# Patient Record
Sex: FEMALE | Race: BLACK OR AFRICAN AMERICAN | NOT HISPANIC OR LATINO | URBAN - METROPOLITAN AREA
[De-identification: names, ages, dates, MRNs, and addresses within clinical notes are randomized per-mention and may not be internally consistent; named-entity substitution may affect disease eponyms.]

---

## 2024-10-21 ENCOUNTER — OFFICE VISIT (OUTPATIENT)
Dept: URGENT CARE | Age: 39
End: 2024-10-21
Payer: COMMERCIAL

## 2024-10-21 ENCOUNTER — HOSPITAL ENCOUNTER (EMERGENCY)
Facility: HOSPITAL | Age: 39
Discharge: OTHER INSTITUTION | End: 2024-10-21
Payer: COMMERCIAL

## 2024-10-21 VITALS
OXYGEN SATURATION: 90 % | BODY MASS INDEX: 25.25 KG/M2 | TEMPERATURE: 98.2 F | WEIGHT: 147 LBS | RESPIRATION RATE: 16 BRPM | HEART RATE: 65 BPM | DIASTOLIC BLOOD PRESSURE: 76 MMHG | SYSTOLIC BLOOD PRESSURE: 123 MMHG

## 2024-10-21 VITALS
DIASTOLIC BLOOD PRESSURE: 85 MMHG | BODY MASS INDEX: 25.25 KG/M2 | TEMPERATURE: 97.5 F | OXYGEN SATURATION: 94 % | RESPIRATION RATE: 18 BRPM | WEIGHT: 147 LBS | SYSTOLIC BLOOD PRESSURE: 112 MMHG | HEART RATE: 78 BPM

## 2024-10-21 DIAGNOSIS — R10.11 RIGHT UPPER QUADRANT PAIN: Primary | ICD-10-CM

## 2024-10-21 LAB
POC APPEARANCE, URINE: CLEAR
POC BILIRUBIN, URINE: NEGATIVE
POC BLOOD, URINE: NEGATIVE
POC COLOR, URINE: ABNORMAL
POC GLUCOSE, URINE: NEGATIVE MG/DL
POC KETONES, URINE: NEGATIVE MG/DL
POC LEUKOCYTES, URINE: ABNORMAL
POC NITRITE,URINE: NEGATIVE
POC PH, URINE: 5.5 PH
POC PROTEIN, URINE: NEGATIVE MG/DL
POC SPECIFIC GRAVITY, URINE: 1.02
POC UROBILINOGEN, URINE: 1 EU/DL

## 2024-10-21 PROCEDURE — 99203 OFFICE O/P NEW LOW 30 MIN: CPT | Performed by: FAMILY MEDICINE

## 2024-10-21 PROCEDURE — 81003 URINALYSIS AUTO W/O SCOPE: CPT | Performed by: FAMILY MEDICINE

## 2024-10-21 PROCEDURE — 99281 EMR DPT VST MAYX REQ PHY/QHP: CPT

## 2024-10-21 RX ORDER — FONDAPARINUX SODIUM 7.5 MG/.6ML
7.5 INJECTION SUBCUTANEOUS DAILY
COMMUNITY

## 2024-10-21 ASSESSMENT — ENCOUNTER SYMPTOMS
BACK PAIN: 0
DIZZINESS: 0
BLOOD IN STOOL: 0
FEVER: 0
HEADACHES: 0
CONSTIPATION: 0
ARTHRALGIAS: 0
COUGH: 0
FLANK PAIN: 0
WHEEZING: 0
DYSURIA: 0
VOMITING: 0
VOICE CHANGE: 0
APNEA: 0
ABDOMINAL PAIN: 1
BRUISES/BLEEDS EASILY: 1
SINUS PRESSURE: 0
CHILLS: 0
NAUSEA: 0
DIARRHEA: 0
MYALGIAS: 0

## 2024-10-21 ASSESSMENT — PAIN DESCRIPTION - DESCRIPTORS: DESCRIPTORS: ACHING

## 2024-10-21 ASSESSMENT — PAIN - FUNCTIONAL ASSESSMENT: PAIN_FUNCTIONAL_ASSESSMENT: 0-10

## 2024-10-21 ASSESSMENT — PAIN DESCRIPTION - LOCATION: LOCATION: OTHER (COMMENT)

## 2024-10-21 ASSESSMENT — COLUMBIA-SUICIDE SEVERITY RATING SCALE - C-SSRS
2. HAVE YOU ACTUALLY HAD ANY THOUGHTS OF KILLING YOURSELF?: NO
1. IN THE PAST MONTH, HAVE YOU WISHED YOU WERE DEAD OR WISHED YOU COULD GO TO SLEEP AND NOT WAKE UP?: NO
6. HAVE YOU EVER DONE ANYTHING, STARTED TO DO ANYTHING, OR PREPARED TO DO ANYTHING TO END YOUR LIFE?: NO

## 2024-10-21 ASSESSMENT — PAIN DESCRIPTION - PAIN TYPE: TYPE: ACUTE PAIN

## 2024-10-21 ASSESSMENT — PAIN SCALES - GENERAL
PAINLEVEL_OUTOF10: 8
PAINLEVEL_OUTOF10: 7

## 2024-10-21 ASSESSMENT — PAIN DESCRIPTION - ORIENTATION: ORIENTATION: RIGHT

## 2024-10-21 NOTE — PATIENT INSTRUCTIONS
Based on clinical exam, patient's history, 39-year-old with complex chronic liver failure  medical condition including antiphospholipid syndrome on fondaparinux, with right upper quadrant pain needs evaluation in the emergency room with additional test and imaging studies.  I discussed with her follow-up with her hematologist, Dr. Motley of left a voicemail.  She can call the office at [phone] to schedule follow-up with Dr. Motley.

## 2024-10-21 NOTE — PROGRESS NOTES
Subjective   Patient ID: Gissel Harvey is a 39 y.o. female. They present today with a chief complaint of Illness (Rt side upper abd pain 1 week hx lupus) and Abdominal Pain.    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 Not seen by hepatology in past 3 years.       Illness  Associated symptoms: abdominal pain    Associated symptoms: no chest pain, no cough, no diarrhea, no fever, no headaches, no myalgias, no nausea, no vomiting and no wheezing    Abdominal Pain  Pertinent negatives include no arthralgias, constipation, diarrhea, dysuria, fever, headaches, myalgias, nausea or vomiting.       Past Medical History  Allergies as of 10/21/2024 - Reviewed 10/21/2024   Allergen Reaction Noted    Heparin Hives and Unknown 02/03/2008       (Not in a hospital admission)       No past medical history on file.    No past surgical history on file.     reports that she has been smoking cigarettes. She has never used smokeless tobacco.    Review of Systems  Review of Systems   Constitutional:  Negative for chills and fever.   HENT:  Negative for sinus pressure and voice change.    Respiratory:  Negative for apnea, cough and wheezing.    Cardiovascular:  Negative for chest pain.   Gastrointestinal:  Positive for abdominal pain. Negative for blood in stool, constipation, diarrhea, nausea and vomiting.   Genitourinary:  Negative for dysuria, flank pain and urgency.   Musculoskeletal:  Negative for arthralgias, back pain and myalgias.   Allergic/Immunologic: Positive for immunocompromised state.   Neurological:  Negative for dizziness and headaches.   Hematological:  Bruises/bleeds easily.                                   Objective    Vitals:    10/21/24 1325   BP: 123/76   Pulse: 65   Resp: 16   Temp: 36.8 °C (98.2 °F)   SpO2: 90%   Weight: 66.7 kg (147 lb)     No LMP recorded.    Physical Exam  Vitals and nursing note reviewed.   Constitutional:       General: She is not in acute distress.     Appearance: She is ill-appearing. She is not toxic-appearing or diaphoretic.   HENT:      Mouth/Throat:      Mouth: Mucous membranes are dry.   Eyes:      General: Scleral icterus present.   Cardiovascular:      Rate and Rhythm: Normal rate and regular rhythm.      Pulses: Normal pulses.      Heart sounds: Normal heart sounds.   Pulmonary:      Effort: Pulmonary effort is normal.      Breath sounds: Normal breath sounds.   Chest:      Chest wall: Tenderness (right lower costochondral junction in the anterior axilklary 10-12th rib) present.   Abdominal:      General: There is distension.      Tenderness: There is abdominal tenderness (engorged superficial veins abdomen, tender RUQ, no rigidity). There is no right CVA tenderness, guarding or rebound.   Musculoskeletal:         General: Normal range of motion.      Cervical back: No rigidity.   Lymphadenopathy:      Cervical: No cervical adenopathy.   Skin:     General: Skin is warm.      Findings: No lesion or rash.   Neurological:      Mental Status: She is alert and oriented to person, place, and time.   Psychiatric:         Mood and Affect: Mood normal.         Procedures    Point of Care Test & Imaging Results from this visit  Results for orders placed or performed in visit on 10/21/24   POCT UA Automated manually resulted   Result Value Ref Range    POC Color, Urine Orange (A) Straw, Yellow, Light-Yellow    POC Appearance, Urine Clear Clear    POC Glucose, Urine NEGATIVE NEGATIVE mg/dl    POC Bilirubin, Urine NEGATIVE NEGATIVE    POC Ketones, Urine NEGATIVE NEGATIVE mg/dl    POC Specific Gravity, Urine 1.025 1.005 - 1.035    POC Blood, Urine NEGATIVE NEGATIVE    POC PH, Urine 5.5 No  Reference Range Established PH    POC Protein, Urine NEGATIVE NEGATIVE, 30 (1+) mg/dl    POC Urobilinogen, Urine 1.0 0.2, 1.0 EU/DL    Poc Nitrite, Urine NEGATIVE NEGATIVE    POC Leukocytes, Urine TRACE (A) NEGATIVE      No results found.    Diagnostic study results (if any) were reviewed by Carlie Anaya.    Assessment/Plan   Allergies, medications, history, and pertinent labs/EKGs/Imaging reviewed by Carlie Anaya.     Medical Decision Making  Based on clinical exam, patient's history, 39-year-old with complex chronic liver failure  medical condition including antiphospholipid syndrome on fondaparinux, with right upper quadrant pain needs evaluation in the emergency room with additional test and imaging studies.  Her urinalysis without blood, trace selena. She's asymptomatic. I discussed with her follow-up with her hematologist, Dr. Motley of left a voicemail.  She can call the office at [phone] to schedule follow-up with Dr. Motley.  Report called to the ER.  Orders and Diagnoses  Diagnoses and all orders for this visit:  Right upper quadrant pain  -     POCT UA Automated manually resulted      Medical Admin Record      Patient disposition: ED    Electronically signed by Carlie Anaya  2:29 PM

## 2024-10-22 NOTE — ED TRIAGE NOTES
Pt here with c/o r flank pain was seen at  pt is in liver failure but this isnt like pt's normal pain.uc sent her here

## 2024-10-22 NOTE — ED TRIAGE NOTES
TRIAGE NOTE   I saw the patient as the Clinician in Triage and performed a brief history and physical exam, established acuity, and ordered appropriate tests to develop basic plan of care. Patient will be seen by an ANAOTLIY, resident and/or physician who will independently evaluate the patient. Please see subsequent provider notes for further details and disposition.     Brief HPI: In brief, Gissel Harvey is a 39 y.o. female that presents for right upper quadrant pain and ascites.  States that she has a history of liver failure and has had worsening pain over the last week and a half.  She has not been seeing anybody for her illness.    Focused Physical exam:   Regular rate and rhythm  Alert and oriented  Right upper quadrant tenderness on palpation with ascites    Plan/MDM:   After discussing plan of care with the patient and ordering lab work and IV patient states that she wants to leave at this time.  States that whenever she comes into the emergency department she gets admitted and at this time is not ready to be admitted.  States that she needs to go home tonight discourse and things away and will come back tomorrow for a workup.  Patient alert and oriented and able to make her own medical decisions.    Please see subsequent provider note for further details and disposition

## 2024-11-02 ENCOUNTER — HOSPITAL ENCOUNTER (EMERGENCY)
Facility: HOSPITAL | Age: 39
Discharge: HOME | End: 2024-11-02
Attending: EMERGENCY MEDICINE
Payer: COMMERCIAL

## 2024-11-02 VITALS
OXYGEN SATURATION: 94 % | HEIGHT: 64 IN | WEIGHT: 147 LBS | HEART RATE: 79 BPM | RESPIRATION RATE: 20 BRPM | BODY MASS INDEX: 25.1 KG/M2 | TEMPERATURE: 97.8 F | DIASTOLIC BLOOD PRESSURE: 86 MMHG | SYSTOLIC BLOOD PRESSURE: 124 MMHG

## 2024-11-02 PROCEDURE — 4500999001 HC ED NO CHARGE

## 2024-11-02 ASSESSMENT — PAIN - FUNCTIONAL ASSESSMENT: PAIN_FUNCTIONAL_ASSESSMENT: 0-10

## 2024-11-02 ASSESSMENT — PAIN SCALES - GENERAL: PAINLEVEL_OUTOF10: 0 - NO PAIN

## 2025-01-01 ENCOUNTER — APPOINTMENT (OUTPATIENT)
Dept: RADIOLOGY | Facility: HOSPITAL | Age: 40
End: 2025-01-01
Payer: COMMERCIAL

## 2025-01-01 ENCOUNTER — APPOINTMENT (OUTPATIENT)
Dept: CARDIOLOGY | Facility: HOSPITAL | Age: 40
End: 2025-01-01
Payer: COMMERCIAL

## 2025-01-01 ENCOUNTER — APPOINTMENT (OUTPATIENT)
Dept: NEUROLOGY | Facility: HOSPITAL | Age: 40
End: 2025-01-01
Payer: COMMERCIAL

## 2025-01-01 ENCOUNTER — APPOINTMENT (OUTPATIENT)
Dept: VASCULAR MEDICINE | Facility: HOSPITAL | Age: 40
End: 2025-01-01
Payer: COMMERCIAL

## 2025-01-01 PROCEDURE — 71045 X-RAY EXAM CHEST 1 VIEW: CPT

## 2025-01-01 PROCEDURE — 93325 DOPPLER ECHO COLOR FLOW MAPG: CPT | Performed by: INTERNAL MEDICINE

## 2025-01-01 PROCEDURE — 76705 ECHO EXAM OF ABDOMEN: CPT

## 2025-01-01 PROCEDURE — 93005 ELECTROCARDIOGRAM TRACING: CPT

## 2025-01-01 PROCEDURE — 93321 DOPPLER ECHO F-UP/LMTD STD: CPT | Performed by: INTERNAL MEDICINE

## 2025-01-01 PROCEDURE — 70553 MRI BRAIN STEM W/O & W/DYE: CPT

## 2025-01-01 PROCEDURE — 74177 CT ABD & PELVIS W/CONTRAST: CPT

## 2025-01-01 PROCEDURE — 71260 CT THORAX DX C+: CPT

## 2025-01-01 PROCEDURE — 70450 CT HEAD/BRAIN W/O DYE: CPT

## 2025-01-01 PROCEDURE — 71275 CT ANGIOGRAPHY CHEST: CPT

## 2025-01-01 PROCEDURE — C8924 2D TTE W OR W/O FOL W/CON,FU: HCPCS

## 2025-01-01 PROCEDURE — 93308 TTE F-UP OR LMTD: CPT | Performed by: INTERNAL MEDICINE

## 2025-03-03 ENCOUNTER — HOSPITAL ENCOUNTER (INPATIENT)
Facility: HOSPITAL | Age: 40
End: 2025-03-03
Attending: EMERGENCY MEDICINE | Admitting: INTERNAL MEDICINE
Payer: COMMERCIAL

## 2025-03-03 DIAGNOSIS — K72.90 LIVER FAILURE WITHOUT HEPATIC COMA, UNSPECIFIED CHRONICITY (MULTI): ICD-10-CM

## 2025-03-03 DIAGNOSIS — R09.02 HYPOXEMIA: Primary | ICD-10-CM

## 2025-03-03 DIAGNOSIS — Z51.5 HOSPICE CARE PATIENT: ICD-10-CM

## 2025-03-03 DIAGNOSIS — R06.03 ACUTE RESPIRATORY DISTRESS: ICD-10-CM

## 2025-03-03 DIAGNOSIS — E16.2 HYPOGLYCEMIA: ICD-10-CM

## 2025-03-03 DIAGNOSIS — J96.01 ACUTE RESPIRATORY FAILURE WITH HYPOXIA (MULTI): ICD-10-CM

## 2025-03-03 DIAGNOSIS — M79.89 LEFT ARM SWELLING: ICD-10-CM

## 2025-03-03 DIAGNOSIS — J18.9 MULTIFOCAL PNEUMONIA: ICD-10-CM

## 2025-03-03 DIAGNOSIS — N17.9 AKI (ACUTE KIDNEY INJURY) (CMS-HCC): ICD-10-CM

## 2025-03-03 DIAGNOSIS — E87.20 LACTIC ACIDOSIS: ICD-10-CM

## 2025-03-03 DIAGNOSIS — R57.9 SHOCK (MULTI): ICD-10-CM

## 2025-03-03 DIAGNOSIS — I95.9 HYPOTENSION, UNSPECIFIED HYPOTENSION TYPE: ICD-10-CM

## 2025-03-03 LAB
ALBUMIN SERPL BCP-MCNC: 3 G/DL (ref 3.4–5)
ALBUMIN SERPL BCP-MCNC: 3.2 G/DL (ref 3.4–5)
ALP SERPL-CCNC: 121 U/L (ref 33–110)
ALP SERPL-CCNC: 153 U/L (ref 33–110)
ALT SERPL W P-5'-P-CCNC: 23 U/L (ref 7–45)
ALT SERPL W P-5'-P-CCNC: 24 U/L (ref 7–45)
AMMONIA PLAS-SCNC: 130 UMOL/L (ref 16–53)
AMPHETAMINES UR QL SCN: ABNORMAL
ANION GAP BLDA CALCULATED.4IONS-SCNC: 13 MMO/L (ref 10–25)
ANION GAP BLDA CALCULATED.4IONS-SCNC: 13 MMO/L (ref 10–25)
ANION GAP BLDA CALCULATED.4IONS-SCNC: 15 MMO/L (ref 10–25)
ANION GAP BLDMV CALCULATED.4IONS-SCNC: 18 MMO/L (ref 10–25)
ANION GAP BLDV CALCULATED.4IONS-SCNC: 12 MMOL/L (ref 10–25)
ANION GAP BLDV CALCULATED.4IONS-SCNC: 14 MMOL/L (ref 10–25)
ANION GAP BLDV CALCULATED.4IONS-SCNC: 17 MMOL/L (ref 10–25)
ANION GAP BLDV CALCULATED.4IONS-SCNC: 20 MMOL/L (ref 10–25)
ANION GAP BLDV CALCULATED.4IONS-SCNC: 21 MMOL/L (ref 10–25)
ANION GAP SERPL CALC-SCNC: 21 MMOL/L (ref 10–20)
ANION GAP SERPL CALC-SCNC: 28 MMOL/L (ref 10–20)
APTT PPP: 35 SECONDS (ref 26–36)
APTT PPP: 45 SECONDS (ref 26–36)
AST SERPL W P-5'-P-CCNC: 52 U/L (ref 9–39)
AST SERPL W P-5'-P-CCNC: 56 U/L (ref 9–39)
B-OH-BUTYR SERPL-SCNC: 0.32 MMOL/L (ref 0.02–0.27)
BARBITURATES UR QL SCN: ABNORMAL
BASE EXCESS BLDA CALC-SCNC: -1.5 MMOL/L (ref -2–3)
BASE EXCESS BLDA CALC-SCNC: -2.6 MMOL/L (ref -2–3)
BASE EXCESS BLDA CALC-SCNC: -6.7 MMOL/L (ref -2–3)
BASE EXCESS BLDMV CALC-SCNC: -9.5 MMOL/L (ref -2–3)
BASE EXCESS BLDV CALC-SCNC: -2.7 MMOL/L (ref -2–3)
BASE EXCESS BLDV CALC-SCNC: -5 MMOL/L (ref -2–3)
BASE EXCESS BLDV CALC-SCNC: -7.3 MMOL/L (ref -2–3)
BASE EXCESS BLDV CALC-SCNC: -9.4 MMOL/L (ref -2–3)
BASE EXCESS BLDV CALC-SCNC: -9.6 MMOL/L (ref -2–3)
BASOPHILS # BLD MANUAL: 0.04 X10*3/UL (ref 0–0.1)
BASOPHILS NFR BLD MANUAL: 0.8 %
BENZODIAZ UR QL SCN: ABNORMAL
BILIRUB SERPL-MCNC: 6.3 MG/DL (ref 0–1.2)
BILIRUB SERPL-MCNC: 6.5 MG/DL (ref 0–1.2)
BNP SERPL-MCNC: 822 PG/ML (ref 0–99)
BODY TEMPERATURE: 37 DEGREES CELSIUS
BUN SERPL-MCNC: 35 MG/DL (ref 6–23)
BUN SERPL-MCNC: 39 MG/DL (ref 6–23)
BURR CELLS BLD QL SMEAR: ABNORMAL
BZE UR QL SCN: ABNORMAL
CA-I BLDA-SCNC: 0.99 MMOL/L (ref 1.1–1.33)
CA-I BLDA-SCNC: 1.03 MMOL/L (ref 1.1–1.33)
CA-I BLDA-SCNC: 1.15 MMOL/L (ref 1.1–1.33)
CA-I BLDMV-SCNC: 1.18 MMOL/L (ref 1.1–1.33)
CA-I BLDV-SCNC: 1.06 MMOL/L (ref 1.1–1.33)
CA-I BLDV-SCNC: 1.13 MMOL/L (ref 1.1–1.33)
CA-I BLDV-SCNC: 1.17 MMOL/L (ref 1.1–1.33)
CALCIUM SERPL-MCNC: 8 MG/DL (ref 8.6–10.6)
CALCIUM SERPL-MCNC: 8.7 MG/DL (ref 8.6–10.6)
CANNABINOIDS UR QL SCN: ABNORMAL
CARDIAC TROPONIN I PNL SERPL HS: 16 NG/L (ref 0–34)
CARDIAC TROPONIN I PNL SERPL HS: 40 NG/L (ref 0–34)
CHLORIDE BLD-SCNC: 106 MMOL/L (ref 98–107)
CHLORIDE BLDA-SCNC: 104 MMOL/L (ref 98–107)
CHLORIDE BLDA-SCNC: 104 MMOL/L (ref 98–107)
CHLORIDE BLDA-SCNC: 105 MMOL/L (ref 98–107)
CHLORIDE BLDV-SCNC: 104 MMOL/L (ref 98–107)
CHLORIDE BLDV-SCNC: 105 MMOL/L (ref 98–107)
CHLORIDE SERPL-SCNC: 101 MMOL/L (ref 98–107)
CHLORIDE SERPL-SCNC: 105 MMOL/L (ref 98–107)
CO2 SERPL-SCNC: 15 MMOL/L (ref 21–32)
CO2 SERPL-SCNC: 26 MMOL/L (ref 21–32)
CREAT SERPL-MCNC: 1.91 MG/DL (ref 0.5–1.05)
CREAT SERPL-MCNC: 1.96 MG/DL (ref 0.5–1.05)
EGFRCR SERPLBLD CKD-EPI 2021: 33 ML/MIN/1.73M*2
EGFRCR SERPLBLD CKD-EPI 2021: 34 ML/MIN/1.73M*2
EOSINOPHIL # BLD MANUAL: 0 X10*3/UL (ref 0–0.7)
EOSINOPHIL NFR BLD MANUAL: 0 %
ERYTHROCYTE [DISTWIDTH] IN BLOOD BY AUTOMATED COUNT: 15.3 % (ref 11.5–14.5)
ETHANOL SERPL-MCNC: <10 MG/DL
FENTANYL+NORFENTANYL UR QL SCN: ABNORMAL
FLUAV RNA RESP QL NAA+PROBE: NOT DETECTED
FLUBV RNA RESP QL NAA+PROBE: NOT DETECTED
GLUCOSE BLD MANUAL STRIP-MCNC: 102 MG/DL (ref 74–99)
GLUCOSE BLD-MCNC: 64 MG/DL (ref 74–99)
GLUCOSE BLDA-MCNC: 121 MG/DL (ref 74–99)
GLUCOSE BLDA-MCNC: 92 MG/DL (ref 74–99)
GLUCOSE BLDA-MCNC: 94 MG/DL (ref 74–99)
GLUCOSE BLDV-MCNC: 104 MG/DL (ref 74–99)
GLUCOSE BLDV-MCNC: 153 MG/DL (ref 74–99)
GLUCOSE BLDV-MCNC: 155 MG/DL (ref 74–99)
GLUCOSE BLDV-MCNC: 43 MG/DL (ref 74–99)
GLUCOSE BLDV-MCNC: 95 MG/DL (ref 74–99)
GLUCOSE SERPL-MCNC: 38 MG/DL (ref 74–99)
GLUCOSE SERPL-MCNC: 90 MG/DL (ref 74–99)
HCG UR QL IA.RAPID: NEGATIVE
HCO3 BLDA-SCNC: 21.9 MMOL/L (ref 22–26)
HCO3 BLDA-SCNC: 24.9 MMOL/L (ref 22–26)
HCO3 BLDA-SCNC: 25.2 MMOL/L (ref 22–26)
HCO3 BLDMV-SCNC: 18.4 MMOL/L (ref 22–26)
HCO3 BLDV-SCNC: 17.6 MMOL/L (ref 22–26)
HCO3 BLDV-SCNC: 18 MMOL/L (ref 22–26)
HCO3 BLDV-SCNC: 22.6 MMOL/L (ref 22–26)
HCO3 BLDV-SCNC: 23 MMOL/L (ref 22–26)
HCO3 BLDV-SCNC: 27 MMOL/L (ref 22–26)
HCT VFR BLD AUTO: 42.4 % (ref 36–46)
HCT VFR BLD EST: 37 % (ref 36–46)
HCT VFR BLD EST: 37 % (ref 36–46)
HCT VFR BLD EST: 39 % (ref 36–46)
HCT VFR BLD EST: 40 % (ref 36–46)
HCT VFR BLD EST: 41 % (ref 36–46)
HCT VFR BLD EST: 42 % (ref 36–46)
HCT VFR BLD EST: 42 % (ref 36–46)
HCT VFR BLD EST: 43 % (ref 36–46)
HCT VFR BLD EST: 44 % (ref 36–46)
HGB BLD-MCNC: 14.4 G/DL (ref 12–16)
HGB BLDA-MCNC: 13.3 G/DL (ref 12–16)
HGB BLDA-MCNC: 14.1 G/DL (ref 12–16)
HGB BLDA-MCNC: 14.5 G/DL (ref 12–16)
HGB BLDMV-MCNC: 12.4 G/DL (ref 12–16)
HGB BLDV-MCNC: 12.2 G/DL (ref 12–16)
HGB BLDV-MCNC: 13.1 G/DL (ref 12–16)
HGB BLDV-MCNC: 13.8 G/DL (ref 12–16)
HGB BLDV-MCNC: 14 G/DL (ref 12–16)
HGB BLDV-MCNC: 14.4 G/DL (ref 12–16)
HIV 1+2 AB+HIV1 P24 AG SERPL QL IA: NONREACTIVE
IMM GRANULOCYTES # BLD AUTO: 0.07 X10*3/UL (ref 0–0.7)
IMM GRANULOCYTES NFR BLD AUTO: 1.5 % (ref 0–0.9)
INHALED O2 CONCENTRATION: 100 %
INR PPP: 2.4 (ref 0.9–1.1)
INR PPP: 2.5 (ref 0.9–1.1)
LACTATE BLDA-SCNC: 5 MMOL/L (ref 0.4–2)
LACTATE BLDA-SCNC: 5.6 MMOL/L (ref 0.4–2)
LACTATE BLDA-SCNC: 6 MMOL/L (ref 0.4–2)
LACTATE BLDMV-SCNC: 7.8 MMOL/L (ref 0.4–2)
LACTATE BLDV-SCNC: 5 MMOL/L (ref 0.4–2)
LACTATE BLDV-SCNC: 6.6 MMOL/L (ref 0.4–2)
LACTATE BLDV-SCNC: 6.9 MMOL/L (ref 0.4–2)
LACTATE BLDV-SCNC: 8.6 MMOL/L (ref 0.4–2)
LACTATE BLDV-SCNC: 8.9 MMOL/L (ref 0.4–2)
LACTATE SERPL-SCNC: 5.7 MMOL/L (ref 0.4–2)
LACTATE SERPL-SCNC: 8.7 MMOL/L (ref 0.4–2)
LYMPHOCYTES # BLD MANUAL: 0.26 X10*3/UL (ref 1.2–4.8)
LYMPHOCYTES NFR BLD MANUAL: 5.4 %
MAGNESIUM SERPL-MCNC: 2.06 MG/DL (ref 1.6–2.4)
MCH RBC QN AUTO: 32.4 PG (ref 26–34)
MCHC RBC AUTO-ENTMCNC: 34 G/DL (ref 32–36)
MCV RBC AUTO: 96 FL (ref 80–100)
METAMYELOCYTES # BLD MANUAL: 0.15 X10*3/UL
METAMYELOCYTES NFR BLD MANUAL: 3.1 %
METHADONE UR QL SCN: ABNORMAL
MONOCYTES # BLD MANUAL: 0.26 X10*3/UL (ref 0.1–1)
MONOCYTES NFR BLD MANUAL: 5.4 %
MYELOCYTES # BLD MANUAL: 0.67 X10*3/UL
MYELOCYTES NFR BLD MANUAL: 14 %
NEUTROPHILS # BLD MANUAL: 3.42 X10*3/UL (ref 1.2–7.7)
NEUTS BAND # BLD MANUAL: 0.04 X10*3/UL (ref 0–0.7)
NEUTS BAND NFR BLD MANUAL: 0.8 %
NEUTS SEG # BLD MANUAL: 3.38 X10*3/UL (ref 1.2–7)
NEUTS SEG NFR BLD MANUAL: 70.5 %
NRBC BLD-RTO: 0 /100 WBCS (ref 0–0)
OPIATES UR QL SCN: ABNORMAL
OXYCODONE+OXYMORPHONE UR QL SCN: ABNORMAL
OXYHGB MFR BLDA: 84.7 % (ref 94–98)
OXYHGB MFR BLDA: 89.8 % (ref 94–98)
OXYHGB MFR BLDA: 93 % (ref 94–98)
OXYHGB MFR BLDMV: 89.2 % (ref 45–75)
OXYHGB MFR BLDV: 53.6 % (ref 45–75)
OXYHGB MFR BLDV: 55.7 % (ref 45–75)
OXYHGB MFR BLDV: 60.8 % (ref 45–75)
OXYHGB MFR BLDV: 72.4 % (ref 45–75)
OXYHGB MFR BLDV: 76.8 % (ref 45–75)
PCO2 BLDA: 49 MM HG (ref 38–42)
PCO2 BLDA: 53 MM HG (ref 38–42)
PCO2 BLDA: 56 MM HG (ref 38–42)
PCO2 BLDMV: 47 MM HG (ref 41–51)
PCO2 BLDV: 42 MM HG (ref 41–51)
PCO2 BLDV: 44 MM HG (ref 41–51)
PCO2 BLDV: 55 MM HG (ref 41–51)
PCO2 BLDV: 65 MM HG (ref 41–51)
PCO2 BLDV: 69 MM HG (ref 41–51)
PCP UR QL SCN: ABNORMAL
PH BLDA: 7.2 PH (ref 7.38–7.42)
PH BLDA: 7.28 PH (ref 7.38–7.42)
PH BLDA: 7.32 PH (ref 7.38–7.42)
PH BLDMV: 7.2 PH (ref 7.33–7.43)
PH BLDV: 7.15 PH (ref 7.33–7.43)
PH BLDV: 7.2 PH (ref 7.33–7.43)
PH BLDV: 7.22 PH (ref 7.33–7.43)
PH BLDV: 7.23 PH (ref 7.33–7.43)
PH BLDV: 7.23 PH (ref 7.33–7.43)
PHOSPHATE SERPL-MCNC: 5.7 MG/DL (ref 2.5–4.9)
PLATELET # BLD AUTO: 106 X10*3/UL (ref 150–450)
PO2 BLDA: 65 MM HG (ref 85–95)
PO2 BLDA: 67 MM HG (ref 85–95)
PO2 BLDA: 73 MM HG (ref 85–95)
PO2 BLDMV: 71 MM HG (ref 35–45)
PO2 BLDV: 39 MM HG (ref 35–45)
PO2 BLDV: 42 MM HG (ref 35–45)
PO2 BLDV: 46 MM HG (ref 35–45)
PO2 BLDV: 50 MM HG (ref 35–45)
PO2 BLDV: 54 MM HG (ref 35–45)
POLYCHROMASIA BLD QL SMEAR: ABNORMAL
POTASSIUM BLDA-SCNC: 3.3 MMOL/L (ref 3.5–5.3)
POTASSIUM BLDMV-SCNC: 3.3 MMOL/L (ref 3.5–5.3)
POTASSIUM BLDV-SCNC: 3 MMOL/L (ref 3.5–5.3)
POTASSIUM BLDV-SCNC: 3.2 MMOL/L (ref 3.5–5.3)
POTASSIUM BLDV-SCNC: 3.3 MMOL/L (ref 3.5–5.3)
POTASSIUM BLDV-SCNC: 3.4 MMOL/L (ref 3.5–5.3)
POTASSIUM BLDV-SCNC: 3.7 MMOL/L (ref 3.5–5.3)
POTASSIUM SERPL-SCNC: 3.5 MMOL/L (ref 3.5–5.3)
POTASSIUM SERPL-SCNC: 3.6 MMOL/L (ref 3.5–5.3)
PROT SERPL-MCNC: 6.6 G/DL (ref 6.4–8.2)
PROT SERPL-MCNC: 6.8 G/DL (ref 6.4–8.2)
PROTHROMBIN TIME: 26.5 SECONDS (ref 9.8–12.4)
PROTHROMBIN TIME: 27.9 SECONDS (ref 9.8–12.4)
RBC # BLD AUTO: 4.44 X10*6/UL (ref 4–5.2)
RBC MORPH BLD: ABNORMAL
RSV RNA RESP QL NAA+PROBE: NOT DETECTED
SAO2 % BLDA: 88 % (ref 94–100)
SAO2 % BLDA: 93 % (ref 94–100)
SAO2 % BLDA: 96 % (ref 94–100)
SAO2 % BLDMV: 92 % (ref 45–75)
SAO2 % BLDV: 56 % (ref 45–75)
SAO2 % BLDV: 57 % (ref 45–75)
SAO2 % BLDV: 63 % (ref 45–75)
SAO2 % BLDV: 75 % (ref 45–75)
SAO2 % BLDV: 79 % (ref 45–75)
SARS-COV-2 RNA RESP QL NAA+PROBE: NOT DETECTED
SODIUM BLDA-SCNC: 139 MMOL/L (ref 136–145)
SODIUM BLDMV-SCNC: 139 MMOL/L (ref 136–145)
SODIUM BLDV-SCNC: 139 MMOL/L (ref 136–145)
SODIUM BLDV-SCNC: 140 MMOL/L (ref 136–145)
SODIUM BLDV-SCNC: 141 MMOL/L (ref 136–145)
SODIUM SERPL-SCNC: 144 MMOL/L (ref 136–145)
SODIUM SERPL-SCNC: 144 MMOL/L (ref 136–145)
TOTAL CELLS COUNTED BLD: 129
TRIGL SERPL-MCNC: 84 MG/DL (ref 0–149)
TSH SERPL-ACNC: 1.55 MIU/L (ref 0.44–3.98)
WBC # BLD AUTO: 4.8 X10*3/UL (ref 4.4–11.3)

## 2025-03-03 PROCEDURE — 99285 EMERGENCY DEPT VISIT HI MDM: CPT | Mod: 25 | Performed by: EMERGENCY MEDICINE

## 2025-03-03 PROCEDURE — 2500000005 HC RX 250 GENERAL PHARMACY W/O HCPCS: Mod: SE

## 2025-03-03 PROCEDURE — 94002 VENT MGMT INPAT INIT DAY: CPT | Mod: CCI

## 2025-03-03 PROCEDURE — 84132 ASSAY OF SERUM POTASSIUM: CPT

## 2025-03-03 PROCEDURE — 85027 COMPLETE CBC AUTOMATED: CPT | Performed by: EMERGENCY MEDICINE

## 2025-03-03 PROCEDURE — 84484 ASSAY OF TROPONIN QUANT: CPT | Performed by: EMERGENCY MEDICINE

## 2025-03-03 PROCEDURE — 82435 ASSAY OF BLOOD CHLORIDE: CPT

## 2025-03-03 PROCEDURE — 87631 RESP VIRUS 3-5 TARGETS: CPT

## 2025-03-03 PROCEDURE — 74177 CT ABD & PELVIS W/CONTRAST: CPT | Performed by: RADIOLOGY

## 2025-03-03 PROCEDURE — 84484 ASSAY OF TROPONIN QUANT: CPT

## 2025-03-03 PROCEDURE — 85007 BL SMEAR W/DIFF WBC COUNT: CPT | Performed by: EMERGENCY MEDICINE

## 2025-03-03 PROCEDURE — 83880 ASSAY OF NATRIURETIC PEPTIDE: CPT

## 2025-03-03 PROCEDURE — 2500000001 HC RX 250 WO HCPCS SELF ADMINISTERED DRUGS (ALT 637 FOR MEDICARE OP): Mod: SE

## 2025-03-03 PROCEDURE — 85576 BLOOD PLATELET AGGREGATION: CPT

## 2025-03-03 PROCEDURE — 87798 DETECT AGENT NOS DNA AMP: CPT

## 2025-03-03 PROCEDURE — 31500 INSERT EMERGENCY AIRWAY: CPT | Mod: GC

## 2025-03-03 PROCEDURE — 76937 US GUIDE VASCULAR ACCESS: CPT | Performed by: EMERGENCY MEDICINE

## 2025-03-03 PROCEDURE — 82140 ASSAY OF AMMONIA: CPT

## 2025-03-03 PROCEDURE — 2500000004 HC RX 250 GENERAL PHARMACY W/ HCPCS (ALT 636 FOR OP/ED): Mod: SE | Performed by: EMERGENCY MEDICINE

## 2025-03-03 PROCEDURE — 36620 INSERTION CATHETER ARTERY: CPT

## 2025-03-03 PROCEDURE — 96361 HYDRATE IV INFUSION ADD-ON: CPT | Mod: 59

## 2025-03-03 PROCEDURE — 80307 DRUG TEST PRSMV CHEM ANLYZR: CPT | Performed by: EMERGENCY MEDICINE

## 2025-03-03 PROCEDURE — 2020000001 HC ICU ROOM DAILY

## 2025-03-03 PROCEDURE — 87632 RESP VIRUS 6-11 TARGETS: CPT

## 2025-03-03 PROCEDURE — 99291 CRITICAL CARE FIRST HOUR: CPT

## 2025-03-03 PROCEDURE — 2500000005 HC RX 250 GENERAL PHARMACY W/O HCPCS: Mod: SE | Performed by: EMERGENCY MEDICINE

## 2025-03-03 PROCEDURE — 36620 INSERTION CATHETER ARTERY: CPT | Mod: GC

## 2025-03-03 PROCEDURE — 80053 COMPREHEN METABOLIC PANEL: CPT

## 2025-03-03 PROCEDURE — 5A09357 ASSISTANCE WITH RESPIRATORY VENTILATION, LESS THAN 24 CONSECUTIVE HOURS, CONTINUOUS POSITIVE AIRWAY PRESSURE: ICD-10-PCS | Performed by: EMERGENCY MEDICINE

## 2025-03-03 PROCEDURE — 37799 UNLISTED PX VASCULAR SURGERY: CPT

## 2025-03-03 PROCEDURE — 85730 THROMBOPLASTIN TIME PARTIAL: CPT

## 2025-03-03 PROCEDURE — 2500000004 HC RX 250 GENERAL PHARMACY W/ HCPCS (ALT 636 FOR OP/ED): Mod: SE

## 2025-03-03 PROCEDURE — 31500 INSERT EMERGENCY AIRWAY: CPT | Performed by: EMERGENCY MEDICINE

## 2025-03-03 PROCEDURE — 84145 PROCALCITONIN (PCT): CPT

## 2025-03-03 PROCEDURE — 82077 ASSAY SPEC XCP UR&BREATH IA: CPT | Performed by: EMERGENCY MEDICINE

## 2025-03-03 PROCEDURE — 87389 HIV-1 AG W/HIV-1&-2 AB AG IA: CPT

## 2025-03-03 PROCEDURE — 85610 PROTHROMBIN TIME: CPT | Performed by: EMERGENCY MEDICINE

## 2025-03-03 PROCEDURE — 87186 SC STD MICRODIL/AGAR DIL: CPT

## 2025-03-03 PROCEDURE — 31500 INSERT EMERGENCY AIRWAY: CPT

## 2025-03-03 PROCEDURE — 86644 CMV ANTIBODY: CPT

## 2025-03-03 PROCEDURE — 71275 CT ANGIOGRAPHY CHEST: CPT | Performed by: RADIOLOGY

## 2025-03-03 PROCEDURE — 84132 ASSAY OF SERUM POTASSIUM: CPT | Performed by: EMERGENCY MEDICINE

## 2025-03-03 PROCEDURE — 99291 CRITICAL CARE FIRST HOUR: CPT | Mod: 25

## 2025-03-03 PROCEDURE — 82947 ASSAY GLUCOSE BLOOD QUANT: CPT

## 2025-03-03 PROCEDURE — 36556 INSERT NON-TUNNEL CV CATH: CPT

## 2025-03-03 PROCEDURE — 87899 AGENT NOS ASSAY W/OPTIC: CPT

## 2025-03-03 PROCEDURE — 99292 CRITICAL CARE ADDL 30 MIN: CPT | Performed by: EMERGENCY MEDICINE

## 2025-03-03 PROCEDURE — 85610 PROTHROMBIN TIME: CPT

## 2025-03-03 PROCEDURE — 87640 STAPH A DNA AMP PROBE: CPT

## 2025-03-03 PROCEDURE — 71045 X-RAY EXAM CHEST 1 VIEW: CPT | Performed by: RADIOLOGY

## 2025-03-03 PROCEDURE — 83605 ASSAY OF LACTIC ACID: CPT | Performed by: EMERGENCY MEDICINE

## 2025-03-03 PROCEDURE — 83880 ASSAY OF NATRIURETIC PEPTIDE: CPT | Performed by: EMERGENCY MEDICINE

## 2025-03-03 PROCEDURE — 87636 SARSCOV2 & INF A&B AMP PRB: CPT | Performed by: EMERGENCY MEDICINE

## 2025-03-03 PROCEDURE — 2550000001 HC RX 255 CONTRASTS: Mod: SE | Performed by: EMERGENCY MEDICINE

## 2025-03-03 PROCEDURE — 81025 URINE PREGNANCY TEST: CPT | Performed by: EMERGENCY MEDICINE

## 2025-03-03 PROCEDURE — 36415 COLL VENOUS BLD VENIPUNCTURE: CPT | Performed by: EMERGENCY MEDICINE

## 2025-03-03 PROCEDURE — 83735 ASSAY OF MAGNESIUM: CPT

## 2025-03-03 PROCEDURE — 84443 ASSAY THYROID STIM HORMONE: CPT | Performed by: EMERGENCY MEDICINE

## 2025-03-03 PROCEDURE — 96375 TX/PRO/DX INJ NEW DRUG ADDON: CPT | Mod: 59

## 2025-03-03 PROCEDURE — 84478 ASSAY OF TRIGLYCERIDES: CPT

## 2025-03-03 PROCEDURE — 93010 ELECTROCARDIOGRAM REPORT: CPT | Performed by: EMERGENCY MEDICINE

## 2025-03-03 PROCEDURE — 87040 BLOOD CULTURE FOR BACTERIA: CPT | Performed by: EMERGENCY MEDICINE

## 2025-03-03 PROCEDURE — 87077 CULTURE AEROBIC IDENTIFY: CPT

## 2025-03-03 PROCEDURE — 99292 CRITICAL CARE ADDL 30 MIN: CPT | Mod: 25

## 2025-03-03 PROCEDURE — 86645 CMV ANTIBODY IGM: CPT

## 2025-03-03 PROCEDURE — 71045 X-RAY EXAM CHEST 1 VIEW: CPT | Performed by: STUDENT IN AN ORGANIZED HEALTH CARE EDUCATION/TRAINING PROGRAM

## 2025-03-03 PROCEDURE — 87449 NOS EACH ORGANISM AG IA: CPT

## 2025-03-03 PROCEDURE — 82010 KETONE BODYS QUAN: CPT | Performed by: EMERGENCY MEDICINE

## 2025-03-03 PROCEDURE — 87641 MR-STAPH DNA AMP PROBE: CPT

## 2025-03-03 PROCEDURE — 83605 ASSAY OF LACTIC ACID: CPT

## 2025-03-03 PROCEDURE — 99291 CRITICAL CARE FIRST HOUR: CPT | Performed by: EMERGENCY MEDICINE

## 2025-03-03 PROCEDURE — 85730 THROMBOPLASTIN TIME PARTIAL: CPT | Performed by: EMERGENCY MEDICINE

## 2025-03-03 PROCEDURE — 5A1955Z RESPIRATORY VENTILATION, GREATER THAN 96 CONSECUTIVE HOURS: ICD-10-PCS

## 2025-03-03 PROCEDURE — 96365 THER/PROPH/DIAG IV INF INIT: CPT | Mod: 59

## 2025-03-03 PROCEDURE — 3E043XZ INTRODUCTION OF VASOPRESSOR INTO CENTRAL VEIN, PERCUTANEOUS APPROACH: ICD-10-PCS | Performed by: EMERGENCY MEDICINE

## 2025-03-03 PROCEDURE — 36620 INSERTION CATHETER ARTERY: CPT | Performed by: EMERGENCY MEDICINE

## 2025-03-03 PROCEDURE — 86663 EPSTEIN-BARR ANTIBODY: CPT

## 2025-03-03 PROCEDURE — 93010 ELECTROCARDIOGRAM REPORT: CPT | Performed by: INTERNAL MEDICINE

## 2025-03-03 PROCEDURE — 82436 ASSAY OF URINE CHLORIDE: CPT

## 2025-03-03 PROCEDURE — 82810 BLOOD GASES O2 SAT ONLY: CPT

## 2025-03-03 PROCEDURE — 94799 UNLISTED PULMONARY SVC/PX: CPT

## 2025-03-03 PROCEDURE — 85384 FIBRINOGEN ACTIVITY: CPT

## 2025-03-03 PROCEDURE — 96376 TX/PRO/DX INJ SAME DRUG ADON: CPT | Mod: 59

## 2025-03-03 PROCEDURE — 87637 SARSCOV2&INF A&B&RSV AMP PRB: CPT | Performed by: EMERGENCY MEDICINE

## 2025-03-03 PROCEDURE — 84133 ASSAY OF URINE POTASSIUM: CPT

## 2025-03-03 PROCEDURE — 84100 ASSAY OF PHOSPHORUS: CPT

## 2025-03-03 PROCEDURE — 36556 INSERT NON-TUNNEL CV CATH: CPT | Performed by: EMERGENCY MEDICINE

## 2025-03-03 PROCEDURE — 0BH17EZ INSERTION OF ENDOTRACHEAL AIRWAY INTO TRACHEA, VIA NATURAL OR ARTIFICIAL OPENING: ICD-10-PCS

## 2025-03-03 RX ORDER — MIDAZOLAM HYDROCHLORIDE 1 MG/ML
0-20 INJECTION, SOLUTION INTRAVENOUS CONTINUOUS
Status: DISCONTINUED | OUTPATIENT
Start: 2025-03-03 | End: 2025-03-04

## 2025-03-03 RX ORDER — PHENYLEPHRINE HCL IN 0.9% NACL 0.4MG/10ML
SYRINGE (ML) INTRAVENOUS
Status: DISPENSED
Start: 2025-03-03 | End: 2025-03-04

## 2025-03-03 RX ORDER — FENTANYL CITRATE-0.9 % NACL/PF 10 MCG/ML
0-300 PLASTIC BAG, INJECTION (ML) INTRAVENOUS CONTINUOUS
Status: DISCONTINUED | OUTPATIENT
Start: 2025-03-03 | End: 2025-03-03

## 2025-03-03 RX ORDER — DEXTROSE 50 % IN WATER (D50W) INTRAVENOUS SYRINGE
25
Status: DISPENSED | OUTPATIENT
Start: 2025-03-03

## 2025-03-03 RX ORDER — ROCURONIUM BROMIDE 10 MG/ML
INJECTION, SOLUTION INTRAVENOUS
Status: COMPLETED
Start: 2025-03-03 | End: 2025-03-03

## 2025-03-03 RX ORDER — NOREPINEPHRINE BITARTRATE/D5W 8 MG/250ML
0-.5 PLASTIC BAG, INJECTION (ML) INTRAVENOUS CONTINUOUS
Status: DISCONTINUED | OUTPATIENT
Start: 2025-03-03 | End: 2025-03-03

## 2025-03-03 RX ORDER — DEXAMETHASONE SODIUM PHOSPHATE 10 MG/ML
10 INJECTION INTRAMUSCULAR; INTRAVENOUS DAILY
Status: DISCONTINUED | OUTPATIENT
Start: 2025-03-14 | End: 2025-03-04

## 2025-03-03 RX ORDER — FENTANYL CITRATE 50 UG/ML
INJECTION, SOLUTION INTRAMUSCULAR; INTRAVENOUS
Status: COMPLETED
Start: 2025-03-03 | End: 2025-03-03

## 2025-03-03 RX ORDER — FENTANYL CITRATE-0.9 % NACL/PF 10 MCG/ML
0-300 PLASTIC BAG, INJECTION (ML) INTRAVENOUS CONTINUOUS
Status: DISCONTINUED | OUTPATIENT
Start: 2025-03-03 | End: 2025-03-04

## 2025-03-03 RX ORDER — DEXTROSE 50 % IN WATER (D50W) INTRAVENOUS SYRINGE
25 ONCE
Status: COMPLETED | OUTPATIENT
Start: 2025-03-03 | End: 2025-03-03

## 2025-03-03 RX ORDER — SODIUM BICARBONATE 1 MEQ/ML
50 SYRINGE (ML) INTRAVENOUS ONCE
Status: COMPLETED | OUTPATIENT
Start: 2025-03-03 | End: 2025-03-03

## 2025-03-03 RX ORDER — PHENYLEPHRINE HCL IN 0.9% NACL 0.4MG/10ML
120 SYRINGE (ML) INTRAVENOUS ONCE
Status: COMPLETED | OUTPATIENT
Start: 2025-03-03 | End: 2025-03-03

## 2025-03-03 RX ORDER — PROPOFOL 10 MG/ML
5-50 INJECTION, EMULSION INTRAVENOUS CONTINUOUS
Status: DISCONTINUED | OUTPATIENT
Start: 2025-03-03 | End: 2025-03-03

## 2025-03-03 RX ORDER — ACETAMINOPHEN 325 MG/1
650 TABLET ORAL EVERY 4 HOURS PRN
Status: DISPENSED | OUTPATIENT
Start: 2025-03-03

## 2025-03-03 RX ORDER — INSULIN LISPRO 100 [IU]/ML
0-5 INJECTION, SOLUTION INTRAVENOUS; SUBCUTANEOUS EVERY 4 HOURS
Status: DISPENSED | OUTPATIENT
Start: 2025-03-03

## 2025-03-03 RX ORDER — PROPOFOL 10 MG/ML
INJECTION, EMULSION INTRAVENOUS
Status: COMPLETED
Start: 2025-03-03 | End: 2025-03-03

## 2025-03-03 RX ORDER — CALCIUM CHLORIDE INJECTION 100 MG/ML
1 INJECTION, SOLUTION INTRAVENOUS ONCE
Status: DISCONTINUED | OUTPATIENT
Start: 2025-03-03 | End: 2025-03-03

## 2025-03-03 RX ORDER — NOREPINEPHRINE BITARTRATE 0.06 MG/ML
0-.5 INJECTION, SOLUTION INTRAVENOUS CONTINUOUS
Status: DISCONTINUED | OUTPATIENT
Start: 2025-03-03 | End: 2025-03-07

## 2025-03-03 RX ORDER — VANCOMYCIN HYDROCHLORIDE 500 MG/100ML
500 INJECTION, SOLUTION INTRAVENOUS ONCE
Status: COMPLETED | OUTPATIENT
Start: 2025-03-03 | End: 2025-03-03

## 2025-03-03 RX ORDER — PROPOFOL 10 MG/ML
INJECTION, EMULSION INTRAVENOUS
Status: DISCONTINUED
Start: 2025-03-03 | End: 2025-03-03 | Stop reason: WASHOUT

## 2025-03-03 RX ORDER — MAGNESIUM SULFATE HEPTAHYDRATE 40 MG/ML
2 INJECTION, SOLUTION INTRAVENOUS ONCE
Status: COMPLETED | OUTPATIENT
Start: 2025-03-03 | End: 2025-03-03

## 2025-03-03 RX ORDER — NOREPINEPHRINE BITARTRATE/D5W 8 MG/250ML
0-.2 PLASTIC BAG, INJECTION (ML) INTRAVENOUS CONTINUOUS
Status: DISCONTINUED | OUTPATIENT
Start: 2025-03-03 | End: 2025-03-03

## 2025-03-03 RX ORDER — DEXTROSE 50 % IN WATER (D50W) INTRAVENOUS SYRINGE
Status: COMPLETED
Start: 2025-03-03 | End: 2025-03-03

## 2025-03-03 RX ORDER — INDOMETHACIN 25 MG/1
CAPSULE ORAL
Status: COMPLETED
Start: 2025-03-03 | End: 2025-03-03

## 2025-03-03 RX ORDER — PHENYLEPHRINE HCL IN 0.9% NACL 0.4MG/10ML
SYRINGE (ML) INTRAVENOUS
Status: COMPLETED
Start: 2025-03-03 | End: 2025-03-03

## 2025-03-03 RX ORDER — ROCURONIUM BROMIDE 10 MG/ML
INJECTION, SOLUTION INTRAVENOUS
Status: DISCONTINUED
Start: 2025-03-03 | End: 2025-03-03 | Stop reason: WASHOUT

## 2025-03-03 RX ORDER — MIDAZOLAM HYDROCHLORIDE 1 MG/ML
INJECTION, SOLUTION INTRAVENOUS
Status: COMPLETED
Start: 2025-03-03 | End: 2025-03-03

## 2025-03-03 RX ORDER — EPINEPHRINE 1 MG/ML
INJECTION INTRAMUSCULAR; INTRAVENOUS; SUBCUTANEOUS
Status: DISPENSED
Start: 2025-03-03 | End: 2025-03-04

## 2025-03-03 RX ORDER — DIGOXIN 0.25 MG/ML
500 INJECTION INTRAMUSCULAR; INTRAVENOUS ONCE
Status: COMPLETED | OUTPATIENT
Start: 2025-03-03 | End: 2025-03-03

## 2025-03-03 RX ORDER — FENTANYL CITRATE 50 UG/ML
50 INJECTION, SOLUTION INTRAMUSCULAR; INTRAVENOUS ONCE
Status: COMPLETED | OUTPATIENT
Start: 2025-03-03 | End: 2025-03-03

## 2025-03-03 RX ORDER — ESOMEPRAZOLE MAGNESIUM 40 MG/1
40 GRANULE, DELAYED RELEASE ORAL
Status: DISPENSED | OUTPATIENT
Start: 2025-03-04

## 2025-03-03 RX ORDER — DEXTROSE 50 % IN WATER (D50W) INTRAVENOUS SYRINGE
12.5
Status: ACTIVE | OUTPATIENT
Start: 2025-03-03

## 2025-03-03 RX ORDER — ROCURONIUM BROMIDE 10 MG/ML
65 INJECTION, SOLUTION INTRAVENOUS ONCE
Status: COMPLETED | OUTPATIENT
Start: 2025-03-03 | End: 2025-03-03

## 2025-03-03 RX ORDER — LACTULOSE 10 G/15ML
20 SOLUTION ORAL 3 TIMES DAILY
Status: DISCONTINUED | OUTPATIENT
Start: 2025-03-04 | End: 2025-03-08

## 2025-03-03 RX ORDER — BIVALIRUDIN 250 MG/5ML
.05-.49 INJECTION, POWDER, LYOPHILIZED, FOR SOLUTION INTRAVENOUS CONTINUOUS
Status: DISPENSED | OUTPATIENT
Start: 2025-03-03

## 2025-03-03 RX ORDER — PHENYLEPHRINE 10 MG/250 ML(40 MCG/ML)IN 0.9 % SOD.CHLORIDE INTRAVENOUS
0-2 CONTINUOUS
Status: DISCONTINUED | OUTPATIENT
Start: 2025-03-03 | End: 2025-03-04

## 2025-03-03 RX ORDER — PANTOPRAZOLE SODIUM 40 MG/10ML
40 INJECTION, POWDER, LYOPHILIZED, FOR SOLUTION INTRAVENOUS
Status: DISPENSED | OUTPATIENT
Start: 2025-03-04

## 2025-03-03 RX ORDER — NOREPINEPHRINE BITARTRATE/D5W 8 MG/250ML
PLASTIC BAG, INJECTION (ML) INTRAVENOUS
Status: COMPLETED
Start: 2025-03-03 | End: 2025-03-03

## 2025-03-03 RX ORDER — ACETAMINOPHEN 160 MG/5ML
650 SOLUTION ORAL EVERY 4 HOURS PRN
Status: DISPENSED | OUTPATIENT
Start: 2025-03-03

## 2025-03-03 RX ORDER — PANTOPRAZOLE SODIUM 40 MG/1
40 TABLET, DELAYED RELEASE ORAL
Status: ACTIVE | OUTPATIENT
Start: 2025-03-04

## 2025-03-03 RX ORDER — VANCOMYCIN HYDROCHLORIDE 1 G/20ML
INJECTION, POWDER, LYOPHILIZED, FOR SOLUTION INTRAVENOUS DAILY PRN
Status: DISCONTINUED | OUTPATIENT
Start: 2025-03-03 | End: 2025-03-06

## 2025-03-03 RX ORDER — THIAMINE HYDROCHLORIDE 100 MG/ML
500 INJECTION, SOLUTION INTRAMUSCULAR; INTRAVENOUS ONCE
Status: COMPLETED | OUTPATIENT
Start: 2025-03-03 | End: 2025-03-03

## 2025-03-03 RX ORDER — ALBUTEROL SULFATE 0.83 MG/ML
5 SOLUTION RESPIRATORY (INHALATION) ONCE
Status: DISCONTINUED | OUTPATIENT
Start: 2025-03-03 | End: 2025-03-04

## 2025-03-03 RX ORDER — POLYETHYLENE GLYCOL 3350 17 G/17G
17 POWDER, FOR SOLUTION ORAL DAILY
Status: DISCONTINUED | OUTPATIENT
Start: 2025-03-04 | End: 2025-03-03

## 2025-03-03 RX ORDER — VANCOMYCIN HYDROCHLORIDE 1 G/200ML
1000 INJECTION, SOLUTION INTRAVENOUS ONCE
Status: COMPLETED | OUTPATIENT
Start: 2025-03-03 | End: 2025-03-03

## 2025-03-03 RX ORDER — MEROPENEM 1 G/1
2 INJECTION, POWDER, FOR SOLUTION INTRAVENOUS EVERY 12 HOURS
Status: DISCONTINUED | OUTPATIENT
Start: 2025-03-03 | End: 2025-03-08

## 2025-03-03 RX ADMIN — SODIUM BICARBONATE 50 MEQ: 84 INJECTION, SOLUTION INTRAVENOUS at 17:23

## 2025-03-03 RX ADMIN — FENTANYL CITRATE 50 MCG: 50 INJECTION, SOLUTION INTRAMUSCULAR; INTRAVENOUS at 17:40

## 2025-03-03 RX ADMIN — Medication 120 MCG: at 17:39

## 2025-03-03 RX ADMIN — SODIUM BICARBONATE 50 MEQ: 84 INJECTION INTRAVENOUS at 17:25

## 2025-03-03 RX ADMIN — Medication 80 PERCENT: at 16:00

## 2025-03-03 RX ADMIN — Medication 50 MEQ: at 16:19

## 2025-03-03 RX ADMIN — DEXTROSE 50 % IN WATER (D50W) INTRAVENOUS SYRINGE 25 G: at 16:20

## 2025-03-03 RX ADMIN — SODIUM CHLORIDE, POTASSIUM CHLORIDE, SODIUM LACTATE AND CALCIUM CHLORIDE 1000 ML: 600; 310; 30; 20 INJECTION, SOLUTION INTRAVENOUS at 22:18

## 2025-03-03 RX ADMIN — MEROPENEM 2 G: 1 INJECTION INTRAVENOUS at 23:43

## 2025-03-03 RX ADMIN — Medication 100 PERCENT: at 19:39

## 2025-03-03 RX ADMIN — HYDROCORTISONE SODIUM SUCCINATE 50 MG: 100 INJECTION, POWDER, FOR SOLUTION INTRAMUSCULAR; INTRAVENOUS at 18:00

## 2025-03-03 RX ADMIN — Medication 50 MCG/HR: at 17:26

## 2025-03-03 RX ADMIN — ROCURONIUM BROMIDE 65 MG: 10 INJECTION, SOLUTION INTRAVENOUS at 17:00

## 2025-03-03 RX ADMIN — Medication 100 PERCENT: at 19:28

## 2025-03-03 RX ADMIN — SODIUM BICARBONATE 50 MEQ: 84 INJECTION, SOLUTION INTRAVENOUS at 16:19

## 2025-03-03 RX ADMIN — CISATRACURIUM BESYLATE 37.5 MG/HR: 10 INJECTION INTRAVENOUS at 23:06

## 2025-03-03 RX ADMIN — MIDAZOLAM HYDROCHLORIDE 1 MG/HR: 1 INJECTION, SOLUTION INTRAVENOUS at 22:08

## 2025-03-03 RX ADMIN — Medication 1.5 MG/KG/HR: at 22:04

## 2025-03-03 RX ADMIN — SODIUM BICARBONATE 50 MEQ: 84 INJECTION, SOLUTION INTRAVENOUS at 18:00

## 2025-03-03 RX ADMIN — IOHEXOL 90 ML: 350 INJECTION, SOLUTION INTRAVENOUS at 15:29

## 2025-03-03 RX ADMIN — NOREPINEPHRINE BITARTRATE 0.01 MCG/KG/MIN: 8 INJECTION, SOLUTION INTRAVENOUS at 17:00

## 2025-03-03 RX ADMIN — ACETAMINOPHEN 650 MG: 325 TABLET ORAL at 23:03

## 2025-03-03 RX ADMIN — PIPERACILLIN SODIUM AND TAZOBACTAM SODIUM 4.5 G: 4; .5 INJECTION, SOLUTION INTRAVENOUS at 17:43

## 2025-03-03 RX ADMIN — SODIUM CHLORIDE 1000 ML: 0.9 INJECTION, SOLUTION INTRAVENOUS at 16:21

## 2025-03-03 RX ADMIN — VANCOMYCIN HYDROCHLORIDE 500 MG: 500 INJECTION, SOLUTION INTRAVENOUS at 20:30

## 2025-03-03 RX ADMIN — DIGOXIN 500 MCG: 0.25 INJECTION INTRAMUSCULAR; INTRAVENOUS at 18:50

## 2025-03-03 RX ADMIN — Medication 250 MCG/HR: at 22:03

## 2025-03-03 RX ADMIN — ROCURONIUM BROMIDE 65 MG: 10 INJECTION INTRAVENOUS at 17:00

## 2025-03-03 RX ADMIN — Medication 0.01 MCG/KG/MIN: at 17:00

## 2025-03-03 RX ADMIN — NOREPINEPHRINE BITARTRATE 0.35 MCG/KG/MIN: 0.06 INJECTION, SOLUTION INTRAVENOUS at 23:12

## 2025-03-03 RX ADMIN — Medication 50 MEQ: at 17:23

## 2025-03-03 RX ADMIN — DEXTROSE MONOHYDRATE 25 G: 25 INJECTION, SOLUTION INTRAVENOUS at 16:15

## 2025-03-03 RX ADMIN — VASOPRESSIN 0.03 UNITS/MIN: 0.2 INJECTION INTRAVENOUS at 19:32

## 2025-03-03 RX ADMIN — SODIUM CHLORIDE, POTASSIUM CHLORIDE, SODIUM LACTATE AND CALCIUM CHLORIDE 500 ML: 600; 310; 30; 20 INJECTION, SOLUTION INTRAVENOUS at 23:50

## 2025-03-03 RX ADMIN — ANGIOTENSIN II 20 NG/KG/MIN: 2.5 INJECTION INTRAVENOUS at 23:02

## 2025-03-03 RX ADMIN — THIAMINE HYDROCHLORIDE 500 MG: 100 INJECTION, SOLUTION INTRAMUSCULAR; INTRAVENOUS at 19:45

## 2025-03-03 RX ADMIN — NOREPINEPHRINE BITARTRATE 0.35 MCG/KG/MIN: 8 INJECTION, SOLUTION INTRAVENOUS at 22:15

## 2025-03-03 RX ADMIN — VASOPRESSIN 0.06 UNITS/MIN: 0.2 INJECTION INTRAVENOUS at 22:16

## 2025-03-03 RX ADMIN — VANCOMYCIN HYDROCHLORIDE 1000 MG: 1 INJECTION, SOLUTION INTRAVENOUS at 17:43

## 2025-03-03 RX ADMIN — DEXTROSE MONOHYDRATE 25 G: 25 INJECTION, SOLUTION INTRAVENOUS at 16:20

## 2025-03-03 RX ADMIN — Medication 100 MG: at 17:15

## 2025-03-03 RX ADMIN — PROPOFOL 25 MCG/KG/MIN: 10 INJECTION, EMULSION INTRAVENOUS at 17:26

## 2025-03-03 RX ADMIN — BIVALIRUDIN 0.05 MG/KG/HR: 250 INJECTION INTRACAVERNOUS at 23:42

## 2025-03-03 RX ADMIN — AZITHROMYCIN 500 MG: 500 INJECTION, POWDER, LYOPHILIZED, FOR SOLUTION INTRAVENOUS at 18:59

## 2025-03-03 RX ADMIN — Medication 100 PERCENT: at 17:30

## 2025-03-03 RX ADMIN — Medication 0.5 MG/KG/HR: at 19:59

## 2025-03-03 RX ADMIN — MAGNESIUM SULFATE HEPTAHYDRATE 2 G: 40 INJECTION, SOLUTION INTRAVENOUS at 18:50

## 2025-03-03 RX ADMIN — EPINEPHRINE 0.1 MCG/KG/MIN: 1 INJECTION INTRAMUSCULAR; INTRAVENOUS; SUBCUTANEOUS at 22:17

## 2025-03-03 SDOH — SOCIAL STABILITY: SOCIAL INSECURITY: ABUSE: ADULT

## 2025-03-03 SDOH — SOCIAL STABILITY: SOCIAL INSECURITY: WERE YOU ABLE TO COMPLETE ALL THE BEHAVIORAL HEALTH SCREENINGS?: NO

## 2025-03-03 SDOH — SOCIAL STABILITY: SOCIAL INSECURITY: DOES ANYONE TRY TO KEEP YOU FROM HAVING/CONTACTING OTHER FRIENDS OR DOING THINGS OUTSIDE YOUR HOME?: UNABLE TO ASSESS

## 2025-03-03 SDOH — SOCIAL STABILITY: SOCIAL INSECURITY: DO YOU FEEL UNSAFE GOING BACK TO THE PLACE WHERE YOU ARE LIVING?: UNABLE TO ASSESS

## 2025-03-03 SDOH — SOCIAL STABILITY: SOCIAL INSECURITY: HAS ANYONE EVER THREATENED TO HURT YOUR FAMILY OR YOUR PETS?: UNABLE TO ASSESS

## 2025-03-03 SDOH — SOCIAL STABILITY: SOCIAL INSECURITY: DO YOU FEEL ANYONE HAS EXPLOITED OR TAKEN ADVANTAGE OF YOU FINANCIALLY OR OF YOUR PERSONAL PROPERTY?: UNABLE TO ASSESS

## 2025-03-03 SDOH — SOCIAL STABILITY: SOCIAL INSECURITY: HAVE YOU HAD THOUGHTS OF HARMING ANYONE ELSE?: UNABLE TO ASSESS

## 2025-03-03 SDOH — SOCIAL STABILITY: SOCIAL INSECURITY: ARE YOU OR HAVE YOU BEEN THREATENED OR ABUSED PHYSICALLY, EMOTIONALLY, OR SEXUALLY BY ANYONE?: UNABLE TO ASSESS

## 2025-03-03 SDOH — SOCIAL STABILITY: SOCIAL INSECURITY: ARE THERE ANY APPARENT SIGNS OF INJURIES/BEHAVIORS THAT COULD BE RELATED TO ABUSE/NEGLECT?: UNABLE TO ASSESS

## 2025-03-03 SDOH — SOCIAL STABILITY: SOCIAL INSECURITY: HAVE YOU HAD ANY THOUGHTS OF HARMING ANYONE ELSE?: UNABLE TO ASSESS

## 2025-03-03 ASSESSMENT — COGNITIVE AND FUNCTIONAL STATUS - GENERAL: PATIENT BASELINE BEDBOUND: UNABLE TO ASSESS AT THIS TIME

## 2025-03-03 ASSESSMENT — LIFESTYLE VARIABLES
SKIP TO QUESTIONS 9-10: 0
HOW OFTEN DO YOU HAVE A DRINK CONTAINING ALCOHOL: PATIENT UNABLE TO ANSWER
AUDIT-C TOTAL SCORE: -1
HOW OFTEN DO YOU HAVE 6 OR MORE DRINKS ON ONE OCCASION: PATIENT UNABLE TO ANSWER
HOW MANY STANDARD DRINKS CONTAINING ALCOHOL DO YOU HAVE ON A TYPICAL DAY: PATIENT UNABLE TO ANSWER
AUDIT-C TOTAL SCORE: -1

## 2025-03-03 ASSESSMENT — ACTIVITIES OF DAILY LIVING (ADL)
GROOMING: UNABLE TO ASSESS
LACK_OF_TRANSPORTATION: PATIENT UNABLE TO ANSWER
BATHING: UNABLE TO ASSESS
DRESSING YOURSELF: UNABLE TO ASSESS
JUDGMENT_ADEQUATE_SAFELY_COMPLETE_DAILY_ACTIVITIES: UNABLE TO ASSESS
WALKS IN HOME: UNABLE TO ASSESS
HEARING - RIGHT EAR: UNABLE TO ASSESS
FEEDING YOURSELF: UNABLE TO ASSESS
TOILETING: UNABLE TO ASSESS
HEARING - LEFT EAR: UNABLE TO ASSESS
PATIENT'S MEMORY ADEQUATE TO SAFELY COMPLETE DAILY ACTIVITIES?: UNABLE TO ASSESS
ADEQUATE_TO_COMPLETE_ADL: UNABLE TO ASSESS

## 2025-03-03 ASSESSMENT — PATIENT HEALTH QUESTIONNAIRE - PHQ9
SUM OF ALL RESPONSES TO PHQ9 QUESTIONS 1 & 2: 0
2. FEELING DOWN, DEPRESSED OR HOPELESS: NOT AT ALL
1. LITTLE INTEREST OR PLEASURE IN DOING THINGS: NOT AT ALL

## 2025-03-03 NOTE — ED TRIAGE NOTES
39F brought in by nikhil Ems for SOB  Hx of lupus, clotting disorder, liver/cirrhosis,   on blood thinners. Chest pain started approx yesterday.   HR in 140s in squad

## 2025-03-03 NOTE — ED PROVIDER NOTES
CC: Shortness of Breath     History provided by: Patient  Limitations to History: Respiratory Distress    HPI:    Patient is a 39-year-old female with a PMH of cirrhosis with portal hypertension s/p TIPS, Budd-Chiari, chronic IVC occlusion, antiphospholipid syndrome, previous pulmonary embolism not compliant with anticoagulation, and history of HIT who presents to the emergency department via EMS for chief complaint of shortness of breath and chest pain.  She reports 2 days of flulike symptoms and states they became extremely short of breath with pleuritic chest pain therefore she called EMS and presented to the emergency department.    External Records Reviewed: Previous ED records, inpatient records, and outpatient records  ???????????????????????????????????????????????????????????????  Triage Vitals:  T 37.8 °C (100 °F)  HR (!) 146  BP 85/57  RR (!) 45  O2 (!) 86 % Supplemental oxygen    Physical Exam  Constitutional:       General: She is awake. She is in acute distress.      Appearance: She is ill-appearing, toxic-appearing and diaphoretic.   HENT:      Head: Normocephalic and atraumatic.   Eyes:      Extraocular Movements: Extraocular movements intact.   Cardiovascular:      Rate and Rhythm: Tachycardia present.      Pulses:           Radial pulses are 2+ on the right side and 2+ on the left side.        Dorsalis pedis pulses are 2+ on the right side and 2+ on the left side.      Heart sounds: Normal heart sounds, S1 normal and S2 normal. Heart sounds not distant. No murmur heard.     No friction rub.   Pulmonary:      Effort: Tachypnea, accessory muscle usage and respiratory distress present.      Comments: Coarse and rhonchorous breath sounds throughout bilaterally.  Abdominal:      General: Abdomen is flat. There is distension.      Palpations: Abdomen is soft.      Tenderness: There is abdominal tenderness in the epigastric area.      Comments: Engorged caput medusa over abdomen.   Musculoskeletal:       Right lower leg: No edema.      Left lower leg: No edema.   Skin:     General: Skin is warm.      Capillary Refill: Capillary refill takes more than 3 seconds.   Neurological:      Mental Status: She is alert and oriented to person, place, and time.      GCS: GCS eye subscore is 4. GCS verbal subscore is 5. GCS motor subscore is 6.      Comments: Moving all extremities against gravity.  Answering questions appropriately given respiratory distress.   Psychiatric:         Behavior: Behavior is cooperative.        ???????????????????????????????????????????????????????????????  ED Course/Treatment/Medical Decision Making    Independent Interpretation of Studies:  I independently interpreted: EKG, CXR, CT angio for PE, CT abdomen pelvis    Social Determinants Limiting Care:  None identified         ED Course:  ED Course as of 03/06/25 1207   Mon Mar 03, 2025   1622 Senior resident attestation: This is a 39-year-old female with past medical history of hypercoagulability and cirrhosis who presents to the emergency department with 1 day of chest pain and shortness of breath.  She presents in extremis.  She is hypoxic, tachycardic and tachypneic as well as hypotensive.  Initial POCUS shows a dilated right side heart however no obvious D sign, no effusion.  On exam she has significantly distended abdomen with prominent abdominal blood vessels consistent with cirrhosis.  She appears uncomfortable and in distress.  She was initially escalated to BiPAP and did have good improvement in her oxygenation.  X-ray shows bilateral infiltrates in the bases.  We are initially concerned for massive PE given noncompliance with anticoagulation and hypercoagulable state with possible lung infarction and she was rushed to the CT scanner for PE as well as CT abdomen and pelvis.  On our review did not show any massive PE.  She continued with IV antibiotics as well as IV fluids to cover for sepsis.  Also on the differential at this time is  ARDS.  1 hour after BiPAP administration, patient's pH dropped to 7.20, bicarb of 18 and she remains significantly tachypneic and continues to have increased work of breathing.  We become concerned that she will not tolerate BiPAP much longer.  We elected to intubate at this time.  Currently awaiting CT PE and CT abdomen and pelvis results.  Have lower suspicion at this time for cardiac cause however troponins are pending.  EKG shows sinus tachycardia.  She has no history of heart failure.  She is on a low-dose Levophed infusion to maintain blood pressures.  She was supplemented with dextrose and sodium bicarb prior to intubation. [VM]   1805 EKG interpretation by me: 14: 53: Rate is 137 bpm, regular rhythm, normal axis, NE interval 134, QRS 88, QTc 537, PVCs, no evidence of ST segment elevations or depressions, no pattern T wave abnormalities.  Sinus tachycardia. [VM]      ED Course User Index  [VM] William Vines DO         Diagnoses as of 03/06/25 1207   Hypoxemia   Acute respiratory failure with hypoxia (Multi)   Hypotension, unspecified hypotension type   Liver failure without hepatic coma, unspecified chronicity (Multi)   Multifocal pneumonia   Acute respiratory distress   Hypoglycemia   Lactic acidosis   SCOUT (acute kidney injury) (CMS-HCC)       MDM:    Patient is a 39-year-old female with above PMH who presents to the emergency department for chief complaint of shortness of breath and chest pain.  Upon arrival to the emergency department the patient is an extremis.  She is tachycardic to the 140s, tachypneic, hypoxic in the 80s on 15 L nonrebreather, and hypotensive with maps 60-65.  Patient was placed on BiPAP given failure of oxygenation/ventilation on nonrebreather.  Initial EKG is remarkable for sinus tachycardia with no evidence of ischemia as dictated above.  Bedside point-of-care ultrasound is remarkable for hyperdynamic EF, dilated right ventricle, no obvious D sign, no pericardial effusion.   Bedside x-ray is remarkable for bibasilar infiltrates concerning for pneumonia versus pulmonary infarcts.  Initial VBG is remarkable for pH 7.23, pCO2 42, glucose 43, and lactate 8.6.  The patient was given 1 amp of D50.  At this time we are concerned for massive pulmonary embolism therefore the patient was taken to CT for CT angio of the chest and CT abdomen pelvis.  Upon my review of CT angio of the chest there is no obvious massive PE.  Given concern for infectious etiology the patient will be started on vancomycin, Zosyn, and she will be given lactated Ringer's for resuscitation.  Patient is extremely tachypneic on BiPAP with max saturations of 92% on 100% FiO2.  At this time the decision was made to intubate the patient for due to oxygenation/ventilation failure.  2 A of bicarb were given due to patient's acidosis.  The patient was started on low-dose Levophed drip to maintain blood pressure.  The patient was successfully intubated please see separate procedure note for details.  Postintubation the patient required high PEEP pressures to maintain oxygenation.  CT abdomen pelvis is without acute intra-abdominal abnormality.  CT angio of the chest is remarkable for extensive multifocal pneumonia.  Lactic acidosis is downtrending to 5.7.  Patient remains hypoxic on the ventilator with saturations 88-92% with high PEEP and high pressures.  There are concerns for ARDS at this time.  MICU attending was engaged for admission.  It is their recommendation that we start vasopressin in addition to Levophed, maintain sedation to a RASS of -4 to -5, and start Nimbex for paralyzation given concerns for acute respiratory distress syndrome.  Left-sided IJ central line was placed as well as the arterial line please see separate procedure note for details.  The patient was admitted to the medical ICU in critical condition for further management.    Impression:  Hypoxemia  Acute respiratory failure with  hypoxia  Hypotension  Liver failure without hepatic coma  Multifocal pneumonia    Disposition:  Admit to medical ICU    Edward Garay DO   Emergency Medicine, PGY-2      Procedures ? Smart[employer] last updated 3/6/2025 12:07 PM          Edward Garay DO  Resident  03/06/25 5508

## 2025-03-03 NOTE — ED PROCEDURE NOTE
Procedure  Intubation    Performed by: Edward Garay DO  Authorized by: Chang Soriano MD    Consent:     Consent obtained:  Verbal    Consent given by:  Patient    Risks, benefits, and alternatives were discussed: yes    Universal protocol:     Patient identity confirmed:  Arm band  Pre-procedure details:     Indications: respiratory failure      Patient status:  Altered mental status    Look externally: no concerns      Mouth opening - incisor distance:  3 or more finger widths    Hyoid-mental distance: 3 or more finger widths      Hyoid-thyroid distance: 2 or more finger widths      Mallampati score:  I    Obstruction: none      Neck mobility: normal      Pharmacologic strategy: RSI      Induction agents:  Ketamine    Paralytics:  Rocuronium  Procedure details:     Preoxygenation: BIPAP.    CPR in progress: no      Number of attempts:  1  Successful intubation attempt details:     Intubation method:  Oral    Intubation technique: video assisted      Laryngoscope blade:  Hypercurved and Mac 4    Bougie used: no      Grade view: I      Tube size (mm):  7.5    Tube type:  Cuffed    Tube visualized through cords: yes    Placement assessment:     ETT at teeth/gumline (cm):  24    Tube secured with:  Adhesive tape    Breath sounds:  Equal    Placement verification: chest rise, CXR verification, direct visualization, numeric ETCO2 and waveform ETCO2      CXR findings:  Low    Tube repositioned: yes    Post-procedure details:     Procedure completion:  Tolerated well, no immediate complications    Complications: hypotension                 Edward Garay DO  Resident  03/03/25 1175

## 2025-03-04 ENCOUNTER — APPOINTMENT (OUTPATIENT)
Dept: CARDIOLOGY | Facility: HOSPITAL | Age: 40
End: 2025-03-04
Payer: COMMERCIAL

## 2025-03-04 LAB
ALBUMIN SERPL BCP-MCNC: 2.6 G/DL (ref 3.4–5)
ALP SERPL-CCNC: 86 U/L (ref 33–110)
ALT SERPL W P-5'-P-CCNC: 22 U/L (ref 7–45)
ANION GAP BLDA CALCULATED.4IONS-SCNC: 16 MMO/L (ref 10–25)
ANION GAP BLDA CALCULATED.4IONS-SCNC: 17 MMO/L (ref 10–25)
ANION GAP BLDA CALCULATED.4IONS-SCNC: 18 MMO/L (ref 10–25)
ANION GAP BLDA CALCULATED.4IONS-SCNC: 20 MMO/L (ref 10–25)
ANION GAP BLDV CALCULATED.4IONS-SCNC: 16 MMOL/L (ref 10–25)
ANION GAP SERPL CALC-SCNC: 21 MMOL/L (ref 10–20)
AORTIC VALVE PEAK VELOCITY: 1.32 M/S
APTT PPP: 76 SECONDS (ref 26–36)
APTT PPP: 77 SECONDS (ref 26–36)
APTT PPP: 83 SECONDS (ref 26–36)
AST SERPL W P-5'-P-CCNC: 48 U/L (ref 9–39)
ATRIAL RATE: 153 BPM
AV PEAK GRADIENT: 7 MMHG
AVA (PEAK VEL): 2.4 CM2
BASE EXCESS BLDA CALC-SCNC: -4.4 MMOL/L (ref -2–3)
BASE EXCESS BLDA CALC-SCNC: -4.5 MMOL/L (ref -2–3)
BASE EXCESS BLDA CALC-SCNC: -4.8 MMOL/L (ref -2–3)
BASE EXCESS BLDA CALC-SCNC: -5.3 MMOL/L (ref -2–3)
BASE EXCESS BLDA CALC-SCNC: -6.6 MMOL/L (ref -2–3)
BASE EXCESS BLDA CALC-SCNC: -9.9 MMOL/L (ref -2–3)
BASE EXCESS BLDV CALC-SCNC: -4.9 MMOL/L (ref -2–3)
BASOPHILS # BLD MANUAL: 0.05 X10*3/UL (ref 0–0.1)
BASOPHILS NFR BLD MANUAL: 1.6 %
BILIRUB DIRECT SERPL-MCNC: 4.4 MG/DL (ref 0–0.3)
BILIRUB SERPL-MCNC: 6.8 MG/DL (ref 0–1.2)
BNP SERPL-MCNC: 2127 PG/ML (ref 0–99)
BODY TEMPERATURE: 37 DEGREES CELSIUS
BUN SERPL-MCNC: 43 MG/DL (ref 6–23)
BURR CELLS BLD QL SMEAR: ABNORMAL
CA-I BLDA-SCNC: 0.98 MMOL/L (ref 1.1–1.33)
CA-I BLDA-SCNC: 1 MMOL/L (ref 1.1–1.33)
CA-I BLDA-SCNC: 1.04 MMOL/L (ref 1.1–1.33)
CA-I BLDA-SCNC: 1.06 MMOL/L (ref 1.1–1.33)
CA-I BLDA-SCNC: 1.09 MMOL/L (ref 1.1–1.33)
CA-I BLDA-SCNC: 1.11 MMOL/L (ref 1.1–1.33)
CA-I BLDV-SCNC: 0.97 MMOL/L (ref 1.1–1.33)
CALCIUM SERPL-MCNC: 8.1 MG/DL (ref 8.6–10.6)
CARDIAC TROPONIN I PNL SERPL HS: 62 NG/L (ref 0–34)
CARDIAC TROPONIN I PNL SERPL HS: 63 NG/L (ref 0–34)
CFT FORM KAOLIN IND BLD RES TEG: 1.7 MIN (ref 0.8–2.1)
CHLORIDE BLDA-SCNC: 100 MMOL/L (ref 98–107)
CHLORIDE BLDA-SCNC: 101 MMOL/L (ref 98–107)
CHLORIDE BLDA-SCNC: 103 MMOL/L (ref 98–107)
CHLORIDE BLDA-SCNC: 104 MMOL/L (ref 98–107)
CHLORIDE BLDA-SCNC: 99 MMOL/L (ref 98–107)
CHLORIDE BLDA-SCNC: 99 MMOL/L (ref 98–107)
CHLORIDE BLDV-SCNC: 105 MMOL/L (ref 98–107)
CHLORIDE SERPL-SCNC: 101 MMOL/L (ref 98–107)
CHLORIDE UR-SCNC: <15 MMOL/L
CHLORIDE/CREATININE (MMOL/G) IN URINE: NORMAL
CLOT ANGLE.KAOLIN INDUCED BLD RES TEG: 70 DEG (ref 63–78)
CLOT INIT KAO IND P HEP NEUT BLD RES TEG: 8.8 MIN (ref 4.6–9.1)
CLOT INIT KAO IND P HEP NEUT BLD RES TEG: 9 MIN (ref 4.3–8.3)
CMV IGG AVIDITY SERPL IA-RTO: REACTIVE %
CO2 SERPL-SCNC: 23 MMOL/L (ref 21–32)
CREAT SERPL-MCNC: 1.85 MG/DL (ref 0.5–1.05)
CREAT UR-MCNC: 267 MG/DL (ref 20–320)
EBV EA IGG SER QL: POSITIVE
EBV NA AB SER QL: NEGATIVE
EBV VCA IGG SER IA-ACNC: POSITIVE
EBV VCA IGM SER IA-ACNC: NEGATIVE
EGFRCR SERPLBLD CKD-EPI 2021: 35 ML/MIN/1.73M*2
EJECTION FRACTION: 33 %
EOSINOPHIL # BLD MANUAL: 0 X10*3/UL (ref 0–0.7)
EOSINOPHIL NFR BLD MANUAL: 0 %
ERYTHROCYTE [DISTWIDTH] IN BLOOD BY AUTOMATED COUNT: 15.4 % (ref 11.5–14.5)
FIBRINOGEN BLD CALC-MCNC: 360 MG/DL (ref 278–581)
FOLATE SERPL-MCNC: 6.1 NG/ML
GLUCOSE BLD MANUAL STRIP-MCNC: 113 MG/DL (ref 74–99)
GLUCOSE BLD MANUAL STRIP-MCNC: 137 MG/DL (ref 74–99)
GLUCOSE BLD MANUAL STRIP-MCNC: 55 MG/DL (ref 74–99)
GLUCOSE BLD MANUAL STRIP-MCNC: 89 MG/DL (ref 74–99)
GLUCOSE BLD MANUAL STRIP-MCNC: 96 MG/DL (ref 74–99)
GLUCOSE BLD MANUAL STRIP-MCNC: 98 MG/DL (ref 74–99)
GLUCOSE BLD MANUAL STRIP-MCNC: 98 MG/DL (ref 74–99)
GLUCOSE BLDA-MCNC: 101 MG/DL (ref 74–99)
GLUCOSE BLDA-MCNC: 108 MG/DL (ref 74–99)
GLUCOSE BLDA-MCNC: 109 MG/DL (ref 74–99)
GLUCOSE BLDA-MCNC: 139 MG/DL (ref 74–99)
GLUCOSE BLDA-MCNC: 77 MG/DL (ref 74–99)
GLUCOSE BLDA-MCNC: 83 MG/DL (ref 74–99)
GLUCOSE BLDV-MCNC: 80 MG/DL (ref 74–99)
GLUCOSE SERPL-MCNC: 163 MG/DL (ref 74–99)
HCO3 BLDA-SCNC: 18 MMOL/L (ref 22–26)
HCO3 BLDA-SCNC: 20.2 MMOL/L (ref 22–26)
HCO3 BLDA-SCNC: 22.5 MMOL/L (ref 22–26)
HCO3 BLDA-SCNC: 22.6 MMOL/L (ref 22–26)
HCO3 BLDA-SCNC: 22.6 MMOL/L (ref 22–26)
HCO3 BLDA-SCNC: 23 MMOL/L (ref 22–26)
HCO3 BLDV-SCNC: 22.1 MMOL/L (ref 22–26)
HCT VFR BLD AUTO: 43.3 % (ref 36–46)
HCT VFR BLD EST: 40 % (ref 36–46)
HCT VFR BLD EST: 41 % (ref 36–46)
HCT VFR BLD EST: 43 % (ref 36–46)
HCT VFR BLD EST: 43 % (ref 36–46)
HCT VFR BLD EST: 44 % (ref 36–46)
HCT VFR BLD EST: 45 % (ref 36–46)
HCT VFR BLD EST: 45 % (ref 36–46)
HGB BLD-MCNC: 13.8 G/DL (ref 12–16)
HGB BLDA-MCNC: 13.4 G/DL (ref 12–16)
HGB BLDA-MCNC: 13.6 G/DL (ref 12–16)
HGB BLDA-MCNC: 14.2 G/DL (ref 12–16)
HGB BLDA-MCNC: 14.3 G/DL (ref 12–16)
HGB BLDA-MCNC: 14.8 G/DL (ref 12–16)
HGB BLDA-MCNC: 15.1 G/DL (ref 12–16)
HGB BLDV-MCNC: 15.1 G/DL (ref 12–16)
HMPV RNA SPEC QL NAA+PROBE: NOT DETECTED
HPIV1 RNA SPEC QL NAA+PROBE: NOT DETECTED
HPIV2 RNA SPEC QL NAA+PROBE: NOT DETECTED
HPIV3 RNA SPEC QL NAA+PROBE: NOT DETECTED
HPIV4 RNA SPEC QL NAA+PROBE: NOT DETECTED
IMM GRANULOCYTES # BLD AUTO: 0.47 X10*3/UL (ref 0–0.7)
IMM GRANULOCYTES NFR BLD AUTO: 13.8 % (ref 0–0.9)
INHALED O2 CONCENTRATION: 100 %
IRON SATN MFR SERPL: 12 % (ref 25–45)
IRON SERPL-MCNC: 24 UG/DL (ref 35–150)
LACTATE BLDA-SCNC: 5.9 MMOL/L (ref 0.4–2)
LACTATE BLDA-SCNC: 6 MMOL/L (ref 0.4–2)
LACTATE BLDA-SCNC: 6.8 MMOL/L (ref 0.4–2)
LACTATE BLDA-SCNC: 7.2 MMOL/L (ref 0.4–2)
LACTATE BLDA-SCNC: 7.6 MMOL/L (ref 0.4–2)
LACTATE BLDA-SCNC: 7.6 MMOL/L (ref 0.4–2)
LACTATE BLDV-SCNC: 6.2 MMOL/L (ref 0.4–2)
LACTATE BLDV-SCNC: 7.2 MMOL/L (ref 0.4–2)
LACTATE SERPL-SCNC: 8.9 MMOL/L (ref 0.4–2)
LEFT VENTRICLE INTERNAL DIMENSION DIASTOLE: 4.7 CM (ref 3.5–6)
LEFT VENTRICULAR OUTFLOW TRACT DIAMETER: 2.1 CM
LYMPHOCYTES # BLD MANUAL: 0.46 X10*3/UL (ref 1.2–4.8)
LYMPHOCYTES NFR BLD MANUAL: 13.5 %
MA KAOLIN BLD RES TEG: 56 MM (ref 52–69)
MA KAOLIN+TF BLD RES TEG: 61 MM (ref 52–70)
MA TF IND+IIB-IIIA INH BLD RES TEG: 20 MM (ref 15–32)
MAGNESIUM SERPL-MCNC: 1.86 MG/DL (ref 1.6–2.4)
MCH RBC QN AUTO: 31.6 PG (ref 26–34)
MCHC RBC AUTO-ENTMCNC: 31.9 G/DL (ref 32–36)
MCV RBC AUTO: 99 FL (ref 80–100)
METAMYELOCYTES # BLD MANUAL: 1.21 X10*3/UL
METAMYELOCYTES NFR BLD MANUAL: 35.7 %
MONOCYTES # BLD MANUAL: 0.11 X10*3/UL (ref 0.1–1)
MONOCYTES NFR BLD MANUAL: 3.2 %
MRSA DNA SPEC QL NAA+PROBE: DETECTED
MYELOCYTES # BLD MANUAL: 0.11 X10*3/UL
MYELOCYTES NFR BLD MANUAL: 3.2 %
NEUTROPHILS # BLD MANUAL: 1.37 X10*3/UL (ref 1.2–7.7)
NEUTS BAND # BLD MANUAL: 1.05 X10*3/UL (ref 0–0.7)
NEUTS BAND NFR BLD MANUAL: 30.9 %
NEUTS SEG # BLD MANUAL: 0.32 X10*3/UL (ref 1.2–7)
NEUTS SEG NFR BLD MANUAL: 9.5 %
NRBC BLD-RTO: 0 /100 WBCS (ref 0–0)
OXYHGB MFR BLDA: 87.1 % (ref 94–98)
OXYHGB MFR BLDA: 88.4 % (ref 94–98)
OXYHGB MFR BLDA: 89.2 % (ref 94–98)
OXYHGB MFR BLDA: 89.7 % (ref 94–98)
OXYHGB MFR BLDA: 89.8 % (ref 94–98)
OXYHGB MFR BLDA: 91.8 % (ref 94–98)
OXYHGB MFR BLDV: 92.9 % (ref 45–75)
P AXIS: 83 DEGREES
PCO2 BLDA: 44 MM HG (ref 38–42)
PCO2 BLDA: 46 MM HG (ref 38–42)
PCO2 BLDA: 48 MM HG (ref 38–42)
PCO2 BLDA: 48 MM HG (ref 38–42)
PCO2 BLDA: 49 MM HG (ref 38–42)
PCO2 BLDA: 55 MM HG (ref 38–42)
PCO2 BLDV: 47 MM HG (ref 41–51)
PH BLDA: 7.2 PH (ref 7.38–7.42)
PH BLDA: 7.23 PH (ref 7.38–7.42)
PH BLDA: 7.27 PH (ref 7.38–7.42)
PH BLDA: 7.27 PH (ref 7.38–7.42)
PH BLDA: 7.28 PH (ref 7.38–7.42)
PH BLDA: 7.28 PH (ref 7.38–7.42)
PH BLDV: 7.28 PH (ref 7.33–7.43)
PHOSPHATE SERPL-MCNC: 6.5 MG/DL (ref 2.5–4.9)
PLATELET # BLD AUTO: 81 X10*3/UL (ref 150–450)
PO2 BLDA: 59 MM HG (ref 85–95)
PO2 BLDA: 60 MM HG (ref 85–95)
PO2 BLDA: 64 MM HG (ref 85–95)
PO2 BLDA: 67 MM HG (ref 85–95)
PO2 BLDA: 68 MM HG (ref 85–95)
PO2 BLDA: 74 MM HG (ref 85–95)
PO2 BLDV: 76 MM HG (ref 35–45)
POTASSIUM BLDA-SCNC: 2.8 MMOL/L (ref 3.5–5.3)
POTASSIUM BLDA-SCNC: 4.1 MMOL/L (ref 3.5–5.3)
POTASSIUM BLDA-SCNC: 4.1 MMOL/L (ref 3.5–5.3)
POTASSIUM BLDA-SCNC: 4.3 MMOL/L (ref 3.5–5.3)
POTASSIUM BLDA-SCNC: 4.6 MMOL/L (ref 3.5–5.3)
POTASSIUM BLDA-SCNC: 4.7 MMOL/L (ref 3.5–5.3)
POTASSIUM BLDV-SCNC: 2.9 MMOL/L (ref 3.5–5.3)
POTASSIUM SERPL-SCNC: 4.5 MMOL/L (ref 3.5–5.3)
POTASSIUM UR-SCNC: 71 MMOL/L
POTASSIUM/CREAT UR-RTO: 27 MMOL/G CREAT
PR INTERVAL: 136 MS
PROCALCITONIN SERPL-MCNC: 57.3 NG/ML
PROT SERPL-MCNC: 5.9 G/DL (ref 6.4–8.2)
Q ONSET: 227 MS
QRS COUNT: 25 BEATS
QRS DURATION: 92 MS
QT INTERVAL: 312 MS
QTC CALCULATION(BAZETT): 496 MS
QTC FREDERICIA: 425 MS
R AXIS: 89 DEGREES
RBC # BLD AUTO: 4.37 X10*6/UL (ref 4–5.2)
RBC MORPH BLD: ABNORMAL
RIGHT VENTRICLE FREE WALL PEAK S': 12.5 CM/S
SAO2 % BLDA: 89 % (ref 94–100)
SAO2 % BLDA: 91 % (ref 94–100)
SAO2 % BLDA: 91 % (ref 94–100)
SAO2 % BLDA: 92 % (ref 94–100)
SAO2 % BLDA: 92 % (ref 94–100)
SAO2 % BLDA: 95 % (ref 94–100)
SAO2 % BLDV: 96 % (ref 45–75)
SODIUM BLDA-SCNC: 133 MMOL/L (ref 136–145)
SODIUM BLDA-SCNC: 133 MMOL/L (ref 136–145)
SODIUM BLDA-SCNC: 135 MMOL/L (ref 136–145)
SODIUM BLDA-SCNC: 135 MMOL/L (ref 136–145)
SODIUM BLDA-SCNC: 137 MMOL/L (ref 136–145)
SODIUM BLDA-SCNC: 140 MMOL/L (ref 136–145)
SODIUM BLDV-SCNC: 140 MMOL/L (ref 136–145)
SODIUM SERPL-SCNC: 140 MMOL/L (ref 136–145)
SODIUM UR-SCNC: 26 MMOL/L
SODIUM/CREAT UR-RTO: 10 MMOL/G CREAT
T AXIS: 34 DEGREES
T OFFSET: 383 MS
TIBC SERPL-MCNC: 203 UG/DL (ref 240–445)
TOTAL CELLS COUNTED BLD: 126
TRICUSPID ANNULAR PLANE SYSTOLIC EXCURSION: 1.6 CM
UIBC SERPL-MCNC: 179 UG/DL (ref 110–370)
VANCOMYCIN SERPL-MCNC: 9 UG/ML (ref 5–20)
VARIANT LYMPHS # BLD MANUAL: 0.08 X10*3/UL (ref 0–0.5)
VARIANT LYMPHS NFR BLD: 2.4 %
VENTRICULAR RATE: 152 BPM
VIT B12 SERPL-MCNC: 1719 PG/ML (ref 211–911)
WBC # BLD AUTO: 3.4 X10*3/UL (ref 4.4–11.3)

## 2025-03-04 PROCEDURE — 82746 ASSAY OF FOLIC ACID SERUM: CPT

## 2025-03-04 PROCEDURE — P9047 ALBUMIN (HUMAN), 25%, 50ML: HCPCS | Mod: SE

## 2025-03-04 PROCEDURE — 37799 UNLISTED PX VASCULAR SURGERY: CPT

## 2025-03-04 PROCEDURE — 82248 BILIRUBIN DIRECT: CPT

## 2025-03-04 PROCEDURE — 83550 IRON BINDING TEST: CPT

## 2025-03-04 PROCEDURE — 2500000001 HC RX 250 WO HCPCS SELF ADMINISTERED DRUGS (ALT 637 FOR MEDICARE OP): Mod: SE

## 2025-03-04 PROCEDURE — 2500000004 HC RX 250 GENERAL PHARMACY W/ HCPCS (ALT 636 FOR OP/ED): Mod: SE

## 2025-03-04 PROCEDURE — 82947 ASSAY GLUCOSE BLOOD QUANT: CPT

## 2025-03-04 PROCEDURE — 99291 CRITICAL CARE FIRST HOUR: CPT

## 2025-03-04 PROCEDURE — 2500000005 HC RX 250 GENERAL PHARMACY W/O HCPCS: Mod: SE

## 2025-03-04 PROCEDURE — 94645 CONT INHLJ TX EACH ADDL HOUR: CPT

## 2025-03-04 PROCEDURE — 2500000005 HC RX 250 GENERAL PHARMACY W/O HCPCS: Mod: SE | Performed by: PHARMACIST

## 2025-03-04 PROCEDURE — 84132 ASSAY OF SERUM POTASSIUM: CPT

## 2025-03-04 PROCEDURE — 93306 TTE W/DOPPLER COMPLETE: CPT | Performed by: INTERNAL MEDICINE

## 2025-03-04 PROCEDURE — 2500000004 HC RX 250 GENERAL PHARMACY W/ HCPCS (ALT 636 FOR OP/ED): Mod: SE | Performed by: PHARMACIST

## 2025-03-04 PROCEDURE — 83605 ASSAY OF LACTIC ACID: CPT

## 2025-03-04 PROCEDURE — 85730 THROMBOPLASTIN TIME PARTIAL: CPT

## 2025-03-04 PROCEDURE — 84100 ASSAY OF PHOSPHORUS: CPT

## 2025-03-04 PROCEDURE — 82330 ASSAY OF CALCIUM: CPT | Performed by: INTERNAL MEDICINE

## 2025-03-04 PROCEDURE — 82330 ASSAY OF CALCIUM: CPT

## 2025-03-04 PROCEDURE — C8929 TTE W OR WO FOL WCON,DOPPLER: HCPCS

## 2025-03-04 PROCEDURE — 83540 ASSAY OF IRON: CPT

## 2025-03-04 PROCEDURE — 80202 ASSAY OF VANCOMYCIN: CPT

## 2025-03-04 PROCEDURE — 85007 BL SMEAR W/DIFF WBC COUNT: CPT

## 2025-03-04 PROCEDURE — 2020000001 HC ICU ROOM DAILY

## 2025-03-04 PROCEDURE — 94640 AIRWAY INHALATION TREATMENT: CPT

## 2025-03-04 PROCEDURE — 83735 ASSAY OF MAGNESIUM: CPT

## 2025-03-04 PROCEDURE — 85027 COMPLETE CBC AUTOMATED: CPT

## 2025-03-04 PROCEDURE — 94003 VENT MGMT INPAT SUBQ DAY: CPT

## 2025-03-04 PROCEDURE — 84132 ASSAY OF SERUM POTASSIUM: CPT | Performed by: INTERNAL MEDICINE

## 2025-03-04 PROCEDURE — 82607 VITAMIN B-12: CPT

## 2025-03-04 PROCEDURE — 85018 HEMOGLOBIN: CPT

## 2025-03-04 PROCEDURE — 84484 ASSAY OF TROPONIN QUANT: CPT

## 2025-03-04 PROCEDURE — 2500000005 HC RX 250 GENERAL PHARMACY W/O HCPCS: Mod: SE | Performed by: EMERGENCY MEDICINE

## 2025-03-04 PROCEDURE — 82810 BLOOD GASES O2 SAT ONLY: CPT

## 2025-03-04 PROCEDURE — B246ZZZ ULTRASONOGRAPHY OF RIGHT AND LEFT HEART: ICD-10-PCS | Performed by: INTERNAL MEDICINE

## 2025-03-04 PROCEDURE — 71045 X-RAY EXAM CHEST 1 VIEW: CPT | Performed by: RADIOLOGY

## 2025-03-04 PROCEDURE — 94644 CONT INHLJ TX 1ST HOUR: CPT

## 2025-03-04 RX ORDER — CALCIUM GLUCONATE 20 MG/ML
2 INJECTION, SOLUTION INTRAVENOUS ONCE
Status: COMPLETED | OUTPATIENT
Start: 2025-03-04 | End: 2025-03-04

## 2025-03-04 RX ORDER — FENTANYL CITRATE-0.9 % NACL/PF 10 MCG/ML
0-300 PLASTIC BAG, INJECTION (ML) INTRAVENOUS CONTINUOUS
Status: DISPENSED | OUTPATIENT
Start: 2025-03-04

## 2025-03-04 RX ORDER — MIDAZOLAM HYDROCHLORIDE 1 MG/ML
0-20 INJECTION, SOLUTION INTRAVENOUS CONTINUOUS
Status: DISPENSED | OUTPATIENT
Start: 2025-03-04

## 2025-03-04 RX ORDER — MICAFUNGIN SODIUM 100 MG/5ML
100 INJECTION, POWDER, LYOPHILIZED, FOR SOLUTION INTRAVENOUS EVERY 24 HOURS
Status: DISCONTINUED | OUTPATIENT
Start: 2025-03-04 | End: 2025-03-06

## 2025-03-04 RX ORDER — ALBUMIN HUMAN 250 G/1000ML
12.5 SOLUTION INTRAVENOUS ONCE
Status: COMPLETED | OUTPATIENT
Start: 2025-03-04 | End: 2025-03-04

## 2025-03-04 RX ORDER — POTASSIUM CHLORIDE 14.9 MG/ML
20 INJECTION INTRAVENOUS
Status: COMPLETED | OUTPATIENT
Start: 2025-03-04 | End: 2025-03-04

## 2025-03-04 RX ADMIN — Medication 250 MCG/HR: at 02:20

## 2025-03-04 RX ADMIN — CARBOXYMETHYLCELLULOSE SODIUM 2 DROP: 5 SOLUTION/ DROPS OPHTHALMIC at 14:59

## 2025-03-04 RX ADMIN — VASOPRESSIN 0.03 UNITS/MIN: 0.2 INJECTION INTRAVENOUS at 01:00

## 2025-03-04 RX ADMIN — MICAFUNGIN SODIUM 100 MG: 100 INJECTION, POWDER, LYOPHILIZED, FOR SOLUTION INTRAVENOUS at 08:30

## 2025-03-04 RX ADMIN — LACTULOSE 20 G: 10 SOLUTION ORAL at 14:51

## 2025-03-04 RX ADMIN — DEXAMETHASONE SODIUM PHOSPHATE 20 MG: 10 INJECTION, SOLUTION INTRAMUSCULAR; INTRAVENOUS at 00:51

## 2025-03-04 RX ADMIN — Medication 250 MCG/HR: at 07:52

## 2025-03-04 RX ADMIN — Medication 250 MCG/HR: at 13:10

## 2025-03-04 RX ADMIN — LACTULOSE 20 G: 10 SOLUTION ORAL at 01:58

## 2025-03-04 RX ADMIN — DEXTROSE MONOHYDRATE 25 G: 25 INJECTION, SOLUTION INTRAVENOUS at 03:57

## 2025-03-04 RX ADMIN — DOXYCYCLINE 100 MG: 100 INJECTION, POWDER, LYOPHILIZED, FOR SOLUTION INTRAVENOUS at 09:26

## 2025-03-04 RX ADMIN — CARBOXYMETHYLCELLULOSE SODIUM 2 DROP: 5 SOLUTION/ DROPS OPHTHALMIC at 09:30

## 2025-03-04 RX ADMIN — EPOPROSTENOL 0.05 MCG/KG/MIN: 1.5 INJECTION, POWDER, LYOPHILIZED, FOR SOLUTION INTRAVENOUS at 00:24

## 2025-03-04 RX ADMIN — TOBRAMYCIN 440 MG: 40 INJECTION INTRAMUSCULAR; INTRAVENOUS at 08:55

## 2025-03-04 RX ADMIN — PANTOPRAZOLE SODIUM 40 MG: 40 INJECTION, POWDER, LYOPHILIZED, FOR SOLUTION INTRAVENOUS at 08:58

## 2025-03-04 RX ADMIN — MIDAZOLAM HYDROCHLORIDE 5 MG/HR: 1 INJECTION, SOLUTION INTRAVENOUS at 13:00

## 2025-03-04 RX ADMIN — CISATRACURIUM BESYLATE 37.5 MG/HR: 200 INJECTION, SOLUTION INTRAVENOUS at 12:00

## 2025-03-04 RX ADMIN — WHITE PETROLATUM 57.7 %-MINERAL OIL 31.9 % EYE OINTMENT 1 APPLICATION: at 13:15

## 2025-03-04 RX ADMIN — VASOPRESSIN 0.06 UNITS/MIN: 0.2 INJECTION INTRAVENOUS at 13:00

## 2025-03-04 RX ADMIN — WHITE PETROLATUM 57.7 %-MINERAL OIL 31.9 % EYE OINTMENT 1 APPLICATION: at 08:37

## 2025-03-04 RX ADMIN — CISATRACURIUM BESYLATE 37.5 MG/HR: 200 INJECTION, SOLUTION INTRAVENOUS at 06:40

## 2025-03-04 RX ADMIN — CISATRACURIUM BESYLATE 37.5 MG/HR: 200 INJECTION, SOLUTION INTRAVENOUS at 23:13

## 2025-03-04 RX ADMIN — ALBUMIN HUMAN 12.5 G: 0.25 SOLUTION INTRAVENOUS at 16:34

## 2025-03-04 RX ADMIN — MIDAZOLAM HYDROCHLORIDE 4 MG/HR: 1 INJECTION, SOLUTION INTRAVENOUS at 07:55

## 2025-03-04 RX ADMIN — PERFLUTREN 2 ML OF DILUTION: 6.52 INJECTION, SUSPENSION INTRAVENOUS at 08:17

## 2025-03-04 RX ADMIN — MEROPENEM 2 G: 1 INJECTION INTRAVENOUS at 10:21

## 2025-03-04 RX ADMIN — DOXYCYCLINE 100 MG: 100 INJECTION, POWDER, LYOPHILIZED, FOR SOLUTION INTRAVENOUS at 20:22

## 2025-03-04 RX ADMIN — ACETAMINOPHEN 650 MG: 325 TABLET ORAL at 05:34

## 2025-03-04 RX ADMIN — Medication 2 MG/KG/HR: at 13:11

## 2025-03-04 RX ADMIN — CISATRACURIUM BESYLATE 37.5 MG/HR: 200 INJECTION, SOLUTION INTRAVENOUS at 17:45

## 2025-03-04 RX ADMIN — VASOPRESSIN 0.06 UNITS/MIN: 0.2 INJECTION INTRAVENOUS at 08:40

## 2025-03-04 RX ADMIN — Medication 100 PERCENT: at 20:07

## 2025-03-04 RX ADMIN — CISATRACURIUM BESYLATE 37.5 MG/HR: 200 INJECTION, SOLUTION INTRAVENOUS at 02:20

## 2025-03-04 RX ADMIN — CALCIUM GLUCONATE 2 G: 20 INJECTION, SOLUTION INTRAVENOUS at 09:25

## 2025-03-04 RX ADMIN — POTASSIUM CHLORIDE 20 MEQ: 14.9 INJECTION, SOLUTION INTRAVENOUS at 01:58

## 2025-03-04 RX ADMIN — Medication 100 PERCENT: at 07:03

## 2025-03-04 RX ADMIN — Medication 250 MCG/HR: at 17:48

## 2025-03-04 RX ADMIN — VANCOMYCIN HYDROCHLORIDE 1250 MG: 5 INJECTION, POWDER, LYOPHILIZED, FOR SOLUTION INTRAVENOUS at 17:45

## 2025-03-04 RX ADMIN — Medication 250 MCG/HR: at 22:31

## 2025-03-04 RX ADMIN — ANGIOTENSIN II 80 NG/KG/MIN: 2.5 INJECTION INTRAVENOUS at 08:42

## 2025-03-04 RX ADMIN — LACTULOSE 20 G: 10 SOLUTION ORAL at 21:23

## 2025-03-04 RX ADMIN — ACETAMINOPHEN 650 MG: 160 SOLUTION ORAL at 18:10

## 2025-03-04 RX ADMIN — Medication 2 MG/KG/HR: at 09:11

## 2025-03-04 RX ADMIN — Medication 2 MG/KG/HR: at 17:45

## 2025-03-04 RX ADMIN — MEROPENEM 2 G: 1 INJECTION INTRAVENOUS at 21:24

## 2025-03-04 RX ADMIN — CARBOXYMETHYLCELLULOSE SODIUM 2 DROP: 5 SOLUTION/ DROPS OPHTHALMIC at 21:24

## 2025-03-04 RX ADMIN — ACETAMINOPHEN 650 MG: 160 SOLUTION ORAL at 14:51

## 2025-03-04 RX ADMIN — Medication 250 MCG/HR: at 10:15

## 2025-03-04 RX ADMIN — VASOPRESSIN 0.06 UNITS/MIN: 0.2 INJECTION INTRAVENOUS at 17:48

## 2025-03-04 RX ADMIN — WHITE PETROLATUM 57.7 %-MINERAL OIL 31.9 % EYE OINTMENT 1 APPLICATION: at 20:07

## 2025-03-04 RX ADMIN — METHYLPREDNISOLONE SODIUM SUCCINATE 50 MG: 125 INJECTION, POWDER, FOR SOLUTION INTRAMUSCULAR; INTRAVENOUS at 20:22

## 2025-03-04 RX ADMIN — LACTULOSE 20 G: 10 SOLUTION ORAL at 10:00

## 2025-03-04 RX ADMIN — CALCIUM GLUCONATE 2 G: 20 INJECTION, SOLUTION INTRAVENOUS at 00:51

## 2025-03-04 RX ADMIN — NOREPINEPHRINE BITARTRATE 0.35 MCG/KG/MIN: 0.06 INJECTION, SOLUTION INTRAVENOUS at 14:00

## 2025-03-04 RX ADMIN — CARBOXYMETHYLCELLULOSE SODIUM 2 DROP: 5 SOLUTION/ DROPS OPHTHALMIC at 01:58

## 2025-03-04 RX ADMIN — METHYLPREDNISOLONE SODIUM SUCCINATE 50 MG: 125 INJECTION, POWDER, FOR SOLUTION INTRAMUSCULAR; INTRAVENOUS at 09:26

## 2025-03-04 RX ADMIN — CARBOXYMETHYLCELLULOSE SODIUM 2 DROP: 5 SOLUTION/ DROPS OPHTHALMIC at 05:16

## 2025-03-04 RX ADMIN — POTASSIUM CHLORIDE 20 MEQ: 14.9 INJECTION, SOLUTION INTRAVENOUS at 00:20

## 2025-03-04 ASSESSMENT — PAIN - FUNCTIONAL ASSESSMENT
PAIN_FUNCTIONAL_ASSESSMENT: CPOT (CRITICAL CARE PAIN OBSERVATION TOOL)

## 2025-03-04 NOTE — ED PROCEDURE NOTE
Procedure  Insert arterial line    Performed by: Edgardo Mcghee MD  Authorized by: Chang Soriano MD    Consent:     Consent obtained:  Emergent situation  Pre-procedure details:     Skin preparation:  Chlorhexidine    Preparation: Patient was prepped and draped in sterile fashion    Sedation:     Sedation type:  Deep  Anesthesia:     Anesthesia method:  Local infiltration    Local anesthetic:  Lidocaine 1% w/o epi  Procedure details:     Location:  L radial    Needle gauge:  22 G    Placement technique:  Ultrasound guided    Number of attempts:  2    Transducer: waveform confirmed    Post-procedure details:     Post-procedure:  Sterile dressing applied    CMS:  Unable to assess    Procedure completion:  Tolerated               Edgardo Mcghee MD  Resident  03/03/25 8877

## 2025-03-04 NOTE — PROGRESS NOTES
Enumerated Treatment Strategies for Refractory Hypoxemia in ARDS  Prone Positioning - Improves ventilation-perfusion (V/Q) matching and reduces ventilator-induced lung injury (VILI).  Pros: Mortality benefit; widely available and low-cost.  Cons: Requires trained personnel; risk of pressure sores, ET tube dislodgment.    Neuromuscular Blocking Agents (NMBAs) - Improves ventilator synchrony and reduces VILI.  Pros: Mortality benefit; decreases barotrauma risk.  Cons: Risk of ICU-acquired weakness; requires sedation.    Recruitment Maneuvers - Applies transient high airway pressure to open collapsed alveoli.  Pros: Improves oxygenation; easily applied on ventilators.  Cons: Risk of barotrauma; potential for hemodynamic instability.    High PEEP Strategy - Maintains alveolar recruitment to prevent atelectrauma.  Pros: Improves oxygenation; reduces atelectasis.  Cons: Risk of barotrauma; may reduce cardiac output.    Airway Pressure Release Ventilation (APRV) - Time-cycled pressure mode allowing spontaneous breathing.  Pros: Improves oxygenation; decreases sedation needs.  Cons: No proven mortality benefit; potential for overdistension.    High-Frequency Oscillatory Ventilation (HFOV) - Uses small tidal volumes with high frequency to maintain lung recruitment.  Pros: Reduces atelectrauma; may benefit severe hypoxemia (Danny?/FiO? <60).  Cons: No mortality benefit; increased risk of hemodynamic instability.    Inhaled Pulmonary Vasodilators (NO, Prostacyclins) - Selectively dilate pulmonary vessels in ventilated lung areas.  Pros: Improves V/Q matching; decreases pulmonary hypertension.  Cons: No mortality benefit; [place] requires special equipment, risk of methemoglobinemia.    Extracorporeal Membrane Oxygenation (ECMO) - Provides extracorporeal gas exchange to allow lung rest.  Pros: May reduce mortality in select cases; enables lung-protective ventilation.  Cons: Requires specialized centers; risk of bleeding and  thromboembolism.      Airway Pressure Release Ventilation (APRV) is a  1 Time-cycled, pressure-targeted mode with continuous high airway pressure (CPAP phase) and brief intermittent releases.    2 Allows spontaneous breathing, improving ventilation-perfusion matching and reducing atelectrauma.    3 Enhances oxygenation, decreases sedation needs, but lacks proven mortality benefit.    ___________________________  2. Indications for VV ECMO and VA ECMO  VV ECMO (Respiratory Support Only)  Severe ARDS (e.g., COVID-19, bacterial/viral pneumonia, aspiration)  Hypercapnic respiratory failure (e.g., status asthmaticus)  Airway obstruction, pulmonary contusion, smoke inhalation  Massive hemoptysis/pulmonary hemorrhage  Bridge to lung transplant or recovery    VA ECMO (Cardiac + Respiratory Support)  Cardiogenic shock (MI, myocarditis, PE, drug toxicity)  Cardiac arrest (ECPR) (witnessed arrest, refractory VF/VT)  Failure to wean from CPB post-cardiac surgery  Bridge to heart transplant/VAD  [place]-procedural support for high-risk cardiac interventions    3. Contraindications to ECMO  Absolute ??  Unwitnessed cardiac arrest or prolonged CPR (>10-20 min without ROSC)  Severe neurological injury (brain death, severe anoxic brain injury)  End-stage organ failure (cirrhosis, renal failure, advanced COPD)  Disseminated malignancy  Unrepairable aortic dissection, severe aortic regurgitation  Relative ??  Obesity, advanced age (>70 years)  Prolonged mechanical ventilation (>14 days)  Severe peripheral vascular disease (for peripheral VA ECMO)  Coagulopathy, bleeding risks (e.g., recent hemorrhagic stroke)  Not a transplant/VAD candidate

## 2025-03-04 NOTE — PROGRESS NOTES
SOCIAL WORK NOTE   SW consulted to assist with NOK.     Per chart, historucally patient has listed a friend named yanique. Including in 2007. No scanned advanced directives on file.     Attempted to reach Yanique at his and patient's numbers   [phone]-not able to reach   [phone]  [phone]-not a working number     Per [facility] there was a male on scene, caller [phone]-VM left, Additionally a call from Yanique from previous EMS visit ([phone])      UPDATE: Successful contact made with Yanique [phone]. He reports patient has daughter in [employer] retirement, Payal Durant. Per [facility], she was released yesterday.     UPDATE  HCPOA scanned from 2007 lists Beti Cabrales ([phone], [phone]). VM left. Ethics updated   Juli Denny, CAHRITO, LISW-S ([id number])

## 2025-03-04 NOTE — CONSULTS
ETHICS CONSULTATION NOTE    CONSULT REQUESTER: Physician:     ETHICS QUESTION: Who is the appropriate medical decision-maker for a patient without known next of kin?     BACKGROUND:    Process Steps: I spoke the MICU team this AM regarding Ms. Harvey's reason for admission and her current medical condition. I learned that Ms. Harvey is very ill, yet she does not have known next of kin to support medical decision-making.     Ethically Relevant Medical Information: Ms. Harvey is a 39 year old female with a past medical history that includes, but is not limited to, cirrhosis, HTN, Budd-Chiari, and IVC occlusion. She presented to the ED with shortness of breath and chest pain. She has since been intubated and admitted to the MICU. She is also on vasopressor support for blood pressure control.     Code Status: Full code    Capacity/Decision-Making Considerations: Lacks decision-making capacity.     Advance Directives/Family/Support System: No known next of kin at this time.     ETHICS DISCUSSION & ANALYSIS:    When a patient lacks the capacity to participate in medical decision-making, a capacity that ought to reflect patient autonomy, surrogates are asked to perform substituted judgments which reflect the values and preferences of the patient, an extension and safeguard of autonomous decision-making.      Yet when surrogates are absent or unwilling to participate in surrogate decision-making, a best interest standard is used in which the benefits of treatment are weighed against known risks.    ETHICS RECOMMENDATIONS:    Please continue to perform rigorous efforts to find family for Ms. Harvey.     Until next of kin can be found, she is a patient without proxy. Please page the ethics service for all non-emergent medical interventions that require informed consent.       In the event of an emergent procedure without which serious further physical and mental disability or death could occur, informed consent or refusal is  not required. Please obtain a second medical opinion and record both opinions in the patient's chart and initiate the procedure.    If it is determined that emergent interventions, such as cardiac resuscitation, would have no meaningful chance of achieving the intended medical outcome, cause harm or suffering which significantly outweigh benefit to the patient, offer little, if any, hope of survival outside an ICU, and/or is inconsistent with recognized professional standards of care, then a DNR order with added limitations should be placed in the patient's chart to prevent suffering or harm. A DNR order is a medical order determined by the attending physician according to medical judgment and the patient's best interests. It does not require consent from the patient or next of kin. The patient and the family should be notified of the DNR order as soon as possible     FOLLOW UP:   The Ethics Consultation Service remains available.  Please call with any questions or additional concerns.  The Ethics Consultation Service (ECS) can be reached by paging [phone].     Miguel Lee, PhD

## 2025-03-04 NOTE — CARE PLAN
The patient's goals for the shift include      The clinical goals for the shift include Pt to remain HDS during shift      Problem: Pain - Adult  Goal: Verbalizes/displays adequate comfort level or baseline comfort level  Outcome: Progressing     Problem: Safety - Adult  Goal: Free from fall injury  Outcome: Progressing     Problem: Discharge Planning  Goal: Discharge to home or other facility with appropriate resources  Outcome: Progressing     Problem: Chronic Conditions and Co-morbidities  Goal: Patient's chronic conditions and co-morbidity symptoms are monitored and maintained or improved  Outcome: Progressing     Problem: Nutrition  Goal: Nutrient intake appropriate for maintaining nutritional needs  Outcome: Progressing     Problem: Knowledge Deficit  Goal: Patient/family/caregiver demonstrates understanding of disease process, treatment plan, medications, and discharge instructions  Outcome: Progressing     Problem: Mechanical Ventilation  Goal: Patient Will Maintain Patent Airway  Outcome: Progressing  Goal: Oral health is maintained or improved  Outcome: Progressing  Goal: ET tube will be managed safely  Outcome: Progressing  Goal: Ability to express needs and understand communication  Outcome: Progressing  Goal: Mobility/activity is maintained at optimum level for patient  Outcome: Progressing     Problem: Skin  Goal: Decreased wound size/increased tissue granulation at next dressing change  Outcome: Progressing  Goal: Participates in plan/prevention/treatment measures  Outcome: Progressing  Goal: Prevent/manage excess moisture  Outcome: Progressing  Goal: Prevent/minimize sheer/friction injuries  Outcome: Progressing  Goal: Promote/optimize nutrition  Outcome: Progressing  Goal: Promote skin healing  Outcome: Progressing     Problem: Safety - Medical Restraint  Goal: Remains free of injury from restraints (Restraint for Interference with Medical Device)  Outcome: Progressing  Goal: Free from restraint(s)  (Restraint for Interference with Medical Device)  Outcome: Progressing

## 2025-03-04 NOTE — H&P
"History Of Present Illness  Gissel Harvey is a 39 y.o. 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 In the ED, was found to be in distress with tachycardia, tachypnea, and hypoxemia. Was placed on BiPAP but unfortunately had worsening respiratory status and was intubated in the ED. CT CAP notable for diffuse peribronchial consolidations throughout all lobes c/f multifocal pneumonia, with near complete lobar consolidation of RML. In the ED, she was given vancomycin/Zosyn/azithromycin as well as Solu-medrol 50mg. Also given digoxin 500 mcg for tachycardia.     On admission to the MICU, pt is intubated and sedated. Attempted to call emergency contact listed in the chart however there was no answer.     ED Course:   Patient Vitals for the past 24 hrs:   BP Temp Temp src Pulse Resp SpO2 Height Weight   03/03/25 1900 80/59 -- -- (!) 156 20 (!) 93 % -- --   03/03/25 1845 84/57 -- -- (!) 156 10 (!) 93 % -- --   03/03/25 1830 87/59 -- -- (!) 156 20 (!) 92 % -- --   03/03/25 1815 85/65 -- -- (!) 158 20 95 % -- --   03/03/25 1800 97/68 -- -- (!) 160 (!) 27 96 % -- --   03/03/25 1745 89/70 -- -- (!) 161 20 (!) 93 % -- --   03/03/25 1730 93/63 -- -- (!) 154 20 94 % -- --   03/03/25 1715 77/63 -- -- (!) 156 20 (!) 87 % -- --   03/03/25 1700 114/66 -- -- (!) 161 20 (!) 80 % -- --   03/03/25 1645 83/53 -- -- (!) 160 18 (!) 88 % -- --   03/03/25 1630 103/79 -- -- (!) 152 (!) 36 94 % -- --   03/03/25 1615 84/58 -- -- (!) 152 (!) 37 94 % -- --   03/03/25 1600 -- -- -- -- (!) 24 -- -- --   03/03/25 1545 103/71 -- -- (!) 151 (!) 40 97 % -- --   03/03/25 1515 87/56 -- -- (!) 151 (!) 35 (!) 78 % -- --   03/03/25 1500 95/61 -- -- -- -- (!) 88 % 1.676 m (5' 6\") 63.5 kg (140 lb)   03/03/25 1454 -- 37.8 °C (100 °F) Oral (!) 146 (!) 45 (!) " 86 % -- --   03/03/25 1445 85/57 -- -- -- -- (!) 86 % -- --     Results for orders placed or performed during the hospital encounter of 03/03/25 (from the past 24 hours)   Blood Gas Venous Full Panel Unsolicited   Result Value Ref Range    POCT pH, Venous 7.23 (LL) 7.33 - 7.43 pH    POCT pCO2, Venous 42 41 - 51 mm Hg    POCT pO2, Venous 46 (H) 35 - 45 mm Hg    POCT SO2, Venous 63 45 - 75 %    POCT Oxy Hemoglobin, Venous 60.8 45.0 - 75.0 %    POCT Hematocrit Calculated, Venous 43.0 36.0 - 46.0 %    POCT Sodium, Venous 140 136 - 145 mmol/L    POCT Potassium, Venous 3.7 3.5 - 5.3 mmol/L    POCT Chloride, Venous 105 98 - 107 mmol/L    POCT Ionized Calicum, Venous 1.17 1.10 - 1.33 mmol/L    POCT Glucose, Venous 43 (LL) 74 - 99 mg/dL    POCT Lactate, Venous 8.6 (HH) 0.4 - 2.0 mmol/L    POCT Base Excess, Venous -9.6 (L) -2.0 - 3.0 mmol/L    POCT HCO3 Calculated, Venous 17.6 (L) 22.0 - 26.0 mmol/L    POCT Hemoglobin, Venous 14.4 12.0 - 16.0 g/dL    POCT Anion Gap, Venous 21.0 10.0 - 25.0 mmol/L    Patient Temperature 37.0 degrees Celsius   Blood Gas Venous Full Panel Unsolicited   Result Value Ref Range    POCT pH, Venous 7.22 (LL) 7.33 - 7.43 pH    POCT pCO2, Venous 44 41 - 51 mm Hg    POCT pO2, Venous 39 35 - 45 mm Hg    POCT SO2, Venous 56 45 - 75 %    POCT Oxy Hemoglobin, Venous 53.6 45.0 - 75.0 %    POCT Hematocrit Calculated, Venous 39.0 36.0 - 46.0 %    POCT Sodium, Venous 140 136 - 145 mmol/L    POCT Potassium, Venous 3.0 (L) 3.5 - 5.3 mmol/L    POCT Chloride, Venous 105 98 - 107 mmol/L    POCT Ionized Calicum, Venous 1.13 1.10 - 1.33 mmol/L    POCT Glucose, Venous 153 (H) 74 - 99 mg/dL    POCT Lactate, Venous 8.9 (HH) 0.4 - 2.0 mmol/L    POCT Base Excess, Venous -9.4 (L) -2.0 - 3.0 mmol/L    POCT HCO3 Calculated, Venous 18.0 (L) 22.0 - 26.0 mmol/L    POCT Hemoglobin, Venous 13.1 12.0 - 16.0 g/dL    POCT Anion Gap, Venous 20.0 10.0 - 25.0 mmol/L    Patient Temperature 37.0 degrees Celsius   CBC and Auto Differential    Result Value Ref Range    WBC 4.8 4.4 - 11.3 x10*3/uL    nRBC 0.0 0.0 - 0.0 /100 WBCs    RBC 4.44 4.00 - 5.20 x10*6/uL    Hemoglobin 14.4 12.0 - 16.0 g/dL    Hematocrit 42.4 36.0 - 46.0 %    MCV 96 80 - 100 fL    MCH 32.4 26.0 - 34.0 pg    MCHC 34.0 32.0 - 36.0 g/dL    RDW 15.3 (H) 11.5 - 14.5 %    Platelets 106 (L) 150 - 450 x10*3/uL    Immature Granulocytes %, Automated 1.5 (H) 0.0 - 0.9 %    Immature Granulocytes Absolute, Automated 0.07 0.00 - 0.70 x10*3/uL   Comprehensive Metabolic Panel   Result Value Ref Range    Glucose 38 (LL) 74 - 99 mg/dL    Sodium 144 136 - 145 mmol/L    Potassium 3.6 3.5 - 5.3 mmol/L    Chloride 105 98 - 107 mmol/L    Bicarbonate 15 (L) 21 - 32 mmol/L    Anion Gap 28 (H) 10 - 20 mmol/L    Urea Nitrogen 35 (H) 6 - 23 mg/dL    Creatinine 1.91 (H) 0.50 - 1.05 mg/dL    eGFR 34 (L) >60 mL/min/1.73m*2    Calcium 8.7 8.6 - 10.6 mg/dL    Albumin 3.2 (L) 3.4 - 5.0 g/dL    Alkaline Phosphatase 153 (H) 33 - 110 U/L    Total Protein 6.8 6.4 - 8.2 g/dL    AST 56 (H) 9 - 39 U/L    Bilirubin, Total 6.3 (H) 0.0 - 1.2 mg/dL    ALT 24 7 - 45 U/L   Lactate   Result Value Ref Range    Lactate 8.7 (HH) 0.4 - 2.0 mmol/L   Coagulation Screen   Result Value Ref Range    Protime 27.9 (H) 9.8 - 12.4 seconds    INR 2.5 (H) 0.9 - 1.1    aPTT 35 26 - 36 seconds   Ethanol   Result Value Ref Range    Alcohol <10 <=10 mg/dL   B-type natriuretic peptide   Result Value Ref Range     (H) 0 - 99 pg/mL   Troponin I, High Sensitivity, Initial   Result Value Ref Range    Troponin I, High Sensitivity (CMC) 16 0 - 34 ng/L   Manual Differential   Result Value Ref Range    Neutrophils %, Manual 70.5 40.0 - 80.0 %    Bands %, Manual 0.8 0.0 - 5.0 %    Lymphocytes %, Manual 5.4 13.0 - 44.0 %    Monocytes %, Manual 5.4 2.0 - 10.0 %    Eosinophils %, Manual 0.0 0.0 - 6.0 %    Basophils %, Manual 0.8 0.0 - 2.0 %    Metamyelocytes %, Manual 3.1 0.0 - 0.0 %    Myelocytes %, Manual 14.0 0.0 - 0.0 %    Seg Neutrophils Absolute,  Manual 3.38 1.20 - 7.00 x10*3/uL    Bands Absolute, Manual 0.04 0.00 - 0.70 x10*3/uL    Lymphocytes Absolute, Manual 0.26 (L) 1.20 - 4.80 x10*3/uL    Monocytes Absolute, Manual 0.26 0.10 - 1.00 x10*3/uL    Eosinophils Absolute, Manual 0.00 0.00 - 0.70 x10*3/uL    Basophils Absolute, Manual 0.04 0.00 - 0.10 x10*3/uL    Metamyelocytes Absolute, Manual 0.15 0.00 - 0.00 x10*3/uL    Myelocytes Absolute, Manual 0.67 0.00 - 0.00 x10*3/uL    Total Cells Counted 129     Neutrophils Absolute, Manual 3.42 1.20 - 7.70 x10*3/uL    RBC Morphology See Below     Polychromasia Mild     [place] Cells Many    Sars-CoV-2 and Influenza A/B PCR   Result Value Ref Range    Flu A Result Not Detected Not Detected    Flu B Result Not Detected Not Detected    Coronavirus 2019, PCR Not Detected Not Detected   Blood Gas Mixed Venous Full Panel Unsolicited   Result Value Ref Range    POCT pH, Mixed 7.20 (LL) 7.33 - 7.43 pH    POCT pCO2, Mixed 47 41 - 51 mm Hg    POCT pO2, Mixed 71 (H) 35 - 45 mm Hg    POCT SO2, Mixed 92 (H) 45 - 75 %    POCT Oxy Hemoglobin, Mixed 89.2 (H) 45.0 - 75.0 %    POCT Hematocrit Calculated, Mixed 37.0 36.0 - 46.0 %    POCT Sodium, Mixed 139 136 - 145 mmol/L    POCT Potassium, Mixed 3.3 (L) 3.5 - 5.3 mmol/L    POCT Chloride, Mixed 106 98 - 107 mmol/L    POCT Ionized Calcium, Mixed 1.18 1.10 - 1.33 mmol/L    POCT Glucose, Mixed 64 (L) 74 - 99 mg/dL    POCT Lactate, Mixed 7.8 (HH) 0.4 - 2.0 mmol/L    POCT Base Excess, Mixed -9.5 (L) -2.0 - 3.0 mmol/L    POCT HCO3 Calculated, Mixed 18.4 (L) 22.0 - 26.0 mmol/L    POCT Hemoglobin, Mixed 12.4 12.0 - 16.0 g/dL    POCT Anion Gap, Mixed 18 10 - 25 mmo/L    Patient Temperature 37.0 degrees Celsius   Blood Gas Venous Full Panel Unsolicited   Result Value Ref Range    POCT pH, Venous 7.23 (LL) 7.33 - 7.43 pH    POCT pCO2, Venous 55 (H) 41 - 51 mm Hg    POCT pO2, Venous 50 (H) 35 - 45 mm Hg    POCT SO2, Venous 75 45 - 75 %    POCT Oxy Hemoglobin, Venous 72.4 45.0 - 75.0 %    POCT  Hematocrit Calculated, Venous 37.0 36.0 - 46.0 %    POCT Sodium, Venous 139 136 - 145 mmol/L    POCT Potassium, Venous 3.3 (L) 3.5 - 5.3 mmol/L    POCT Chloride, Venous 105 98 - 107 mmol/L    POCT Ionized Calicum, Venous 1.13 1.10 - 1.33 mmol/L    POCT Glucose, Venous 155 (H) 74 - 99 mg/dL    POCT Lactate, Venous 6.9 (HH) 0.4 - 2.0 mmol/L    POCT Base Excess, Venous -5.0 (L) -2.0 - 3.0 mmol/L    POCT HCO3 Calculated, Venous 23.0 22.0 - 26.0 mmol/L    POCT Hemoglobin, Venous 12.2 12.0 - 16.0 g/dL    POCT Anion Gap, Venous 14.0 10.0 - 25.0 mmol/L    Patient Temperature 37.0 degrees Celsius   Blood Gas Venous Full Panel Unsolicited   Result Value Ref Range    POCT pH, Venous 7.15 (LL) 7.33 - 7.43 pH    POCT pCO2, Venous 65 (H) 41 - 51 mm Hg    POCT pO2, Venous 42 35 - 45 mm Hg    POCT SO2, Venous 57 45 - 75 %    POCT Oxy Hemoglobin, Venous 55.7 45.0 - 75.0 %    POCT Hematocrit Calculated, Venous 42.0 36.0 - 46.0 %    POCT Sodium, Venous 140 136 - 145 mmol/L    POCT Potassium, Venous 3.4 (L) 3.5 - 5.3 mmol/L    POCT Chloride, Venous 104 98 - 107 mmol/L    POCT Ionized Calicum, Venous 1.13 1.10 - 1.33 mmol/L    POCT Glucose, Venous 104 (H) 74 - 99 mg/dL    POCT Lactate, Venous 6.6 (HH) 0.4 - 2.0 mmol/L    POCT Base Excess, Venous -7.3 (L) -2.0 - 3.0 mmol/L    POCT HCO3 Calculated, Venous 22.6 22.0 - 26.0 mmol/L    POCT Hemoglobin, Venous 14.0 12.0 - 16.0 g/dL    POCT Anion Gap, Venous 17.0 10.0 - 25.0 mmol/L    Patient Temperature 37.0 degrees Celsius   Blood Gas Arterial Full Panel Unsolicited   Result Value Ref Range    POCT pH, Arterial 7.20 (LL) 7.38 - 7.42 pH    POCT pCO2, Arterial 56 (H) 38 - 42 mm Hg    POCT pO2, Arterial 65 (L) 85 - 95 mm Hg    POCT SO2, Arterial 88 (L) 94 - 100 %    POCT Oxy Hemoglobin, Arterial 84.7 (L) 94.0 - 98.0 %    POCT Hematocrit Calculated, Arterial 40.0 36.0 - 46.0 %    POCT Sodium, Arterial 139 136 - 145 mmol/L    POCT Potassium, Arterial 3.3 (L) 3.5 - 5.3 mmol/L    POCT Chloride,  Arterial 105 98 - 107 mmol/L    POCT Ionized Calcium, Arterial 1.15 1.10 - 1.33 mmol/L    POCT Glucose, Arterial 92 74 - 99 mg/dL    POCT Lactate, Arterial 6.0 (HH) 0.4 - 2.0 mmol/L    POCT Base Excess, Arterial -6.7 (L) -2.0 - 3.0 mmol/L    POCT HCO3 Calculated, Arterial 21.9 (L) 22.0 - 26.0 mmol/L    POCT Hemoglobin, Arterial 13.3 12.0 - 16.0 g/dL    POCT Anion Gap, Arterial 15 10 - 25 mmo/L    Patient Temperature 37.0 degrees Celsius   Blood Gas Venous Full Panel Unsolicited   Result Value Ref Range    POCT pH, Venous 7.20 (LL) 7.33 - 7.43 pH    POCT pCO2, Venous 69 (H) 41 - 51 mm Hg    POCT pO2, Venous 54 (H) 35 - 45 mm Hg    POCT SO2, Venous 79 (H) 45 - 75 %    POCT Oxy Hemoglobin, Venous 76.8 (H) 45.0 - 75.0 %    POCT Hematocrit Calculated, Venous 41.0 36.0 - 46.0 %    POCT Sodium, Venous 141 136 - 145 mmol/L    POCT Potassium, Venous 3.2 (L) 3.5 - 5.3 mmol/L    POCT Chloride, Venous 105 98 - 107 mmol/L    POCT Ionized Calicum, Venous 1.06 (L) 1.10 - 1.33 mmol/L    POCT Glucose, Venous 95 74 - 99 mg/dL    POCT Lactate, Venous 5.0 (HH) 0.4 - 2.0 mmol/L    POCT Base Excess, Venous -2.7 (L) -2.0 - 3.0 mmol/L    POCT HCO3 Calculated, Venous 27.0 (H) 22.0 - 26.0 mmol/L    POCT Hemoglobin, Venous 13.8 12.0 - 16.0 g/dL    POCT Anion Gap, Venous 12.0 10.0 - 25.0 mmol/L    Patient Temperature 37.0 degrees Celsius     XR chest 1 view    Result Date: 3/3/2025  1.  Endotracheal tube terminates at the roland, directed towards the right mainstem bronchus. Repositioning is recommended. 2. Diffuse reticular opacities bilaterally consistent with multifocal pneumonia. 3. Question small right basilar pleural effusion and atelectasis.   I personally reviewed the image(s)/study and resident interpretation. I agree with the findings as stated by resident Jude Conley. Data analyzed and images interpreted at [facility], [place], [place].   MACRO: Jude Conley discussed the significance and  urgency of this critical finding by telephone with  BETTY ORTEGA on 3/3/2025 at 5:58 pm.  (**-RCF-**) Findings:  See findings.   Signed by: Luis Carlos Barone 3/3/2025 6:16 PM Dictation workstation:   [id number]    CT abdomen pelvis w IV contrast    Result Date: 3/3/2025  1.  No acute abnormality within the abdomen and pelvis. 2. Chronic findings of cirrhosis and portal hypertension as described above. Incomplete evaluation of a tips stent, further evaluation with color Doppler ultrasound recommended 3. Please refer to separately dictated CT of the chest for evaluation of the chest findings.   I personally reviewed the images/study and I agree with the findings as stated. This study was interpreted at [place], [place].   MACRO: None   Signed by: Dakotah Ness 3/3/2025 5:06 PM Dictation workstation:   [id number]    CT angio chest for pulmonary embolism    Result Date: 3/3/2025  1. No discrete filling defects within the main pulmonary artery or its branches to  proximal segmental level. Please note that, assessment of distal segmental and subsegmental branches is limited and small peripheral emboli are not entirely excluded. 2. Findings likely representing multifocal pneumonia to include near-complete right middle lobe consolidation. 3. Additional findings of interlobular septal thickening which likely indicates an additional component of pulmonary edema. 4. Hepatosplenomegaly with numerous prominent splenorenal varices and subcutaneous collateral vessels, consistent with portal hypertension. 5. Additional incidental non-acute findings as detailed above. See same day abdomen/pelvis CT report for full evaluation of findings below the diaphragm.     I personally reviewed the images/study and I agree with the findings as stated by Dr. Dennis Carrero. This study was interpreted at [place], [place].   MACRO: Dennis Carrero discussed the  significance and urgency of this critical finding by epic secure chat with  ARETHA RYAN on 3/3/2025 at 4:27 pm.  (**-RCF-**) Findings:  See findings.   Signed by: Hossein Harrison 3/3/2025 4:57 PM Dictation workstation:   [id number]    XR chest 1 view    Result Date: 3/3/2025  Findings of multifocal pneumonia involving the bilateral mid/lower lung zones, right worse than left. Correlate with concurrent CT chest findings.   I personally reviewed the images/study and I agree with the findings as stated by Resident Elizabeth Burger. This study was interpreted at [place], [place].   Signed by: Vivien Bradshaw 3/3/2025 3:48 PM Dictation workstation:   [id number]     ED Medication Administration from 03/03/2025 1441 to 03/03/2025 1922         Date/Time Order Dose Route Action Action by     03/03/2025 1529 EST iohexol (OMNIPaque) 350 mg iodine/mL solution 90 mL 90 mL intravenous Given GOKUL Adame     03/03/2025 1600 EST oxygen (O2) therapy 80 percent inhalation Start ROSINA Chandler     03/03/2025 1615 EST dextrose 50 % injection 25 g 25 g intravenous Given Slaughter, E     03/03/2025 1619 EST sodium bicarbonate 8.4 % (1 mEq/mL) 50 mEq 50 mEq intravenous Given Slaughter, E     03/03/2025 1620 EST dextrose 50 % injection 25 g 25 g intravenous Given Slaughter, E     03/03/2025 1621 EST sodium chloride 0.9 % bolus 1,000 mL 1,000 mL intravenous New Bag Slaughter, E     03/03/2025 1700 EST norepinephrine (Levophed) 8 mg in dextrose 5% 250 mL (0.032 mg/mL) infusion (premix) 0.01 mcg/kg/min intravenous New Bag Slaughter, E     03/03/2025 1700 EST rocuronium (ZeMuron) injection 65 mg 65 mg intravenous Given Slaughter, E     03/03/2025 1715 EST ketamine in NaCl, iso-osmotic injection 100 mg 100 mg intravenous Given Slaughter, E     03/03/2025 1723 EST sodium bicarbonate 8.4 % (1 mEq/mL) 50 mEq 50 mEq intravenous Given Slaughter, E     03/03/2025 1725 EST sodium bicarbonate 8.4 % (1 mEq/mL) 50  mEq 50 mEq intravenous Given Gage, E     03/03/2025 1725 EST sodium chloride 0.9 % bolus 1,000 mL 0 mL intravenous Stopped Gage, E     03/03/2025 1726 EST fentanyl (Sublimaze) 10 mcg/mL in sodium chloride 0.9% infusion 50 mcg/hr intravenous New Bag Gage, E     03/03/2025 1726 EST propofol (Diprivan) infusion 25 mcg/kg/min intravenous New Bag Gage, E     03/03/2025 1730 EST fentanyl (Sublimaze) 10 mcg/mL in sodium chloride 0.9% infusion 25 mcg/hr intravenous Rate/Dose Change Gage, E     03/03/2025 1730 EST norepinephrine (Levophed) 8 mg in dextrose 5% 250 mL (0.032 mg/mL) infusion (premix) 0.2 mcg/kg/min intravenous Rate/Dose Change Gage, E     03/03/2025 1730 EST oxygen (O2) therapy 100 percent inhalation Rate Verify Medical Gas Slaughter, E     03/03/2025 1730 EST propofol (Diprivan) infusion 10 mcg/kg/min intravenous Rate/Dose Change Gage, E     03/03/2025 1739 EST phenylephrine in NS (Luan-Synephrine) 40 mcg/mL syringe 120 mcg 120 mcg intravenous Given Gage, E     03/03/2025 1740 EST fentaNYL bolus from bag 25 mcg 25 mcg intravenous Bolus from Bag Gage, E     03/03/2025 1740 EST fentaNYL PF (Sublimaze) injection 50 mcg 50 mcg intravenous Given Gage, E     03/03/2025 1743 EST piperacillin-tazobactam (Zosyn) 4.5 g in dextrose (iso)  mL 4.5 g intravenous New Bag Gage, E     03/03/2025 1743 EST vancomycin (Vancocin) 1,000 mg in dextrose 5%  mL 1,000 mg intravenous New Bag Gage, E     03/03/2025 1800 EST hydrocortisone sodium succinate (PF) (Solu-CORTEF) injection 50 mg 50 mg intravenous Given Gage, E     03/03/2025 1850 EST digoxin (Lanoxin) injection 500 mcg 500 mcg intravenous Given Gage, E     03/03/2025 1850 EST magnesium sulfate 2 g in sterile water for injection 50 mL 2 g intravenous New Bag Gage, E     03/03/2025 1859 EST azithromycin (Zithromax) 500 mg in dextrose 5% 250 mL  mg intravenous ARTURO Araujo      03/03/2025 1906 EST piperacillin-tazobactam (Zosyn) 4.5 g in dextrose (iso)  mL 0 g intravenous Stopped ARTURO Gage           Past Medical History  She has no past medical history on file.    Current Outpatient Medications   Medication Instructions    fondaparinux (ARIXTRA) 7.5 mg, subcutaneous, Daily     Surgical History  She has no past surgical history on file.     Social History  She reports that she has been smoking cigarettes. She has never used smokeless tobacco. No history on file for alcohol use and drug use.    Family History  No family history on file.     Allergies  Heparin    Review of Systems  Unable to perform 2/2 sedation      Physical Exam  General: intubated, sedated, bright red blood in the oropharynx, dark brown secretions from in-line tracheal suctioning   HEENT: normocephalic, atraumatic  CV: heart tachycardic w/o murmurs  Resp: lungs coarse and rhonchorous anteriorly   Abd: soft, nondistended, engorged veins over the anterior/lateral abdomen   Ext: no peripheral edema  Neuro: unable to assess neurological status      Last Recorded Vitals  BP 80/59   Pulse (!) 156   Temp 37.8 °C (100 °F) (Oral)   Resp 20   Wt 63.5 kg (140 lb)   SpO2 (!) 93%      Assessment/Plan   Assessment & Plan  Hypoxemia      Gissel Harvey is a 39 y.o. 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 Does meet diagnostic criteria for ARDS, will initiate paralytic and consider proning for ARDS I/s/o pneumonia.     NEUROLOGICAL   #sedation   - sedating with fentanyl, ketamine, Versed (hypotensive with propofol)   - RASS goal -4 to -5    CARDIOVASCULAR  #shock, likely septic I/s/o severe pneumonia   :: lactate > 9 on presentation   - on norepi, vasopressin, and  epinephrine to support blood pressure with MAP goal > 65   - f/up TTE     PULMONARY   #community acquired pneumonia   #ARDS   :: intubated 3/3   Vent Mode: Volume control/assist control  FiO2 (%):  [80 %-100 %] 100 %  S RR:  [10-24] 24  S VT:  [400 mL] 400 mL  PEEP/CPAP (cm H2O):  [15 cm H20] 15 cm H20  MAP (cm H2O):  [23] 23  :: P:F ~ 70 on admission   - flu A/B, COVID negative  - f/up pneumonia workup: urine strep, urine legionella, RSV, parainfluenza virus, metapneumovirus, full resp viral panel, procalcitonin, tracheal aspirate, MRSA nares  - s/p vanc, Zosyn, azithro 3/2 in the ED   - continue vancomycin, meropenem, doxycycline (prolonged QT) 3/3-present   - f/up blood cultures   - dexamethasone 20mg x 10 days followed by 10mg x 10 days   - cisatracurium for paralytic, will consider proning once paralyzed     GI/HEPATOLOGY   #liver cirrhosis   #portal hypertension s/p TIPS   #hx of Budd-Chiari     RENAL   #decreased renal function c/f SCOUT vs CKD   :: Cr 1.9 on admission, unclear baseline as no labs in 2 years   - f/up urine electrolytes     ID   See above pulm section     ENDO   - SSI while on steroids     HEME   #lymphopenia   :: abs leukocyte count 260  - f/up HIV     #hx of antiphospholipid syndrome   #hx of positive PF4  #chronic IVC occlusion   #hx of DVT   - supposed to be on fondaparinux outpatient, unclear if taking as no recent med fills   - continue bival while inpatient I/s/o previous positive PF4     F: prn   E: prn   N: NPO   GI: PPI   DVT: bival gtt   Access: [facility] central line, L rad art line     CODE STATUS: PRESUMED FULL   NOK: emergency contact in chart is listed as friend Konrad Saldivar -- attempted to call on admission and no answer          Yuniel Paniagua MD

## 2025-03-04 NOTE — PROGRESS NOTES
Vancomycin Dosing by Pharmacy  FOLLOW UP    Gissel Harvey is a 39 y.o. female who Pharmacy is consulted to dose vancomycin for pneumonia.     Based on the patient's indication and renal status, this patient is being dosed based on a goal vancomycin level of 15-20.     Current vancomycin dose: 1500 mg once.    Most recent vancomycin level: 17 mcg/mL (~18 hour level)    Renal function is currently improving.    Estimated Creatinine Clearance: 38.2 mL/min (A) (by C-G formula based on SCr of 1.85 mg/dL (H)).    Results from last 7 days   Lab Units 03/04/25  0438 03/03/25  2132 03/03/25  1547   CREATININE mg/dL 1.85* 1.96* 1.91*   BUN mg/dL 43* 39* 35*   WBC AUTO x10*3/uL 3.4*  --  4.8        Visit Vitals  BP 81/68   Pulse (!) 144   Temp 37.8 °C (100 °F)   Resp 24       Gram Stain   Date/Time Value Ref Range Status   03/03/2025 10:00 PM (3+) Moderate Polymorphonuclear leukocytes (A)  Preliminary   03/03/2025 10:00 PM (3+) Moderate Gram positive cocci (A)  Preliminary     Blood Culture   Date/Time Value Ref Range Status   03/03/2025 04:26 PM Loaded on Instrument - Culture in progress  Preliminary   03/03/2025 04:26 PM Loaded on Instrument - Culture in progress  Preliminary        Susceptibility data from last 90 days.  Collected Specimen Info Organism   03/03/25 Fluid from Tracheal Aspirate Staphylococcus aureus       Assessment/Plan    Vancomycin level is below goal range of 15-20. Will re-dose vancomycin with one time order of 1250 mg.    The next vancomycin level will be ordered for 3/5 at 1400, unless clinically indicated sooner.  Will continue to monitor renal function daily while on vancomycin and order serum creatinine at least every 48 hours if not already ordered.  Will follow for continued vancomycin needs, clinical response, and signs/symptoms of toxicity.       Luis Hays, LindsayD

## 2025-03-04 NOTE — CONSULTS
Vancomycin Dosing by Pharmacy- INITIAL    Gissel Harvey is a 39 y.o. year old female who Pharmacy has been consulted for vancomycin dosing for pneumonia. Based on the patient's indication and renal status this patient will be dosed based on a goal trough/random level of 15-20.     Renal function is currently declining.    Visit Vitals  BP 81/68   Pulse (!) 146   Temp 37.8 °C (100 °F) (Oral)   Resp (!) 34        Lab Results   Component Value Date    CREATININE 1.91 (H) 2025    CREATININE 0.64 05/15/2022    CREATININE 0.74 2019    CREATININE 0.70 2019    CREATININE 0.54 2019        Patient weight is as follows:   Vitals:    25 1500   Weight: 63.5 kg (140 lb)       Cultures:  No results found for the encounter in last 14 days.        I/O last 3 completed shifts:  In: - (0 mL/kg)   Out: 400 (6.3 mL/kg) [Urine:400 (0.2 mL/kg/hr)]  Weight: 63.5 kg   I/O during current shift:  No intake/output data recorded.    Temp (24hrs), Av.8 °C (100 °F), Min:37.8 °C (100 °F), Max:37.8 °C (100 °F)         Assessment/Plan     Patient has already been given a loading dose of 1500 mg.  Will initiate vancomycin maintenance, dosing by level.  Follow-up level will be ordered on 3/4/25 at 1400 unless clinically indicated sooner.  Will continue to monitor renal function daily while on vancomycin and order serum creatinine at least every 48 hours if not already ordered.  Follow for continued vancomycin needs, clinical response, and signs/symptoms of toxicity.       Rachel Martines, PharmD

## 2025-03-04 NOTE — ED PROCEDURE NOTE
Procedure  Critical Care    Performed by: Edward Garay DO  Authorized by: Chang Soriano MD    Critical care provider statement:     Critical care time (minutes):  121    Critical care time was exclusive of:  Separately billable procedures and treating other patients and teaching time    Critical care was necessary to treat or prevent imminent or life-threatening deterioration of the following conditions:  Circulatory failure, sepsis, respiratory failure, hepatic failure, shock, metabolic crisis and renal failure    Critical care was time spent personally by me on the following activities:  Blood draw for specimens, development of treatment plan with patient or surrogate, discussions with consultants, discussions with primary provider, evaluation of patient's response to treatment, examination of patient, interpretation of cardiac output measurements, obtaining history from patient or surrogate, ordering and performing treatments and interventions, ordering and review of laboratory studies, ordering and review of radiographic studies, pulse oximetry, re-evaluation of patient's condition, review of old charts, vascular access procedures and ventilator management    Care discussed with: admitting provider                 Edward Garay DO  Resident  03/03/25 [id number]

## 2025-03-04 NOTE — SIGNIFICANT EVENT
Ms. 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 She is not an ECMO candidate. She is on levo/vaso/epi/angiotensin 2 and high ventilator settings with poor response to treatment. Unfortunately, if she were to arrest, ACLS would be unlikely to lead to sustained ROSC given her difficulty oxygenating and extremely high pressor requirements pre-arrest. As she would not benefit from ACLS, DNR status is appropriate. Unfortunately, a NOK or decision maker cannot be reached or identified. Ethics consulted and agrees with DNR. Code status changed to DNR, however will continue full measures otherwise.     Lillie Ricks,   9:09 AM  03/04/25

## 2025-03-04 NOTE — PROGRESS NOTES
Medical Intensive Care - Daily Progress Note   Subjective    Gissel Harvey is a 39 y.o. year old female patient admitted on 3/3/2025 with following ICU needs: intubation and pressor support    Interval History:  Overnight, the patient has been requiring increasing amounts of pressors and still has low MAP.    This morning, she is on Versed 4, ketamine, and 250 fentanyl for sedation. She is on levo 0.3, epi 0.8, vasopressin 0.06, and angiotensin II 80. She has black to dark red secretions.    On rounds, we spoke with ethics and agreed with DNR. No family members have been able to be reached per social work. Will make DNR.    Meds    Scheduled medications  cisatracurium, 0.1 mg/kg, intravenous, Once  doxycycline, 100 mg, intravenous, q12h  pantoprazole, 40 mg, oral, Daily before breakfast   Or  esomeprazole, 40 mg, nasoduodenal tube, Daily before breakfast   Or  pantoprazole, 40 mg, intravenous, Daily before breakfast  fentaNYL, 25 mcg, intravenous, Once  insulin lispro, 0-5 Units, subcutaneous, q4h  lactulose, 20 g, oral, TID  artificial tears, 2 drop, Both Eyes, q6h  meropenem, 2 g, intravenous, q12h  methylPREDNISolone sodium succinate (PF), 50 mg, intravenous, q12h  micafungin, 100 mg, intravenous, q24h  midazolam, 2 mg, intravenous, Once  oxygen, , inhalation, Continuous - Inhalation  white petrolatum-mineral oiL, 1 Application, Both Eyes, q6h      Continuous medications  angiotensin II (Giapreza) 2.5 mg in sodium chloride 0.9% 250 mL (0.01 mg/mL) infusion, 1.25-40 ng/kg/min, Last Rate: 40 ng/kg/min (03/04/25 1030)  bivalirudin, 0.05-0.49 mg/kg/hr, Last Rate: 0.05 mg/kg/hr (03/04/25 0600)  cisatracurium, 37.5 mg/hr, Last Rate: 37.5 mg/hr (03/04/25 1200)  EPINEPHrine, 0-1 mcg/kg/min, Last Rate: 0.08 mcg/kg/min (03/04/25 1031)  epoprostenol, 0.05 mcg/kg/min (Ideal), Last Rate: 0.05 mcg/kg/min (03/04/25 0024)  fentaNYL, 0-300 mcg/hr, Last Rate: 250 mcg/hr (03/04/25 1015)  ketamine, 0-2 mg/kg/hr, Last Rate: 2 mg/kg/hr  "(03/04/25 0911)  midazolam, 0-20 mg/hr, Last Rate: 4 mg/hr (03/04/25 0800)  norepinephrine, 0-0.5 mcg/kg/min, Last Rate: 0.3 mcg/kg/min (03/04/25 1031)  vasopressin, 0-0.06 Units/min, Last Rate: 0.06 Units/min (03/04/25 0840)      PRN medications  PRN medications: acetaminophen **OR** acetaminophen, dextrose, dextrose, fentaNYL, glucagon, glucagon, midazolam, vancomycin     Objective    Blood pressure 81/68, pulse (!) 144, temperature 37.8 °C (100 °F), resp. rate 24, height 1.676 m (5' 6\"), weight 63.5 kg (140 lb), SpO2 (!) 87%.     Physical Exam   General: intubated, sedated, bright red blood in the oropharynx, black to red secretions from in-line tracheal suctioning   HEENT: normocephalic, atraumatic  CV: heart tachycardic w/o murmurs  Resp: lungs coarse with rhonchi. Crackles present, R>L.   Abd: soft, nondistended, engorged veins over the anterior/lateral abdomen   Ext: no peripheral edema  Neuro: unable to assess neurological status      Intake/Output Summary (Last 24 hours) at 3/4/2025 1256  Last data filed at 3/4/2025 1200  Gross per 24 hour   Intake 5139.22 ml   Output 905 ml   Net 4234.22 ml     Labs:   Results from last 72 hours   Lab Units 03/04/25  0438 03/03/25  2132 03/03/25  1547   SODIUM mmol/L 140 144 144   POTASSIUM mmol/L 4.5 3.5 3.6   CHLORIDE mmol/L 101 101 105   CO2 mmol/L 23 26 15*   BUN mg/dL 43* 39* 35*   CREATININE mg/dL 1.85* 1.96* 1.91*   GLUCOSE mg/dL 163* 90 38*   CALCIUM mg/dL 8.1* 8.0* 8.7   ANION GAP mmol/L 21* 21* 28*   EGFR mL/min/1.73m*2 35* 33* 34*   PHOSPHORUS mg/dL 6.5* 5.7*  --       Results from last 72 hours   Lab Units 03/04/25  0438 03/03/25  1547   WBC AUTO x10*3/uL 3.4* 4.8   HEMOGLOBIN g/dL 13.8 14.4   HEMATOCRIT % 43.3 42.4   PLATELETS AUTO x10*3/uL 81* 106*   LYMPHO PCT MAN % 13.5 5.4   MONO PCT MAN % 3.2 5.4   EOSINO PCT MAN % 0.0 0.0      Results from last 72 hours   Lab Units 03/04/25  1231 03/04/25  0826 03/04/25  0516   POCT PH, ARTERIAL pH 7.27* 7.27* 7.23* "   POCT PCO2, ARTERIAL mm Hg 44* 49* 55*   POCT PO2, ARTERIAL mm Hg 59* 60* 68*   POCT SO2, ARTERIAL % 89* 91* 92*     Results from last 72 hours   Lab Units 03/04/25  0042 03/03/25  1832 03/03/25  1710   POCT PH, VENOUS pH 7.28* 7.20* 7.15*   POCT PCO2, VENOUS mm Hg 47 69* 65*   POCT PO2, VENOUS mm Hg 76* 54* 42        Micro/ID:     Lab Results   Component Value Date    BLOODCULT Loaded on Instrument - Culture in progress 03/03/2025    BLOODCULT Loaded on Instrument - Culture in progress 03/03/2025       Summary of key imaging results from the last 24 hours  XR chest 1 view    Result Date: 3/4/2025  Interpreted By:  Hossein Harrison  and Brenna Murphy STUDY: XR CHEST 1 VIEW;  3/4/2025 4:52 am   INDICATION: Signs/Symptoms:Hypoxia.     COMPARISON: XR CHEST 1 VIEW 3/3/2025, CT ABDOMEN PELVIS W IV CONTRAST 3/3/2025 CT PE 03/03/2025   ACCESSION NUMBER(S): [id number]   ORDERING CLINICIAN: MEGHANN DEGROOT   FINDINGS: AP radiograph of the chest was provided.   MEDICAL DEVICES: ETT distal tip 5.1 cm from the roland. Left IJ CVC distal tip overlying the expected location of the superior cavoatrial junction. Esophageal temperature probe distal tip overlying the cardiac silhouette. Enteric tube side port overlying the GE junction with distal tip not imaged. Partially visualized tips.   CARDIOMEDIASTINAL SILHOUETTE: Cardiomediastinal silhouette is largely obscured by overlying airspace opacities. Within these limits, is likely stable from prior exam.   LUNGS: Similar patchy opacities within the bilateral mid to lower lungs, most confluent in the right lower lung. Small right pleural effusion. No pneumothorax..   ABDOMEN: No remarkable upper abdominal findings.   BONES: No acute osseous changes.       1.  Similar bilateral mid to lower lung airspace and interstitial opacities. 2. Enteric tube side port overlying the GE junction. Advancement recommended. Other medical devices as above.   I personally reviewed the images/study and  I agree with the findings as stated by Dr. Jeffrey Ceja. This study was interpreted at [facility], [place], [place].   MACRO: None   Signed by: Hossein Harrison 3/4/2025 11:44 AM Dictation workstation:   [id number]    Transthoracic Echo (TTE) Complete    Result Date: 3/4/2025   [facility], [address] [place] [address]                Tel [phone] and Fax [phone] TRANSTHORACIC ECHOCARDIOGRAM REPORT  Patient Name:       LOUISA Sanchez Physician:    [id number] Cony Cartwright MD Study Date:         3/4/2025            Ordering Provider:    [id number] MEGHANN DEGROOT MRN/PID:            [id number]            Fellow:               [id number] Dakotah Duran MD Accession#:         [id number]        Nurse: Date of Birth/Age:  1985 / 39 years Sonographer:          Willie Palafox RDCS Gender assigned at  F                   Additional Staff: Birth: Height:             167.64 cm           Admit Date: Weight:             63.50 kg            Admission Status:     Inpatient -                                                               Routine BSA / BMI:          1.72 m2 / 22.60     Encounter#:           [id number]                     kg/m2 Blood Pressure:     77/51 mmHg          Department Location:  [address] Study Type:    TRANSTHORACIC ECHO (TTE) COMPLETE Diagnosis/ICD: Shock, unspecified-R57.9 Indication:    tachycardia, shock CPT Code:      Echo Complete w Full Doppler-12604 Patient History: Pertinent History: Chest Pain and Dyspnea. cirrhosis with portal HTN s/p TIPS,                    Budd-Chiari, chronic IVC occlusion, anti-phospholipid                    syndrome (supposed to be on fondaparinux), hx of  positive                    PF4. Study Detail: The following Echo studies were performed: 2D, M-Mode, Doppler and               color flow. Technically challenging study due to body habitus,               patient lying in supine position, poor acoustic windows and               prominent lung artifact. The patient is intubated. Definity used               as a contrast agent for endocardial border definition and agitated               saline used as a contrast agent for intraseptal flow evaluation.               Total contrast used for this procedure was 2 mL via IV push.               Unable to obtain suprasternal notch view.  PHYSICIAN INTERPRETATION: Left Ventricle: Left ventricular ejection fraction is moderately decreased, by visual estimate at 30-35%. There is global hypokinesis of the left ventricle with minor regional variations. The left ventricular cavity size is normal. There is normal septal and normal posterior left ventricular wall thickness. Left ventricular diastolic filling cannot be determined due to E/A wave fusion. There is no definite left ventricular thrombus visualized. Technically difficult exam despite the use of echocontrast due to off axis somewhat foreshortened apical views. Left Atrium: The left atrial size was not well visualized. There is no evidence of a patent foramen ovale. A bubble study using agitated saline was performed. Bubble study is negative. Agitated saline contrast study was negative for intracardiac shunt. Right Ventricle: The right ventricle was not well visualized. There is reduced right ventricular systolic function. Right Atrium: The right atrium is normal in size. Aortic Valve: The aortic valve was not well visualized. There is no evidence of aortic valve regurgitation. The peak instantaneous gradient of the aortic valve is 7 mmHg. Mitral Valve: The mitral valve is mildly thickened. There is mild mitral annular calcification. There is trace mitral valve  regurgitation. Tricuspid Valve: The tricuspid valve is structurally normal. There is trace tricuspid regurgitation. The right ventricular systolic pressure is unable to be estimated. Pulmonic Valve: The pulmonic valve is not well visualized. There is no indication of pulmonic valve regurgitation. Pericardium: Trivial pericardial effusion. Aorta: The aortic root is normal. Systemic Veins: The inferior vena cava was not well visualized. TIPS shunt seen within the portl vein. In comparison to the previous echocardiogram(s): There are no prior studies on this patient for comparison purposes. No prior echocardiogram available for comparison.  CONCLUSIONS:  1. Poorly visualized anatomical structures due to suboptimal image quality.  2. Left ventricular ejection fraction is moderately decreased, by visual estimate at 30-35%.  3. There is global hypokinesis of the left ventricle with minor regional variations.  4. No left ventricular thrombus visualized.  5. Technically difficult exam despite the use of echocontrast due to off axis somewhat foreshortened apical views.  6. There is reduced right ventricular systolic function.  7. Agitated saline contrast study was negative for intracardiac shunt.  8. TIPS shunt seen within the portl vein.  9. There is no evidence of a patent foramen ovale. 10. Pt is tachycardic with  bpm throughout the exam. 11. No prior echocardiogram available for comparison. QUANTITATIVE DATA SUMMARY:  2D MEASUREMENTS:         Normal Ranges: Ao Root d:       2.90 cm (2.0-3.7cm) LAs:             2.90 cm (2.7-4.0cm) IVSd:            0.70 cm (0.6-1.1cm) LVPWd:           0.70 cm (0.6-1.1cm) LVIDd:           4.70 cm (3.9-5.9cm) LVIDs:           3.80 cm LV Mass Index:   60 g/m2 LV % FS          19.1 %  AORTA MEASUREMENTS:         Normal Ranges: Ao Sinus, d:        2.90 cm (2.1-3.5cm)  LV SYSTOLIC FUNCTION:                      Normal Ranges: EF-Visual:      33 % LV EF Reported: 33 %  LV DIASTOLIC  FUNCTION:          Normal Ranges: MV Peak E:             0.73 m/s (0.7-1.2 m/s)  AORTIC VALVE:           Normal Ranges: AoV Vmax:      1.32 m/s (<=1.7m/s) AoV Peak P.0 mmHg (<20mmHg) LVOT Max Low:  0.91 m/s (<=1.1m/s) LVOT VTI:      10.50 cm LVOT Diameter: 2.10 cm  (1.8-2.4cm) AoV Area,Vmax: 2.40 cm2 (2.5-4.5cm2)  RIGHT VENTRICLE: TAPSE: 15.5 mm RV s'  0.12 m/s  PULMONIC VALVE:          Normal Ranges: PV Max Low:     0.9 m/s  (0.6-0.9m/s) PV Max PG:      3.2 mmHg  [id number] Cony Cartwright MD Electronically signed on 3/4/2025 at 9:42:22 AM  ** Final **     ECG 12 Lead    Result Date: 3/4/2025  Sinus tachycardia ST & T wave abnormality, consider inferior ischemia ST & T wave abnormality, consider anterolateral ischemia Abnormal ECG When compared with ECG of 03-MAR-2025 21:51, No significant change was found    XR chest 1 view    Result Date: 3/3/2025  Interpreted By:  Vivien Garcia, STUDY: XR CHEST 1 VIEW;  3/3/2025 8:44 pm   INDICATION: Signs/Symptoms:L IJ placement.     COMPARISON: 2025   ACCESSION NUMBER(S): [id number]   ORDERING CLINICIAN: JIMMY AMATO   FINDINGS: AP radiograph of the chest was provided.   Endotracheal tube has been retracted and terminates approximately 7 cm above the roland.   Enteric to extends over the gastric fundus with side port at the level of the gastroesophageal junction.   Left IJ central venous catheter with tip projecting over the distal SVC.   CARDIOMEDIASTINAL SILHOUETTE: Cardiomediastinal silhouette is normal in size and configuration.   LUNGS: Extensive bilateral mid to lower lung field airspace opacities similar to prior imaging. Blunting of the right costophrenic angle is again noted. No sizable pneumothorax.   ABDOMEN: No remarkable upper abdominal findings.   BONES: No acute osseous changes.       1.  Medical devices as detailed. High position of the endotracheal tube and enteric tube as detailed. 2. Airspace opacities and possible trace right pleural effusion, similar  to prior.       MACRO: None   Signed by: Vivien Garcia 3/3/2025 9:26 PM Dictation workstation:   [id number]    XR chest 1 view    Result Date: 3/3/2025  Interpreted By:  Luis Carlos Barone and Beyersdorf Conner STUDY: XR CHEST 1 VIEW;  3/3/2025 5:47 pm   INDICATION: Signs/Symptoms:Intubation.   COMPARISON: Single-view chest 03/03/2025   ACCESSION NUMBER(S): [id number]   ORDERING CLINICIAN: BETTY ORTEGA   FINDINGS: AP radiograph of the chest was provided.   Endotracheal tube terminates at the roland, directed towards the right mainstem bronchus. Enteric tube courses below the diaphragm and with its tip terminating below the limits of the exam.     CARDIOMEDIASTINAL SILHOUETTE: Cardiomediastinal silhouette is normal in size and configuration.   LUNGS: Diffuse reticular opacities in the bilateral lungs. Opacity overlying the right costophrenic angle favored to represent atelectasis or pleural effusion.   ABDOMEN: No remarkable upper abdominal findings.   BONES: No acute osseous changes.       1.  Endotracheal tube terminates at the roland, directed towards the right mainstem bronchus. Repositioning is recommended. 2. Diffuse reticular opacities bilaterally consistent with multifocal pneumonia. 3. Question small right basilar pleural effusion and atelectasis.   I personally reviewed the image(s)/study and resident interpretation. I agree with the findings as stated by resident Jude Conley. Data analyzed and images interpreted at [facility], [place], [place].   MACRO: Jude Conley discussed the significance and urgency of this critical finding by telephone with  BETTY ORTEGA on 3/3/2025 at 5:58 pm.  (**-RCF-**) Findings:  See findings.   Signed by: Luis Carlos Barone 3/3/2025 6:16 PM Dictation workstation:   [id number]    CT abdomen pelvis w IV contrast    Result Date: 3/3/2025  Interpreted By:  Dakotah Ness and Bera Kaustav STUDY: CT ABDOMEN PELVIS W IV CONTRAST;   3/3/2025 3:41 pm   INDICATION: Signs/Symptoms:Hypotensive, cirrhosis.     COMPARISON: CTA chest abdomen pelvis on 05/15/2022   ACCESSION NUMBER(S): [id number]   ORDERING CLINICIAN: JIMMY AMATO   TECHNIQUE: CT of the abdomen and pelvis was performed.  Standard contiguous axial images were obtained at 3 mm slice thickness through the abdomen and pelvis. Coronal and sagittal reconstructions at 3 mm slice thickness were performed.  90 ML of Omnipaque 350 was administered intravenously without immediate complication.   FINDINGS: LOWER CHEST: Please refer to separately dictated CT of the chest for evaluation of the chest.   ABDOMEN:   LIVER: The liver demonstrates irregular nodular contour and parenchyma consistent with cirrhosis. Several ill-defined lesions are seen throughout the liver, within segment III and VIII, which is similar when compared to 2017, however incompletely characterized on this examination, however previously characterized as regenerative nodules on MRI from 2011. Similar poor opacification of the tips stent with internal calcification. Similar appearance of multiple collaterals along the anterior abdominal wall as well as a recannulated paraumbilical vein.   BILE DUCTS: The intrahepatic and extrahepatic ducts are not dilated.   GALLBLADDER: The gallbladder is nondistended and without evidence of radiopaque stones.   PANCREAS: The pancreas appears unremarkable without evidence of ductal dilatation or masses.   SPLEEN: Similar splenomegaly, consistent with changes of portal hypertension.   ADRENAL GLANDS: Bilateral adrenal glands appear normal.   KIDNEYS AND URETERS: The kidneys are normal in size and enhance symmetrically.  No hydroureteronephrosis or nephroureterolithiasis is identified.   PELVIS:   BLADDER: The urinary bladder appears normal without abnormal wall thickening.   REPRODUCTIVE ORGANS: The uterus and bilateral adnexa are unremarkable.   BOWEL: The stomach is unremarkable.   The small  and large bowel are normal in caliber and demonstrate no wall thickening.   Normal appendix.   VESSELS: There is no aneurysmal dilatation of the abdominal aorta. There is similar chronic occlusion of the IVC and common iliac veins, with extensive collaterals visualized.. There are extensive collaterals present within the anterior abdominal wall, as well as perigastric, paraesophageal and perisplenic varices.   PERITONEUM/RETROPERITONEUM/LYMPH NODES: Trace ascites. No loculated fluid collections. No abdominopelvic lymphadenopathy is present.   BONES AND ABDOMINAL WALL: No suspicious osseous lesions are identified. Extensive venous collaterals as described above. Otherwise, the abdominal wall soft tissues appear normal.       1.  No acute abnormality within the abdomen and pelvis. 2. Chronic findings of cirrhosis and portal hypertension as described above. Incomplete evaluation of a tips stent, further evaluation with color Doppler ultrasound recommended 3. Please refer to separately dictated CT of the chest for evaluation of the chest findings.   I personally reviewed the images/study and I agree with the findings as stated. This study was interpreted at [place], [place].   MACRO: None   Signed by: Dakotah Ness 3/3/2025 5:06 PM Dictation workstation:   [id number]    CT angio chest for pulmonary embolism    Result Date: 3/3/2025  Interpreted By:  Hossein Harrison and Sheng Max STUDY: CT ANGIO CHEST FOR PULMONARY EMBOLISM;  3/3/2025 3:41 pm   INDICATION: Signs/Symptoms:chest pain, r/o PE.       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   COMPARISON: CTA chest abdomen pelvis 05/15/2022 and CT dated 06/30/2019   ACCESSION NUMBER(S): [id number]   ORDERING CLINICIAN: ARETHA RYAN   TECHNIQUE: Helical data acquisition of the chest was obtained  after intravenous administration of 90 mL of Omnipaque 350 intravenously, as per PE protocol. Images were reformatted in coronal and sagittal planes. Axial and coronal maximum intensity projection (MIP) images were created and reviewed. No immediate complications.   FINDINGS: POTENTIAL LIMITATIONS OF THE STUDY: The assessment is limited by respiratory motion and suboptimal contrast opacification of pulmonary arteries.   HEART AND VESSELS: No discrete filling defects within the main pulmonary artery or its branches to  proximal segmental level. Please note that, assessment of distal segmental and subsegmental branches is limited and small peripheral emboli are not entirely excluded. Main pulmonary artery is at the upper limits of normal measuring 3.0 cm.   The thoracic aorta normal in course and caliber. Normal three-vessel aortic arch. There is minimal atherosclerosis present, including calcified and noncalcified plaques.Although, the study is not tailored for evaluation of aorta, there is no definite evidence of acute aortic pathology. No coronary artery calcifications are seen. Please note, the study is not optimized for evaluation of coronary arteries.   The cardiac chambers are not enlarged.There are no findings to suggest right heart strain. There is no pericardial effusion seen.   Engorgement of the azygous and hemiazygous veins is likely sequela of azygous continuation of the IVC in addition to chronic congestion from occluded TIPS (series 401, image 114 and 239).   MEDIASTINUM AND ASHLYN, LOWER NECK AND AXILLA: The visualized thyroid gland is within normal limits. There are multiple prominent to mildly enlarged mediastinal lymph nodes seen, measuring up to 1.4 cm, which are nonspecific, but are likely felt to be reactive in nature. Prominent soft tissue within the prevascular mediastinum which measures 2.2 centimeters, stable across multiple prior exams and may represent residual or hyperplastic thymic tissue.  Esophagus appears within normal limits as seen.     LUNGS AND AIRWAYS: The trachea and central airways are patent. No endobronchial lesion is seen.   New diffuse peribronchial consolidations throughout all 5 lobes concerning for multifocal bronchopneumonia. Findings are greatest within the right middle lobe with near-complete lobar consolidation. Background of mild centrilobular emphysema. Smooth interlobular septal thickening predominantly in the left-greater-than-right upper lobes is likely sequela of pulmonary interstitial edema. No pleural effusion or pneumothorax.     UPPER ABDOMEN: Postsurgical changes of TIPS which appears chronically occluded (series 401, image [id number]) dating back to 2019. Partially imaged hepatosplenomegaly. Numerous prominent splenorenal varices.     CHEST WALL AND OSSEOUS STRUCTURES: Numerous collateral vasculature throughout the soft tissues tracking along the left-greater-than-right anterior chest walls.No acute osseous pathology.There are no suspicious osseous lesions.Minimal multilevel degenerative changes within visualized spine.       1. No discrete filling defects within the main pulmonary artery or its branches to  proximal segmental level. Please note that, assessment of distal segmental and subsegmental branches is limited and small peripheral emboli are not entirely excluded. 2. Findings likely representing multifocal pneumonia to include near-complete right middle lobe consolidation. 3. Additional findings of interlobular septal thickening which likely indicates an additional component of pulmonary edema. 4. Hepatosplenomegaly with numerous prominent splenorenal varices and subcutaneous collateral vessels, consistent with portal hypertension. 5. Additional incidental non-acute findings as detailed above. See same day abdomen/pelvis CT report for full evaluation of findings below the diaphragm.     I personally reviewed the images/study and I agree with the findings as stated  by Dr. Dennis Carrero. This study was interpreted at [place], [place].   MACRO: Dennis Carrero discussed the significance and urgency of this critical finding by epic secure chat with  ARETHA RYAN on 3/3/2025 at 4:27 pm.  (**-RCF-**) Findings:  See findings.   Signed by: Hossein Harrison 3/3/2025 4:57 PM Dictation workstation:   [id number]    XR chest 1 view    Result Date: 3/3/2025  Interpreted By:  Vivien Bradshaw and Awan Komal STUDY: XR CHEST 1 VIEW; 3/3/2025 3:18 pm   INDICATION: Signs/Symptoms:hypoxic sob.   COMPARISON: Chest radiograph 05/15/2022 and CTA chest abdomen pelvis 05/15/2022   ACCESSION NUMBER(S): [id number]   ORDERING CLINICIAN: FABI CUNNINGHAM   FINDINGS: AP view of the chest.   CARDIOMEDIASTINAL SILHOUETTE: Cardiomediastinal silhouette is stable in size and configuration.   LUNGS: There is a large opacity involving the right lower lobe, obscuring the right heart border concerning for pneumonia. There are also patchy opacities involving the left lower lobe. No evidence of pneumothorax.   ABDOMEN: No remarkable upper abdominal findings.   BONES: No acute osseous changes.       Findings of multifocal pneumonia involving the bilateral mid/lower lung zones, right worse than left. Correlate with concurrent CT chest findings.   I personally reviewed the images/study and I agree with the findings as stated by Resident Elizabeth Burger. This study was interpreted at [place], [place].   Signed by: Vivien Bradshaw 3/3/2025 3:48 PM Dictation workstation:   [id number]      Assessment and Plan     Assessment: Gissel Harvey is a 39 y.o. year old female patient admitted on 3/3/2025 with intubation and pressor support.     Gissel Harvey is a 39 y.o. 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 Treating for septic shock with possible cardiogenic shock component as well as ARDS.    This patient is in severe shock and is now requiring 4 pressors. We are trying to keep her MAP up, but her MAP is still below 65. She is not a candidate for bridge. We will continue to treat her septic shock with likely cardiogenic component but she may still pass regardless of our efforts.    Mechanical Ventilation: < 4 days  Sedation/Analgesia:  fentanyl, ketamine, Versed  Restraints: no    Summary for 03/04/25:  Now on 4 pressors, still room to increase levo and vaso  Elevated lactate, continue serial ABGs  Troponin has plateaued  Continue vancomycin, meropenem, doxycycline (prolonged QT), tobramycin, and micafungin (3/3 - present)  Solumedrol 50 mg q12 for ARDS and mineralocorticoid effect  Unable to reach family  Ethics consulted, pt now DNR      NEUROLOGY/PSYCH:  #Sedation   - sedating with fentanyl, ketamine, Versed (hypotensive with propofol)   - RASS goal -4 to -5    CARDIOVASCULAR:  #Shock, likely septic I/s/o severe pneumonia and possible cardiogenic component  :: lactate > 9 on presentation   :: TTE 3/4 with EF 30-35% with reduced RV function, no previous TTE for comparison  - on norepi, vasopressin, epinephrine, and angiotensin II to support blood pressure with MAP goal > 65   - Elevated lactate, continue serial ABGs  - Troponin has plateaued    PULMONARY:  Vent Mode: Volume control/assist control  FiO2 (%):  [80 %-100 %] 100 %  S RR:  [10-24] 24  S VT:  [400 mL-450 mL] 450 mL  PEEP/CPAP (cm H2O):  [12 cm H20-15 cm H20] 12 cm H20  MAP (cm H2O):  [15-23] 17     #Community acquired pneumonia   #ARDS   :: intubated 3/3   :: P:F ~ 70 on admission   :: flu A/B, COVID negative  :: s/p vanc, Zosyn, azithro 3/2 in the ED   :: MRSA nares positive 3/4  - f/up pneumonia workup: urine strep, urine  legionella, RSV, parainfluenza virus, metapneumovirus, full resp viral panel, procalcitonin, tracheal aspirate  - continue vancomycin, meropenem, doxycycline (prolonged QT), tobramycin, and micafungin (3/3 - present)  - f/up blood cultures   - Solumedrol 50 mg q12 for ARDS and mineralocorticoid effect  - Inhaled epo  - BP not stable enough for proning  - Would not tolerate PVRP  - Per shock call, not a candidate for ECMO    RENAL/GENITOURINARY:  #Decreased renal function c/f SCOUT vs CKD   :: Cr 1.9 on admission, unclear baseline as no labs in 2 years   - f/up urine electrolytes  - Continue to monitor Cr with daily RFP    GASTROENTEROLOGY:  #liver cirrhosis   #portal hypertension s/p TIPS   #hx of Budd-Chiari   - Continue to monitor abdomen for signs of ascites formation    ENDOCRINOLOGY:  - SSI while on steroids     HEMATOLOGY:  #Lymphopenia   :: abs leukocyte count 260  :: HIV neg  - CTM     #Hx of antiphospholipid syndrome   #Hx of positive PF4  #Chronic IVC occlusion   #Hx of DVT   - Supposed to be on fondaparinux outpatient, unclear if taking as no recent med fills   - Continue bival while inpatient I/s/o previous positive PF4     SKIN:  -No acute issues    MUSCULOSKELETAL:        -No acute issues    INFECTIOUS DISEASE:  -See above pulm section     Social:  - Ethics consulted, recommends DNR  - Social work on board, will continue to try to reach family    ICU Check List       ICU Liberation: Intervention:   Assess, Prevent, Manage Pain      Both SAT and SBT [] SAT  [] SBT 30-60 min [] Extubate to NC  [] Extubate to NIV  [] Discuss Trach   Choice of analgesia and sedation Gao Agitation Sedation Scale (RASS): Unarousable  Target Arousal Level (RASS): RASS -3 to -4 fentanyl, ketamine, Versed      Delirium: Assess, prevent and manage  CAM ICU Positive    Early Mobility and Exercise   [] PT /OT consult   Family Engagement and Empowerment []Family updated [x]SW consult     FEN  Fluids: PRN, caution with reduced  EF  Electrolytes: PRN  Nutrition: NPO  Prophylaxis:  DVT ppx: bivalirudin gtt since positive PF4  GI ppx: pantoprazole  Bowel care: lactulose  Hardware:         CVC 03/04/25 Triple lumen Left Internal jugular (Active)   Placement Date: 03/04/25   Earliest Known Present: 03/04/25  Lumen Type: Triple lumen  Orientation: Left  Location: Internal jugular   Number of days: 0       Arterial Line 03/03/25 Left Radial (Active)   Placement Date/Time: 03/03/25 (c) 2040   Size: 22 G  Orientation: Left  Location: Radial  Site Prep: Chlorhexidine   Local Anesthetic: Injectable  Insertion attempts: 2  Securement Method: Transparent dressing   Number of days: 0       ETT  7.5 mm (Active)   Placement Date: 03/03/25   ETT Type: ETT - single  Single Lumen Tube Size: 7.5 mm  Cuffed: Yes  Location: Oral   Number of days: 1       Urethral Catheter 16 Fr. (Active)   Placement Date/Time: 03/03/25 1700   Hand Hygiene Completed: Yes  Tube Size (Fr.): 16 Fr.  Catheter Balloon Size: 10 mL  Urine Returned: Yes   Number of days: 0       NG/OG/Feeding Tube NG - [place] sump Right nostril (Active)   Placement Date/Time: 03/03/25 1900   Type of Tube: NG/OG Tube  Tube Length: 53 cm  Tube Type: NG - [place] sump  Tube Location: Right nostril   Number of days: 0       Social:  Code: DNR    HPOA: SW trying to find, no proxy currently  Disposition: MARLENE Das MD   03/04/25 at 12:56 PM     Disclaimer: Documentation completed with the information available at the time of input. The times in the chart may not be reflective of actual patient care times, interventions, or procedures. Documentation occurs after the physical care of the patient.

## 2025-03-04 NOTE — ED PROCEDURE NOTE
Procedure  Central Line    Performed by: Edward Garay DO  Authorized by: Chang Soriano MD    Consent:     Consent obtained:  Emergent situation  Universal protocol:     Patient identity confirmed:  Arm band  Pre-procedure details:     Indication(s): central venous access and insufficient peripheral access      Hand hygiene: Hand hygiene performed prior to insertion      Sterile barrier technique: All elements of maximal sterile technique followed      Skin preparation:  Chlorhexidine  Sedation:     Sedation type:  Deep  Anesthesia:     Anesthesia method:  None  Procedure details:     Location:  L internal jugular    Patient position:  Supine    Procedural supplies:  Triple lumen    Catheter size:  7 Fr    Landmarks identified: yes      Ultrasound guidance: yes      Ultrasound guidance timing: prior to insertion and real time      Sterile ultrasound techniques: Sterile gel and sterile probe covers were used      Number of attempts:  1    Successful placement: yes    Post-procedure details:     Post-procedure:  Dressing applied and line sutured    Assessment:  Blood return through all ports, free fluid flow, no pneumothorax on x-ray and placement verified by x-ray    Procedure completion:  Tolerated well, no immediate complications               Edward Garay DO  Resident  03/03/25 [id number]

## 2025-03-04 NOTE — SIGNIFICANT EVENT
"SHOCK CALL NOTE  This note represents a summary of a virtual evaluation by the Shock call team requested by MICU Night team. Reason: Refractory hypoxemia    SHOCK CALL Members on the Call:  Critical Care Attending:  Dr. Cherelle Zarate  Anesthesiology Attendings:  Dr. Janki Harrison/Dr Moni Carrillo  CardioThoracic Surgery Attending: Dr. Rosaura Pichardo  Cardiology Attendings:  Dr. Alfred Barry/Dr Liseth Luther  Critical Care Fellow:   Dr. Tadeo Sahni    Brief Clinical Summary:  Gissel Harvey is a 39 y.o. female presenting with chest pain and worsening shortness of breath.     Patient is a 40 yo AA Female brought in by EMS for chest pain and worsening shortness of breath of unknown duration. Her PMHx is significant for Liver cirrhosis (?etiology) with portal HTN s/p TIPS, Budd-Chiari, chronic IVC occlusion, anti-phospholipid syndrome (supposed to be on fondaparinux), hx of positive PF4 (unclear if true HIT) and Polysubstance use disorder. Team has not been able to reach any family at this time.    In the ED, was found to be in distress with tachycardia, tachypnea, and hypoxemic to the 80s. Initially requiring BIPAP but was subsequently Intubated in the ED and mechanically ventilated.    Vital Signs  BP 81/68   Pulse (!) 148   Temp (!) 38.2 °C (100.8 °F)   Resp 24   Ht 1.676 m (5' 6\")   Wt 63.5 kg (140 lb)   SpO2 90%   BMI 22.60 kg/m²      ET tube 7.5   Vent setting 24/450/12/100%  On 3 pressors - Levophed/Vasopressin/Angiotensin 2; Initially required EPI also but weaned off  Sedation: Versed/Fent/Ketamine  Access: Lt Rad Art line/LIJ Triple lumen/PIVs    Laboratory  Recent Labs     03/04/25  0438 03/03/25  2132 03/03/25  1846 03/03/25  1623 03/03/25  1547 05/15/22  1421 05/15/22  1315 06/30/19  1259   TROPHS 63* 62*  --  40* 16   < > 4  --    BNP  --  2,127*  --   --  822*  --  46  --    LACTATE  --   --  5.7*  --  8.7*  --   --  0.6    < > = values in this interval not displayed.     Other " labs:  Alcohol level NEG  Flu/COVID NEG  Utox POSITIVE for Amphetamine, Cocaine and Cannabinoids  Serial ABGs with worsening P/F ratio    CT Scan reviewed:  CT C/A/P notable for diffuse peribronchial consolidations throughout all lobes c/f multifocal pneumonia, with near complete lobar consolidation of RML.      CTPE (03/03)  No discrete filling defects within the main pulmonary artery or its branches to  proximal segmental level. 2. Findings likely representing multifocal pneumonia to include near-complete right middle lobe consolidation. 3. Additional findings of interlobular septal thickening which likely indicates an additional component of pulmonary edema. 4. Hepatosplenomegaly with numerous prominent splenorenal varices and subcutaneous collateral vessels, consistent with portal hypertension.    TTE reviewed (if available): Ordered/pending  No previous Echo    Treatments given:   In the ED  - Vanc/Zosyn/Azithro/Solumedrol 50mg  - Digoxin 500 mcg for tachycardia.     In the MICU  - Broadened antibiotics to Day/Vanc/Doxycycline  - Double gram negative coverage with a dose of Amikacin  - Fungal coverage with Micafungin  - Cisatracurium infusion  - Inhaled EPO  - ARDS dose Steroids (Dexa given)    Initial Impressions:  Acute Respiratory Distress Syndrome/Refractory hypoxemia  Liver Cirrhosis  Anti-Phospholipid Syndrome (on Fondaparinux)    Initial Suggestions/Plans:  Continue management of patient in the Medical Intensive unit at [facility].  Initial therapy suggested: Patient NOT a candidate for ECMO due to aniticipated problems with getting access (IVC and others target vessel occlusions as per CTA) and patient's comorbid conditions.  Will consult Ethics     To re conference the SHOCK CALL team please call the  Transfer Center at [phone].

## 2025-03-04 NOTE — CARE PLAN
Problem: Pain - Adult  Goal: Verbalizes/displays adequate comfort level or baseline comfort level  3/4/2025 0501 by Mamta Lauren RN  Outcome: Progressing  3/4/2025 0501 by Mamta Lauren RN  Outcome: Progressing     Problem: Safety - Adult  Goal: Free from fall injury  3/4/2025 0501 by Mamta Lauren RN  Outcome: Progressing  3/4/2025 0501 by Mamta Lauren RN  Outcome: Progressing     Problem: Discharge Planning  Goal: Discharge to home or other facility with appropriate resources  3/4/2025 0501 by Mamta Lauren RN  Outcome: Progressing  3/4/2025 0501 by Mamta Lauren RN  Outcome: Progressing     Problem: Chronic Conditions and Co-morbidities  Goal: Patient's chronic conditions and co-morbidity symptoms are monitored and maintained or improved  3/4/2025 0501 by Mamta Lauren RN  Outcome: Progressing  3/4/2025 0501 by Mamta Lauren RN  Outcome: Progressing     Problem: Nutrition  Goal: Nutrient intake appropriate for maintaining nutritional needs  3/4/2025 0501 by Mamta Lauren RN  Outcome: Progressing  3/4/2025 0501 by Mamta Lauren RN  Outcome: Progressing     Problem: Knowledge Deficit  Goal: Patient/family/caregiver demonstrates understanding of disease process, treatment plan, medications, and discharge instructions  3/4/2025 0501 by Mamta Lauren RN  Outcome: Progressing  3/4/2025 0501 by Mamta Lauren RN  Outcome: Progressing     Problem: Mechanical Ventilation  Goal: Patient Will Maintain Patent Airway  3/4/2025 0501 by Mamta Lauren RN  Outcome: Progressing  3/4/2025 0501 by Mamta Lauren RN  Outcome: Progressing  Goal: Oral health is maintained or improved  3/4/2025 0501 by Mamta Lauren RN  Outcome: Progressing  3/4/2025 0501 by Mamta Lauren RN  Outcome: Progressing  Goal: ET tube will be managed safely  3/4/2025 0501 by Mamta Lauren RN  Outcome: Progressing  3/4/2025 0501 by Mamta Lauren RN  Outcome:  Progressing  Goal: Ability to express needs and understand communication  3/4/2025 0501 by Mamta Lauren RN  Outcome: Progressing  3/4/2025 0501 by Mamta Lauren RN  Outcome: Progressing  Goal: Mobility/activity is maintained at optimum level for patient  3/4/2025 0501 by Mamta Lauren RN  Outcome: Progressing  3/4/2025 0501 by Mamta Lauren RN  Outcome: Progressing     Problem: Skin  Goal: Decreased wound size/increased tissue granulation at next dressing change  3/4/2025 0501 by Mamta Lauren RN  Outcome: Progressing  3/4/2025 0501 by Mamta Lauren RN  Outcome: Progressing  Goal: Participates in plan/prevention/treatment measures  3/4/2025 0501 by Mamta Lauren RN  Outcome: Progressing  3/4/2025 0501 by Mamta Lauren RN  Outcome: Progressing  Goal: Prevent/manage excess moisture  3/4/2025 0501 by Mamta Lauren RN  Outcome: Progressing  3/4/2025 0501 by Mamta Lauren RN  Outcome: Progressing  Goal: Prevent/minimize sheer/friction injuries  3/4/2025 0501 by Mamta Lauren RN  Outcome: Progressing  3/4/2025 0501 by Mamta Lauren RN  Outcome: Progressing  Goal: Promote/optimize nutrition  3/4/2025 0501 by Mamta Lauren RN  Outcome: Progressing  3/4/2025 0501 by Mamta Lauren RN  Outcome: Progressing  Goal: Promote skin healing  3/4/2025 0501 by Mamta Lauren RN  Outcome: Progressing  3/4/2025 0501 by Mamta Lauren RN  Outcome: Progressing     Problem: Safety - Medical Restraint  Goal: Remains free of injury from restraints (Restraint for Interference with Medical Device)  3/4/2025 0501 by Mamta Lauren RN  Outcome: Progressing  3/4/2025 0501 by Mamta Lauren RN  Outcome: Progressing  Goal: Free from restraint(s) (Restraint for Interference with Medical Device)  3/4/2025 0501 by Mamta N Mihalek, RN  Outcome: Progressing  3/4/2025 0501 by Mamta Lauren, RN  Outcome: Progressing

## 2025-03-05 ENCOUNTER — APPOINTMENT (OUTPATIENT)
Dept: RADIOLOGY | Facility: HOSPITAL | Age: 40
End: 2025-03-05
Payer: COMMERCIAL

## 2025-03-05 LAB
ALBUMIN SERPL BCP-MCNC: 2.5 G/DL (ref 3.4–5)
ALBUMIN SERPL BCP-MCNC: 2.9 G/DL (ref 3.4–5)
ALP SERPL-CCNC: 68 U/L (ref 33–110)
ALT SERPL W P-5'-P-CCNC: 17 U/L (ref 7–45)
ANION GAP BLDA CALCULATED.4IONS-SCNC: 20 MMO/L (ref 10–25)
ANION GAP BLDA CALCULATED.4IONS-SCNC: 20 MMO/L (ref 10–25)
ANION GAP BLDA CALCULATED.4IONS-SCNC: 21 MMO/L (ref 10–25)
ANION GAP SERPL CALC-SCNC: 22 MMOL/L (ref 10–20)
ANION GAP SERPL CALC-SCNC: 23 MMOL/L (ref 10–20)
APTT PPP: 62 SECONDS (ref 26–36)
APTT PPP: 79 SECONDS (ref 26–36)
APTT PPP: 84 SECONDS (ref 26–36)
AST SERPL W P-5'-P-CCNC: 38 U/L (ref 9–39)
BASE EXCESS BLDA CALC-SCNC: -10.1 MMOL/L (ref -2–3)
BASE EXCESS BLDA CALC-SCNC: -6.1 MMOL/L (ref -2–3)
BASE EXCESS BLDA CALC-SCNC: -9.5 MMOL/L (ref -2–3)
BASOPHILS # BLD MANUAL: 0 X10*3/UL (ref 0–0.1)
BASOPHILS # BLD MANUAL: 0 X10*3/UL (ref 0–0.1)
BASOPHILS NFR BLD MANUAL: 0 %
BASOPHILS NFR BLD MANUAL: 0 %
BILIRUB DIRECT SERPL-MCNC: 5.6 MG/DL (ref 0–0.3)
BILIRUB SERPL-MCNC: 8.8 MG/DL (ref 0–1.2)
BODY TEMPERATURE: 37 DEGREES CELSIUS
BUN SERPL-MCNC: 60 MG/DL (ref 6–23)
BUN SERPL-MCNC: 66 MG/DL (ref 6–23)
BURR CELLS BLD QL SMEAR: ABNORMAL
BURR CELLS BLD QL SMEAR: ABNORMAL
CA-I BLDA-SCNC: 0.97 MMOL/L (ref 1.1–1.33)
CA-I BLDA-SCNC: 1.1 MMOL/L (ref 1.1–1.33)
CA-I BLDA-SCNC: 1.11 MMOL/L (ref 1.1–1.33)
CALCIUM SERPL-MCNC: 7.7 MG/DL (ref 8.6–10.6)
CALCIUM SERPL-MCNC: 8.4 MG/DL (ref 8.6–10.6)
CHLORIDE BLDA-SCNC: 96 MMOL/L (ref 98–107)
CHLORIDE BLDA-SCNC: 97 MMOL/L (ref 98–107)
CHLORIDE BLDA-SCNC: 98 MMOL/L (ref 98–107)
CHLORIDE SERPL-SCNC: 96 MMOL/L (ref 98–107)
CHLORIDE SERPL-SCNC: 97 MMOL/L (ref 98–107)
CMV IGM SERPL-ACNC: 16.1 AU/ML
CO2 SERPL-SCNC: 19 MMOL/L (ref 21–32)
CO2 SERPL-SCNC: 21 MMOL/L (ref 21–32)
CREAT SERPL-MCNC: 2.21 MG/DL (ref 0.5–1.05)
CREAT SERPL-MCNC: 2.32 MG/DL (ref 0.5–1.05)
EGFRCR SERPLBLD CKD-EPI 2021: 27 ML/MIN/1.73M*2
EGFRCR SERPLBLD CKD-EPI 2021: 28 ML/MIN/1.73M*2
EOSINOPHIL # BLD MANUAL: 0 X10*3/UL (ref 0–0.7)
EOSINOPHIL # BLD MANUAL: 0 X10*3/UL (ref 0–0.7)
EOSINOPHIL NFR BLD MANUAL: 0 %
EOSINOPHIL NFR BLD MANUAL: 0 %
ERYTHROCYTE [DISTWIDTH] IN BLOOD BY AUTOMATED COUNT: 15.6 % (ref 11.5–14.5)
ERYTHROCYTE [DISTWIDTH] IN BLOOD BY AUTOMATED COUNT: 16.1 % (ref 11.5–14.5)
GLUCOSE BLD MANUAL STRIP-MCNC: 113 MG/DL (ref 74–99)
GLUCOSE BLD MANUAL STRIP-MCNC: 115 MG/DL (ref 74–99)
GLUCOSE BLD MANUAL STRIP-MCNC: 116 MG/DL (ref 74–99)
GLUCOSE BLD MANUAL STRIP-MCNC: 122 MG/DL (ref 74–99)
GLUCOSE BLD MANUAL STRIP-MCNC: 90 MG/DL (ref 74–99)
GLUCOSE BLDA-MCNC: 104 MG/DL (ref 74–99)
GLUCOSE BLDA-MCNC: 110 MG/DL (ref 74–99)
GLUCOSE BLDA-MCNC: 113 MG/DL (ref 74–99)
GLUCOSE SERPL-MCNC: 112 MG/DL (ref 74–99)
GLUCOSE SERPL-MCNC: 119 MG/DL (ref 74–99)
HCO3 BLDA-SCNC: 17.2 MMOL/L (ref 22–26)
HCO3 BLDA-SCNC: 18 MMOL/L (ref 22–26)
HCO3 BLDA-SCNC: 20.2 MMOL/L (ref 22–26)
HCT VFR BLD AUTO: 35.9 % (ref 36–46)
HCT VFR BLD AUTO: 42.5 % (ref 36–46)
HCT VFR BLD EST: 37 % (ref 36–46)
HCT VFR BLD EST: 40 % (ref 36–46)
HCT VFR BLD EST: 40 % (ref 36–46)
HGB BLD-MCNC: 12 G/DL (ref 12–16)
HGB BLD-MCNC: 13.2 G/DL (ref 12–16)
HGB BLDA-MCNC: 12.4 G/DL (ref 12–16)
HGB BLDA-MCNC: 13.4 G/DL (ref 12–16)
HGB BLDA-MCNC: 13.4 G/DL (ref 12–16)
IMM GRANULOCYTES # BLD AUTO: 1.43 X10*3/UL (ref 0–0.7)
IMM GRANULOCYTES # BLD AUTO: 2.76 X10*3/UL (ref 0–0.7)
IMM GRANULOCYTES NFR BLD AUTO: 17.6 % (ref 0–0.9)
IMM GRANULOCYTES NFR BLD AUTO: 9 % (ref 0–0.9)
INHALED O2 CONCENTRATION: 100 %
INR PPP: 2.7 (ref 0.9–1.1)
INR PPP: 2.9 (ref 0.9–1.1)
LACTATE BLDA-SCNC: 6.8 MMOL/L (ref 0.4–2)
LACTATE BLDA-SCNC: 6.9 MMOL/L (ref 0.4–2)
LACTATE BLDA-SCNC: 7.1 MMOL/L (ref 0.4–2)
LEGIONELLA AG UR QL: NEGATIVE
LYMPHOCYTES # BLD MANUAL: 0.64 X10*3/UL (ref 1.2–4.8)
LYMPHOCYTES # BLD MANUAL: 0.64 X10*3/UL (ref 1.2–4.8)
LYMPHOCYTES NFR BLD MANUAL: 4 %
LYMPHOCYTES NFR BLD MANUAL: 4.1 %
MAGNESIUM SERPL-MCNC: 2.03 MG/DL (ref 1.6–2.4)
MAGNESIUM SERPL-MCNC: 2.07 MG/DL (ref 1.6–2.4)
MCH RBC QN AUTO: 30.8 PG (ref 26–34)
MCH RBC QN AUTO: 31.6 PG (ref 26–34)
MCHC RBC AUTO-ENTMCNC: 31.1 G/DL (ref 32–36)
MCHC RBC AUTO-ENTMCNC: 33.4 G/DL (ref 32–36)
MCV RBC AUTO: 95 FL (ref 80–100)
MCV RBC AUTO: 99 FL (ref 80–100)
METAMYELOCYTES # BLD MANUAL: 0.25 X10*3/UL
METAMYELOCYTES # BLD MANUAL: 0.38 X10*3/UL
METAMYELOCYTES NFR BLD MANUAL: 1.6 %
METAMYELOCYTES NFR BLD MANUAL: 2.4 %
MONOCYTES # BLD MANUAL: 0.64 X10*3/UL (ref 0.1–1)
MONOCYTES # BLD MANUAL: 1.02 X10*3/UL (ref 0.1–1)
MONOCYTES NFR BLD MANUAL: 4 %
MONOCYTES NFR BLD MANUAL: 6.5 %
MYELOCYTES # BLD MANUAL: 0.13 X10*3/UL
MYELOCYTES # BLD MANUAL: 0.13 X10*3/UL
MYELOCYTES NFR BLD MANUAL: 0.8 %
MYELOCYTES NFR BLD MANUAL: 0.8 %
NEUTROPHILS # BLD MANUAL: 13.66 X10*3/UL (ref 1.2–7.7)
NEUTS BAND # BLD MANUAL: 1.02 X10*3/UL (ref 0–0.7)
NEUTS BAND NFR BLD MANUAL: 6.5 %
NEUTS SEG # BLD MANUAL: 12.64 X10*3/UL (ref 1.2–7)
NEUTS SEG # BLD MANUAL: 14.12 X10*3/UL (ref 1.2–7)
NEUTS SEG NFR BLD MANUAL: 80.5 %
NEUTS SEG NFR BLD MANUAL: 88.8 %
NRBC BLD MANUAL-RTO: 1.6 % (ref 0–0)
NRBC BLD-RTO: 0.5 /100 WBCS (ref 0–0)
NRBC BLD-RTO: 0.6 /100 WBCS (ref 0–0)
OXYHGB MFR BLDA: 84.9 % (ref 94–98)
OXYHGB MFR BLDA: 85.5 % (ref 94–98)
OXYHGB MFR BLDA: 89.3 % (ref 94–98)
PCO2 BLDA: 42 MM HG (ref 38–42)
PCO2 BLDA: 42 MM HG (ref 38–42)
PCO2 BLDA: 44 MM HG (ref 38–42)
PH BLDA: 7.22 PH (ref 7.38–7.42)
PH BLDA: 7.22 PH (ref 7.38–7.42)
PH BLDA: 7.29 PH (ref 7.38–7.42)
PHOSPHATE SERPL-MCNC: 6.7 MG/DL (ref 2.5–4.9)
PHOSPHATE SERPL-MCNC: 7.4 MG/DL (ref 2.5–4.9)
PLATELET # BLD AUTO: 110 X10*3/UL (ref 150–450)
PLATELET # BLD AUTO: 89 X10*3/UL (ref 150–450)
PO2 BLDA: 56 MM HG (ref 85–95)
PO2 BLDA: 58 MM HG (ref 85–95)
PO2 BLDA: 65 MM HG (ref 85–95)
POLYCHROMASIA BLD QL SMEAR: ABNORMAL
POTASSIUM BLDA-SCNC: 3.8 MMOL/L (ref 3.5–5.3)
POTASSIUM BLDA-SCNC: 3.8 MMOL/L (ref 3.5–5.3)
POTASSIUM BLDA-SCNC: 3.9 MMOL/L (ref 3.5–5.3)
POTASSIUM SERPL-SCNC: 3.9 MMOL/L (ref 3.5–5.3)
POTASSIUM SERPL-SCNC: 4.1 MMOL/L (ref 3.5–5.3)
PROT SERPL-MCNC: 5.7 G/DL (ref 6.4–8.2)
PROTHROMBIN TIME: 30.3 SECONDS (ref 9.8–12.4)
PROTHROMBIN TIME: 31.8 SECONDS (ref 9.8–12.4)
RBC # BLD AUTO: 3.8 X10*6/UL (ref 4–5.2)
RBC # BLD AUTO: 4.28 X10*6/UL (ref 4–5.2)
RBC MORPH BLD: ABNORMAL
RBC MORPH BLD: ABNORMAL
S PNEUM AG UR QL: POSITIVE
SAO2 % BLDA: 87 % (ref 94–100)
SAO2 % BLDA: 88 % (ref 94–100)
SAO2 % BLDA: 92 % (ref 94–100)
SODIUM BLDA-SCNC: 131 MMOL/L (ref 136–145)
SODIUM BLDA-SCNC: 132 MMOL/L (ref 136–145)
SODIUM BLDA-SCNC: 132 MMOL/L (ref 136–145)
SODIUM SERPL-SCNC: 134 MMOL/L (ref 136–145)
SODIUM SERPL-SCNC: 136 MMOL/L (ref 136–145)
TOTAL CELLS COUNTED BLD: 123
TOTAL CELLS COUNTED BLD: 125
VANCOMYCIN SERPL-MCNC: 14.1 UG/ML (ref 5–20)
WBC # BLD AUTO: 15.7 X10*3/UL (ref 4.4–11.3)
WBC # BLD AUTO: 15.9 X10*3/UL (ref 4.4–11.3)

## 2025-03-05 PROCEDURE — 71045 X-RAY EXAM CHEST 1 VIEW: CPT

## 2025-03-05 PROCEDURE — 02HV33Z INSERTION OF INFUSION DEVICE INTO SUPERIOR VENA CAVA, PERCUTANEOUS APPROACH: ICD-10-PCS | Performed by: STUDENT IN AN ORGANIZED HEALTH CARE EDUCATION/TRAINING PROGRAM

## 2025-03-05 PROCEDURE — 85018 HEMOGLOBIN: CPT

## 2025-03-05 PROCEDURE — 84132 ASSAY OF SERUM POTASSIUM: CPT

## 2025-03-05 PROCEDURE — 2500000005 HC RX 250 GENERAL PHARMACY W/O HCPCS: Mod: SE | Performed by: EMERGENCY MEDICINE

## 2025-03-05 PROCEDURE — 37799 UNLISTED PX VASCULAR SURGERY: CPT

## 2025-03-05 PROCEDURE — 85027 COMPLETE CBC AUTOMATED: CPT

## 2025-03-05 PROCEDURE — 2500000001 HC RX 250 WO HCPCS SELF ADMINISTERED DRUGS (ALT 637 FOR MEDICARE OP): Mod: SE

## 2025-03-05 PROCEDURE — 80202 ASSAY OF VANCOMYCIN: CPT

## 2025-03-05 PROCEDURE — 85730 THROMBOPLASTIN TIME PARTIAL: CPT

## 2025-03-05 PROCEDURE — 85007 BL SMEAR W/DIFF WBC COUNT: CPT

## 2025-03-05 PROCEDURE — 94644 CONT INHLJ TX 1ST HOUR: CPT

## 2025-03-05 PROCEDURE — 93010 ELECTROCARDIOGRAM REPORT: CPT | Performed by: INTERNAL MEDICINE

## 2025-03-05 PROCEDURE — 99254 IP/OBS CNSLTJ NEW/EST MOD 60: CPT | Performed by: INTERNAL MEDICINE

## 2025-03-05 PROCEDURE — 2500000005 HC RX 250 GENERAL PHARMACY W/O HCPCS: Mod: SE

## 2025-03-05 PROCEDURE — 94003 VENT MGMT INPAT SUBQ DAY: CPT

## 2025-03-05 PROCEDURE — 5A2204Z RESTORATION OF CARDIAC RHYTHM, SINGLE: ICD-10-PCS | Performed by: STUDENT IN AN ORGANIZED HEALTH CARE EDUCATION/TRAINING PROGRAM

## 2025-03-05 PROCEDURE — 94645 CONT INHLJ TX EACH ADDL HOUR: CPT

## 2025-03-05 PROCEDURE — 99254 IP/OBS CNSLTJ NEW/EST MOD 60: CPT | Performed by: STUDENT IN AN ORGANIZED HEALTH CARE EDUCATION/TRAINING PROGRAM

## 2025-03-05 PROCEDURE — 82248 BILIRUBIN DIRECT: CPT

## 2025-03-05 PROCEDURE — 2500000004 HC RX 250 GENERAL PHARMACY W/ HCPCS (ALT 636 FOR OP/ED): Mod: SE

## 2025-03-05 PROCEDURE — 82435 ASSAY OF BLOOD CHLORIDE: CPT

## 2025-03-05 PROCEDURE — 71045 X-RAY EXAM CHEST 1 VIEW: CPT | Performed by: RADIOLOGY

## 2025-03-05 PROCEDURE — 82040 ASSAY OF SERUM ALBUMIN: CPT

## 2025-03-05 PROCEDURE — 85610 PROTHROMBIN TIME: CPT

## 2025-03-05 PROCEDURE — 82947 ASSAY GLUCOSE BLOOD QUANT: CPT

## 2025-03-05 PROCEDURE — 83735 ASSAY OF MAGNESIUM: CPT

## 2025-03-05 PROCEDURE — 99291 CRITICAL CARE FIRST HOUR: CPT

## 2025-03-05 PROCEDURE — 84100 ASSAY OF PHOSPHORUS: CPT

## 2025-03-05 PROCEDURE — 2020000001 HC ICU ROOM DAILY

## 2025-03-05 RX ORDER — CALCIUM GLUCONATE 20 MG/ML
2 INJECTION, SOLUTION INTRAVENOUS ONCE
Status: COMPLETED | OUTPATIENT
Start: 2025-03-05 | End: 2025-03-05

## 2025-03-05 RX ORDER — INDOMETHACIN 25 MG/1
50 CAPSULE ORAL ONCE
Status: COMPLETED | OUTPATIENT
Start: 2025-03-05 | End: 2025-03-05

## 2025-03-05 RX ORDER — MAGNESIUM SULFATE HEPTAHYDRATE 40 MG/ML
2 INJECTION, SOLUTION INTRAVENOUS ONCE
Status: COMPLETED | OUTPATIENT
Start: 2025-03-05 | End: 2025-03-05

## 2025-03-05 RX ORDER — POLYETHYLENE GLYCOL 3350 17 G/17G
17 POWDER, FOR SOLUTION ORAL 2 TIMES DAILY
Status: DISCONTINUED | OUTPATIENT
Start: 2025-03-05 | End: 2025-03-09

## 2025-03-05 RX ORDER — VANCOMYCIN HYDROCHLORIDE 1 G/200ML
1000 INJECTION, SOLUTION INTRAVENOUS ONCE
Status: COMPLETED | OUTPATIENT
Start: 2025-03-05 | End: 2025-03-05

## 2025-03-05 RX ORDER — INDOMETHACIN 25 MG/1
CAPSULE ORAL
Status: COMPLETED
Start: 2025-03-05 | End: 2025-03-05

## 2025-03-05 RX ADMIN — MEROPENEM 2 G: 1 INJECTION INTRAVENOUS at 10:12

## 2025-03-05 RX ADMIN — CARBOXYMETHYLCELLULOSE SODIUM 2 DROP: 5 SOLUTION/ DROPS OPHTHALMIC at 09:06

## 2025-03-05 RX ADMIN — CISATRACURIUM BESYLATE 37.5 MG/HR: 200 INJECTION, SOLUTION INTRAVENOUS at 20:19

## 2025-03-05 RX ADMIN — Medication 250 MCG/HR: at 06:11

## 2025-03-05 RX ADMIN — VASOPRESSIN 0.06 UNITS/MIN: 0.2 INJECTION INTRAVENOUS at 06:20

## 2025-03-05 RX ADMIN — METHYLPREDNISOLONE SODIUM SUCCINATE 50 MG: 125 INJECTION, POWDER, FOR SOLUTION INTRAMUSCULAR; INTRAVENOUS at 08:31

## 2025-03-05 RX ADMIN — CARBOXYMETHYLCELLULOSE SODIUM 2 DROP: 5 SOLUTION/ DROPS OPHTHALMIC at 20:59

## 2025-03-05 RX ADMIN — WHITE PETROLATUM 57.7 %-MINERAL OIL 31.9 % EYE OINTMENT 1 APPLICATION: at 07:55

## 2025-03-05 RX ADMIN — EPOPROSTENOL 0.05 MCG/KG/MIN: 1.5 INJECTION, POWDER, LYOPHILIZED, FOR SOLUTION INTRAVENOUS at 22:17

## 2025-03-05 RX ADMIN — CARBOXYMETHYLCELLULOSE SODIUM 2 DROP: 5 SOLUTION/ DROPS OPHTHALMIC at 03:25

## 2025-03-05 RX ADMIN — SODIUM BICARBONATE 10 MEQ/HR: 84 INJECTION, SOLUTION INTRAVENOUS at 16:26

## 2025-03-05 RX ADMIN — METHYLPREDNISOLONE SODIUM SUCCINATE 50 MG: 125 INJECTION, POWDER, FOR SOLUTION INTRAMUSCULAR; INTRAVENOUS at 20:58

## 2025-03-05 RX ADMIN — AMIODARONE HYDROCHLORIDE 150 MG: 1.5 INJECTION, SOLUTION INTRAVENOUS at 15:19

## 2025-03-05 RX ADMIN — CALCIUM GLUCONATE 2 G: 20 INJECTION, SOLUTION INTRAVENOUS at 09:11

## 2025-03-05 RX ADMIN — CISATRACURIUM BESYLATE 37.5 MG/HR: 200 INJECTION, SOLUTION INTRAVENOUS at 04:44

## 2025-03-05 RX ADMIN — INDOMETHACIN 50 MEQ: 25 CAPSULE ORAL at 16:25

## 2025-03-05 RX ADMIN — MEROPENEM 2 G: 1 INJECTION INTRAVENOUS at 22:46

## 2025-03-05 RX ADMIN — ACETAMINOPHEN 650 MG: 160 SOLUTION ORAL at 02:09

## 2025-03-05 RX ADMIN — Medication 100 PERCENT: at 20:59

## 2025-03-05 RX ADMIN — KETAMINE HYDROCHLORIDE 2 MG/KG/HR: 10 INJECTION INTRAMUSCULAR; INTRAVENOUS at 16:15

## 2025-03-05 RX ADMIN — PANTOPRAZOLE SODIUM 40 MG: 40 INJECTION, POWDER, LYOPHILIZED, FOR SOLUTION INTRAVENOUS at 05:14

## 2025-03-05 RX ADMIN — Medication 250 MCG/HR: at 02:09

## 2025-03-05 RX ADMIN — CEFTAROLINE FOSAMIL 300 MG: 600 POWDER, FOR SOLUTION INTRAVENOUS at 18:50

## 2025-03-05 RX ADMIN — WHITE PETROLATUM 57.7 %-MINERAL OIL 31.9 % EYE OINTMENT 1 APPLICATION: at 19:35

## 2025-03-05 RX ADMIN — LACTULOSE 20 G: 10 SOLUTION ORAL at 08:33

## 2025-03-05 RX ADMIN — POLYETHYLENE GLYCOL 3350 17 G: 17 POWDER, FOR SOLUTION ORAL at 09:24

## 2025-03-05 RX ADMIN — AMIODARONE HYDROCHLORIDE 1 MG/MIN: 1.8 INJECTION, SOLUTION INTRAVENOUS at 16:31

## 2025-03-05 RX ADMIN — EPOPROSTENOL 0.05 MCG/KG/MIN: 1.5 INJECTION, POWDER, LYOPHILIZED, FOR SOLUTION INTRAVENOUS at 06:02

## 2025-03-05 RX ADMIN — CISATRACURIUM BESYLATE 37.5 MG/HR: 200 INJECTION, SOLUTION INTRAVENOUS at 09:05

## 2025-03-05 RX ADMIN — VASOPRESSIN 0.06 UNITS/MIN: 0.2 INJECTION INTRAVENOUS at 20:12

## 2025-03-05 RX ADMIN — NOREPINEPHRINE BITARTRATE 0.25 MCG/KG/MIN: 0.06 INJECTION, SOLUTION INTRAVENOUS at 17:00

## 2025-03-05 RX ADMIN — SODIUM BICARBONATE 50 MEQ: 84 INJECTION INTRAVENOUS at 16:38

## 2025-03-05 RX ADMIN — DOXYCYCLINE 100 MG: 100 INJECTION, POWDER, LYOPHILIZED, FOR SOLUTION INTRAVENOUS at 20:12

## 2025-03-05 RX ADMIN — Medication 2 MG/KG/HR: at 06:12

## 2025-03-05 RX ADMIN — DOXYCYCLINE 100 MG: 100 INJECTION, POWDER, LYOPHILIZED, FOR SOLUTION INTRAVENOUS at 08:28

## 2025-03-05 RX ADMIN — WHITE PETROLATUM 57.7 %-MINERAL OIL 31.9 % EYE OINTMENT 1 APPLICATION: at 02:09

## 2025-03-05 RX ADMIN — BIVALIRUDIN 0.03 MG/KG/HR: 250 INJECTION INTRACAVERNOUS at 06:12

## 2025-03-05 RX ADMIN — Medication 2 MG/KG/HR: at 09:05

## 2025-03-05 RX ADMIN — VANCOMYCIN HYDROCHLORIDE 1000 MG: 1 INJECTION, SOLUTION INTRAVENOUS at 19:35

## 2025-03-05 RX ADMIN — Medication 250 MCG/HR: at 09:05

## 2025-03-05 RX ADMIN — POLYETHYLENE GLYCOL 3350 17 G: 17 POWDER, FOR SOLUTION ORAL at 20:12

## 2025-03-05 RX ADMIN — CARBOXYMETHYLCELLULOSE SODIUM 2 DROP: 5 SOLUTION/ DROPS OPHTHALMIC at 16:44

## 2025-03-05 RX ADMIN — ANGIOTENSIN II 40 NG/KG/MIN: 2.5 INJECTION INTRAVENOUS at 00:13

## 2025-03-05 RX ADMIN — MICAFUNGIN SODIUM 100 MG: 100 INJECTION, POWDER, LYOPHILIZED, FOR SOLUTION INTRAVENOUS at 05:14

## 2025-03-05 RX ADMIN — WHITE PETROLATUM 57.7 %-MINERAL OIL 31.9 % EYE OINTMENT 1 APPLICATION: at 14:05

## 2025-03-05 RX ADMIN — LACTULOSE 20 G: 10 SOLUTION ORAL at 20:58

## 2025-03-05 RX ADMIN — MAGNESIUM SULFATE HEPTAHYDRATE 2 G: 40 INJECTION, SOLUTION INTRAVENOUS at 16:16

## 2025-03-05 RX ADMIN — MIDAZOLAM HYDROCHLORIDE 5 MG/HR: 1 INJECTION, SOLUTION INTRAVENOUS at 09:05

## 2025-03-05 RX ADMIN — VASOPRESSIN 0.03 UNITS/MIN: 0.2 INJECTION INTRAVENOUS at 12:48

## 2025-03-05 RX ADMIN — Medication 250 MCG/HR: at 18:56

## 2025-03-05 RX ADMIN — CALCIUM GLUCONATE 2 G: 20 INJECTION, SOLUTION INTRAVENOUS at 07:51

## 2025-03-05 RX ADMIN — AMIODARONE HYDROCHLORIDE 150 MG: 1.5 INJECTION, SOLUTION INTRAVENOUS at 15:49

## 2025-03-05 RX ADMIN — Medication 100 PERCENT: at 07:00

## 2025-03-05 RX ADMIN — Medication 2 MG/KG/HR: at 02:10

## 2025-03-05 RX ADMIN — Medication 250 MCG/HR: at 12:48

## 2025-03-05 RX ADMIN — Medication 250 MCG/HR: at 22:46

## 2025-03-05 RX ADMIN — CALCIUM GLUCONATE 2 G: 20 INJECTION, SOLUTION INTRAVENOUS at 16:16

## 2025-03-05 RX ADMIN — SODIUM BICARBONATE 50 MEQ: 84 INJECTION, SOLUTION INTRAVENOUS at 16:25

## 2025-03-05 RX ADMIN — KETAMINE HYDROCHLORIDE 2 MG/KG/HR: 10 INJECTION INTRAMUSCULAR; INTRAVENOUS at 10:53

## 2025-03-05 NOTE — PROGRESS NOTES
CCLS in communication with medical team about daughter and what she knows about her mother's hospitalization and medical status. Child life services are not available late into the evening so CCLS informed RN about items for coping and support which were brought over to the bedside this afternoon for patient's daughter to have them during this time. Child life will continue to follow.    ADRI Damon  ([facility] Secure Chat)

## 2025-03-05 NOTE — PROGRESS NOTES
SOCIAL WORK NOTE   No call back from attempts to reach HCPOA scanned. Medical team reports daughter came last night. Child life involved for support if needed. Social work to follow.  CHARITO Montejo, LISW-S ([id number])

## 2025-03-05 NOTE — SIGNIFICANT EVENT
At around 3:05 PM today, during the trialysis line insertion (during insertion of guidewire), patient went into wide complex tachycardia.  Procedure was aborted. Patient had a pulse.  Heart rate slowed down however pressor requirement was rising.  She was given amiodarone 150 bolus followed by calcium gluconate 2 g, followed by magnesium sulfate 2 g followed by 2 amps of bicarb, followed by another bolus of amiodarone 150mg IV.  Tachycardia failed to resolve. Decision was made to trial synchronized cardioversion given instability. 200J were delivered (synchronized) and rate slowed down however patient continued to be tachycardic to around 118.  Electrophysiology was consulted who believe the current rhythm is atrial flutter, however on review of telemetry the patient did actually go into ventricular tachycardia (Non-sustained). Daughter was called to be informed of this however she was at a friend's  and asked to talk about this at around 5:30 PM.

## 2025-03-05 NOTE — CONSULTS
Reason For Consult  SVT vs VT    History Of Present Illness  Gissel Harvey is a 39 y.o. 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 Treating for septic shock with possible cardiogenic shock component as well as ARDS. Patient remains in severe shock requiring levo, epi, vaso, ATII. Went into wide complex tachycardia as wire was being threaded during central line placement    EKG 3/5 consistent with 2:1 AF -> s/p 200J shock and amiodarone bolus -> pt slowed down to HR of 116    On tele, pt had some NSVT likely while wire was being threaded    EKG from 3/3 also consistent with AF with 2:1 conduction    EP consulted for further management     TTE 3/3/25:   1. Poorly visualized anatomical structures due to suboptimal image quality.   2. Left ventricular ejection fraction is moderately decreased, by visual estimate at 30-35%.   3. There is global hypokinesis of the left ventricle with minor regional variations.   4. No left ventricular thrombus visualized.   5. Technically difficult exam despite the use of echocontrast due to off axis somewhat foreshortened apical views.   6. There is reduced right ventricular systolic function.   7. Agitated saline contrast study was negative for intracardiac shunt.   8. TIPS shunt seen within the portl vein.   9. There is no evidence of a patent foramen ovale.  10. Pt is tachycardic with  bpm throughout the exam.  11. No prior echocardiogram available for comparison.     Past Medical History  She has no past medical history on file.    Surgical History  She has no past surgical history on file.     Social History  She reports that she has been smoking cigarettes. She has never used smokeless tobacco. No history on file for  "alcohol use and drug use.    Family History  No family history on file.     Allergies  Heparin    Review of Systems  Pt intubated so unable to perform ROS     Physical Exam  Gen: frail appearing  Neuro: intubated and sedated  HEENT: PEERL, EOMI, sclera anicteric, no conjunctival injection, MMM, no oropharyngeal lesions  Neck: no elevated JVD  Resp: CTAB, normal breath sounds, no wheezing/crackles/ rales  CV: RRR, normal S1/S2, no murmurs/ rubs/gallops  GI: non-tender, non-distended, normal BSs in all 4 quadrants  MSK: warm and well perfused, no edema  Skin: warm and dry, no lesions, no rashes     Last Recorded Vitals  Blood pressure 81/68, pulse (!) 133, temperature 37.6 °C (99.7 °F), resp. rate 24, height 1.676 m (5' 6\"), weight 63.5 kg (140 lb), SpO2 (!) 83%.    Relevant Results  LABS:  CMP:  Results from last 7 days   Lab Units 03/05/25  1544 03/05/25  0506 03/04/25  0438 03/03/25  2132 03/03/25  1547   SODIUM mmol/L 136 134* 140 144 144   POTASSIUM mmol/L 3.9 4.1 4.5 3.5 3.6   CHLORIDE mmol/L 96* 97* 101 101 105   CO2 mmol/L 21 19* 23 26 15*   ANION GAP mmol/L 23* 22* 21* 21* 28*   BUN mg/dL 66* 60* 43* 39* 35*   CREATININE mg/dL 2.32* 2.21* 1.85* 1.96* 1.91*   EGFR mL/min/1.73m*2 27* 28* 35* 33* 34*   MAGNESIUM mg/dL 2.03 2.07 1.86 2.06  --    ALBUMIN g/dL 2.5* 2.9* 2.6* 3.0* 3.2*   ALT U/L 17  --  22 23 24   AST U/L 38  --  48* 52* 56*   BILIRUBIN TOTAL mg/dL 8.8*  --  6.8* 6.5* 6.3*     CBC:  Results from last 7 days   Lab Units 03/05/25  1544 03/05/25  0506 03/04/25  0438 03/03/25  1547   WBC AUTO x10*3/uL 15.7* 15.9* 3.4* 4.8   HEMOGLOBIN g/dL 12.0 13.2 13.8 14.4   HEMATOCRIT % 35.9* 42.5 43.3 42.4   PLATELETS AUTO x10*3/uL 89* 110* 81* 106*   MCV fL 95 99 99 96     COAG:   Results from last 7 days   Lab Units 03/05/25  1544 03/05/25  0506 03/03/25  2132 03/03/25  1547   INR  2.7* 2.9* 2.4* 2.5*     ABO: No results found for: \"ABO\"  HEME/ENDO:  Results from last 7 days   Lab Units 03/04/25  0438 " 03/03/25  1547   IRON SATURATION % 12*  --    TSH mIU/L  --  1.55      CARDIAC:   Results from last 7 days   Lab Units 03/04/25  0438 03/03/25  2132 03/03/25  1623 03/03/25  1547   TROPHSCMC ng/L 63* 62* 40* 16   BNP pg/mL  --  2,127*  --  822*     Recent Labs     03/03/25 2132   TRIG 84          Assessment/Plan   Gissel Harvey is a 39 y.o. 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 Treating for septic shock with possible cardiogenic shock component as well as ARDS. Patient remains in severe shock requiring levo, epi, vaso, ATII. Went into wide complex tachycardia as wire was being threaded during central line placement    Patient appears to have NSVT when wire was being threaded which then converted to 2:1 Aflutter -> s/p shock and amiodarone bolus -> tele still appears to be consistent with AF. Pt was likely in 2:1 AF before the line placement based on EKG from 3/3.     Pt appears to be critically sick with poor prognosis.     -Can give 150mg amiodarone bolus if patient goes back into RVR  -Recommend anticoagulation with heparin gtt as patient was cardioverted for AF  -Would avoid starting amiodarone gtt as there is a theoretical risk of converting to NSR -> EKG 3/3 suggestive of AF which means pt was in AF for >48h without anticoagulation  -Patient cannot be on beta blockers due to shock  -Continue GOC discussions    Thank you for the consult. Recommendations are preliminary unless signed by attending       I spent 45 minutes in the professional and overall care of this patient.      Kathi Harding MD

## 2025-03-05 NOTE — SIGNIFICANT EVENT
Goals of care:    Called Ms. Harvey's daughter Payal Durant to discuss goals of care and give clinical updates. Also present was the patient's cousin who has been an aunt to Ms. Durant throughout her life. I explained that Ms. Harvey has a severe, overwhelming infection with S. Aureus pneumonia (Strep pneumo antigen is also positive, so possible polymicrobial pneumonia) with septic shock requiring significant support with high vent settings and multiple pressors. She is in multi-organ failure with an acute renal injury, profound shock with refractory acidosis and respiratory failure. I explained that despite high ventilator settings and inhaled epoprostenol, she is still saturating only 85% (I attempted higher PEEP, however higher PEEP dropped her saturations, so I suspect the high PEEP was over-distending the parts of the lung which are less inflamed and leading to worsened V/Q mismatch). I also explained that she is requiring 4 pressors to maintain her BP and keep her heart from stopping. I also explained that because of the kidney failure and acidosis, she will need dialysis, however due to her antiphospholipid syndrome, we are limited on access sites for the dialysis line due to chronic thrombi. I also explained that we will need to lay her flat for the procedure which could lead to worsening hypoxia and hemodynamic instability which could cause her heart to stop, so if we lay her flat and she becomes more unstable, we may need to abort the procedure. I also explained that the fluid shifts with starting dialysis could also lead to worsening blood pressure and that her heart could also stop from that, however due to her acidosis and renal failure, she will likely pass away without dialysis. I discussed that unfortunately, with the low oxygen levels despite maximal vent settings and the profound shock requiring continuous epinephrine (which is the medication we use to get peoples' hearts restarted), if her heart  were to stop, chest compressions would be unlikely to get her heart restarted. I explained that she is basically already being resuscitated with all of this support. I told them that I am worried that doing compressions would likely just cause chest wall trauma, possible rib fractures, and would not have the desired outcome which would be sustained ROSC. I did explain that we are not taking away any treatments and that we are doing absolutely everything we can for her up to the point of chest compressions. They understand how sick she is and that doing chest compressions would be unlikely to help. Will keep DNR status and continue aggressive care otherwise. They expressed confusion on how she could have gone from walking and talking to near death so quickly, which I explained was likely due to such an aggressive infection with S aureus. They will be coming to bedside to visit [email or web address].     Lillie Ricks, DO

## 2025-03-05 NOTE — PROGRESS NOTES
Medical Intensive Care - Daily Progress Note   Subjective    Gissel Harvey is a 39 y.o. year old female patient admitted on 3/3/2025 with following ICU needs: intubation and pressor support    Interval History:  NAEON.  Patient's daughter located yesterday, able to visit.  MAP maintained above goal of 65.  Pressors: vasopressin 0.06, epinephrine 0.15, norepinephrine 0.2, angiotensin II 40  Sedation: versed 5, ketamine 2, fentanyl 250  Paralytic: cistracurium 37.5  On bivalirudin 0.03      Meds    Scheduled medications  cisatracurium, 0.1 mg/kg, intravenous, Once  doxycycline, 100 mg, intravenous, q12h  pantoprazole, 40 mg, oral, Daily before breakfast   Or  esomeprazole, 40 mg, nasoduodenal tube, Daily before breakfast   Or  pantoprazole, 40 mg, intravenous, Daily before breakfast  fentaNYL, 25 mcg, intravenous, Once  insulin lispro, 0-5 Units, subcutaneous, q4h  lactulose, 20 g, oral, TID  artificial tears, 2 drop, Both Eyes, q6h  meropenem, 2 g, intravenous, q12h  methylPREDNISolone sodium succinate (PF), 50 mg, intravenous, q12h  micafungin, 100 mg, intravenous, q24h  midazolam, 2 mg, intravenous, Once  oxygen, , inhalation, Continuous - Inhalation  polyethylene glycol, 17 g, oral, BID  white petrolatum-mineral oiL, 1 Application, Both Eyes, q6h      Continuous medications  angiotensin II (Giapreza) 2.5 mg in sodium chloride 0.9% 250 mL (0.01 mg/mL) infusion, 1.25-40 ng/kg/min, Last Rate: 40 ng/kg/min (03/05/25 0600)  bivalirudin, 0.05-0.49 mg/kg/hr, Last Rate: 0.01 mg/kg/hr (03/05/25 0842)  cisatracurium, 37.5 mg/hr, Last Rate: 37.5 mg/hr (03/05/25 0905)  EPINEPHrine, 0-1 mcg/kg/min, Last Rate: 0.08 mcg/kg/min (03/05/25 1030)  epoprostenol, 0.05 mcg/kg/min (Ideal), Last Rate: 0.05 mcg/kg/min (03/05/25 0602)  fentaNYL, 0-300 mcg/hr, Last Rate: 250 mcg/hr (03/05/25 0905)  ketamine (Ketalar) 1,000 mg in 100 mL (10 mg/mL) infusion, 0.05-2 mg/kg/hr  midazolam, 0-20 mg/hr, Last Rate: 5 mg/hr (03/05/25  "0905)  norepinephrine, 0-0.5 mcg/kg/min, Last Rate: 0.16 mcg/kg/min (03/05/25 1030)  vasopressin, 0-0.06 Units/min, Last Rate: 0.03 Units/min (03/05/25 1015)      PRN medications  PRN medications: acetaminophen **OR** acetaminophen, dextrose, dextrose, fentaNYL, glucagon, glucagon, midazolam, vancomycin     Objective    Blood pressure 81/68, pulse (!) 121, temperature 36.1 °C (97 °F), resp. rate 24, height 1.676 m (5' 6\"), weight 63.5 kg (140 lb), SpO2 (!) 87%.     Physical Exam   General: intubated, sedated  HEENT: normocephalic, atraumatic  CV: regular rhythm, heart rate tachycardic w/o murmurs  Resp: lungs coarse with rhonchi. Crackles present, R>L, worse at lung bases  Abd: soft, slightly more distended than yesterday, engorged veins over the anterior/lateral abdomen   Ext: no peripheral edema  Neuro: unable to assess neurological status      Intake/Output Summary (Last 24 hours) at 3/5/2025 1050  Last data filed at 3/5/2025 1012  Gross per 24 hour   Intake 5150.09 ml   Output 655 ml   Net 4495.09 ml     Labs:   Results from last 72 hours   Lab Units 03/05/25  0506 03/04/25  0438 03/03/25  2132   SODIUM mmol/L 134* 140 144   POTASSIUM mmol/L 4.1 4.5 3.5   CHLORIDE mmol/L 97* 101 101   CO2 mmol/L 19* 23 26   BUN mg/dL 60* 43* 39*   CREATININE mg/dL 2.21* 1.85* 1.96*   GLUCOSE mg/dL 119* 163* 90   CALCIUM mg/dL 7.7* 8.1* 8.0*   ANION GAP mmol/L 22* 21* 21*   EGFR mL/min/1.73m*2 28* 35* 33*   PHOSPHORUS mg/dL 7.4* 6.5* 5.7*      Results from last 72 hours   Lab Units 03/05/25  0506 03/04/25  0438 03/03/25  1547   WBC AUTO x10*3/uL 15.9* 3.4* 4.8   HEMOGLOBIN g/dL 13.2 13.8 14.4   HEMATOCRIT % 42.5 43.3 42.4   PLATELETS AUTO x10*3/uL 110* 81* 106*   LYMPHO PCT MAN % 4.0 13.5 5.4   MONO PCT MAN % 4.0 3.2 5.4   EOSINO PCT MAN % 0.0 0.0 0.0      Results from last 72 hours   Lab Units 03/05/25  0530 03/04/25  2347 03/04/25  1231   POCT PH, ARTERIAL pH 7.22* 7.20* 7.27*   POCT PCO2, ARTERIAL mm Hg 44* 46* 44*   POCT PO2, " ARTERIAL mm Hg 65* 67* 59*   POCT SO2, ARTERIAL % 92* 91* 89*     Results from last 72 hours   Lab Units 03/04/25  0042 03/03/25  1832 03/03/25  1710   POCT PH, VENOUS pH 7.28* 7.20* 7.15*   POCT PCO2, VENOUS mm Hg 47 69* 65*   POCT PO2, VENOUS mm Hg 76* 54* 42        Micro/ID:     Lab Results   Component Value Date    BLOODCULT No growth at 1 day 03/03/2025    BLOODCULT No growth at 1 day 03/03/2025       Summary of key imaging results from the last 24 hours  ECG 12 Lead    Result Date: 3/4/2025  Narrow QRS tachycardia ST & T wave abnormality, consider inferior ischemia ST & T wave abnormality, consider anterolateral ischemia Abnormal ECG When compared with ECG of 03-MAR-2025 21:51, No significant change was found Confirmed by Barrett Rabago ([id number]) on 3/4/2025 4:20:45 PM    XR chest 1 view    Result Date: 3/4/2025  Interpreted By:  Hossein Harrison and Ohs Zachary STUDY: XR CHEST 1 VIEW;  3/4/2025 4:52 am   INDICATION: Signs/Symptoms:Hypoxia.     COMPARISON: XR CHEST 1 VIEW 3/3/2025, CT ABDOMEN PELVIS W IV CONTRAST 3/3/2025 CT PE 03/03/2025   ACCESSION NUMBER(S): [id number]   ORDERING CLINICIAN: MEGHANN DEGROOT   FINDINGS: AP radiograph of the chest was provided.   MEDICAL DEVICES: ETT distal tip 5.1 cm from the roland. Left IJ CVC distal tip overlying the expected location of the superior cavoatrial junction. Esophageal temperature probe distal tip overlying the cardiac silhouette. Enteric tube side port overlying the GE junction with distal tip not imaged. Partially visualized tips.   CARDIOMEDIASTINAL SILHOUETTE: Cardiomediastinal silhouette is largely obscured by overlying airspace opacities. Within these limits, is likely stable from prior exam.   LUNGS: Similar patchy opacities within the bilateral mid to lower lungs, most confluent in the right lower lung. Small right pleural effusion. No pneumothorax..   ABDOMEN: No remarkable upper abdominal findings.   BONES: No acute osseous changes.       1.  Similar  bilateral mid to lower lung airspace and interstitial opacities. 2. Enteric tube side port overlying the GE junction. Advancement recommended. Other medical devices as above.   I personally reviewed the images/study and I agree with the findings as stated by Dr. Jeffrey Ceja. This study was interpreted at [place], [place].   MACRO: None   Signed by: Hossein Harrison 3/4/2025 11:44 AM Dictation workstation:   [id number]    Transthoracic Echo (TTE) Complete    Result Date: 3/4/2025   [facility], [address] [place] [address]                Tel [phone] and Fax [phone] TRANSTHORACIC ECHOCARDIOGRAM REPORT  Patient Name:       LOUISA Sanchez Physician:    [id number] Cony Cartwright MD Study Date:         3/4/2025            Ordering Provider:    [id number] MEGHANN DEGROOT MRN/PID:            [id number]            Fellow:               [id number] Dakotah Duran MD Accession#:         [id number]        Nurse: Date of Birth/Age:  1985 / 39 years Sonographer:          Willie Palafox                                                               [facility] Gender assigned at  F                   Additional Staff: Birth: Height:             167.64 cm           Admit Date: Weight:             63.50 kg            Admission Status:     Inpatient -                                                               Routine BSA / BMI:          1.72 m2 / 22.60     Encounter#:           [id number]                     kg/m2 Blood Pressure:     77/51 mmHg          [address] Study Type:    TRANSTHORACIC ECHO (TTE) COMPLETE Diagnosis/ICD: Shock, unspecified-R57.9 Indication:    tachycardia, shock CPT Code:      Echo Complete w Full [id number] Patient History:  Pertinent History: Chest Pain and Dyspnea. 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 Study Detail: The following Echo studies were performed: 2D, M-Mode, Doppler and               color flow. Technically challenging study due to body habitus,               patient lying in supine position, poor acoustic windows and               prominent lung artifact. The patient is intubated. Definity used               as a contrast agent for endocardial border definition and agitated               saline used as a contrast agent for intraseptal flow evaluation.               Total contrast used for this procedure was 2 mL via IV push.               Unable to obtain suprasternal notch view.  PHYSICIAN INTERPRETATION: Left Ventricle: Left ventricular ejection fraction is moderately decreased, by visual estimate at 30-35%. There is global hypokinesis of the left ventricle with minor regional variations. The left ventricular cavity size is normal. There is normal septal and normal posterior left ventricular wall thickness. Left ventricular diastolic filling cannot be determined due to E/A wave fusion. There is no definite left ventricular thrombus visualized. Technically difficult exam despite the use of echocontrast due to off axis somewhat foreshortened apical views. Left Atrium: The left atrial size was not well visualized. There is no evidence of a patent foramen ovale. A bubble study using agitated saline was performed. Bubble study is negative. Agitated saline contrast study was negative for intracardiac shunt. Right Ventricle: The right ventricle was not well visualized. There is reduced right ventricular systolic function. Right Atrium: The right atrium is normal in size. Aortic Valve: The aortic valve was not well visualized. There is no evidence of aortic  valve regurgitation. The peak instantaneous gradient of the aortic valve is 7 mmHg. Mitral Valve: The mitral valve is mildly thickened. There is mild mitral annular calcification. There is trace mitral valve regurgitation. Tricuspid Valve: The tricuspid valve is structurally normal. There is trace tricuspid regurgitation. The right ventricular systolic pressure is unable to be estimated. Pulmonic Valve: The pulmonic valve is not well visualized. There is no indication of pulmonic valve regurgitation. Pericardium: Trivial pericardial effusion. Aorta: The aortic root is normal. Systemic Veins: The inferior vena cava was not well visualized. TIPS shunt seen within the portl vein. In comparison to the previous echocardiogram(s): There are no prior studies on this patient for comparison purposes. No prior echocardiogram available for comparison.  CONCLUSIONS:  1. Poorly visualized anatomical structures due to suboptimal image quality.  2. Left ventricular ejection fraction is moderately decreased, by visual estimate at 30-35%.  3. There is global hypokinesis of the left ventricle with minor regional variations.  4. No left ventricular thrombus visualized.  5. Technically difficult exam despite the use of echocontrast due to off axis somewhat foreshortened apical views.  6. There is reduced right ventricular systolic function.  7. Agitated saline contrast study was negative for intracardiac shunt.  8. TIPS shunt seen within the portl vein.  9. There is no evidence of a patent foramen ovale. 10. Pt is tachycardic with  bpm throughout the exam. 11. No prior echocardiogram available for comparison. QUANTITATIVE DATA SUMMARY:  2D MEASUREMENTS:         Normal Ranges: Ao Root d:       2.90 cm (2.0-3.7cm) LAs:             2.90 cm (2.7-4.0cm) IVSd:            0.70 cm (0.6-1.1cm) LVPWd:           0.70 cm (0.6-1.1cm) LVIDd:           4.70 cm (3.9-5.9cm) LVIDs:           3.80 cm LV Mass Index:   60 g/m2 LV % FS          19.1 %   AORTA MEASUREMENTS:         Normal Ranges: Ao Sinus, d:        2.90 cm (2.1-3.5cm)  LV SYSTOLIC FUNCTION:                      Normal Ranges: EF-Visual:      33 % LV EF Reported: 33 %  LV DIASTOLIC FUNCTION:          Normal Ranges: MV Peak E:             0.73 m/s (0.7-1.2 m/s)  AORTIC VALVE:           Normal Ranges: AoV Vmax:      1.32 m/s (<=1.7m/s) AoV Peak P.0 mmHg (<20mmHg) LVOT Max Low:  0.91 m/s (<=1.1m/s) LVOT VTI:      10.50 cm LVOT Diameter: 2.10 cm  (1.8-2.4cm) AoV Area,Vmax: 2.40 cm2 (2.5-4.5cm2)  RIGHT VENTRICLE: TAPSE: 15.5 mm RV s'  0.12 m/s  PULMONIC VALVE:          Normal Ranges: PV Max Low:     0.9 m/s  (0.6-0.9m/s) PV Max PG:      3.2 mmHg  [id number] Cony Cartwright MD Electronically signed on 3/4/2025 at 9:42:22 AM  ** Final **     XR chest 1 view    Result Date: 3/3/2025  Interpreted By:  Vivien Garcia, STUDY: XR CHEST 1 VIEW;  3/3/2025 8:44 pm   INDICATION: Signs/Symptoms:L IJ placement.     COMPARISON: 2025   ACCESSION NUMBER(S): [id number]   ORDERING CLINICIAN: JIMMY AMATO   FINDINGS: AP radiograph of the chest was provided.   Endotracheal tube has been retracted and terminates approximately 7 cm above the roland.   Enteric to extends over the gastric fundus with side port at the level of the gastroesophageal junction.   Left IJ central venous catheter with tip projecting over the distal SVC.   CARDIOMEDIASTINAL SILHOUETTE: Cardiomediastinal silhouette is normal in size and configuration.   LUNGS: Extensive bilateral mid to lower lung field airspace opacities similar to prior imaging. Blunting of the right costophrenic angle is again noted. No sizable pneumothorax.   ABDOMEN: No remarkable upper abdominal findings.   BONES: No acute osseous changes.       1.  Medical devices as detailed. High position of the endotracheal tube and enteric tube as detailed. 2. Airspace opacities and possible trace right pleural effusion, similar to prior.       MACRO: None   Signed by: Vivien Garcia  3/3/2025 9:26 PM Dictation workstation:   [id number]    XR chest 1 view    Result Date: 3/3/2025  Interpreted By:  Luis Carlos Barone and Beyersdorf Conner STUDY: XR CHEST 1 VIEW;  3/3/2025 5:47 pm   INDICATION: Signs/Symptoms:Intubation.   COMPARISON: Single-view chest 03/03/2025   ACCESSION NUMBER(S): [id number]   ORDERING CLINICIAN: BETTY ORTEGA   FINDINGS: AP radiograph of the chest was provided.   Endotracheal tube terminates at the roland, directed towards the right mainstem bronchus. Enteric tube courses below the diaphragm and with its tip terminating below the limits of the exam.     CARDIOMEDIASTINAL SILHOUETTE: Cardiomediastinal silhouette is normal in size and configuration.   LUNGS: Diffuse reticular opacities in the bilateral lungs. Opacity overlying the right costophrenic angle favored to represent atelectasis or pleural effusion.   ABDOMEN: No remarkable upper abdominal findings.   BONES: No acute osseous changes.       1.  Endotracheal tube terminates at the roland, directed towards the right mainstem bronchus. Repositioning is recommended. 2. Diffuse reticular opacities bilaterally consistent with multifocal pneumonia. 3. Question small right basilar pleural effusion and atelectasis.   I personally reviewed the image(s)/study and resident interpretation. I agree with the findings as stated by resident Jude Conley. Data analyzed and images interpreted at [facility], [place], [place].   MACRO: Jude Conley discussed the significance and urgency of this critical finding by telephone with  BETTY ORTEGA on 3/3/2025 at 5:58 pm.  (**-RCF-**) Findings:  See findings.   Signed by: Luis Carlos Barone 3/3/2025 6:16 PM Dictation workstation:   [id number]    CT abdomen pelvis w IV contrast    Result Date: 3/3/2025  Interpreted By:  Dakotah Ness and Bera Kaustav STUDY: CT ABDOMEN PELVIS W IV CONTRAST;  3/3/2025 3:41 pm   INDICATION: Signs/Symptoms:Hypotensive,  cirrhosis.     COMPARISON: CTA chest abdomen pelvis on 05/15/2022   ACCESSION NUMBER(S): [id number]   ORDERING CLINICIAN: JIMMY AMATO   TECHNIQUE: CT of the abdomen and pelvis was performed.  Standard contiguous axial images were obtained at 3 mm slice thickness through the abdomen and pelvis. Coronal and sagittal reconstructions at 3 mm slice thickness were performed.  90 ML of Omnipaque 350 was administered intravenously without immediate complication.   FINDINGS: LOWER CHEST: Please refer to separately dictated CT of the chest for evaluation of the chest.   ABDOMEN:   LIVER: The liver demonstrates irregular nodular contour and parenchyma consistent with cirrhosis. Several ill-defined lesions are seen throughout the liver, within segment III and VIII, which is similar when compared to 2017, however incompletely characterized on this examination, however previously characterized as regenerative nodules on MRI from 2011. Similar poor opacification of the tips stent with internal calcification. Similar appearance of multiple collaterals along the anterior abdominal wall as well as a recannulated paraumbilical vein.   BILE DUCTS: The intrahepatic and extrahepatic ducts are not dilated.   GALLBLADDER: The gallbladder is nondistended and without evidence of radiopaque stones.   PANCREAS: The pancreas appears unremarkable without evidence of ductal dilatation or masses.   SPLEEN: Similar splenomegaly, consistent with changes of portal hypertension.   ADRENAL GLANDS: Bilateral adrenal glands appear normal.   KIDNEYS AND URETERS: The kidneys are normal in size and enhance symmetrically.  No hydroureteronephrosis or nephroureterolithiasis is identified.   PELVIS:   BLADDER: The urinary bladder appears normal without abnormal wall thickening.   REPRODUCTIVE ORGANS: The uterus and bilateral adnexa are unremarkable.   BOWEL: The stomach is unremarkable.   The small and large bowel are normal in caliber and demonstrate no  wall thickening.   Normal appendix.   VESSELS: There is no aneurysmal dilatation of the abdominal aorta. There is similar chronic occlusion of the IVC and common iliac veins, with extensive collaterals visualized.. There are extensive collaterals present within the anterior abdominal wall, as well as perigastric, paraesophageal and perisplenic varices.   PERITONEUM/RETROPERITONEUM/LYMPH NODES: Trace ascites. No loculated fluid collections. No abdominopelvic lymphadenopathy is present.   BONES AND ABDOMINAL WALL: No suspicious osseous lesions are identified. Extensive venous collaterals as described above. Otherwise, the abdominal wall soft tissues appear normal.       1.  No acute abnormality within the abdomen and pelvis. 2. Chronic findings of cirrhosis and portal hypertension as described above. Incomplete evaluation of a tips stent, further evaluation with color Doppler ultrasound recommended 3. Please refer to separately dictated CT of the chest for evaluation of the chest findings.   I personally reviewed the images/study and I agree with the findings as stated. This study was interpreted at [place], [place].   MACRO: None   Signed by: Dakotah Ness 3/3/2025 5:06 PM Dictation workstation:   [id number]    CT angio chest for pulmonary embolism    Result Date: 3/3/2025  Interpreted By:  Hossein Harrison and Sheng Max STUDY: CT ANGIO CHEST FOR PULMONARY EMBOLISM;  3/3/2025 3:41 pm   INDICATION: Signs/Symptoms:chest pain, r/o PE.       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   COMPARISON: CTA chest abdomen pelvis 05/15/2022 and CT dated 06/30/2019   ACCESSION NUMBER(S): [id number]   ORDERING CLINICIAN: ARETHA RYAN   TECHNIQUE: Helical data acquisition of the chest was obtained after intravenous administration of 90 mL of Omnipaque 350  intravenously, as per PE protocol. Images were reformatted in coronal and sagittal planes. Axial and coronal maximum intensity projection (MIP) images were created and reviewed. No immediate complications.   FINDINGS: POTENTIAL LIMITATIONS OF THE STUDY: The assessment is limited by respiratory motion and suboptimal contrast opacification of pulmonary arteries.   HEART AND VESSELS: No discrete filling defects within the main pulmonary artery or its branches to  proximal segmental level. Please note that, assessment of distal segmental and subsegmental branches is limited and small peripheral emboli are not entirely excluded. Main pulmonary artery is at the upper limits of normal measuring 3.0 cm.   The thoracic aorta normal in course and caliber. Normal three-vessel aortic arch. There is minimal atherosclerosis present, including calcified and noncalcified plaques.Although, the study is not tailored for evaluation of aorta, there is no definite evidence of acute aortic pathology. No coronary artery calcifications are seen. Please note, the study is not optimized for evaluation of coronary arteries.   The cardiac chambers are not enlarged.There are no findings to suggest right heart strain. There is no pericardial effusion seen.   Engorgement of the azygous and hemiazygous veins is likely sequela of azygous continuation of the IVC in addition to chronic congestion from occluded TIPS (series 401, image 114 and 239).   MEDIASTINUM AND ASHLYN, LOWER NECK AND AXILLA: The visualized thyroid gland is within normal limits. There are multiple prominent to mildly enlarged mediastinal lymph nodes seen, measuring up to 1.4 cm, which are nonspecific, but are likely felt to be reactive in nature. Prominent soft tissue within the prevascular mediastinum which measures 2.2 centimeters, stable across multiple prior exams and may represent residual or hyperplastic thymic tissue. Esophagus appears within normal limits as seen.     LUNGS  AND AIRWAYS: The trachea and central airways are patent. No endobronchial lesion is seen.   New diffuse peribronchial consolidations throughout all 5 lobes concerning for multifocal bronchopneumonia. Findings are greatest within the right middle lobe with near-complete lobar consolidation. Background of mild centrilobular emphysema. Smooth interlobular septal thickening predominantly in the left-greater-than-right upper lobes is likely sequela of pulmonary interstitial edema. No pleural effusion or pneumothorax.     UPPER ABDOMEN: Postsurgical changes of TIPS which appears chronically occluded (series 401, image [id number]) dating back to 2019. Partially imaged hepatosplenomegaly. Numerous prominent splenorenal varices.     CHEST WALL AND OSSEOUS STRUCTURES: Numerous collateral vasculature throughout the soft tissues tracking along the left-greater-than-right anterior chest walls.No acute osseous pathology.There are no suspicious osseous lesions.Minimal multilevel degenerative changes within visualized spine.       1. No discrete filling defects within the main pulmonary artery or its branches to  proximal segmental level. Please note that, assessment of distal segmental and subsegmental branches is limited and small peripheral emboli are not entirely excluded. 2. Findings likely representing multifocal pneumonia to include near-complete right middle lobe consolidation. 3. Additional findings of interlobular septal thickening which likely indicates an additional component of pulmonary edema. 4. Hepatosplenomegaly with numerous prominent splenorenal varices and subcutaneous collateral vessels, consistent with portal hypertension. 5. Additional incidental non-acute findings as detailed above. See same day abdomen/pelvis CT report for full evaluation of findings below the diaphragm.     I personally reviewed the images/study and I agree with the findings as stated by Dr. Dennis Carrero. This study was interpreted at [facility]  [place], [place].   MACRO: Dennis Carrero discussed the significance and urgency of this critical finding by epic secure chat with  ARETHA RYAN on 3/3/2025 at 4:27 pm.  (**-RCF-**) Findings:  See findings.   Signed by: Hossein Harrison 3/3/2025 4:57 PM Dictation workstation:   [id number]    XR chest 1 view    Result Date: 3/3/2025  Interpreted By:  Vivien Bradshaw and Awan Komal STUDY: XR CHEST 1 VIEW; 3/3/2025 3:18 pm   INDICATION: Signs/Symptoms:hypoxic sob.   COMPARISON: Chest radiograph 05/15/2022 and CTA chest abdomen pelvis 05/15/2022   ACCESSION NUMBER(S): [id number]   ORDERING CLINICIAN: FABI CUNNINGHAM   FINDINGS: AP view of the chest.   CARDIOMEDIASTINAL SILHOUETTE: Cardiomediastinal silhouette is stable in size and configuration.   LUNGS: There is a large opacity involving the right lower lobe, obscuring the right heart border concerning for pneumonia. There are also patchy opacities involving the left lower lobe. No evidence of pneumothorax.   ABDOMEN: No remarkable upper abdominal findings.   BONES: No acute osseous changes.       Findings of multifocal pneumonia involving the bilateral mid/lower lung zones, right worse than left. Correlate with concurrent CT chest findings.   I personally reviewed the images/study and I agree with the findings as stated by Resident Elizabeth Burger. This study was interpreted at [facility], [place], [place].   Signed by: Vivien Bradshaw 3/3/2025 3:48 PM Dictation workstation:   [id number]      Assessment and Plan     Assessment: Gissel Harvey is a 39 y.o. year old female patient admitted on 3/3/2025 with intubation and pressor support.     Gissel Harvey is a 39 y.o. 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 Treating for septic shock with possible cardiogenic shock component as well as ARDS.    Patient remains in severe shock requiring levo, epi, vaso, ATII. MAP improving slightly, now maintaining above goal 65. Now with worsening renal function. We will continue to treat her septic shock with likely cardiogenic component but she may still pass regardless of our efforts.    Mechanical Ventilation: < 4 days  Sedation/Analgesia:  fentanyl, ketamine, Versed  Restraints: no    Summary for 03/05/25:  Continuing to require 4 pressors  Elevated lactate, continue serial ABGs  Respiratory culture growing staph aureus  Continue vancomycin, doxycycline, micafungin, and meropenem pending sensitivies  Solumedrol 50 mg q12 for ARDS and mineralocorticoid effect  Leukocytosis likely iso steroid use  Aim to discuss goals of care with patient's daughter today    NEUROLOGY/PSYCH:  #Sedation   - Sedating with fentanyl, ketamine, Versed. Propofol discontinued for hypotension.  - RASS goal -4 to -5    CARDIOVASCULAR:  #Shock, likely septic I/s/o severe pneumonia and possible cardiogenic component  #HFrEF  :: lactate > 9 on presentation, now downtrending  :: TTE 3/4 with EF 30-35% with reduced RV function, no previous TTE for comparison  :: BNP 2127 at admission  :: Troponin plateaued (62>63) on 3/4/25  - On norepi, vasopressin, epinephrine, and angiotensin II to support blood pressure with MAP goal > 65   - Aim to wean vasopressin first, followed by epi  - Elevated lactate, continue serial ABGs    PULMONARY:  Vent Mode: Volume control/assist control  FiO2 (%):  [100 %] 100 %  S RR:  [24] 24  S VT:  [450 mL] 450 mL  PEEP/CPAP (cm H2O):  [12 cm H20] 12 cm H20  MAP (cm H2O):  [17-18] 17     #Community acquired pneumonia   #ARDS   :: intubated 3/3   :: P:F ~ 70 on admission   :: Procalcitonin 57.3  :: flu A/B, COVID, parainfluenza, RSV, metapneumovirus, legionella urine Ag:  negative  :: s/p vanc, Zosyn, azithro 3/2 in the ED   :: MRSA nares positive 3/4  :: Tracheal aspirate cx 3/3: staph aureus  :: Strep pneumo urine ag: positive  :: Bcx 3/3: NGTD x1 day    Plan  - f/up pneumonia workup: full resp viral panel, tracheal aspirate cx  - f/up blood cultures   - Continue vancomycin, meropenem, micafungin, doxycycline pending sensitivities  - Continue solumedrol 50 mg q12 for ARDS and mineralocorticoid effect  - Inhaled epo  - BP not stable enough for proning  - Would not tolerate PVRP  - Per shock call, not a candidate for ECMO    RENAL/GENITOURINARY:  #Decreased renal function c/f SCOUT vs CKD, worsening  :: Cr 1.9 on admission, unclear baseline as no labs in 2 years   :: FeNa 3/3: 0.1% prerenal  - Will consult nephrology for consideration for CVVH  - Continue to monitor Cr with daily RFP    GASTROENTEROLOGY:  #liver cirrhosis   #portal hypertension s/p TIPS   #hx of Budd-Chiari   - Continue to monitor abdomen for signs of ascites formation, will POCUS today    ENDOCRINOLOGY:  - SSI while on steroids     HEMATOLOGY:  #Lymphopenia   :: Abs leukocyte count 260  :: HIV neg  - Now with leukocytosis likely 2/2 known infection and steroid use  - CTM     #Hx of antiphospholipid syndrome   #Hx of positive PF4  #Chronic IVC occlusion   #Hx of DVT   :: Supposed to be on fondaparinux outpatient, unclear if taking as no recent med fills   - Continue bivalirudin while inpatient I/s/o previous positive PF4     SKIN:  -No acute issues    MUSCULOSKELETAL:        -No acute issues    INFECTIOUS DISEASE:  ::See above pulm section for more details    Antibiotics  Azithromycin 500 mg: 3/3  Zosyn 4.5 g: 3/3  Tobramycin 440 mg: 3/4  Vancomycin: 3/3 - present  Meropenem 2 g BID: 3/3 - present  Micafungin: 3/4 - present  Doxycycline 100 mg BID: 3/4 - present    Social:  - Ethics consulted, recommends DNR  - Daughter found yesterday, will aim to initiate GOC discussion    ICU Check List       ICU Liberation:  Intervention:   Assess, Prevent, Manage Pain      Both SAT and SBT [] SAT  [] SBT 30-60 min [] Extubate to NC  [] Extubate to NIV  [] Discuss Trach   Choice of analgesia and sedation Gao Agitation Sedation Scale (RASS): Unarousable  Target Arousal Level (RASS): RASS -3 to -4 fentanyl, ketamine, Versed      Delirium: Assess, prevent and manage  CAM ICU Positive    Early Mobility and Exercise   [] PT /OT consult   Family Engagement and Empowerment [x]Family updated [x]SW consult     FEN  Fluids: PRN, caution with reduced EF  Electrolytes: PRN  Nutrition: NPO  Prophylaxis:  DVT ppx: bivalirudin gtt since positive PF4  GI ppx: pantoprazole  Bowel care: lactulose  Hardware:         CVC 03/04/25 Triple lumen Left Internal jugular (Active)   Placement Date: 03/04/25   Earliest Known Present: 03/04/25  Lumen Type: Triple lumen  Orientation: Left  Location: Internal jugular   Number of days: 1       Arterial Line 03/03/25 Left Radial (Active)   Placement Date/Time: 03/03/25 (c) 2040   Size: 22 G  Orientation: Left  Location: Radial  Site Prep: Chlorhexidine   Local Anesthetic: Injectable  Insertion attempts: 2  Securement Method: Transparent dressing   Number of days: 1       ETT  7.5 mm (Active)   Placement Date: 03/03/25   ETT Type: ETT - single  Single Lumen Tube Size: 7.5 mm  Cuffed: Yes  Location: Oral   Number of days: 2       Urethral Catheter 16 Fr. (Active)   Placement Date/Time: 03/03/25 1700   Hand Hygiene Completed: Yes  Tube Size (Fr.): 16 Fr.  Catheter Balloon Size: 10 mL  Urine Returned: Yes   Number of days: 1       NG/OG/Feeding Tube NG - Paint Lick sump Right nostril (Active)   Placement Date/Time: 03/03/25 1900   Type of Tube: NG/OG Tube  Tube Length: 53 cm  Tube Type: NG - Paint Lick sump  Tube Location: Right nostril   Number of days: 1       Social:  Code: DNR    HPOA: Payal Durant (daughter) [phone]  Disposition: MARLENE Haynes MD   03/05/25 at 10:50 AM     Disclaimer: Documentation  completed with the information available at the time of input. The times in the chart may not be reflective of actual patient care times, interventions, or procedures. Documentation occurs after the physical care of the patient.

## 2025-03-05 NOTE — CONSULTS
Nephrology Consult     Date of admission: 3/3/2025      Gissel Harvey is a 39 y.o. female with PMHx cirrhosis with portal HTN s/p TIPS, Budd-Chiari, chronic IVC occlusion. 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 In the ED, was found to be in distress with tachycardia, tachypnea, and hypoxemia. Was placed on BiPAP but unfortunately had worsening respiratory status and was intubated in the ED. CT CAP notable for diffuse peribronchial consolidations throughout all lobes c/f multifocal pneumonia, with near complete lobar consolidation of RML. In the ED, she was given vancomycin/Zosyn/azithromycin as well as Solu-medrol 50mg. Also given digoxin 500 mcg for tachycardia.      On admission to the MICU, pt is intubated and sedated on multiple pressors, CXR showing pneumonia. On high vent settings FiO2 100%, PEEP 12 on 4 pressors. Lactic acidosis PH 7.26. bicarb downtrending. 5L in and 600cc urine last 24h. Trialysis line was attempted and patient went into Ventricular tachycardia and was aborted.     Social Connections: Not on file       ALLERGIES:  Allergies   Allergen Reactions    Heparin Hives and Unknown     HIT       CURRENT MEDICATIONS:  Scheduled medications  cisatracurium, 0.1 mg/kg, intravenous, Once  doxycycline, 100 mg, intravenous, q12h  pantoprazole, 40 mg, oral, Daily before breakfast   Or  esomeprazole, 40 mg, nasoduodenal tube, Daily before breakfast   Or  pantoprazole, 40 mg, intravenous, Daily before breakfast  fentaNYL, 25 mcg, intravenous, Once  insulin lispro, 0-5 Units, subcutaneous, q4h  lactulose, 20 g, oral, TID  artificial tears, 2 drop, Both Eyes, q6h  meropenem, 2 g, intravenous, q12h  methylPREDNISolone sodium succinate (PF), 50 mg, intravenous, q12h  micafungin, 100 mg, intravenous, q24h  midazolam, 2 mg, intravenous, Once  oxygen, , inhalation, Continuous -  "Inhalation  polyethylene glycol, 17 g, oral, BID  white petrolatum-mineral oiL, 1 Application, Both Eyes, q6h      Continuous medications  angiotensin II (Giapreza) 2.5 mg in sodium chloride 0.9% 250 mL (0.01 mg/mL) infusion, 1.25-40 ng/kg/min, Last Rate: 40 ng/kg/min (03/05/25 0600)  bivalirudin, 0.05-0.49 mg/kg/hr, Last Rate: 0.01 mg/kg/hr (03/05/25 0842)  cisatracurium, 37.5 mg/hr, Last Rate: 37.5 mg/hr (03/05/25 0905)  EPINEPHrine, 0-1 mcg/kg/min, Last Rate: 0.12 mcg/kg/min (03/05/25 1225)  epoprostenol, 0.05 mcg/kg/min (Ideal), Last Rate: 0.05 mcg/kg/min (03/05/25 0602)  fentaNYL, 0-300 mcg/hr, Last Rate: 250 mcg/hr (03/05/25 0905)  ketamine (Ketalar) 1,000 mg in 100 mL (10 mg/mL) infusion, 0.05-2 mg/kg/hr, Last Rate: 2 mg/kg/hr (03/05/25 1053)  midazolam, 0-20 mg/hr, Last Rate: 5 mg/hr (03/05/25 0905)  norepinephrine, 0-0.5 mcg/kg/min, Last Rate: 0.18 mcg/kg/min (03/05/25 1225)  vasopressin, 0-0.06 Units/min, Last Rate: 0.03 Units/min (03/05/25 1015)      PRN medications  PRN medications: acetaminophen **OR** acetaminophen, dextrose, dextrose, fentaNYL, glucagon, glucagon, midazolam, vancomycin         OBJECTIVE:     VITALS: Visit Vitals  BP 81/68   Pulse (!) 127   Temp 36.6 °C (97.9 °F)   Resp 24   Ht 1.676 m (5' 6\")   Wt 63.5 kg (140 lb)   SpO2 (!) 85%   BMI 22.60 kg/m²   Smoking Status Every Day   BSA 1.72 m²       GEN: Vent/sedated  CVS: S1 S2 no murmurs  RESP:  Coarse BS BL  Neuro: Sedated  Extremities: Trace edema      LABS:  Results from last 72 hours   Lab Units 03/05/25  0506   WBC AUTO x10*3/uL 15.9*   HEMOGLOBIN g/dL 13.2   MCV fL 99   PLATELETS AUTO x10*3/uL 110*   BUN mg/dL 60*   CREATININE mg/dL 2.21*   CALCIUM mg/dL 7.7*           Intake/Output Summary (Last 24 hours) at 3/5/2025 1240  Last data filed at 3/5/2025 1100  Gross per 24 hour   Intake 4803.35 ml   Output 580 ml   Net 4223.35 ml        ASSESSMENT AND PLAN:   39F admitted for acute respiratory failure s/p intubation with multifocal " pneumonia and worsening respiratory status and hemodynamics. On 4 vasopressors currently with lactic acidosis not improving. Renal function has been slowly worsening and likely ATN from profound shock.    #septic shock 2/2 pna  #ARDS 2/2 CAP  #Metabolic acidosis  #Lactic acidosis  #Pneumonia  #VTACH    #SCOUT - oliguric  Baseline creatinine presumed normal. 0.64 2022. No CKD history. Presented at 1.91 on 3/3. Trending up to 2.3 today.   UOP: 600x24. Minimal dark urine today  Acid-base: Metabolic / lactic acidosis  Volume: No significant overload. Poor resp status PEEP 12, FIO2 100%.  BP: Profound shock, multiple pressors, paralyzed    Recommendations:  -Expect renal function and acidosis to worsen given poor hemodynamics and perfusion.  -CRRT to be started if line can be placed. Prev aborted due to Ventricular tachycardia during placement.   -GOC discussion ongoing.  -Will Follow

## 2025-03-05 NOTE — ACP (ADVANCE CARE PLANNING)
Advance Care Planning Note     Discussion Date: 03/05/25   Discussion Participants: Patient intubated and sedation. Discussion participants were Payal Druant (daughter), Misty (Aunt), Dr. Darryl Tracy (MICU Fellow).    The patient wishes to discuss Advance Care Planning today and the following is a brief summary of our discussion.     Patient has capacity to make their own medical decisions: No  Health Care Agent/Surrogate Decision Maker documented in chart: Yes    Documents on file and valid:  Advance Directive/Living Will: No   Health Care Power of : No    Communication of Medical Status/Prognosis:   Explained to family that patient is not achieving adequate oxygen saturations despite being on maximum ventilator settings. Explained that patient is on 4 pressors and nearly maximal support to maintain adequate perfusion. Explained that patient's kidneys have failed and she requires dialysis, however went into a life-threatening arrhythmia (ventricular tachycardia) during line placement. Explained that further interventions including prolongation of current support would result in a high risk of complications such as neurocognitive dysfunction, infections, bowel ischemia, etc.     We also explained that, in the unlikely event that she survives her illness and hospital stay, her quality of life would likely be extremely poor.    Communication of Treatment Goals/Options:   Explained that we would pursue current management pending a final decision. However, we expressed the importance of pursuing what the patient herself would have wished for. Daughter expressed that she would have wanted for all of this to stop. However, aunt on the phone clearly stating that patient is a survivor and she has seen people survive the ICU.    Treatment Decisions  Goals of Care: goals of care were unable to be ascertained, follow plan as below .    We explained to the family the importance of making a decision sooner that later,  knowing that patient is extremely unstable and may pass at any time.    Follow Up Plan  Decision to follow-up on conversation the next morning; with the understanding that patient might pass away overnight.     Team Members  Dr. Darryl Tracy (MICU Fellow)  Dr. Jordan Delacruz (Resident)    Time Statement: Total face to face time spent on advance care planning was 45 minutes minutes with 30 minutes spent in counseling, including the explanation.    Jordan Delacruz MD  3/5/2025 6:49 PM

## 2025-03-05 NOTE — PROGRESS NOTES
Vancomycin Dosing by Pharmacy- FOLLOW UP    Gissel Harvey is a 39 y.o. year old female who Pharmacy has been consulted for vancomycin dosing for pneumonia. Based on the patient's indication and renal status this patient is being dosed based on a goal trough/random level of 15-20.     Patient is being dosed by levels d/t poor renal function. Per nephro note, plan is to start CVVH if line is placed.     Most recent random level: 14.1 mcg/mL    Visit Vitals  BP 81/68   Pulse 110   Temp 37.4 °C (99.3 °F)   Resp 24        Lab Results   Component Value Date    CREATININE 2.32 (H) 2025    CREATININE 2.21 (H) 2025    CREATININE 1.85 (H) 2025    CREATININE 1.96 (H) 2025        Patient weight is as follows:   Vitals:    25 1500   Weight: 63.5 kg (140 lb)       Cultures:  Susceptibility data for the encounter in last 14 days.  Collected Specimen Info Organism Clindamycin Erythromycin Oxacillin Tetracycline Trimethoprim/Sulfamethoxazole Vancomycin   25 Fluid from Tracheal Aspirate Methicillin Susceptible Staphylococcus aureus (MSSA)  S  S  S  S  S  S        I/O last 3 completed shifts:  In: 9550.9 (150.4 mL/kg) [I.V.:5885.7 (92.7 mL/kg); Blood:50; NG/GT:360; IV Piggyback:3255.2]  Out: 940 (14.8 mL/kg) [Urine:940 (0.4 mL/kg/hr)]  Weight: 63.5 kg   I/O during current shift:  I/O this shift:  In: 2390.4 [I.V.:1729.4; NG/GT:160; IV Piggyback:501]  Out: 270 [Urine:270]    Temp (24hrs), Av.3 °C (99.2 °F), Min:36.1 °C (97 °F), Max:38.4 °C (101.1 °F)      Assessment/Plan    Dosing by levels; recent random below target trough. Will order one time dose of 1,000 mg.    The next level will be obtained on 3/6 with am labs. May be obtained sooner if clinically indicated.   Will continue to monitor renal function daily while on vancomycin and order serum creatinine at least every 48 hours if not already ordered.  Follow for continued vancomycin needs, clinical response, and signs/symptoms of toxicity.        Rahat Loya, [facility]

## 2025-03-05 NOTE — HOSPITAL COURSE
Gissel Harvey is a 39 y.o. 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    In the ED, patient was trialled on BiPAP but continued to have worsening respiratory status requiring intubation in the ED. Patient requiring 100% FiO2 to maintain SpO2 90%. CT CAP notable for diffuse peribronchial consolidations throughout all lobes c/f multifocal pneumonia, with near complete lobar consolidation of RML. In the ED, she was given vancomycin/Zosyn/azithromycin as well as Solu-medrol 50mg. Also given digoxin 500 mcg for tachycardia.      On admission to the MICU, pt was intubated and sedated. Started on vanc/meropenem. Requiring pressor support with vasopressin and norepinephrine, also received one time dose of phenylephrine. Overnight MAP down to 60, patient required addition of epinephrine and angiotensin II. Abx broadened to include micafungin and doxycyline for possible septic shock. Also given one dose tobramycin. Continued on ventilatory support with SpO2 down to low/mid 80s on high vent settings. Attempted to call emergency contact listed in the chart however there was no answer. Ethics engaged as patient lacking a proxy. Per discussion with ethics, patient requiring four pressors and saturating poorly on ventilatory support. Elected to make patient DNR as ACLS deemed not to provide benefit to patient and would offer minimal hope for survival outside of ICU. Able to contact patient's daughter who spoke with medical team about severity of patient's condition, agreed to DNR.    Labs positive for strep pneumo (pansusceptible) and MSSA. MRSA nares positive. Vanc trough low, started on ceftaroline. The patient needed a central line placed for dialysis, but the patient went into sustained Vtach when the wire was threaded for central line  placement. The procedure was aborted and the patient required amiodarone bolus and cardioversion. The patient then went into Aflutter. The patient later converted to sinus rhythm on her own.     On 3/6, micafungin, doxycycline, and ceftaroline were discontinued due to culture results. The patient was still on vanc and meropenem at this time.    3/7 weaned off levo and epi overnight and started trickle feeds. She was weaned off vasopressin, and weaned off nimbex throughout the day. Abdomen was more distended with concern for ascites vs stool burden. POCUS revealed no ascites and distended bowel. Dulcolax suppository added to bowel regimen.    By 3/8, the patient's pressor requirement was significantly decreased even though she was still on high ventilator settings. Her daughter was called and code status was re-discussed. The patient was changed to full code.  Abx were deescalated to vanc and ceftriaxone to cover MSSA and strep pneumo from sputum culture.    The patient continued to have lessening pressor requirements. The patient was able to go down to FiO2 80% and was slowly weaned off sedation.      To do  - Follow nephrology recs for dialysis  - Stop Epo when P/F ratio over 150 (no longer severe ARDS)  - Continue to wean sedation  - Titrate down vent settings as tolerated  - Hep C Abs positive, will need outpt GI follow up   tachypnea, grimacing, biting ET tube, not withdrawing to pain and not following commands. CT head and MRI brain 3/14 revealed microhemorrhages in bilateral cerebral hemispheres.

## 2025-03-06 ENCOUNTER — APPOINTMENT (OUTPATIENT)
Dept: RADIOLOGY | Facility: HOSPITAL | Age: 40
End: 2025-03-06
Payer: COMMERCIAL

## 2025-03-06 LAB
ABO GROUP (TYPE) IN BLOOD: NORMAL
ALBUMIN SERPL BCP-MCNC: 2.6 G/DL (ref 3.4–5)
ANION GAP BLDA CALCULATED.4IONS-SCNC: 17 MMO/L (ref 10–25)
ANION GAP BLDA CALCULATED.4IONS-SCNC: 17 MMO/L (ref 10–25)
ANION GAP BLDA CALCULATED.4IONS-SCNC: 18 MMO/L (ref 10–25)
ANION GAP SERPL CALC-SCNC: 23 MMOL/L (ref 10–20)
ANTIBODY SCREEN: NORMAL
APTT PPP: 69 SECONDS (ref 26–36)
BASE EXCESS BLDA CALC-SCNC: -0.6 MMOL/L (ref -2–3)
BASE EXCESS BLDA CALC-SCNC: -4.5 MMOL/L (ref -2–3)
BASE EXCESS BLDA CALC-SCNC: -5.3 MMOL/L (ref -2–3)
BASOPHILS # BLD MANUAL: 0 X10*3/UL (ref 0–0.1)
BASOPHILS NFR BLD MANUAL: 0 %
BODY TEMPERATURE: 37 DEGREES CELSIUS
BUN SERPL-MCNC: 74 MG/DL (ref 6–23)
BURR CELLS BLD QL SMEAR: ABNORMAL
CA-I BLDA-SCNC: 0.98 MMOL/L (ref 1.1–1.33)
CA-I BLDA-SCNC: 0.99 MMOL/L (ref 1.1–1.33)
CA-I BLDA-SCNC: 1.14 MMOL/L (ref 1.1–1.33)
CALCIUM SERPL-MCNC: 7.8 MG/DL (ref 8.6–10.6)
CHLORIDE BLDA-SCNC: 95 MMOL/L (ref 98–107)
CHLORIDE BLDA-SCNC: 96 MMOL/L (ref 98–107)
CHLORIDE BLDA-SCNC: 97 MMOL/L (ref 98–107)
CHLORIDE SERPL-SCNC: 95 MMOL/L (ref 98–107)
CO2 SERPL-SCNC: 22 MMOL/L (ref 21–32)
CREAT SERPL-MCNC: 2.49 MG/DL (ref 0.5–1.05)
EGFRCR SERPLBLD CKD-EPI 2021: 25 ML/MIN/1.73M*2
EOSINOPHIL # BLD MANUAL: 0 X10*3/UL (ref 0–0.7)
EOSINOPHIL NFR BLD MANUAL: 0 %
ERYTHROCYTE [DISTWIDTH] IN BLOOD BY AUTOMATED COUNT: 15.5 % (ref 11.5–14.5)
FIBRINOGEN PPP-MCNC: 325 MG/DL (ref 200–400)
GLUCOSE BLD MANUAL STRIP-MCNC: 110 MG/DL (ref 74–99)
GLUCOSE BLD MANUAL STRIP-MCNC: 111 MG/DL (ref 74–99)
GLUCOSE BLD MANUAL STRIP-MCNC: 117 MG/DL (ref 74–99)
GLUCOSE BLD MANUAL STRIP-MCNC: 119 MG/DL (ref 74–99)
GLUCOSE BLD MANUAL STRIP-MCNC: 121 MG/DL (ref 74–99)
GLUCOSE BLD MANUAL STRIP-MCNC: 134 MG/DL (ref 74–99)
GLUCOSE BLD MANUAL STRIP-MCNC: 136 MG/DL (ref 74–99)
GLUCOSE BLD MANUAL STRIP-MCNC: 138 MG/DL (ref 74–99)
GLUCOSE BLDA-MCNC: 108 MG/DL (ref 74–99)
GLUCOSE BLDA-MCNC: 112 MG/DL (ref 74–99)
GLUCOSE BLDA-MCNC: 137 MG/DL (ref 74–99)
GLUCOSE SERPL-MCNC: 117 MG/DL (ref 74–99)
HCO3 BLDA-SCNC: 20.2 MMOL/L (ref 22–26)
HCO3 BLDA-SCNC: 20.6 MMOL/L (ref 22–26)
HCO3 BLDA-SCNC: 23.1 MMOL/L (ref 22–26)
HCT VFR BLD AUTO: 34 % (ref 36–46)
HCT VFR BLD EST: 32 % (ref 36–46)
HCT VFR BLD EST: 33 % (ref 36–46)
HCT VFR BLD EST: 35 % (ref 36–46)
HGB BLD-MCNC: 11 G/DL (ref 12–16)
HGB BLDA-MCNC: 10.6 G/DL (ref 12–16)
HGB BLDA-MCNC: 11.1 G/DL (ref 12–16)
HGB BLDA-MCNC: 11.6 G/DL (ref 12–16)
IMM GRANULOCYTES # BLD AUTO: 3.13 X10*3/UL (ref 0–0.7)
IMM GRANULOCYTES NFR BLD AUTO: 24.5 % (ref 0–0.9)
INHALED O2 CONCENTRATION: 100 %
INR PPP: 2.9 (ref 0.9–1.1)
LACTATE BLDA-SCNC: 5.7 MMOL/L (ref 0.4–2)
LACTATE BLDA-SCNC: 6.7 MMOL/L (ref 0.4–2)
LACTATE BLDA-SCNC: 7.1 MMOL/L (ref 0.4–2)
LDH SERPL L TO P-CCNC: 245 U/L (ref 84–246)
LYMPHOCYTES # BLD MANUAL: 0.73 X10*3/UL (ref 1.2–4.8)
LYMPHOCYTES NFR BLD MANUAL: 5.7 %
MAGNESIUM SERPL-MCNC: 2.5 MG/DL (ref 1.6–2.4)
MCH RBC QN AUTO: 30.8 PG (ref 26–34)
MCHC RBC AUTO-ENTMCNC: 32.4 G/DL (ref 32–36)
MCV RBC AUTO: 95 FL (ref 80–100)
METAMYELOCYTES # BLD MANUAL: 0.2 X10*3/UL
METAMYELOCYTES NFR BLD MANUAL: 1.6 %
MONOCYTES # BLD MANUAL: 1.24 X10*3/UL (ref 0.1–1)
MONOCYTES NFR BLD MANUAL: 9.7 %
MYELOCYTES # BLD MANUAL: 0.51 X10*3/UL
MYELOCYTES NFR BLD MANUAL: 4 %
NEUTROPHILS # BLD MANUAL: 9.7 X10*3/UL (ref 1.2–7.7)
NEUTS BAND # BLD MANUAL: 0.61 X10*3/UL (ref 0–0.7)
NEUTS BAND NFR BLD MANUAL: 4.8 %
NEUTS SEG # BLD MANUAL: 9.09 X10*3/UL (ref 1.2–7)
NEUTS SEG NFR BLD MANUAL: 71 %
NRBC BLD-RTO: 0.9 /100 WBCS (ref 0–0)
OXYHGB MFR BLDA: 87.7 % (ref 94–98)
OXYHGB MFR BLDA: 91.7 % (ref 94–98)
OXYHGB MFR BLDA: 93.3 % (ref 94–98)
PCO2 BLDA: 34 MM HG (ref 38–42)
PCO2 BLDA: 35 MM HG (ref 38–42)
PCO2 BLDA: 41 MM HG (ref 38–42)
PH BLDA: 7.31 PH (ref 7.38–7.42)
PH BLDA: 7.37 PH (ref 7.38–7.42)
PH BLDA: 7.44 PH (ref 7.38–7.42)
PHOSPHATE SERPL-MCNC: 5.6 MG/DL (ref 2.5–4.9)
PLATELET # BLD AUTO: 62 X10*3/UL (ref 150–450)
PO2 BLDA: 60 MM HG (ref 85–95)
PO2 BLDA: 67 MM HG (ref 85–95)
PO2 BLDA: 68 MM HG (ref 85–95)
POTASSIUM BLDA-SCNC: 3.5 MMOL/L (ref 3.5–5.3)
POTASSIUM BLDA-SCNC: 3.8 MMOL/L (ref 3.5–5.3)
POTASSIUM BLDA-SCNC: 3.9 MMOL/L (ref 3.5–5.3)
POTASSIUM SERPL-SCNC: 3.8 MMOL/L (ref 3.5–5.3)
PROMYELOCYTES # BLD MANUAL: 0.41 X10*3/UL
PROMYELOCYTES NFR BLD MANUAL: 3.2 %
PROTHROMBIN TIME: 32.2 SECONDS (ref 9.8–12.4)
RBC # BLD AUTO: 3.57 X10*6/UL (ref 4–5.2)
RBC MORPH BLD: ABNORMAL
RH FACTOR (ANTIGEN D): NORMAL
SAO2 % BLDA: 90 % (ref 94–100)
SAO2 % BLDA: 92 % (ref 94–100)
SAO2 % BLDA: 96 % (ref 94–100)
SODIUM BLDA-SCNC: 131 MMOL/L (ref 136–145)
SODIUM SERPL-SCNC: 136 MMOL/L (ref 136–145)
TOTAL CELLS COUNTED BLD: 124
VANCOMYCIN SERPL-MCNC: 24.5 UG/ML (ref 5–20)
WBC # BLD AUTO: 12.8 X10*3/UL (ref 4.4–11.3)

## 2025-03-06 PROCEDURE — 2500000001 HC RX 250 WO HCPCS SELF ADMINISTERED DRUGS (ALT 637 FOR MEDICARE OP): Mod: SE

## 2025-03-06 PROCEDURE — 84132 ASSAY OF SERUM POTASSIUM: CPT

## 2025-03-06 PROCEDURE — 2500000004 HC RX 250 GENERAL PHARMACY W/ HCPCS (ALT 636 FOR OP/ED): Mod: SE

## 2025-03-06 PROCEDURE — 84295 ASSAY OF SERUM SODIUM: CPT

## 2025-03-06 PROCEDURE — 83735 ASSAY OF MAGNESIUM: CPT

## 2025-03-06 PROCEDURE — 90937 HEMODIALYSIS REPEATED EVAL: CPT

## 2025-03-06 PROCEDURE — 2500000005 HC RX 250 GENERAL PHARMACY W/O HCPCS: Mod: SE | Performed by: EMERGENCY MEDICINE

## 2025-03-06 PROCEDURE — 83615 LACTATE (LD) (LDH) ENZYME: CPT

## 2025-03-06 PROCEDURE — 2500000005 HC RX 250 GENERAL PHARMACY W/O HCPCS: Mod: SE

## 2025-03-06 PROCEDURE — 82810 BLOOD GASES O2 SAT ONLY: CPT | Performed by: STUDENT IN AN ORGANIZED HEALTH CARE EDUCATION/TRAINING PROGRAM

## 2025-03-06 PROCEDURE — 93010 ELECTROCARDIOGRAM REPORT: CPT | Performed by: INTERNAL MEDICINE

## 2025-03-06 PROCEDURE — 86850 RBC ANTIBODY SCREEN: CPT

## 2025-03-06 PROCEDURE — 94645 CONT INHLJ TX EACH ADDL HOUR: CPT

## 2025-03-06 PROCEDURE — 85007 BL SMEAR W/DIFF WBC COUNT: CPT

## 2025-03-06 PROCEDURE — 74018 RADEX ABDOMEN 1 VIEW: CPT

## 2025-03-06 PROCEDURE — 85610 PROTHROMBIN TIME: CPT

## 2025-03-06 PROCEDURE — 86901 BLOOD TYPING SEROLOGIC RH(D): CPT

## 2025-03-06 PROCEDURE — 85027 COMPLETE CBC AUTOMATED: CPT

## 2025-03-06 PROCEDURE — 2020000001 HC ICU ROOM DAILY

## 2025-03-06 PROCEDURE — 71045 X-RAY EXAM CHEST 1 VIEW: CPT

## 2025-03-06 PROCEDURE — 82947 ASSAY GLUCOSE BLOOD QUANT: CPT

## 2025-03-06 PROCEDURE — 99291 CRITICAL CARE FIRST HOUR: CPT

## 2025-03-06 PROCEDURE — 99231 SBSQ HOSP IP/OBS SF/LOW 25: CPT | Performed by: STUDENT IN AN ORGANIZED HEALTH CARE EDUCATION/TRAINING PROGRAM

## 2025-03-06 PROCEDURE — 80202 ASSAY OF VANCOMYCIN: CPT

## 2025-03-06 PROCEDURE — 36556 INSERT NON-TUNNEL CV CATH: CPT | Performed by: STUDENT IN AN ORGANIZED HEALTH CARE EDUCATION/TRAINING PROGRAM

## 2025-03-06 PROCEDURE — 82435 ASSAY OF BLOOD CHLORIDE: CPT

## 2025-03-06 PROCEDURE — 85384 FIBRINOGEN ACTIVITY: CPT

## 2025-03-06 PROCEDURE — 37799 UNLISTED PX VASCULAR SURGERY: CPT

## 2025-03-06 PROCEDURE — 99233 SBSQ HOSP IP/OBS HIGH 50: CPT | Performed by: INTERNAL MEDICINE

## 2025-03-06 PROCEDURE — 84132 ASSAY OF SERUM POTASSIUM: CPT | Performed by: STUDENT IN AN ORGANIZED HEALTH CARE EDUCATION/TRAINING PROGRAM

## 2025-03-06 RX ORDER — THIAMINE HYDROCHLORIDE 100 MG/ML
500 INJECTION, SOLUTION INTRAMUSCULAR; INTRAVENOUS 3 TIMES DAILY
Status: DISPENSED | OUTPATIENT
Start: 2025-03-06 | End: 2025-03-09

## 2025-03-06 RX ORDER — AMOXICILLIN 250 MG
2 CAPSULE ORAL 2 TIMES DAILY
Status: DISCONTINUED | OUTPATIENT
Start: 2025-03-06 | End: 2025-03-09

## 2025-03-06 RX ORDER — VANCOMYCIN HYDROCHLORIDE 1 G/20ML
INJECTION, POWDER, LYOPHILIZED, FOR SOLUTION INTRAVENOUS DAILY PRN
Status: DISCONTINUED | OUTPATIENT
Start: 2025-03-06 | End: 2025-03-07

## 2025-03-06 RX ORDER — CALCIUM GLUCONATE 20 MG/ML
2 INJECTION, SOLUTION INTRAVENOUS
Status: COMPLETED | OUTPATIENT
Start: 2025-03-06 | End: 2025-03-06

## 2025-03-06 RX ORDER — VANCOMYCIN HYDROCHLORIDE 500 MG/100ML
500 INJECTION, SOLUTION INTRAVENOUS EVERY 12 HOURS
Status: DISCONTINUED | OUTPATIENT
Start: 2025-03-06 | End: 2025-03-07

## 2025-03-06 RX ORDER — LANOLIN ALCOHOL/MO/W.PET/CERES
100 CREAM (GRAM) TOPICAL DAILY
Status: DISPENSED | OUTPATIENT
Start: 2025-03-09

## 2025-03-06 RX ADMIN — MICAFUNGIN SODIUM 100 MG: 100 INJECTION, POWDER, LYOPHILIZED, FOR SOLUTION INTRAVENOUS at 05:30

## 2025-03-06 RX ADMIN — CARBOXYMETHYLCELLULOSE SODIUM 2 DROP: 5 SOLUTION/ DROPS OPHTHALMIC at 04:02

## 2025-03-06 RX ADMIN — Medication 250 MCG/HR: at 11:08

## 2025-03-06 RX ADMIN — Medication 250 MCG/HR: at 18:29

## 2025-03-06 RX ADMIN — CISATRACURIUM BESYLATE 37.5 MG/HR: 200 INJECTION, SOLUTION INTRAVENOUS at 07:30

## 2025-03-06 RX ADMIN — DOXYCYCLINE 100 MG: 100 INJECTION, POWDER, LYOPHILIZED, FOR SOLUTION INTRAVENOUS at 09:07

## 2025-03-06 RX ADMIN — KETAMINE HYDROCHLORIDE 2 MG/KG/HR: 10 INJECTION INTRAMUSCULAR; INTRAVENOUS at 16:06

## 2025-03-06 RX ADMIN — CISATRACURIUM BESYLATE 37.5 MG/HR: 200 INJECTION, SOLUTION INTRAVENOUS at 17:30

## 2025-03-06 RX ADMIN — PANTOPRAZOLE SODIUM 40 MG: 40 INJECTION, POWDER, LYOPHILIZED, FOR SOLUTION INTRAVENOUS at 06:35

## 2025-03-06 RX ADMIN — ANGIOTENSIN II 40 NG/KG/MIN: 2.5 INJECTION INTRAVENOUS at 09:17

## 2025-03-06 RX ADMIN — LACTULOSE 20 G: 10 SOLUTION ORAL at 09:07

## 2025-03-06 RX ADMIN — WHITE PETROLATUM 57.7 %-MINERAL OIL 31.9 % EYE OINTMENT 1 APPLICATION: at 04:01

## 2025-03-06 RX ADMIN — CARBOXYMETHYLCELLULOSE SODIUM 2 DROP: 5 SOLUTION/ DROPS OPHTHALMIC at 15:09

## 2025-03-06 RX ADMIN — Medication 250 MCG/HR: at 06:37

## 2025-03-06 RX ADMIN — Medication 100 PERCENT: at 08:55

## 2025-03-06 RX ADMIN — LACTULOSE 20 G: 10 SOLUTION ORAL at 15:10

## 2025-03-06 RX ADMIN — VASOPRESSIN 0.06 UNITS/MIN: 0.2 INJECTION INTRAVENOUS at 13:37

## 2025-03-06 RX ADMIN — METHYLPREDNISOLONE SODIUM SUCCINATE 50 MG: 125 INJECTION, POWDER, FOR SOLUTION INTRAMUSCULAR; INTRAVENOUS at 09:07

## 2025-03-06 RX ADMIN — KETAMINE HYDROCHLORIDE 2 MG/KG/HR: 10 INJECTION INTRAMUSCULAR; INTRAVENOUS at 08:45

## 2025-03-06 RX ADMIN — CARBOXYMETHYLCELLULOSE SODIUM 2 DROP: 5 SOLUTION/ DROPS OPHTHALMIC at 08:56

## 2025-03-06 RX ADMIN — POLYETHYLENE GLYCOL 3350 17 G: 17 POWDER, FOR SOLUTION ORAL at 09:08

## 2025-03-06 RX ADMIN — CALCIUM GLUCONATE 2 G: 20 INJECTION, SOLUTION INTRAVENOUS at 13:12

## 2025-03-06 RX ADMIN — SENNOSIDES AND DOCUSATE SODIUM 2 TABLET: 50; 8.6 TABLET ORAL at 21:24

## 2025-03-06 RX ADMIN — CALCIUM CHLORIDE, MAGNESIUM CHLORIDE, DEXTROSE MONOHYDRATE, LACTIC ACID, SODIUM CHLORIDE, SODIUM BICARBONATE AND POTASSIUM CHLORIDE 25 ML/KG/HR: 3.68; 3.05; 22; 5.4; 6.46; 3.09; .314 INJECTION INTRAVENOUS at 23:53

## 2025-03-06 RX ADMIN — KETAMINE HYDROCHLORIDE 2 MG/KG/HR: 10 INJECTION INTRAMUSCULAR; INTRAVENOUS at 00:25

## 2025-03-06 RX ADMIN — MEROPENEM 2 G: 1 INJECTION INTRAVENOUS at 22:42

## 2025-03-06 RX ADMIN — CISATRACURIUM BESYLATE 37.5 MG/HR: 200 INJECTION, SOLUTION INTRAVENOUS at 22:41

## 2025-03-06 RX ADMIN — Medication 250 MCG/HR: at 02:57

## 2025-03-06 RX ADMIN — WHITE PETROLATUM 57.7 %-MINERAL OIL 31.9 % EYE OINTMENT 1 APPLICATION: at 13:21

## 2025-03-06 RX ADMIN — WHITE PETROLATUM 57.7 %-MINERAL OIL 31.9 % EYE OINTMENT 1 APPLICATION: at 21:35

## 2025-03-06 RX ADMIN — VANCOMYCIN HYDROCHLORIDE 500 MG: 500 INJECTION, SOLUTION INTRAVENOUS at 21:24

## 2025-03-06 RX ADMIN — THIAMINE HYDROCHLORIDE 500 MG: 100 INJECTION, SOLUTION INTRAMUSCULAR; INTRAVENOUS at 21:24

## 2025-03-06 RX ADMIN — POLYETHYLENE GLYCOL 3350 17 G: 17 POWDER, FOR SOLUTION ORAL at 21:24

## 2025-03-06 RX ADMIN — LACTULOSE 20 G: 10 SOLUTION ORAL at 21:24

## 2025-03-06 RX ADMIN — CALCIUM GLUCONATE 2 G: 20 INJECTION, SOLUTION INTRAVENOUS at 11:38

## 2025-03-06 RX ADMIN — VASOPRESSIN 0.06 UNITS/MIN: 0.2 INJECTION INTRAVENOUS at 06:37

## 2025-03-06 RX ADMIN — CARBOXYMETHYLCELLULOSE SODIUM 2 DROP: 5 SOLUTION/ DROPS OPHTHALMIC at 21:36

## 2025-03-06 RX ADMIN — EPOPROSTENOL 0.05 MCG/KG/MIN: 1.5 INJECTION, POWDER, LYOPHILIZED, FOR SOLUTION INTRAVENOUS at 14:32

## 2025-03-06 RX ADMIN — CALCIUM CHLORIDE, MAGNESIUM CHLORIDE, DEXTROSE MONOHYDRATE, LACTIC ACID, SODIUM CHLORIDE, SODIUM BICARBONATE AND POTASSIUM CHLORIDE 25 ML/KG/HR: 3.68; 3.05; 22; 5.4; 6.46; 3.09; .314 INJECTION INTRAVENOUS at 18:16

## 2025-03-06 RX ADMIN — CISATRACURIUM BESYLATE 37.5 MG/HR: 200 INJECTION, SOLUTION INTRAVENOUS at 12:36

## 2025-03-06 RX ADMIN — THIAMINE HYDROCHLORIDE 500 MG: 100 INJECTION, SOLUTION INTRAMUSCULAR; INTRAVENOUS at 13:12

## 2025-03-06 RX ADMIN — MEROPENEM 2 G: 1 INJECTION INTRAVENOUS at 10:01

## 2025-03-06 RX ADMIN — MIDAZOLAM HYDROCHLORIDE 5 MG/HR: 1 INJECTION, SOLUTION INTRAVENOUS at 06:37

## 2025-03-06 RX ADMIN — CEFTAROLINE FOSAMIL 300 MG: 600 POWDER, FOR SOLUTION INTRAVENOUS at 10:33

## 2025-03-06 RX ADMIN — WHITE PETROLATUM 57.7 %-MINERAL OIL 31.9 % EYE OINTMENT 1 APPLICATION: at 08:57

## 2025-03-06 RX ADMIN — Medication 250 MCG/HR: at 22:41

## 2025-03-06 RX ADMIN — METHYLPREDNISOLONE SODIUM SUCCINATE 50 MG: 125 INJECTION, POWDER, FOR SOLUTION INTRAMUSCULAR; INTRAVENOUS at 21:24

## 2025-03-06 RX ADMIN — Medication 250 MCG/HR: at 15:07

## 2025-03-06 ASSESSMENT — PAIN - FUNCTIONAL ASSESSMENT
PAIN_FUNCTIONAL_ASSESSMENT: CPOT (CRITICAL CARE PAIN OBSERVATION TOOL)

## 2025-03-06 NOTE — PROGRESS NOTES
Medical Intensive Care - Daily Progress Note   Subjective    Gissel Harvey is a 39 y.o. year old female patient admitted on 3/3/2025 with following ICU needs: intubation and pressor support    Interval History:  NAEON. Telemetry showed NSR overnight.    Pressors: vasopressin 0.06, epinephrine 0.03, norepinephrine 0, angiotensin II 40  Sedation: Versed 5, fent 250, ketamine 2  On bicarb gtt 10  On bivalirudin 0.03      Meds    Scheduled medications  ceftaroline, 300 mg, intravenous, q12h  cisatracurium, 0.1 mg/kg, intravenous, Once  doxycycline, 100 mg, intravenous, q12h  pantoprazole, 40 mg, oral, Daily before breakfast   Or  esomeprazole, 40 mg, nasoduodenal tube, Daily before breakfast   Or  pantoprazole, 40 mg, intravenous, Daily before breakfast  fentaNYL, 25 mcg, intravenous, Once  insulin lispro, 0-5 Units, subcutaneous, q4h  lactulose, 20 g, oral, TID  artificial tears, 2 drop, Both Eyes, q6h  meropenem, 2 g, intravenous, q12h  methylPREDNISolone sodium succinate (PF), 50 mg, intravenous, q12h  micafungin, 100 mg, intravenous, q24h  midazolam, 2 mg, intravenous, Once  oxygen, , inhalation, Continuous - Inhalation  polyethylene glycol, 17 g, oral, BID  white petrolatum-mineral oiL, 1 Application, Both Eyes, q6h      Continuous medications  angiotensin II (Giapreza) 2.5 mg in sodium chloride 0.9% 250 mL (0.01 mg/mL) infusion, 1.25-40 ng/kg/min, Last Rate: 40 ng/kg/min (03/06/25 0600)  bivalirudin, 0.05-0.49 mg/kg/hr, Last Rate: 0.01 mg/kg/hr (03/06/25 0600)  cisatracurium, 37.5 mg/hr, Last Rate: 37.5 mg/hr (03/06/25 0600)  EPINEPHrine, 0-1 mcg/kg/min, Last Rate: 0.04 mcg/kg/min (03/06/25 0600)  epoprostenol, 0.05 mcg/kg/min (Ideal), Last Rate: 0.05 mcg/kg/min (03/05/25 2217)  fentaNYL, 0-300 mcg/hr, Last Rate: 250 mcg/hr (03/06/25 0600)  ketamine (Ketalar) 1,000 mg in 100 mL (10 mg/mL) infusion, 0.05-2 mg/kg/hr, Last Rate: 2 mg/kg/hr (03/06/25 0600)  midazolam, 0-20 mg/hr, Last Rate: 5 mg/hr (03/06/25  "0600)  norepinephrine, 0-0.5 mcg/kg/min, Last Rate: Stopped (03/06/25 0415)  sodium bicarbonate 1 mEq/mL (8.4 %) 250 mEq in dextrose 5% 500 mL (0.5 mEq/mL) infusion, 10 mEq/hr, Last Rate: 10 mEq/hr (03/06/25 0600)  vasopressin, 0-0.06 Units/min, Last Rate: 0.06 Units/min (03/06/25 0600)      PRN medications  PRN medications: acetaminophen **OR** acetaminophen, dextrose, dextrose, fentaNYL, glucagon, glucagon, midazolam, vancomycin     Objective    Blood pressure 81/68, pulse 101, temperature 36.1 °C (97 °F), resp. rate 24, height 1.676 m (5' 6\"), weight 76.4 kg (168 lb 6.9 oz), SpO2 (!) 86%.     Physical Exam   General: intubated, sedated  HEENT: normocephalic, atraumatic  CV: regular rhythm, heart rate tachycardic w/o murmurs  Resp: lungs coarse with rhonchi. Crackles present, R>L, worse at lung bases  Abd: soft, distended, engorged veins over the anterior/lateral abdomen   Ext: no peripheral edema  Neuro: unable to assess neurological status      Intake/Output Summary (Last 24 hours) at 3/6/2025 0633  Last data filed at 3/6/2025 0600  Gross per 24 hour   Intake 4630.42 ml   Output 340 ml   Net 4290.42 ml     Labs:   Results from last 72 hours   Lab Units 03/06/25  0452 03/05/25  1544 03/05/25  0506   SODIUM mmol/L 136 136 134*   POTASSIUM mmol/L 3.8 3.9 4.1   CHLORIDE mmol/L 95* 96* 97*   CO2 mmol/L 22 21 19*   BUN mg/dL 74* 66* 60*   CREATININE mg/dL 2.49* 2.32* 2.21*   GLUCOSE mg/dL 117* 112* 119*   CALCIUM mg/dL 7.8* 8.4* 7.7*   ANION GAP mmol/L 23* 23* 22*   EGFR mL/min/1.73m*2 25* 27* 28*   PHOSPHORUS mg/dL 5.6* 6.7* 7.4*      Results from last 72 hours   Lab Units 03/06/25  0452 03/05/25  1544 03/05/25  0506 03/04/25  0438   WBC AUTO x10*3/uL  --  15.7* 15.9* 3.4*   HEMOGLOBIN g/dL  --  12.0 13.2 13.8   HEMATOCRIT %  --  35.9* 42.5 43.3   PLATELETS AUTO x10*3/uL 62* 89* 110* 81*   LYMPHO PCT MAN %  --  4.1 4.0 13.5   MONO PCT MAN %  --  6.5 4.0 3.2   EOSINO PCT MAN %  --  0.0 0.0 0.0      Results from last " 72 hours   Lab Units 03/06/25  0048 03/05/25  1543 03/05/25  1143   POCT PH, ARTERIAL pH 7.31* 7.29* 7.22*   POCT PCO2, ARTERIAL mm Hg 41 42 42   POCT PO2, ARTERIAL mm Hg 60* 56* 58*   POCT SO2, ARTERIAL % 90* 87* 88*     Results from last 72 hours   Lab Units 03/04/25  0042 03/03/25  1832 03/03/25  1710   POCT PH, VENOUS pH 7.28* 7.20* 7.15*   POCT PCO2, VENOUS mm Hg 47 69* 65*   POCT PO2, VENOUS mm Hg 76* 54* 42        Micro/ID:     Lab Results   Component Value Date    BLOODCULT No growth at 2 days 03/03/2025    BLOODCULT No growth at 2 days 03/03/2025       Summary of key imaging results from the last 24 hours  XR abdomen 1 view    Result Date: 3/6/2025  STUDY: XR ABDOMEN 1 VIEW;  3/6/2025 1:53 am   INDICATION: Signs/Symptoms:worsening abdominal distention.     COMPARISON: CT ABDOMEN PELVIS W IV CONTRAST 3/3/2025, DX TH ABDOMEN AP VIEW 3/10/2012   ACCESSION NUMBER(S): [id number]   ORDERING CLINICIAN: GABY DELGADO   FINDINGS: Enteric tube distal tip and side-port overlying the expected location of the stomach. Esophageal temperature probe distal tip overlying the lower cardiac silhouette. Tips in place.   There is a nonobstructive bowel gas pattern. No extraluminal or portal venous gas.   Visualized soft tissues and osseous structures are unremarkable.   Diffuse coarse reticular opacities of the lung bases. Small bilateral pleural effusions..       1. Nonobstructive bowel gas pattern. 2. Enteric tube distal tip and side-port overlying the expected location of the stomach. 3. Diffuse coarse reticular opacities of the lung bases. 4. Small bilateral pleural effusions.     I personally reviewed the images/study and I agree with the findings as stated by Dr. Jeffrey Ceja. This study was interpreted at [facility], [place], [place].   MACRO: none     Dictation workstation:   [id number]    XR chest 1 view    Result Date: 3/5/2025  Interpreted By:  Ellie Stephens, STUDY: XR  CHEST 1 VIEW; 3/5/2025 11:57 am   INDICATION: Signs/Symptoms:Desat.   COMPARISON: 3/4/2025   ACCESSION NUMBER(S): [id number]   ORDERING CLINICIAN: GABY DELGADO   FINDINGS: AP radiograph of the chest was provided.   MEDICAL DEVICES: ETT distal tip 4.9 cm from the roland. Left IJ CVC distal tip overlying the expected location of the superior cavoatrial junction. Esophageal temperature probe distal tip overlying the cardiac silhouette. Enteric tube in place with tip outside field of view.     CARDIOMEDIASTINAL SILHOUETTE: Cardiomediastinal silhouette is largely obscured by overlying airspace opacities.     LUNGS: Patchy and dense consolidative opacities within the bilateral mid to lower lungs, most confluent in the right lower lung. Right pleural effusion. No pneumothorax..   ABDOMEN: No remarkable upper abdominal findings.   BONES: No acute osseous changes.       1. Persistent multifocal consolidation in bilateral lungs with slight worsening of aeration of the right lung area 2. Small to moderate right pleural effusion.       Signed by: Ellie Hammond 3/5/2025 12:17 PM Dictation workstation:   [id number]    Transthoracic Echo (TTE) Complete    Result Date: 3/4/2025   [facility], [address] [place] [address]                Tel [phone] and Fax [phone] TRANSTHORACIC ECHOCARDIOGRAM REPORT  Patient Name:       LOUISA CORDEROREUBEN Sanchez Physician:    [id number] Cony Cartwright MD Study Date:         3/4/2025            Ordering Provider:    [id number] MEGHANN DEGROOT MRN/PID:            [id number]            Fellow:               [id number] Dakotah Duran MD Accession#:         [id number]        Nurse: Date of Birth/Age:  1985 / 39 years Sonographer:          Willie Palafox                                                                RDCS Gender assigned at  F                   Additional Staff: Birth: Height:             167.64 cm           Admit Date: Weight:             63.50 kg            Admission Status:     Inpatient -                                                               Routine BSA / BMI:          1.72 m2 / 22.60     Encounter#:           [id number]                     kg/m2 Blood Pressure:     77/51 mmHg          Department Location:  [address] Study Type:    TRANSTHORACIC ECHO (TTE) COMPLETE Diagnosis/ICD: Shock, unspecified-R57.9 Indication:    tachycardia, shock CPT Code:      Echo Complete w Full Doppler-[id number] Patient History: Pertinent History: Chest Pain and Dyspnea. 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 Study Detail: The following Echo studies were performed: 2D, M-Mode, Doppler and               color flow. Technically challenging study due to body habitus,               patient lying in supine position, poor acoustic windows and               prominent lung artifact. The patient is intubated. Definity used               as a contrast agent for endocardial border definition and agitated               saline used as a contrast agent for intraseptal flow evaluation.               Total contrast used for this procedure was 2 mL via IV push.               Unable to obtain suprasternal notch view.  PHYSICIAN INTERPRETATION: Left Ventricle: Left ventricular ejection fraction is moderately decreased, by visual estimate at 30-35%. There is global hypokinesis of the left ventricle with minor regional variations. The left ventricular cavity size is normal. There is normal septal and normal posterior left ventricular wall thickness. Left ventricular diastolic filling cannot be determined due to E/A wave fusion. There is no  definite left ventricular thrombus visualized. Technically difficult exam despite the use of echocontrast due to off axis somewhat foreshortened apical views. Left Atrium: The left atrial size was not well visualized. There is no evidence of a patent foramen ovale. A bubble study using agitated saline was performed. Bubble study is negative. Agitated saline contrast study was negative for intracardiac shunt. Right Ventricle: The right ventricle was not well visualized. There is reduced right ventricular systolic function. Right Atrium: The right atrium is normal in size. Aortic Valve: The aortic valve was not well visualized. There is no evidence of aortic valve regurgitation. The peak instantaneous gradient of the aortic valve is 7 mmHg. Mitral Valve: The mitral valve is mildly thickened. There is mild mitral annular calcification. There is trace mitral valve regurgitation. Tricuspid Valve: The tricuspid valve is structurally normal. There is trace tricuspid regurgitation. The right ventricular systolic pressure is unable to be estimated. Pulmonic Valve: The pulmonic valve is not well visualized. There is no indication of pulmonic valve regurgitation. Pericardium: Trivial pericardial effusion. Aorta: The aortic root is normal. Systemic Veins: The inferior vena cava was not well visualized. TIPS shunt seen within the portl vein. In comparison to the previous echocardiogram(s): There are no prior studies on this patient for comparison purposes. No prior echocardiogram available for comparison.  CONCLUSIONS:  1. Poorly visualized anatomical structures due to suboptimal image quality.  2. Left ventricular ejection fraction is moderately decreased, by visual estimate at 30-35%.  3. There is global hypokinesis of the left ventricle with minor regional variations.  4. No left ventricular thrombus visualized.  5. Technically difficult exam despite the use of echocontrast due to off axis somewhat foreshortened apical views.   6. There is reduced right ventricular systolic function.  7. Agitated saline contrast study was negative for intracardiac shunt.  8. TIPS shunt seen within the portl vein.  9. There is no evidence of a patent foramen ovale. 10. Pt is tachycardic with  bpm throughout the exam. 11. No prior echocardiogram available for comparison. QUANTITATIVE DATA SUMMARY:  2D MEASUREMENTS:         Normal Ranges: Ao Root d:       2.90 cm (2.0-3.7cm) LAs:             2.90 cm (2.7-4.0cm) IVSd:            0.70 cm (0.6-1.1cm) LVPWd:           0.70 cm (0.6-1.1cm) LVIDd:           4.70 cm (3.9-5.9cm) LVIDs:           3.80 cm LV Mass Index:   60 g/m2 LV % FS          19.1 %  AORTA MEASUREMENTS:         Normal Ranges: Ao Sinus, d:        2.90 cm (2.1-3.5cm)  LV SYSTOLIC FUNCTION:                      Normal Ranges: EF-Visual:      33 % LV EF Reported: 33 %  LV DIASTOLIC FUNCTION:          Normal Ranges: MV Peak E:             0.73 m/s (0.7-1.2 m/s)  AORTIC VALVE:           Normal Ranges: AoV Vmax:      1.32 m/s (<=1.7m/s) AoV Peak P.0 mmHg (<20mmHg) LVOT Max Low:  0.91 m/s (<=1.1m/s) LVOT VTI:      10.50 cm LVOT Diameter: 2.10 cm  (1.8-2.4cm) AoV Area,Vmax: 2.40 cm2 (2.5-4.5cm2)  RIGHT VENTRICLE: TAPSE: 15.5 mm RV s'  0.12 m/s  PULMONIC VALVE:          Normal Ranges: PV Max Low:     0.9 m/s  (0.6-0.9m/s) PV Max PG:      3.2 mmHg  [id number] Cony Cartwright MD Electronically signed on 3/4/2025 at 9:42:22 AM  ** Final **       Assessment and Plan     Assessment: Gissel Harvey is a 39 y.o. year old female patient admitted on 3/3/2025 with intubation and pressor support.     Gissel Harvey is a 39 y.o. 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 Treating for septic shock with possible  cardiogenic shock component as well as ARDS.    Her pressor requirement has decreased from yesterday. Her resp cx shows pan-sensitive MSSA. Her urine strep pneumo test was also positive. Based on these findings, we need to cover staph and strep. She likely does not need antifungal, atypical, or MRSA coverage so we will deescalate abx.    Mechanical Ventilation: < 4 days  Sedation/Analgesia:  fentanyl, ketamine, Versed  Restraints: no    Summary for 03/06/25:  Thiamine supplementation  Decreasing pressor requirements this morning  Increase levo and decrease epi  Pt is back to sinus rhythm, talk to cards about antiarrhythmic recommendations  Elevated lactate, continue serial ABGs  Central line placement today for dialysis  Respiratory culture growing pan sensitive staph aureus  Discontinue vancomycin, ceftaroline, micafungin, and doxycycline   Continue meropenem  Bicarb this morning 22, hold bicarb gtt  Thrombocytopenia with downtrending platelets, get fibrinogen to r/o DIC  If platelets <50, transfuse but do not stop bivalirubin    NEUROLOGY/PSYCH:  #Sedation   - Sedating with fentanyl, ketamine, Versed. Propofol discontinued for hypotension.  - RASS goal -3 to -4  - Thiamine supplementation    CARDIOVASCULAR:  #Shock, likely septic I/s/o severe pneumonia and possible cardiogenic component  #HFrEF  :: lactate > 9 on presentation, now downtrending  :: TTE 3/4 with EF 30-35% with reduced RV function, no previous TTE for comparison  :: BNP 2127 at admission  :: Troponin plateaued (62>63) on 3/4/25  - On vasopressin, epinephrine, and angiotensin II to support blood pressure with MAP goal > 65   - Increase levo and decrease epi  - Elevated lactate, continue serial ABGs  - Central line placement today for dialysis    #Aflutter  #Sustained VT   :: went into NSVT while wire threaded for central line placement  :: s/p amiodarone 150 mg bolus x2  :: pt cardioverted  - Pt is back to sinus rhythm, talk to cards about  antiarrhythmic recommendations    PULMONARY:  Vent Mode: Volume control/assist control  FiO2 (%):  [100 %] 100 %  S RR:  [24] 24  S VT:  [450 mL] 450 mL  PEEP/CPAP (cm H2O):  [12 cm H20] 12 cm H20  MAP (cm H2O):  [17-18] 17     #Community acquired pneumonia   #ARDS   :: intubated 3/3   :: P:F ~ 70 on admission   :: Procalcitonin 57.3  :: flu A/B, COVID, parainfluenza, RSV, metapneumovirus, legionella urine Ag: negative  :: s/p vanc, Zosyn, azithro 3/2 in the ED   :: MRSA nares positive 3/4  :: Tracheal aspirate cx 3/3: pan sensitive staph aureus  :: Strep pneumo urine ag: positive  :: Bcx 3/3: NGTD x2 days    Plan  - f/up pneumonia workup: full resp viral panel, tracheal aspirate cx  - f/up blood cultures   - Discontinue vancomycin, ceftaroline, micafungin, and doxycycline   - Continue meropenem  - Continue solumedrol 50 mg q12 for ARDS and mineralocorticoid effect  - Inhaled epo  - BP not stable enough for proning  - Would not tolerate PVRP  - Per shock call, not a candidate for ECMO    RENAL/GENITOURINARY:  #Decreased renal function c/f SCOUT vs CKD, worsening  #HAGMA  :: Cr 1.9 on admission, unclear baseline as no labs in 2 years   :: FeNa 3/3: 0.1% prerenal  :: anion gap 19, delta 7, delta ratio 3.5 suggests HAGMA with concurrent metabolic alkalosis  - Nephrology for consideration for CVVH, needs central line  - Continue to monitor Cr with daily RFP  - Bicarb this morning 22, hold bicarb gtt    GASTROENTEROLOGY:  #liver cirrhosis   #portal hypertension s/p TIPS   #hx of Budd-Chiari   - Continue to monitor abdomen for signs of ascites formation    ENDOCRINOLOGY:  - SSI while on steroids     HEMATOLOGY:  #Lymphopenia   :: Abs leukocyte count 260  :: HIV neg  - Now with leukocytosis likely 2/2 known infection and steroid use  - CTM     #Hx of antiphospholipid syndrome   #Hx of positive PF4  #Chronic IVC occlusion   #Hx of DVT   :: Supposed to be on fondaparinux outpatient, unclear if taking as no recent med fills   -  Continue bivalirudin while inpatient I/s/o previous positive PF4     #Thrombocytopenia, downtrending  - Trend plts, INR, coags  - Fibrinogen to r/o DIC  - Vancomycin could cause drug-induced thrombocytopenia  - Amiodarone unlikely because timeline doesn't fit  - If platelets <50, transfuse but do not stop bivalirubin    SKIN:  -No acute issues    MUSCULOSKELETAL:        -No acute issues    INFECTIOUS DISEASE:  ::See above pulm section for more details    Antibiotics  Azithromycin 500 mg: 3/3  Zosyn 4.5 g: 3/3  Tobramycin 440 mg: 3/4  Vancomycin: 3/3 - 3/6  Ceftaroline 3/5 - 3/6  Meropenem 2 g BID: 3/3 - present  Micafungin: 3/4 - 3/6  Doxycycline 100 mg BID: 3/4 - 3/6    Social:  - GOC discussion 3/5     ICU Check List       ICU Liberation: Intervention:   Assess, Prevent, Manage Pain      Both SAT and SBT [] SAT  [] SBT 30-60 min [] Extubate to NC  [] Extubate to NIV  [] Discuss Trach   Choice of analgesia and sedation Gao Agitation Sedation Scale (RASS): Unarousable  Target Arousal Level (RASS): RASS -3 to -4 fentanyl, ketamine, Versed      Delirium: Assess, prevent and manage  CAM ICU Positive    Early Mobility and Exercise   [] PT /OT consult   Family Engagement and Empowerment [x]Family updated [x]SW consult     FEN  Fluids: PRN, caution with reduced EF  Electrolytes: PRN  Nutrition: NPO  Prophylaxis:  DVT ppx: bivalirudin gtt since positive PF4  GI ppx: pantoprazole  Bowel care: lactulose, Miralax BID  Hardware:         CVC 03/04/25 Triple lumen Left Internal jugular (Active)   Placement Date: 03/04/25   Earliest Known Present: 03/04/25  Lumen Type: Triple lumen  Orientation: Left  Location: Internal jugular   Number of days: 1       Arterial Line 03/03/25 Left Radial (Active)   Placement Date/Time: 03/03/25 (c) 2040   Size: 22 G  Orientation: Left  Location: Radial  Site Prep: Chlorhexidine   Local Anesthetic: Injectable  Insertion attempts: 2  Securement Method: Transparent dressing   Number of days: 1        ETT  7.5 mm (Active)   Placement Date: 03/03/25   ETT Type: ETT - single  Single Lumen Tube Size: 7.5 mm  Cuffed: Yes  Location: Oral   Number of days: 2       Urethral Catheter 16 Fr. (Active)   Placement Date/Time: 03/03/25 1700   Hand Hygiene Completed: Yes  Tube Size (Fr.): 16 Fr.  Catheter Balloon Size: 10 mL  Urine Returned: Yes   Number of days: 1       NG/OG/Feeding Tube NG - [place] sump Right nostril (Active)   Placement Date/Time: 03/03/25 1900   Type of Tube: NG/OG Tube  Tube Length: 53 cm  Tube Type: NG - [place] sump  Tube Location: Right nostril   Number of days: 1       Social:  Code: DNR    HPOA: Payal Durant (daughter) [phone]  Disposition: MARLENE Das MD   03/06/25 at 6:33 AM     Disclaimer: Documentation completed with the information available at the time of input. The times in the chart may not be reflective of actual patient care times, interventions, or procedures. Documentation occurs after the physical care of the patient.

## 2025-03-06 NOTE — PROCEDURES
Date: 03/05/2025  Procedure: Central Venous Catheter Placement   Site: Internal Jugular, Right/Left     Time-out was performed. Right region prepped and draped in setrile fashion using chlorhexidine. Skin and subcutaneous tissues superficial to vein of interest were anesthetized with 1% Lidocaine. Right Jugular Vein accessed under ultrasound guidance using finder needle and sheath. Venous blood was withdrawn. The sheath was advanced into the vein and needle withdrawn. Guidwire was advanced through the sheath. Evidence of non-sustained ventricular tachycardia with pulse. Procedure was aborted. Amiodarone boluses to gtt, bicarbonate pushes to gtt, calcium, synchronized cardioversion. Rhythm on EKG Aflutter. Cardiology consulted.     Darryl Tracy MD    Pulmonary & Critical Care Medicine   Fellow

## 2025-03-06 NOTE — CONSULTS
Vancomycin Dosing by Pharmacy- Cessation of Therapy    Consult to pharmacy for vancomycin dosing has been discontinued by the prescriber, pharmacy will sign off at this time.    Please call pharmacy if there are further questions or re-enter a consult if vancomycin is resumed.     Rachel Martines, PharmD

## 2025-03-06 NOTE — PROGRESS NOTES
Nephrology Progress Note     Date of admission: 3/3/2025     Subjective: Pressor requirements improved . Trialysis line placed for initiation of CVVH.      CURRENT MEDICATIONS:  Scheduled medications  cisatracurium, 0.1 mg/kg, intravenous, Once  pantoprazole, 40 mg, oral, Daily before breakfast   Or  esomeprazole, 40 mg, nasoduodenal tube, Daily before breakfast   Or  pantoprazole, 40 mg, intravenous, Daily before breakfast  fentaNYL, 25 mcg, intravenous, Once  insulin lispro, 0-5 Units, subcutaneous, q4h  lactulose, 20 g, oral, TID  artificial tears, 2 drop, Both Eyes, q6h  meropenem, 2 g, intravenous, q12h  methylPREDNISolone sodium succinate (PF), 50 mg, intravenous, q12h  midazolam, 2 mg, intravenous, Once  oxygen, , inhalation, Continuous - Inhalation  polyethylene glycol, 17 g, oral, BID  sennosides-docusate sodium, 2 tablet, oral, BID  thiamine, 500 mg, intravenous, TID   Followed by  [START ON 3/9/2025] thiamine, 100 mg, oral, Daily  vancomycin, 500 mg, intravenous, q12h  white petrolatum-mineral oiL, 1 Application, Both Eyes, q6h      Continuous medications  angiotensin II (Giapreza) 2.5 mg in sodium chloride 0.9% 250 mL (0.01 mg/mL) infusion, 1.25-40 ng/kg/min, Last Rate: 40 ng/kg/min (03/06/25 0917)  bivalirudin, 0.05-0.49 mg/kg/hr, Last Rate: Stopped (03/06/25 1435)  cisatracurium, 37.5 mg/hr, Last Rate: 37.5 mg/hr (03/06/25 1730)  EPINEPHrine, 0-1 mcg/kg/min, Last Rate: 0.05 mcg/kg/min (03/06/25 1046)  epoprostenol, 0.05 mcg/kg/min (Ideal), Last Rate: 0.05 mcg/kg/min (03/06/25 1432)  fentaNYL, 0-300 mcg/hr, Last Rate: 250 mcg/hr (03/06/25 1829)  ketamine (Ketalar) 1,000 mg in 100 mL (10 mg/mL) infusion, 0.05-2 mg/kg/hr, Last Rate: 2 mg/kg/hr (03/06/25 1606)  midazolam, 0-20 mg/hr, Last Rate: 5 mg/hr (03/06/25 0637)  norepinephrine, 0-0.5 mcg/kg/min, Last Rate: Stopped (03/06/25 6145)  PrismaSol 4/2.5, 25 mL/kg/hr, Last Rate: 25 mL/kg/hr (03/06/25 9996)  [Held by provider] sodium bicarbonate 1 mEq/mL  "(8.4 %) 250 mEq in dextrose 5% 500 mL (0.5 mEq/mL) infusion, 10 mEq/hr, Last Rate: Stopped (03/06/25 0755)  vasopressin, 0-0.06 Units/min, Last Rate: 0.06 Units/min (03/06/25 1337)      PRN medications  PRN medications: acetaminophen **OR** acetaminophen, alteplase, dextrose, dextrose, fentaNYL, glucagon, glucagon, midazolam, vancomycin         OBJECTIVE:     VITALS: Visit Vitals  /63 (Patient Position: Lying)   Pulse 103   Temp 37 °C (98.6 °F)   Resp 24   Ht 1.676 m (5' 6\")   Wt 76.4 kg (168 lb 6.9 oz)   SpO2 92%   BMI 27.19 kg/m²   Smoking Status Every Day   BSA 1.89 m²       GEN: Vent/sedated  CVS: S1 S2 no murmurs  RESP:  Coarse BS BL  Neuro: Sedated  Extremities: Trace edema      LABS:  Results from last 72 hours   Lab Units 03/06/25  0452   WBC AUTO x10*3/uL 12.8*   HEMOGLOBIN g/dL 11.0*   MCV fL 95   PLATELETS AUTO x10*3/uL 62*   BUN mg/dL 74*   CREATININE mg/dL 2.49*   CALCIUM mg/dL 7.8*           Intake/Output Summary (Last 24 hours) at 3/6/2025 1834  Last data filed at 3/6/2025 1800  Gross per 24 hour   Intake 2579.98 ml   Output 545 ml   Net 2034.98 ml        ASSESSMENT AND PLAN:   Gissel Harvey is a 39-year-old female admitted for acute respiratory failure s/p intubation with multifocal pneumonia and worsening respiratory status and hemodynamics. On 4 vasopressors currently with lactic acidosis not improving. Renal function has been slowly worsening and likely ATN from profound shock.    #septic shock 2/2 pna  #ARDS 2/2 CAP  #Metabolic acidosis  #Lactic acidosis  #Pneumonia  #VTACH    #SCOUT - oliguric  Baseline creatinine presumed normal. 0.64 2022. No CKD history. Presented at 1.91 on 3/3. Trending up to 2.3 today.   UOP: 600x24. Minimal dark urine today  Acid-base: Metabolic / lactic acidosis  Volume: No significant overload. Poor resp status PEEP 12, FIO2 100%.  BP: Profound shock, multiple pressors, paralyzed    Recommendations:  -CRRT per submitted orders  -RFP BID  -Will Follow    iZon " Radha  Nephrology Fellow

## 2025-03-06 NOTE — PROGRESS NOTES
Electrophysiology Progress Note    Updates:  Back in SR this AM  On bival gtt for AC.       Current Facility-Administered Medications:     acetaminophen (Tylenol) oral liquid 650 mg, 650 mg, oral, q4h PRN, 650 mg at 03/05/25 0209 **OR** acetaminophen (Tylenol) tablet 650 mg, 650 mg, oral, q4h PRN, Yuniel Paniagua MD, 650 mg at 03/04/25 0534    angiotensin II (Giapreza) 2.5 mg in sodium chloride 0.9% 250 mL (0.01 mg/mL) infusion, 1.25-40 ng/kg/min, intravenous, Continuous, Alex Kay MD, Last Rate: 15.24 mL/hr at 03/06/25 0917, 40 ng/kg/min at 03/06/25 0917    bivalirudin (Angiomax) infusion, 0.05-0.49 mg/kg/hr, intravenous, Continuous, Yuniel Paniagua MD, Stopped at 03/06/25 1435    cisatracurium (Nimbex) bolus from bag 6.35 mg, 0.1 mg/kg, intravenous, Once **FOLLOWED BY** cisatracurium (Nimbex) 200 mg in dextrose 5% 100 mL (2 mg/mL) infusion, 37.5 mg/hr, intravenous, Continuous, Cirilo Lam MD, Last Rate: 18.75 mL/hr at 03/06/25 1236, 37.5 mg/hr at 03/06/25 1236    dextrose 50 % injection 12.5 g, 12.5 g, intravenous, q15 min PRN, Cirilo Lam MD    dextrose 50 % injection 25 g, 25 g, intravenous, q15 min PRN, Cirilo Lam MD, 25 g at 03/04/25 0357    EPINEPHrine (Adrenalin) 10 mg in dextrose 5% 250 mL (0.04 mg/mL) infusion, 0-1 mcg/kg/min, intravenous, Continuous, Yuniel Paniagua MD, Last Rate: 4.76 mL/hr at 03/06/25 1046, 0.05 mcg/kg/min at 03/06/25 1046    epoprostenol (Veletri) 3 mg/100 mL inhalation, 0.05 mcg/kg/min (Ideal), inhalation, Continuous, Tadeo Sahni MD, Last Rate: 5.93 mL/hr at 03/06/25 1432, 0.05 mcg/kg/min at 03/06/25 1432    pantoprazole (ProtoNix) EC tablet 40 mg, 40 mg, oral, Daily before breakfast **OR** esomeprazole (NexIUM) suspension 40 mg, 40 mg, nasoduodenal tube, Daily before breakfast **OR** pantoprazole (ProtoNix) injection 40 mg, 40 mg, intravenous, Daily before breakfast, Cirilo Lam MD, 40 mg at 03/06/25 0635    fentanyl (Sublimaze) 10 mcg/mL  in sodium chloride 0.9% infusion, 0-300 mcg/hr, intravenous, Continuous, Alex Kay MD, Last Rate: 25 mL/hr at 03/06/25 1108, 250 mcg/hr at 03/06/25 1108    fentaNYL bolus from bag 25 mcg, 25 mcg, intravenous, Once, Cirilo Lam MD    fentaNYL bolus from bag 25 mcg, 25 mcg, intravenous, q10 min PRN, Cirilo Lam MD    glucagon (Glucagen) injection 1 mg, 1 mg, intramuscular, q15 min PRN, Cirilo Lam MD    glucagon (Glucagen) injection 1 mg, 1 mg, intramuscular, q15 min PRN, Cirilo Lam MD    insulin lispro injection 0-5 Units, 0-5 Units, subcutaneous, q4h, Cirilo Lam MD    ketamine (Ketalar) 1,000 mg in 100 mL (10 mg/mL) infusion, 0.05-2 mg/kg/hr, intravenous, Continuous, Alex Kay MD, Last Rate: 12.7 mL/hr at 03/06/25 0845, 2 mg/kg/hr at 03/06/25 0845    lactulose 20 gram/30 mL oral solution 20 g, 20 g, oral, TID, Yuniel Paniagua MD, 20 g at 03/06/25 0907    lubricating eye drops ophthalmic solution 2 drop, 2 drop, Both Eyes, q6h, Cirilo Lam MD, 2 drop at 03/06/25 0856    meropenem (Merrem) 2 g in sodium chloride 0.9% 100 mL IV, 2 g, intravenous, q12h, Yuniel Paniagua MD, Stopped at 03/06/25 1025    methylPREDNISolone sod succinate (SOLU-Medrol) injection 50 mg, 50 mg, intravenous, q12h, Jordan Delacruz MD, 50 mg at 03/06/25 0907    midazolam (Versed) bolus from bag 2 mg, 2 mg, intravenous, Once, Cirilo Lam MD    midazolam (Versed) bolus from bag 2 mg, 2 mg, intravenous, PRN, Cirilo Lam MD    midazolam in NS (Versed) 1 mg/mL infusion solution, 0-20 mg/hr, intravenous, Continuous, Alex Kay MD, Last Rate: 5 mL/hr at 03/06/25 0637, 5 mg/hr at 03/06/25 0637    norepinephrine (Levophed) 16 mg in sodium chloride 0.9% 250 mL (0.064 mg/mL) infusion (premix), 0-0.5 mcg/kg/min, intravenous, Continuous, Yuniel Paniagua MD, Stopped at 03/06/25 0415    oxygen (O2) therapy, , inhalation, Continuous - Inhalation, Cirilo Lam MD, 100  percent at 03/06/25 0855    polyethylene glycol (Glycolax, Miralax) packet 17 g, 17 g, oral, BID, Alex Kay MD, 17 g at 03/06/25 0908    sennosides-docusate sodium ([place]-Colace) 8.6-50 mg per tablet 2 tablet, 2 tablet, oral, BID, Alex Kay MD    [Held by provider] sodium bicarbonate 1 mEq/mL (8.4 %) 250 mEq in dextrose 5% 500 mL (0.5 mEq/mL) infusion, 10 mEq/hr, intravenous, Continuous, Alex Kay MD, Stopped at 03/06/25 0755    thiamine (Vitamin B1) injection 500 mg, 500 mg, intravenous, TID, 500 mg at 03/06/25 1312 **FOLLOWED BY** [START ON 3/9/2025] thiamine (Vitamin B-1) tablet 100 mg, 100 mg, oral, Daily, Alex Kay MD    vancomycin (Vancocin) pharmacy to dose - pharmacy monitoring, , miscellaneous, Daily PRN, Yumiko Das MD    vasopressin (Vasostrict) 0.2 unit/mL in 5% dextrose 100 mL IV, 0-0.06 Units/min, intravenous, Continuous, Yuniel Paniagua MD, Last Rate: 18 mL/hr at 03/06/25 1337, 0.06 Units/min at 03/06/25 1337    white petrolatum-mineral oiL (Tears Naturale PM) ophthalmic ointment 1 Application, 1 Application, Both Eyes, q6h, Chang Soriano MD, 1 Application at 03/06/25 1321    Objective:    Vitals:    03/06/25 1432   BP:    Pulse:    Resp:    Temp:    SpO2: 91%       Exam:  General - ill appearing, intubated and sedated  Neck - unable to assess JVD   Chest - vented breath sounds  Cardiac - S1, S2, no murmur heard   Lower ext - No LE edema     Labs/Imaging:     Results from last 72 hours   Lab Units 03/06/25  0452 03/05/25  0506 03/04/25  0438   TROPHSCMC ng/L  --   --  63*   SODIUM mmol/L 136   < > 140   POTASSIUM mmol/L 3.8   < > 4.5   CO2 mmol/L 22   < > 23   BUN mg/dL 74*   < > 43*   CREATININE mg/dL 2.49*   < > 1.85*   MAGNESIUM mg/dL 2.50*   < > 1.86   PHOSPHORUS mg/dL 5.6*   < > 6.5*    < > = values in this interval not displayed.      Results from last 72 hours   Lab Units 03/06/25  0452   WBC AUTO x10*3/uL 12.8*   HEMOGLOBIN g/dL 11.0*   PLATELETS AUTO x10*3/uL 62*         Assessment and Plan:   39 y.o. 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 Treating for septic shock with possible cardiogenic shock component as well as ARDS. Patient remains in severe shock requiring levo, epi, vaso, ATII. Went into wide complex tachycardia as wire was being threaded during central line placement     Patient appears to have NSVT when wire was being threaded which then converted to 2:1 Aflutter -> s/p shock and amiodarone bolus -> tele still appears to be consistent with AF. Pt was likely in 2:1 AF before the line placement based on EKG from 3/3. Now back in SR.      Pt appears to be critically sick with poor prognosis.      -Can give 150mg amiodarone bolus if patient goes back into RVR  -Recommend anticoagulation given patient s/p DCCV  -As long as patient is on AC, can use amio gtt for brief periods in the setting of AF with RVR if it is causing hemodynamic instability    -Patient cannot be on beta blockers due to shock  -Continue GOC discussions     EP will sign off. Please reach out with any further concerns.     Jerri Bah  Cardiology Fellow   PGY-5

## 2025-03-07 LAB
ACANTHOCYTES BLD QL SMEAR: ABNORMAL
ADENOVIRUS RVP, VIRC: NOT DETECTED
ALBUMIN SERPL BCP-MCNC: 2.5 G/DL (ref 3.4–5)
ALBUMIN SERPL BCP-MCNC: 2.5 G/DL (ref 3.4–5)
ANION GAP BLDA CALCULATED.4IONS-SCNC: 11 MMO/L (ref 10–25)
ANION GAP BLDA CALCULATED.4IONS-SCNC: 13 MMO/L (ref 10–25)
ANION GAP SERPL CALC-SCNC: 14 MMOL/L (ref 10–20)
ANION GAP SERPL CALC-SCNC: 15 MMOL/L (ref 10–20)
APTT PPP: 55 SECONDS (ref 26–36)
BACTERIA BLD CULT: NORMAL
BACTERIA BLD CULT: NORMAL
BACTERIA SPEC RESP CULT: ABNORMAL
BACTERIA SPEC RESP CULT: ABNORMAL
BASE EXCESS BLDA CALC-SCNC: -0.1 MMOL/L (ref -2–3)
BASE EXCESS BLDA CALC-SCNC: -1 MMOL/L (ref -2–3)
BASOPHILS # BLD MANUAL: 0 X10*3/UL (ref 0–0.1)
BASOPHILS NFR BLD MANUAL: 0 %
BODY TEMPERATURE: 37 DEGREES CELSIUS
BODY TEMPERATURE: 37 DEGREES CELSIUS
BUN SERPL-MCNC: 58 MG/DL (ref 6–23)
BUN SERPL-MCNC: 71 MG/DL (ref 6–23)
CA-I BLD-SCNC: 0.96 MMOL/L (ref 1.1–1.33)
CA-I BLDA-SCNC: 1.08 MMOL/L (ref 1.1–1.33)
CA-I BLDA-SCNC: 1.08 MMOL/L (ref 1.1–1.33)
CALCIUM SERPL-MCNC: 7.4 MG/DL (ref 8.6–10.6)
CALCIUM SERPL-MCNC: 7.8 MG/DL (ref 8.6–10.6)
CHLORIDE BLDA-SCNC: 100 MMOL/L (ref 98–107)
CHLORIDE BLDA-SCNC: 98 MMOL/L (ref 98–107)
CHLORIDE SERPL-SCNC: 102 MMOL/L (ref 98–107)
CHLORIDE SERPL-SCNC: 97 MMOL/L (ref 98–107)
CO2 SERPL-SCNC: 22 MMOL/L (ref 21–32)
CO2 SERPL-SCNC: 25 MMOL/L (ref 21–32)
CREAT SERPL-MCNC: 1.96 MG/DL (ref 0.5–1.05)
CREAT SERPL-MCNC: 2.24 MG/DL (ref 0.5–1.05)
EGFRCR SERPLBLD CKD-EPI 2021: 28 ML/MIN/1.73M*2
EGFRCR SERPLBLD CKD-EPI 2021: 33 ML/MIN/1.73M*2
ENTEROVIRUS/RHINOVIRUS RVP, VIRC: NOT DETECTED
EOSINOPHIL # BLD MANUAL: 0 X10*3/UL (ref 0–0.7)
EOSINOPHIL NFR BLD MANUAL: 0 %
ERYTHROCYTE [DISTWIDTH] IN BLOOD BY AUTOMATED COUNT: 14.6 % (ref 11.5–14.5)
GLUCOSE BLD MANUAL STRIP-MCNC: 103 MG/DL (ref 74–99)
GLUCOSE BLD MANUAL STRIP-MCNC: 112 MG/DL (ref 74–99)
GLUCOSE BLD MANUAL STRIP-MCNC: 123 MG/DL (ref 74–99)
GLUCOSE BLD MANUAL STRIP-MCNC: 134 MG/DL (ref 74–99)
GLUCOSE BLD MANUAL STRIP-MCNC: 134 MG/DL (ref 74–99)
GLUCOSE BLD MANUAL STRIP-MCNC: 85 MG/DL (ref 74–99)
GLUCOSE BLD MANUAL STRIP-MCNC: 96 MG/DL (ref 74–99)
GLUCOSE BLDA-MCNC: 120 MG/DL (ref 74–99)
GLUCOSE BLDA-MCNC: 99 MG/DL (ref 74–99)
GLUCOSE SERPL-MCNC: 113 MG/DL (ref 74–99)
GLUCOSE SERPL-MCNC: 97 MG/DL (ref 74–99)
GRAM STN SPEC: ABNORMAL
GRAM STN SPEC: ABNORMAL
HCO3 BLDA-SCNC: 24.3 MMOL/L (ref 22–26)
HCO3 BLDA-SCNC: 25.4 MMOL/L (ref 22–26)
HCT VFR BLD AUTO: 30.1 % (ref 36–46)
HCT VFR BLD EST: 33 % (ref 36–46)
HCT VFR BLD EST: 35 % (ref 36–46)
HGB BLD-MCNC: 10.5 G/DL (ref 12–16)
HGB BLDA-MCNC: 11 G/DL (ref 12–16)
HGB BLDA-MCNC: 11.5 G/DL (ref 12–16)
HUMAN BOCAVIRUS RVP, VIRC: NOT DETECTED
HUMAN CORONAVIRUS RVP, VIRC: NOT DETECTED
IMM GRANULOCYTES # BLD AUTO: 0.44 X10*3/UL (ref 0–0.7)
IMM GRANULOCYTES NFR BLD AUTO: 2.4 % (ref 0–0.9)
INFLUENZA A , VIRC: NOT DETECTED
INFLUENZA A H1N1-09 , VIRC: NOT DETECTED
INFLUENZA B PCR, VIRC: NOT DETECTED
INHALED O2 CONCENTRATION: 100 %
INHALED O2 CONCENTRATION: 100 %
INR PPP: 3.1 (ref 0.9–1.1)
LACTATE BLDA-SCNC: 3.1 MMOL/L (ref 0.4–2)
LACTATE BLDA-SCNC: 3.9 MMOL/L (ref 0.4–2)
LYMPHOCYTES # BLD MANUAL: 0.6 X10*3/UL (ref 1.2–4.8)
LYMPHOCYTES NFR BLD MANUAL: 3.3 %
MAGNESIUM SERPL-MCNC: 2.57 MG/DL (ref 1.6–2.4)
MAGNESIUM SERPL-MCNC: 2.58 MG/DL (ref 1.6–2.4)
MCH RBC QN AUTO: 31.3 PG (ref 26–34)
MCHC RBC AUTO-ENTMCNC: 34.9 G/DL (ref 32–36)
MCV RBC AUTO: 90 FL (ref 80–100)
METAMYELOCYTES # BLD MANUAL: 0.75 X10*3/UL
METAMYELOCYTES NFR BLD MANUAL: 4.1 %
METAPNEUMOVIRUS , VIRC: NOT DETECTED
MONOCYTES # BLD MANUAL: 1.5 X10*3/UL (ref 0.1–1)
MONOCYTES NFR BLD MANUAL: 8.2 %
NEUTROPHILS # BLD MANUAL: 15.3 X10*3/UL (ref 1.2–7.7)
NEUTS BAND # BLD MANUAL: 0.15 X10*3/UL (ref 0–0.7)
NEUTS BAND NFR BLD MANUAL: 0.8 %
NEUTS SEG # BLD MANUAL: 15.15 X10*3/UL (ref 1.2–7)
NEUTS SEG NFR BLD MANUAL: 82.8 %
NRBC BLD-RTO: 1.2 /100 WBCS (ref 0–0)
OXYHGB MFR BLDA: 92.1 % (ref 94–98)
OXYHGB MFR BLDA: 93.4 % (ref 94–98)
PARAINFLUENZA PCR, VIRC: NOT DETECTED
PCO2 BLDA: 42 MM HG (ref 38–42)
PCO2 BLDA: 44 MM HG (ref 38–42)
PH BLDA: 7.37 PH (ref 7.38–7.42)
PH BLDA: 7.37 PH (ref 7.38–7.42)
PHOSPHATE SERPL-MCNC: 3.3 MG/DL (ref 2.5–4.9)
PHOSPHATE SERPL-MCNC: 3.9 MG/DL (ref 2.5–4.9)
PLATELET # BLD AUTO: 56 X10*3/UL (ref 150–450)
PO2 BLDA: 70 MM HG (ref 85–95)
PO2 BLDA: 73 MM HG (ref 85–95)
POTASSIUM BLDA-SCNC: 4 MMOL/L (ref 3.5–5.3)
POTASSIUM BLDA-SCNC: 4.3 MMOL/L (ref 3.5–5.3)
POTASSIUM SERPL-SCNC: 4 MMOL/L (ref 3.5–5.3)
POTASSIUM SERPL-SCNC: 4.1 MMOL/L (ref 3.5–5.3)
PROTHROMBIN TIME: 34.5 SECONDS (ref 9.8–12.4)
RBC # BLD AUTO: 3.35 X10*6/UL (ref 4–5.2)
RBC MORPH BLD: ABNORMAL
RSV PCR, RVP, VIRC: NOT DETECTED
SAO2 % BLDA: 94 % (ref 94–100)
SAO2 % BLDA: 96 % (ref 94–100)
SODIUM BLDA-SCNC: 131 MMOL/L (ref 136–145)
SODIUM BLDA-SCNC: 132 MMOL/L (ref 136–145)
SODIUM SERPL-SCNC: 133 MMOL/L (ref 136–145)
SODIUM SERPL-SCNC: 134 MMOL/L (ref 136–145)
TOTAL CELLS COUNTED BLD: 122
VANCOMYCIN SERPL-MCNC: 17.1 UG/ML (ref 5–20)
VARIANT LYMPHS # BLD MANUAL: 0.15 X10*3/UL (ref 0–0.5)
VARIANT LYMPHS NFR BLD: 0.8 %
WBC # BLD AUTO: 18.3 X10*3/UL (ref 4.4–11.3)

## 2025-03-07 PROCEDURE — 2500000001 HC RX 250 WO HCPCS SELF ADMINISTERED DRUGS (ALT 637 FOR MEDICARE OP): Mod: SE

## 2025-03-07 PROCEDURE — 2500000004 HC RX 250 GENERAL PHARMACY W/ HCPCS (ALT 636 FOR OP/ED): Mod: SE

## 2025-03-07 PROCEDURE — 90945 DIALYSIS ONE EVALUATION: CPT | Performed by: INTERNAL MEDICINE

## 2025-03-07 PROCEDURE — 85007 BL SMEAR W/DIFF WBC COUNT: CPT

## 2025-03-07 PROCEDURE — 99291 CRITICAL CARE FIRST HOUR: CPT

## 2025-03-07 PROCEDURE — 37799 UNLISTED PX VASCULAR SURGERY: CPT

## 2025-03-07 PROCEDURE — 82435 ASSAY OF BLOOD CHLORIDE: CPT

## 2025-03-07 PROCEDURE — 80202 ASSAY OF VANCOMYCIN: CPT | Performed by: STUDENT IN AN ORGANIZED HEALTH CARE EDUCATION/TRAINING PROGRAM

## 2025-03-07 PROCEDURE — 2500000005 HC RX 250 GENERAL PHARMACY W/O HCPCS: Mod: SE | Performed by: EMERGENCY MEDICINE

## 2025-03-07 PROCEDURE — 2500000005 HC RX 250 GENERAL PHARMACY W/O HCPCS: Mod: SE

## 2025-03-07 PROCEDURE — 82947 ASSAY GLUCOSE BLOOD QUANT: CPT

## 2025-03-07 PROCEDURE — 83735 ASSAY OF MAGNESIUM: CPT

## 2025-03-07 PROCEDURE — 84132 ASSAY OF SERUM POTASSIUM: CPT

## 2025-03-07 PROCEDURE — 85027 COMPLETE CBC AUTOMATED: CPT

## 2025-03-07 PROCEDURE — 85610 PROTHROMBIN TIME: CPT

## 2025-03-07 PROCEDURE — 94645 CONT INHLJ TX EACH ADDL HOUR: CPT

## 2025-03-07 PROCEDURE — 80069 RENAL FUNCTION PANEL: CPT

## 2025-03-07 PROCEDURE — 90937 HEMODIALYSIS REPEATED EVAL: CPT

## 2025-03-07 PROCEDURE — 82330 ASSAY OF CALCIUM: CPT

## 2025-03-07 PROCEDURE — 2020000001 HC ICU ROOM DAILY

## 2025-03-07 PROCEDURE — 94003 VENT MGMT INPAT SUBQ DAY: CPT

## 2025-03-07 RX ORDER — CALCIUM GLUCONATE 20 MG/ML
2 INJECTION, SOLUTION INTRAVENOUS
Status: COMPLETED | OUTPATIENT
Start: 2025-03-07 | End: 2025-03-07

## 2025-03-07 RX ORDER — BISACODYL 10 MG/1
10 SUPPOSITORY RECTAL DAILY
Status: COMPLETED | OUTPATIENT
Start: 2025-03-07 | End: 2025-03-08

## 2025-03-07 RX ADMIN — THIAMINE HYDROCHLORIDE 500 MG: 100 INJECTION, SOLUTION INTRAMUSCULAR; INTRAVENOUS at 21:06

## 2025-03-07 RX ADMIN — SENNOSIDES AND DOCUSATE SODIUM 2 TABLET: 50; 8.6 TABLET ORAL at 21:06

## 2025-03-07 RX ADMIN — SENNOSIDES AND DOCUSATE SODIUM 2 TABLET: 50; 8.6 TABLET ORAL at 08:08

## 2025-03-07 RX ADMIN — CALCIUM CHLORIDE, MAGNESIUM CHLORIDE, DEXTROSE MONOHYDRATE, LACTIC ACID, SODIUM CHLORIDE, SODIUM BICARBONATE AND POTASSIUM CHLORIDE 25 ML/KG/HR: 3.68; 3.05; 22; 5.4; 6.46; 3.09; .314 INJECTION INTRAVENOUS at 05:01

## 2025-03-07 RX ADMIN — WHITE PETROLATUM 57.7 %-MINERAL OIL 31.9 % EYE OINTMENT 1 APPLICATION: at 02:01

## 2025-03-07 RX ADMIN — CALCIUM CHLORIDE, MAGNESIUM CHLORIDE, DEXTROSE MONOHYDRATE, LACTIC ACID, SODIUM CHLORIDE, SODIUM BICARBONATE AND POTASSIUM CHLORIDE 25 ML/KG/HR: 3.68; 3.05; 22; 5.4; 6.46; 3.09; .314 INJECTION INTRAVENOUS at 05:00

## 2025-03-07 RX ADMIN — POLYETHYLENE GLYCOL 3350 17 G: 17 POWDER, FOR SOLUTION ORAL at 21:06

## 2025-03-07 RX ADMIN — VANCOMYCIN HYDROCHLORIDE 500 MG: 500 INJECTION, SOLUTION INTRAVENOUS at 07:19

## 2025-03-07 RX ADMIN — EPOPROSTENOL 0.05 MCG/KG/MIN: 1.5 INJECTION, POWDER, LYOPHILIZED, FOR SOLUTION INTRAVENOUS at 22:04

## 2025-03-07 RX ADMIN — ESOMEPRAZOLE MAGNESIUM 40 MG: 40 FOR SUSPENSION ORAL at 08:08

## 2025-03-07 RX ADMIN — WHITE PETROLATUM 57.7 %-MINERAL OIL 31.9 % EYE OINTMENT 1 APPLICATION: at 08:45

## 2025-03-07 RX ADMIN — LACTULOSE 20 G: 10 SOLUTION ORAL at 08:08

## 2025-03-07 RX ADMIN — WHITE PETROLATUM 57.7 %-MINERAL OIL 31.9 % EYE OINTMENT 1 APPLICATION: at 14:19

## 2025-03-07 RX ADMIN — CISATRACURIUM BESYLATE 37.5 MG/HR: 200 INJECTION, SOLUTION INTRAVENOUS at 08:45

## 2025-03-07 RX ADMIN — POLYETHYLENE GLYCOL 3350 17 G: 17 POWDER, FOR SOLUTION ORAL at 08:08

## 2025-03-07 RX ADMIN — BISACODYL 10 MG: 10 SUPPOSITORY RECTAL at 11:51

## 2025-03-07 RX ADMIN — CARBOXYMETHYLCELLULOSE SODIUM 2 DROP: 5 SOLUTION/ DROPS OPHTHALMIC at 08:45

## 2025-03-07 RX ADMIN — CARBOXYMETHYLCELLULOSE SODIUM 2 DROP: 5 SOLUTION/ DROPS OPHTHALMIC at 15:03

## 2025-03-07 RX ADMIN — MIDAZOLAM HYDROCHLORIDE 5 MG/HR: 1 INJECTION, SOLUTION INTRAVENOUS at 03:59

## 2025-03-07 RX ADMIN — KETAMINE HYDROCHLORIDE 2 MG/KG/HR: 10 INJECTION INTRAMUSCULAR; INTRAVENOUS at 00:28

## 2025-03-07 RX ADMIN — KETAMINE HYDROCHLORIDE 2 MG/KG/HR: 10 INJECTION INTRAMUSCULAR; INTRAVENOUS at 14:29

## 2025-03-07 RX ADMIN — Medication 250 MCG/HR: at 05:48

## 2025-03-07 RX ADMIN — Medication 250 MCG/HR: at 20:04

## 2025-03-07 RX ADMIN — LACTULOSE 20 G: 10 SOLUTION ORAL at 14:19

## 2025-03-07 RX ADMIN — LACTULOSE 20 G: 10 SOLUTION ORAL at 21:06

## 2025-03-07 RX ADMIN — Medication 250 MCG/HR: at 10:36

## 2025-03-07 RX ADMIN — ANGIOTENSIN II 25 NG/KG/MIN: 2.5 INJECTION INTRAVENOUS at 03:59

## 2025-03-07 RX ADMIN — MIDAZOLAM HYDROCHLORIDE 5 MG/HR: 1 INJECTION, SOLUTION INTRAVENOUS at 20:04

## 2025-03-07 RX ADMIN — BIVALIRUDIN 0.01 MG/KG/HR: 250 INJECTION INTRACAVERNOUS at 04:50

## 2025-03-07 RX ADMIN — VASOPRESSIN 0.06 UNITS/MIN: 0.2 INJECTION INTRAVENOUS at 00:28

## 2025-03-07 RX ADMIN — KETAMINE HYDROCHLORIDE 2 MG/KG/HR: 10 INJECTION INTRAMUSCULAR; INTRAVENOUS at 22:07

## 2025-03-07 RX ADMIN — CALCIUM GLUCONATE 2 G: 20 INJECTION, SOLUTION INTRAVENOUS at 18:46

## 2025-03-07 RX ADMIN — METHYLPREDNISOLONE SODIUM SUCCINATE 50 MG: 125 INJECTION, POWDER, FOR SOLUTION INTRAMUSCULAR; INTRAVENOUS at 08:45

## 2025-03-07 RX ADMIN — CISATRACURIUM BESYLATE 37.5 MG/HR: 200 INJECTION, SOLUTION INTRAVENOUS at 04:00

## 2025-03-07 RX ADMIN — CALCIUM CHLORIDE, MAGNESIUM CHLORIDE, DEXTROSE MONOHYDRATE, LACTIC ACID, SODIUM CHLORIDE, SODIUM BICARBONATE AND POTASSIUM CHLORIDE 25 ML/KG/HR: 3.68; 3.05; 22; 5.4; 6.46; 3.09; .314 INJECTION INTRAVENOUS at 05:02

## 2025-03-07 RX ADMIN — THIAMINE HYDROCHLORIDE 500 MG: 100 INJECTION, SOLUTION INTRAMUSCULAR; INTRAVENOUS at 08:08

## 2025-03-07 RX ADMIN — VASOPRESSIN 0.03 UNITS/MIN: 0.2 INJECTION INTRAVENOUS at 06:26

## 2025-03-07 RX ADMIN — METHYLPREDNISOLONE SODIUM SUCCINATE 50 MG: 125 INJECTION, POWDER, FOR SOLUTION INTRAMUSCULAR; INTRAVENOUS at 21:06

## 2025-03-07 RX ADMIN — MEROPENEM 2 G: 1 INJECTION INTRAVENOUS at 22:57

## 2025-03-07 RX ADMIN — Medication 100 PERCENT: at 07:13

## 2025-03-07 RX ADMIN — CARBOXYMETHYLCELLULOSE SODIUM 2 DROP: 5 SOLUTION/ DROPS OPHTHALMIC at 03:07

## 2025-03-07 RX ADMIN — Medication 250 MCG/HR: at 01:54

## 2025-03-07 RX ADMIN — MEROPENEM 2 G: 1 INJECTION INTRAVENOUS at 10:36

## 2025-03-07 RX ADMIN — THIAMINE HYDROCHLORIDE 500 MG: 100 INJECTION, SOLUTION INTRAMUSCULAR; INTRAVENOUS at 14:19

## 2025-03-07 RX ADMIN — EPOPROSTENOL 0.05 MCG/KG/MIN: 1.5 INJECTION, POWDER, LYOPHILIZED, FOR SOLUTION INTRAVENOUS at 05:21

## 2025-03-07 RX ADMIN — Medication 250 MCG/HR: at 15:22

## 2025-03-07 RX ADMIN — Medication 100 PERCENT: at 20:00

## 2025-03-07 RX ADMIN — CALCIUM CHLORIDE, MAGNESIUM CHLORIDE, DEXTROSE MONOHYDRATE, LACTIC ACID, SODIUM CHLORIDE, SODIUM BICARBONATE AND POTASSIUM CHLORIDE 25 ML/KG/HR: 3.68; 3.05; 22; 5.4; 6.46; 3.09; .314 INJECTION INTRAVENOUS at 20:34

## 2025-03-07 RX ADMIN — CALCIUM GLUCONATE 2 G: 20 INJECTION, SOLUTION INTRAVENOUS at 21:06

## 2025-03-07 RX ADMIN — KETAMINE HYDROCHLORIDE 2 MG/KG/HR: 10 INJECTION INTRAMUSCULAR; INTRAVENOUS at 05:48

## 2025-03-07 SDOH — SOCIAL STABILITY: SOCIAL INSECURITY
WITHIN THE LAST YEAR, HAVE YOU BEEN KICKED, HIT, SLAPPED, OR OTHERWISE PHYSICALLY HURT BY YOUR PARTNER OR EX-PARTNER?: PATIENT UNABLE TO ANSWER

## 2025-03-07 SDOH — SOCIAL STABILITY: SOCIAL INSECURITY
WITHIN THE LAST YEAR, HAVE YOU BEEN RAPED OR FORCED TO HAVE ANY KIND OF SEXUAL ACTIVITY BY YOUR PARTNER OR EX-PARTNER?: PATIENT UNABLE TO ANSWER

## 2025-03-07 SDOH — ECONOMIC STABILITY: FOOD INSECURITY
WITHIN THE PAST 12 MONTHS, YOU WORRIED THAT YOUR FOOD WOULD RUN OUT BEFORE YOU GOT THE MONEY TO BUY MORE.: PATIENT UNABLE TO ANSWER

## 2025-03-07 SDOH — SOCIAL STABILITY: SOCIAL INSECURITY: WITHIN THE LAST YEAR, HAVE YOU BEEN AFRAID OF YOUR PARTNER OR EX-PARTNER?: PATIENT UNABLE TO ANSWER

## 2025-03-07 SDOH — ECONOMIC STABILITY: INCOME INSECURITY
IN THE PAST 12 MONTHS HAS THE ELECTRIC, GAS, OIL, OR WATER COMPANY THREATENED TO SHUT OFF SERVICES IN YOUR HOME?: PATIENT UNABLE TO ANSWER

## 2025-03-07 SDOH — SOCIAL STABILITY: SOCIAL INSECURITY
WITHIN THE LAST YEAR, HAVE YOU BEEN HUMILIATED OR EMOTIONALLY ABUSED IN OTHER WAYS BY YOUR PARTNER OR EX-PARTNER?: PATIENT UNABLE TO ANSWER

## 2025-03-07 SDOH — ECONOMIC STABILITY: FOOD INSECURITY
WITHIN THE PAST 12 MONTHS, THE FOOD YOU BOUGHT JUST DIDN'T LAST AND YOU DIDN'T HAVE MONEY TO GET MORE.: PATIENT UNABLE TO ANSWER

## 2025-03-07 ASSESSMENT — PAIN - FUNCTIONAL ASSESSMENT
PAIN_FUNCTIONAL_ASSESSMENT: CPOT (CRITICAL CARE PAIN OBSERVATION TOOL)

## 2025-03-07 ASSESSMENT — ACTIVITIES OF DAILY LIVING (ADL): ASSISTIVE_DEVICE: OTHER (COMMENT)

## 2025-03-07 NOTE — PROGRESS NOTES
Vancomycin Dosing by Pharmacy- FOLLOW UP    Gissel Harvey is a 39 y.o. year old female who Pharmacy has been consulted for vancomycin dosing for pneumonia. Based on the patient's indication and renal status this patient is being dosed based on a goal trough/random level of 15-20.     Renal function is currently being maintained by CVVH.    Current vancomycin dose: 1000 mg given once    Most recent random level: 24.5 mcg/mL    Visit Vitals  /63 (Patient Position: Lying)   Pulse 98   Temp 36.6 °C (97.9 °F)   Resp 24        Lab Results   Component Value Date    CREATININE 2.49 (H) 2025    CREATININE 2.32 (H) 2025    CREATININE 2.21 (H) 2025    CREATININE 1.85 (H) 2025        Patient weight is as follows:   Vitals:    25 0314   Weight: 76.4 kg (168 lb 6.9 oz)       Cultures:  Susceptibility data for the encounter in last 14 days.  Collected Specimen Info Organism Clindamycin Erythromycin Oxacillin Tetracycline Trimethoprim/Sulfamethoxazole Vancomycin   25 Fluid from Tracheal Aspirate Methicillin Susceptible Staphylococcus aureus (MSSA)  S  S  S  S  S  S     Streptococcus pneumoniae              I/O last 3 completed shifts:  In: 4950.4 (64.8 mL/kg) [I.V.:3048.1 (39.9 mL/kg); NG/GT:400; IV Piggyback:1502.3]  Out: 815 (10.7 mL/kg) [Urine:815 (0.3 mL/kg/hr)]  Weight: 76.4 kg   I/O during current shift:  No intake/output data recorded.    Temp (24hrs), Av.7 °C (98 °F), Min:36.1 °C (97 °F), Max:37.5 °C (99.5 °F)      Assessment/Plan    Above goal random/trough level. Orders placed for new vancomycin regimen of 500 every 12 hours to begin at 1945. However due to patients last vancomycin level being elevated prior to start of CVVH and new vancomycin dose, we will follow up with vancomycin level with AM labs on 3/7 to see what direction the vancomycin level is heading.  The next level will be obtained on 3/7 at AM labs. May be obtained sooner if clinically indicated.   Will continue  to monitor renal function daily while on vancomycin and order serum creatinine at least every 48 hours if not already ordered.  Follow for continued vancomycin needs, clinical response, and signs/symptoms of toxicity.       Allegra Calderon, PharmD

## 2025-03-07 NOTE — PROGRESS NOTES
CRRT procedure note   Seen and assessed on CVVH. Labs and events reviewed   Remains hemodynamically unstable, angiotensin, non-oliguric  ml  Vascular access: right IJ trialysis catheter  BFR :200 ml/min  Filter:M150  Replacement solution Prismasol potassium:  4 mEq/L  Patient fluid removal: per ICU team ; Current target to be negative 10 ml/hour.  Please check Phosphorus, Calcium and Magnesium daily and replace peripherally as needed  Continue with current CVVH prescription.    Heart Rate:  [84-98]   Temp:  [34.8 °C (94.6 °F)-36.6 °C (97.9 °F)]   Resp:  [16-24]   Weight:  [79 kg (174 lb 2.6 oz)]   SpO2:  [88 %-93 %]        Intake/Output Summary (Last 24 hours) at 3/7/2025 1811  Last data filed at 3/7/2025 1800  Gross per 24 hour   Intake 3928.28 ml   Output 894 ml   Net 3034.28 ml       Scheduled medications  bisacodyl, 10 mg, rectal, Daily  cisatracurium, 0.1 mg/kg, intravenous, Once  pantoprazole, 40 mg, oral, Daily before breakfast   Or  esomeprazole, 40 mg, nasoduodenal tube, Daily before breakfast   Or  pantoprazole, 40 mg, intravenous, Daily before breakfast  fentaNYL, 25 mcg, intravenous, Once  insulin lispro, 0-5 Units, subcutaneous, q4h  lactulose, 20 g, oral, TID  artificial tears, 2 drop, Both Eyes, q6h  meropenem, 2 g, intravenous, q12h  methylPREDNISolone sodium succinate (PF), 50 mg, intravenous, q12h  midazolam, 2 mg, intravenous, Once  oxygen, , inhalation, Continuous - Inhalation  polyethylene glycol, 17 g, oral, BID  sennosides-docusate sodium, 2 tablet, oral, BID  thiamine, 500 mg, intravenous, TID   Followed by  [START ON 3/9/2025] thiamine, 100 mg, oral, Daily  white petrolatum-mineral oiL, 1 Application, Both Eyes, q6h      Continuous medications  angiotensin II (Giapreza) 2.5 mg in sodium chloride 0.9% 250 mL (0.01 mg/mL) infusion, 1.25-40 ng/kg/min, Last Rate: 25 ng/kg/min (03/07/25 1629)  bivalirudin, 0.05-0.49 mg/kg/hr, Last Rate: 0.01 mg/kg/hr (03/07/25 1300)  epoprostenol, 0.05  mcg/kg/min (Ideal), Last Rate: 0.05 mcg/kg/min (03/07/25 0521)  fentaNYL, 0-300 mcg/hr, Last Rate: 250 mcg/hr (03/07/25 1522)  ketamine (Ketalar) 1,000 mg in 100 mL (10 mg/mL) infusion, 0.05-2 mg/kg/hr, Last Rate: 2 mg/kg/hr (03/07/25 1429)  midazolam, 0-20 mg/hr, Last Rate: 5 mg/hr (03/07/25 1300)  PrismaSol 4/2.5, 25 mL/kg/hr, Last Rate: 25 mL/kg/hr (03/07/25 0502)      PRN medications  PRN medications: acetaminophen **OR** acetaminophen, alteplase, dextrose, dextrose, fentaNYL, glucagon, glucagon, midazolam    Recent Results (from the past 24 hours)   POCT GLUCOSE    Collection Time: 03/06/25  7:29 PM   Result Value Ref Range    POCT Glucose 110 (H) 74 - 99 mg/dL   POCT GLUCOSE    Collection Time: 03/06/25 11:22 PM   Result Value Ref Range    POCT Glucose 134 (H) 74 - 99 mg/dL   BLOOD GAS ARTERIAL FULL PANEL    Collection Time: 03/07/25  2:00 AM   Result Value Ref Range    POCT pH, Arterial 7.37 (L) 7.38 - 7.42 pH    POCT pCO2, Arterial 42 38 - 42 mm Hg    POCT pO2, Arterial 70 (L) 85 - 95 mm Hg    POCT SO2, Arterial 94 94 - 100 %    POCT Oxy Hemoglobin, Arterial 92.1 (L) 94.0 - 98.0 %    POCT Hematocrit Calculated, Arterial 35.0 (L) 36.0 - 46.0 %    POCT Sodium, Arterial 131 (L) 136 - 145 mmol/L    POCT Potassium, Arterial 4.0 3.5 - 5.3 mmol/L    POCT Chloride, Arterial 98 98 - 107 mmol/L    POCT Ionized Calcium, Arterial 1.08 (L) 1.10 - 1.33 mmol/L    POCT Glucose, Arterial 120 (H) 74 - 99 mg/dL    POCT Lactate, Arterial 3.9 (H) 0.4 - 2.0 mmol/L    POCT Base Excess, Arterial -1.0 -2.0 - 3.0 mmol/L    POCT HCO3 Calculated, Arterial 24.3 22.0 - 26.0 mmol/L    POCT Hemoglobin, Arterial 11.5 (L) 12.0 - 16.0 g/dL    POCT Anion Gap, Arterial 13 10 - 25 mmo/L    Patient Temperature 37.0 degrees Celsius    FiO2 100 %   POCT GLUCOSE    Collection Time: 03/07/25  3:42 AM   Result Value Ref Range    POCT Glucose 123 (H) 74 - 99 mg/dL   Magnesium    Collection Time: 03/07/25  4:39 AM   Result Value Ref Range    Magnesium  2.57 (H) 1.60 - 2.40 mg/dL   Renal Function Panel    Collection Time: 03/07/25  4:39 AM   Result Value Ref Range    Glucose 113 (H) 74 - 99 mg/dL    Sodium 133 (L) 136 - 145 mmol/L    Potassium 4.0 3.5 - 5.3 mmol/L    Chloride 97 (L) 98 - 107 mmol/L    Bicarbonate 25 21 - 32 mmol/L    Anion Gap 15 10 - 20 mmol/L    Urea Nitrogen 71 (H) 6 - 23 mg/dL    Creatinine 2.24 (H) 0.50 - 1.05 mg/dL    eGFR 28 (L) >60 mL/min/1.73m*2    Calcium 7.8 (L) 8.6 - 10.6 mg/dL    Phosphorus 3.9 2.5 - 4.9 mg/dL    Albumin 2.5 (L) 3.4 - 5.0 g/dL   CBC and Auto Differential    Collection Time: 03/07/25  4:39 AM   Result Value Ref Range    WBC 18.3 (H) 4.4 - 11.3 x10*3/uL    nRBC 1.2 (H) 0.0 - 0.0 /100 WBCs    RBC 3.35 (L) 4.00 - 5.20 x10*6/uL    Hemoglobin 10.5 (L) 12.0 - 16.0 g/dL    Hematocrit 30.1 (L) 36.0 - 46.0 %    MCV 90 80 - 100 fL    MCH 31.3 26.0 - 34.0 pg    MCHC 34.9 32.0 - 36.0 g/dL    RDW 14.6 (H) 11.5 - 14.5 %    Platelets 56 (L) 150 - 450 x10*3/uL    Immature Granulocytes %, Automated 2.4 (H) 0.0 - 0.9 %    Immature Granulocytes Absolute, Automated 0.44 0.00 - 0.70 x10*3/uL   Coagulation Screen    Collection Time: 03/07/25  4:39 AM   Result Value Ref Range    Protime 34.5 (H) 9.8 - 12.4 seconds    INR 3.1 (H) 0.9 - 1.1    aPTT 55 (H) 26 - 36 seconds   Vancomycin    Collection Time: 03/07/25  4:39 AM   Result Value Ref Range    Vancomycin 17.1 5.0 - 20.0 ug/mL   Manual Differential    Collection Time: 03/07/25  4:39 AM   Result Value Ref Range    Neutrophils %, Manual 82.8 40.0 - 80.0 %    Bands %, Manual 0.8 0.0 - 5.0 %    Lymphocytes %, Manual 3.3 13.0 - 44.0 %    Monocytes %, Manual 8.2 2.0 - 10.0 %    Eosinophils %, Manual 0.0 0.0 - 6.0 %    Basophils %, Manual 0.0 0.0 - 2.0 %    Atypical Lymphocytes %, Manual 0.8 0.0 - 2.0 %    Metamyelocytes %, Manual 4.1 0.0 - 0.0 %    Seg Neutrophils Absolute, Manual 15.15 (H) 1.20 - 7.00 x10*3/uL    Bands Absolute, Manual 0.15 0.00 - 0.70 x10*3/uL    Lymphocytes Absolute, Manual  0.60 (L) 1.20 - 4.80 x10*3/uL    Monocytes Absolute, Manual 1.50 (H) 0.10 - 1.00 x10*3/uL    Eosinophils Absolute, Manual 0.00 0.00 - 0.70 x10*3/uL    Basophils Absolute, Manual 0.00 0.00 - 0.10 x10*3/uL    Atypical Lymphs Absolute, Manual 0.15 0.00 - 0.50 x10*3/uL    Metamyelocytes Absolute, Manual 0.75 0.00 - 0.00 x10*3/uL    Total Cells Counted 122     Neutrophils Absolute, Manual 15.30 (H) 1.20 - 7.70 x10*3/uL    RBC Morphology See Below     Acanthocytes Few    POCT GLUCOSE    Collection Time: 03/07/25  8:41 AM   Result Value Ref Range    POCT Glucose 134 (H) 74 - 99 mg/dL   POCT GLUCOSE    Collection Time: 03/07/25 12:16 PM   Result Value Ref Range    POCT Glucose 96 74 - 99 mg/dL   POCT GLUCOSE    Collection Time: 03/07/25  3:09 PM   Result Value Ref Range    POCT Glucose 103 (H) 74 - 99 mg/dL   Blood Gas Arterial Full Panel    Collection Time: 03/07/25  4:16 PM   Result Value Ref Range    POCT pH, Arterial 7.37 (L) 7.38 - 7.42 pH    POCT pCO2, Arterial 44 (H) 38 - 42 mm Hg    POCT pO2, Arterial 73 (L) 85 - 95 mm Hg    POCT SO2, Arterial 96 94 - 100 %    POCT Oxy Hemoglobin, Arterial 93.4 (L) 94.0 - 98.0 %    POCT Hematocrit Calculated, Arterial 33.0 (L) 36.0 - 46.0 %    POCT Sodium, Arterial 132 (L) 136 - 145 mmol/L    POCT Potassium, Arterial 4.3 3.5 - 5.3 mmol/L    POCT Chloride, Arterial 100 98 - 107 mmol/L    POCT Ionized Calcium, Arterial 1.08 (L) 1.10 - 1.33 mmol/L    POCT Glucose, Arterial 99 74 - 99 mg/dL    POCT Lactate, Arterial 3.1 (H) 0.4 - 2.0 mmol/L    POCT Base Excess, Arterial -0.1 -2.0 - 3.0 mmol/L    POCT HCO3 Calculated, Arterial 25.4 22.0 - 26.0 mmol/L    POCT Hemoglobin, Arterial 11.0 (L) 12.0 - 16.0 g/dL    POCT Anion Gap, Arterial 11 10 - 25 mmo/L    Patient Temperature 37.0 degrees Celsius    FiO2 100 %   Renal function panel    Collection Time: 03/07/25  4:17 PM   Result Value Ref Range    Glucose 97 74 - 99 mg/dL    Sodium 134 (L) 136 - 145 mmol/L    Potassium 4.1 3.5 - 5.3 mmol/L     Chloride 102 98 - 107 mmol/L    Bicarbonate 22 21 - 32 mmol/L    Anion Gap 14 10 - 20 mmol/L    Urea Nitrogen 58 (H) 6 - 23 mg/dL    Creatinine 1.96 (H) 0.50 - 1.05 mg/dL    eGFR 33 (L) >60 mL/min/1.73m*2    Calcium 7.4 (L) 8.6 - 10.6 mg/dL    Phosphorus 3.3 2.5 - 4.9 mg/dL    Albumin 2.5 (L) 3.4 - 5.0 g/dL   Calcium, ionized    Collection Time: 03/07/25  4:17 PM   Result Value Ref Range    POCT Calcium, Ionized 0.96 (L) 1.1 - 1.33 mmol/L   Magnesium    Collection Time: 03/07/25  4:17 PM   Result Value Ref Range    Magnesium 2.58 (H) 1.60 - 2.40 mg/dL

## 2025-03-07 NOTE — CONSULTS
"Nutrition Initial Assessment:   Nutrition Assessment    Reason for Assessment: Enteral assessment/recommendation (TF)    Patient is a 39 y.o. female presenting with hypoxemia.  Required intubation for support.  Treated for multifocal PNA, septic shock, ARDS, SCOUT on CVVH.  She remains intubated this morning, on ketamine, versed and fentanyl for sedation; paralyzed with nimbex and on angiotensin for pressure support.     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  Has been NPO for the duration of her admit, 4 days.     Anthropometrics:  Height: 167.6 cm (5' 6\")   Weight: 79 kg (174 lb 2.6 oz)   BMI (Calculated): 28.12  IBW/kg (Dietitian Calculated): 59 kg  Percent of IBW: 134 %     Weight History:     Wt Readings from Last 5 Encounters:   03/07/25 79 kg (174 lb 2.6 oz)   11/02/24 66.7 kg (147 lb)   10/21/24 66.7 kg (147 lb)   10/21/24 66.7 kg (147 lb)   07/02/19 61.2 kg (134 lb 14.7 oz)     Admit weight on 3/3 was 63.5kg.    Weight Change %:     Nutrition Focused Physical Exam Findings:    Subcutaneous Fat Loss:   Orbital Fat Pads: Mild-Moderate (slight dark circles and slight hollowing)  Buccal Fat Pads: Mild-Moderate (flat cheeks, minimal bounce)  Triceps: Mild-Moderate (less than ample fat tissue)  Muscle Wasting:  Temporalis: Mild-Moderate (slight depression)  Pectoralis (Clavicular Region): Severe (protruding prominent clavicle)  Deltoid/Trapezius: Severe (squared shoulders, acromion process prominent)  Edema:  Edema: +2 mild  Edema Location: generalized  Physical Findings:  Skin: Positive (eye, skin tear butt, L back)    Nutrition Significant Labs:  A1C:No results found for: \"HGBA1C\", BG POCT trend:   Results from last 7 days   Lab Units 03/07/25  0841 03/07/25  0342 03/06/25  2322 03/06/25  1929 03/06/25  1710   POCT GLUCOSE " "mg/dL 134* 123* 134* 110* 119*    , Renal Lab Trend:   Results from last 7 days   Lab Units 03/07/25  0439 03/06/25  0452 03/05/25  1544 03/05/25  0506   POTASSIUM mmol/L 4.0 3.8 3.9 4.1   PHOSPHORUS mg/dL 3.9 5.6* 6.7* 7.4*   SODIUM mmol/L 133* 136 136 134*   MAGNESIUM mg/dL 2.57* 2.50* 2.03 2.07   EGFR mL/min/1.73m*2 28* 25* 27* 28*   BUN mg/dL 71* 74* 66* 60*   CREATININE mg/dL 2.24* 2.49* 2.32* 2.21*    , Vit D: No results found for: \"VITD25\"     Nutrition Specific Medications:  Scheduled medications  cisatracurium, 0.1 mg/kg, intravenous, Once  pantoprazole, 40 mg, oral, Daily before breakfast   Or  esomeprazole, 40 mg, nasoduodenal tube, Daily before breakfast   Or  pantoprazole, 40 mg, intravenous, Daily before breakfast  fentaNYL, 25 mcg, intravenous, Once  insulin lispro, 0-5 Units, subcutaneous, q4h  lactulose, 20 g, oral, TID  artificial tears, 2 drop, Both Eyes, q6h  meropenem, 2 g, intravenous, q12h  methylPREDNISolone sodium succinate (PF), 50 mg, intravenous, q12h  midazolam, 2 mg, intravenous, Once  oxygen, , inhalation, Continuous - Inhalation  polyethylene glycol, 17 g, oral, BID  sennosides-docusate sodium, 2 tablet, oral, BID  thiamine, 500 mg, intravenous, TID   Followed by  [START ON 3/9/2025] thiamine, 100 mg, oral, Daily  vancomycin, 500 mg, intravenous, q12h  white petrolatum-mineral oiL, 1 Application, Both Eyes, q6h      Continuous medications  angiotensin II (Giapreza) 2.5 mg in sodium chloride 0.9% 250 mL (0.01 mg/mL) infusion, 1.25-40 ng/kg/min, Last Rate: 25 ng/kg/min (03/07/25 0913)  bivalirudin, 0.05-0.49 mg/kg/hr, Last Rate: 0.01 mg/kg/hr (03/07/25 0700)  cisatracurium, 37.5 mg/hr, Last Rate: 37.5 mg/hr (03/07/25 0845)  EPINEPHrine, 0-1 mcg/kg/min, Last Rate: Stopped (03/06/25 2313)  epoprostenol, 0.05 mcg/kg/min (Ideal), Last Rate: 0.05 mcg/kg/min (03/07/25 0521)  fentaNYL, 0-300 mcg/hr, Last Rate: 250 mcg/hr (03/07/25 0700)  ketamine (Ketalar) 1,000 mg in 100 mL (10 mg/mL) " infusion, 0.05-2 mg/kg/hr, Last Rate: 2 mg/kg/hr (03/07/25 0705)  midazolam, 0-20 mg/hr, Last Rate: 5 mg/hr (03/07/25 0700)  norepinephrine, 0-0.5 mcg/kg/min, Last Rate: Stopped (03/06/25 0415)  PrismaSol 4/2.5, 25 mL/kg/hr, Last Rate: 25 mL/kg/hr (03/07/25 0502)  [Held by provider] sodium bicarbonate 1 mEq/mL (8.4 %) 250 mEq in dextrose 5% 500 mL (0.5 mEq/mL) infusion, 10 mEq/hr, Last Rate: Stopped (03/06/25 0755)  vasopressin, 0-0.06 Units/min, Last Rate: Stopped (03/07/25 0800)      PRN medications  PRN medications: acetaminophen **OR** acetaminophen, alteplase, dextrose, dextrose, fentaNYL, glucagon, glucagon, midazolam, vancomycin     I/O:   Last BM Date:  (unknown); Stool Appearance: Unable to assess (03/06/25 0830)        Dietary Orders (From admission, onward)       Start     Ordered    03/06/25 1859  Enteral feeding with NPO Nepro; 10; No free water  Diet effective now        Question Answer Comment   Tube feeding formula: Nepro    Tube feeding continuous rate (mL/hr): 10    Free water restriction: No free water        03/06/25 1858 03/03/25 2151  May Participate in Room Service  ( ROOM SERVICE MAY PARTICIPATE)  Once        Question:  .  Answer:  Yes    03/03/25 2150                     Estimated Needs:   Total Energy Estimated Needs in 24 hours (kCal):  (5610-6042)  Method for Estimating Needs: MV= 6.9, RMR= 1388 using admit weight of 63.5kg  Total Protein Estimated Needs in 24 Hours (g):  (90)  Method for Estimating 24 Hour Protein Needs: 59kg x 1.5            Nutrition Diagnosis        Nutrition Diagnosis  Patient has Nutrition Diagnosis: Yes  Diagnosis Status (1): New  Nutrition Diagnosis 1: Increased nutrient needs  Related to (1): septic shock, ARDS  As Evidenced by (1): altered metabolism in the setting of critical illness     Suspect pt is malnourished but no subjective info to add to diagnosis.  Physical exam shows moderate to severe wasting.  Will continue to monitor.     Nutrition  Interventions/Recommendations         Nutrition Prescription:   For tube feed: Two Carlos HN at start rate of 15mls/hr.  Increase once after 8hrs to goal rate of 25mls/hr reached.  Also order one packet of Pro-Stat AWC BID.  Water flushes per team's discretion.   Pt is a refeeding risk but already on thiamin.  After current order is complete, order 100mg thiamin once daily.  Check RFP with mag BID.           Nutrition Interventions:    TF at goal with prostat= 1400kcals, 84grams protein       Nutrition Education:   Not appropriate        Nutrition Monitoring and Evaluation   Food/Nutrient Related History Monitoring  Monitoring and Evaluation Plan: Enteral and parenteral nutrition intake determination  Enteral and Parenteral Nutrition Intake Determination: Enteral nutrition intake - To meet > 75% estimated energy needs       Time Spent (min): 60 minutes

## 2025-03-07 NOTE — CARE PLAN
Problem: Safety - Adult  Goal: Free from fall injury  Outcome: Progressing     Problem: Nutrition  Goal: Nutrient intake appropriate for maintaining nutritional needs  Outcome: Progressing     Problem: Knowledge Deficit  Goal: Patient/family/caregiver demonstrates understanding of disease process, treatment plan, medications, and discharge instructions  Outcome: Progressing     Problem: Mechanical Ventilation  Goal: Patient Will Maintain Patent Airway  Outcome: Progressing  Goal: Oral health is maintained or improved  Outcome: Progressing  Goal: ET tube will be managed safely  Outcome: Progressing  Goal: Ability to express needs and understand communication  Outcome: Progressing  Goal: Mobility/activity is maintained at optimum level for patient  Outcome: Progressing     Problem: Skin  Goal: Decreased wound size/increased tissue granulation at next dressing change  Outcome: Progressing  Flowsheets (Taken 3/7/2025 1220)  Decreased wound size/increased tissue granulation at next dressing change: Utilize specialty bed per algorithm  Goal: Participates in plan/prevention/treatment measures  Outcome: Progressing  Flowsheets (Taken 3/7/2025 1220)  Participates in plan/prevention/treatment measures: Elevate heels  Goal: Prevent/manage excess moisture  Outcome: Progressing  Flowsheets (Taken 3/7/2025 1220)  Prevent/manage excess moisture: Moisturize dry skin  Goal: Prevent/minimize sheer/friction injuries  Outcome: Progressing  Flowsheets (Taken 3/7/2025 1220)  Prevent/minimize sheer/friction injuries:   Complete micro-shifts as needed if patient unable. Adjust patient position to relieve pressure points, not a full turn   Increase activity/out of bed for meals   Use pull sheet   HOB 30 degrees or less   Turn/reposition every 2 hours/use positioning/transfer devices   Utilize specialty bed per algorithm  Goal: Promote/optimize nutrition  Outcome: Progressing  Flowsheets (Taken 3/7/2025 1220)  Promote/optimize nutrition:  Monitor/record intake including meals  Goal: Promote skin healing  Outcome: Progressing  Flowsheets (Taken 3/7/2025 1220)  Promote skin healing:   Assess skin/pad under line(s)/device(s)   Protective dressings over bony prominences   Turn/reposition every 2 hours/use positioning/transfer devices   Ensure correct size (line/device) and apply per  instructions   Rotate device position/do not position patient on device

## 2025-03-07 NOTE — PROGRESS NOTES
Medical Intensive Care - Daily Progress Note   Subjective    Gissel Harvey is a 39 y.o. year old female patient admitted on 3/3/2025 with following ICU needs: intubation and pressor support    Interval History:  NAEON. Started trickle feeds. Lactate trending down.    Pressors: angiotensin II 25 ng/kg/min  Sedation: Versed 5 mg/hr, fent 250 mcg/hr, ketamine 2 mg/kg/hr  Paralytic: Nimbex 37.5 mg/hr  Anticoagulation: Bivalirudin 0.01 mg/kg/hr    Meds    Scheduled medications  bisacodyl, 10 mg, rectal, Daily  cisatracurium, 0.1 mg/kg, intravenous, Once  pantoprazole, 40 mg, oral, Daily before breakfast   Or  esomeprazole, 40 mg, nasoduodenal tube, Daily before breakfast   Or  pantoprazole, 40 mg, intravenous, Daily before breakfast  fentaNYL, 25 mcg, intravenous, Once  insulin lispro, 0-5 Units, subcutaneous, q4h  lactulose, 20 g, oral, TID  artificial tears, 2 drop, Both Eyes, q6h  meropenem, 2 g, intravenous, q12h  methylPREDNISolone sodium succinate (PF), 50 mg, intravenous, q12h  midazolam, 2 mg, intravenous, Once  oxygen, , inhalation, Continuous - Inhalation  polyethylene glycol, 17 g, oral, BID  sennosides-docusate sodium, 2 tablet, oral, BID  thiamine, 500 mg, intravenous, TID   Followed by  [START ON 3/9/2025] thiamine, 100 mg, oral, Daily  white petrolatum-mineral oiL, 1 Application, Both Eyes, q6h      Continuous medications  angiotensin II (Giapreza) 2.5 mg in sodium chloride 0.9% 250 mL (0.01 mg/mL) infusion, 1.25-40 ng/kg/min, Last Rate: 25 ng/kg/min (03/07/25 0913)  bivalirudin, 0.05-0.49 mg/kg/hr, Last Rate: 0.01 mg/kg/hr (03/07/25 0700)  cisatracurium, 18.75 mg/hr, Last Rate: 18.75 mg/hr (03/07/25 1145)  epoprostenol, 0.05 mcg/kg/min (Ideal), Last Rate: 0.05 mcg/kg/min (03/07/25 0521)  fentaNYL, 0-300 mcg/hr, Last Rate: 250 mcg/hr (03/07/25 1036)  ketamine (Ketalar) 1,000 mg in 100 mL (10 mg/mL) infusion, 0.05-2 mg/kg/hr, Last Rate: 2 mg/kg/hr (03/07/25 0705)  midazolam, 0-20 mg/hr, Last Rate: 5 mg/hr  "(03/07/25 0700)  PrismaSol 4/2.5, 25 mL/kg/hr, Last Rate: 25 mL/kg/hr (03/07/25 0502)      PRN medications  PRN medications: acetaminophen **OR** acetaminophen, alteplase, dextrose, dextrose, fentaNYL, glucagon, glucagon, midazolam     Objective    Blood pressure 104/63, pulse 86, temperature 35.8 °C (96.4 °F), resp. rate 24, height 1.676 m (5' 6\"), weight 79 kg (174 lb 2.6 oz), SpO2 90%.     Physical Exam   General: intubated, sedated, paralyzed  HEENT: normocephalic, atraumatic  CV: regular rhythm, heart rate   Resp: lungs coarse with rhonchi and mild wheezing.   Abd: soft, more distended than prior  Ext: BUE and BLE edema        Intake/Output Summary (Last 24 hours) at 3/7/2025 1207  Last data filed at 3/7/2025 1200  Gross per 24 hour   Intake 3650.42 ml   Output 421 ml   Net 3229.42 ml     Labs:   Results from last 72 hours   Lab Units 03/07/25  0439 03/06/25  0452 03/05/25  1544   SODIUM mmol/L 133* 136 136   POTASSIUM mmol/L 4.0 3.8 3.9   CHLORIDE mmol/L 97* 95* 96*   CO2 mmol/L 25 22 21   BUN mg/dL 71* 74* 66*   CREATININE mg/dL 2.24* 2.49* 2.32*   GLUCOSE mg/dL 113* 117* 112*   CALCIUM mg/dL 7.8* 7.8* 8.4*   ANION GAP mmol/L 15 23* 23*   EGFR mL/min/1.73m*2 28* 25* 27*   PHOSPHORUS mg/dL 3.9 5.6* 6.7*      Results from last 72 hours   Lab Units 03/07/25  0439 03/06/25  0452 03/05/25  1544   WBC AUTO x10*3/uL 18.3* 12.8* 15.7*   HEMOGLOBIN g/dL 10.5* 11.0* 12.0   HEMATOCRIT % 30.1* 34.0* 35.9*   PLATELETS AUTO x10*3/uL 56* 62* 89*   LYMPHO PCT MAN % 3.3 5.7 4.1   MONO PCT MAN % 8.2 9.7 6.5   EOSINO PCT MAN % 0.0 0.0 0.0      Results from last 72 hours   Lab Units 03/07/25  0200 03/06/25  1602 03/06/25  1058   POCT PH, ARTERIAL pH 7.37* 7.44* 7.37*   POCT PCO2, ARTERIAL mm Hg 42 34* 35*   POCT PO2, ARTERIAL mm Hg 70* 68* 67*   POCT SO2, ARTERIAL % 94 96 92*              Micro/ID:     Lab Results   Component Value Date    BLOODCULT No growth at 3 days 03/03/2025    BLOODCULT No growth at 3 days 03/03/2025 "       Summary of key imaging results from the last 24 hours  XR chest 1 view    Result Date: 3/6/2025  Interpreted By:  Nitish James and Beyersdorf Conner STUDY: XR CHEST 1 VIEW;  3/6/2025 5:09 pm   INDICATION: Signs/Symptoms:line placement (right trialysis.   COMPARISON: Single-view chest 03/05/2025   ACCESSION NUMBER(S): [id number]   ORDERING CLINICIAN: GABY DELGADO   FINDINGS: AP radiograph of the chest was provided.   Endotracheal tube terminates 6.4 cm superior to the roland. Right internal jugular central venous catheter tip projects over the expected location of the mid to upper SVC. Left internal jugular central venous catheter tip projects over the expected location of the lower SVC. Enteric tube courses past the diaphragm and with its tip projecting over the expected location of the gastric body. Esophageal temperature probe terminates over the mid esophagus.   CARDIOMEDIASTINAL SILHOUETTE: Cardiomediastinal silhouette is stable in size and configuration.   LUNGS: Bilateral reticular airspace opacities throughout the mid to lower lung fields, slightly improved from the prior exam. Trace right pleural effusion. No pneumothorax.   ABDOMEN: No remarkable upper abdominal findings.   BONES: No acute osseous changes.       1.  Right internal jugular central venous catheter tip projects over the expected location of the mid to upper SVC. 2. Reticular airspace opacities in the mid to lower lungs are slightly improved from the prior exam, and again suggestive of multifocal infection. 3. Additional medical devices as above.   I personally reviewed the image(s)/study and resident interpretation. I agree with the findings as stated by resident Jude Conley. Data analyzed and images interpreted at [facility], [place], [place].   MACRO: None   Signed by: Nitish James 3/6/2025 7:29 PM Dictation workstation:   [id number]    XR abdomen 1 view    Result Date: 3/6/2025  Interpreted By:   Nitish James  and Brenna Murphy STUDY: XR ABDOMEN 1 VIEW;  3/6/2025 1:53 am   INDICATION: Signs/Symptoms:worsening abdominal distention.     COMPARISON: CT ABDOMEN PELVIS W IV CONTRAST 3/3/2025, DX TH ABDOMEN AP VIEW 3/10/2012   ACCESSION NUMBER(S): [id number]   ORDERING CLINICIAN: GABY DELGADO   FINDINGS: Enteric tube distal tip and side-port overlying the expected location of the gastric body. Esophageal temperature probe distal tip overlying the lower cardiac silhouette. Tips in place.   There is a nonobstructive bowel gas pattern. No extraluminal or portal venous gas.   Visualized soft tissues and osseous structures are unremarkable.   Diffuse coarse reticular opacities of the lung bases. Small bilateral pleural effusions..       1. Nonobstructive bowel gas pattern. 2. Enteric tube distal tip and side-port overlying the expected location of the gastric body. 3. Diffuse coarse reticular opacities of the lung bases. 4. Small bilateral pleural effusions.     I personally reviewed the images/study and I agree with the findings as stated by Dr. Jeffrey Ceja. This study was interpreted at [place], [place].   MACRO: none   Signed by: Nitish James 3/6/2025 7:47 AM Dictation workstation:   [id number]      Assessment and Plan     Assessment: Gissel Harvey is a 39 y.o. year old female patient admitted on 3/3/2025 with intubation and pressor support.     Gissel Harvey is a 39 y.o. 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 Treating for septic shock with possible cardiogenic shock component as well as ARDS.    Her pressor requirement has decreased and she is now on single pressor therapy. Lactate is trending down and trickle  feeds have been started.    Mechanical Ventilation: 4-10 days  Sedation/Analgesia:  fentanyl, ketamine, Versed  Restraints: no    Summary for 03/07/25:  Down to single pressor (angiotension II)  Wean Nimbex  Lactate down, continue serial ABGs  Continue CVVH  Continue meropenem  POCUS abdomen with distended bowel, no appreciable ascites    NEUROLOGY/PSYCH:  #Sedation   - Sedating with fentanyl, ketamine, Versed. Propofol discontinued for hypotension.  - RASS goal -5 while weaning Nimbex to allow more maximum ventilatory compliance  - Thiamine supplementation    CARDIOVASCULAR:  #Shock, likely septic I/s/o severe pneumonia and possible cardiogenic component  #HFrEF  :: lactate > 9 on presentation, now downtrending  :: TTE 3/4 with EF 30-35% with reduced RV function, no previous TTE for comparison  :: BNP 2127 at admission  :: Troponin plateaued (62>63) on 3/4/25  - On vasopressin, epinephrine, and angiotensin II to support blood pressure with MAP goal > 65   - Increase levo and decrease epi  - Elevated lactate, continue serial ABGs    #Aflutter  #Sustained VT   :: went into NSVT while wire threaded for central line placement  :: s/p amiodarone 150 mg bolus x2  :: pt cardioverted  - Pt is back to sinus rhythm, PRN amio bolus per EP    PULMONARY:  Vent Mode: Volume control/assist control  FiO2 (%):  [100 %] 100 %  S RR:  [24] 24  S VT:  [450 mL] 450 mL  PEEP/CPAP (cm H2O):  [12 cm H20] 12 cm H20  MAP (cm H2O):  [18-19] 19     #Community acquired pneumonia   #ARDS   :: intubated 3/3   :: P:F ~ 70 on admission   :: Procalcitonin 57.3  :: flu A/B, COVID, parainfluenza, RSV, metapneumovirus, legionella urine Ag: negative  :: s/p vanc, Zosyn, azithro 3/2 in the ED   :: MRSA nares positive 3/4  :: Tracheal aspirate cx 3/3: pan sensitive staph aureus  :: Strep pneumo urine ag: positive  :: Bcx 3/3: NGTD x3 days    Plan  - f/up pneumonia workup: full resp viral panel, tracheal aspirate cx  - f/up blood cultures   - Discontinue  vancomycin, ceftaroline, micafungin, and doxycycline   - Continue meropenem  - Continue solumedrol 50 mg q12 for ARDS and mineralocorticoid effect  - Inhaled epo  - BP not stable enough for proning  - Would not tolerate PVRP  - Per shock call, not a candidate for ECMO    RENAL/GENITOURINARY:  #Decreased renal function c/f SCOUT vs CKD, worsening  #HAGMA  :: Cr 1.9 on admission, unclear baseline as no labs in 2 years   :: FeNa 3/3: 0.1% prerenal  :: anion gap 19, delta 7, delta ratio 3.5 suggests HAGMA with concurrent metabolic alkalosis  - Nephrology for RRT  - Continue to monitor Cr with daily RFP  - CVVH initiated    GASTROENTEROLOGY:  #liver cirrhosis   #portal hypertension s/p TIPS   #hx of Budd-Chiari   - Continue to monitor abdomen for signs of ascites formation  - 3/7 POCUS with distended bowel, no appreciable ascites    ENDOCRINOLOGY:  - SSI while on steroids     HEMATOLOGY:  #Lymphopenia   :: Abs leukocyte count 260  :: HIV neg  - Now with leukocytosis likely 2/2 known infection and steroid use  - CTM     #Hx of antiphospholipid syndrome   #Hx of positive PF4  #Chronic IVC occlusion   #Hx of DVT   :: Supposed to be on fondaparinux outpatient, unclear if taking as no recent med fills   - Continue bivalirudin while inpatient I/s/o previous positive PF4     #Thrombocytopenia, downtrending  - Trend plts, INR, coags  - Fibrinogen to r/o DIC  - Vancomycin could cause drug-induced thrombocytopenia  - Amiodarone unlikely because timeline doesn't fit  - If platelets <50, transfuse but do not stop bivalirubin    SKIN:  -No acute issues    MUSCULOSKELETAL:        -No acute issues    INFECTIOUS DISEASE:  ::See above pulm section for more details    Antibiotics  Azithromycin 500 mg: 3/3  Zosyn 4.5 g: 3/3  Tobramycin 440 mg: 3/4  Vancomycin: 3/3 - 3/6  Ceftaroline 3/5 - 3/6  Meropenem 2 g BID: 3/3 - present  Micafungin: 3/4 - 3/6  Doxycycline 100 mg BID: 3/4 - 3/6    Social:  - GOC discussion 3/5     ICU Check List      FEN  Fluids: CVVH  Electrolytes: CVVH  Nutrition: trickle feeds  Prophylaxis:  DVT ppx: bivalirudin gtt  GI ppx: PPI  Bowel care: lactulose, Miralax, senna  Hardware:         CVC 03/04/25 Triple lumen Left Internal jugular (Active)   Placement Date: 03/04/25   Earliest Known Present: 03/04/25  Lumen Type: Triple lumen  Orientation: Left  Location: Internal jugular   Number of days: 1       Arterial Line 03/03/25 Left Radial (Active)   Placement Date/Time: 03/03/25 (c) 2040   Size: 22 G  Orientation: Left  Location: Radial  Site Prep: Chlorhexidine   Local Anesthetic: Injectable  Insertion attempts: 2  Securement Method: Transparent dressing   Number of days: 1       ETT  7.5 mm (Active)   Placement Date: 03/03/25   ETT Type: ETT - single  Single Lumen Tube Size: 7.5 mm  Cuffed: Yes  Location: Oral   Number of days: 2       Urethral Catheter 16 Fr. (Active)   Placement Date/Time: 03/03/25 1700   Hand Hygiene Completed: Yes  Tube Size (Fr.): 16 Fr.  Catheter Balloon Size: 10 mL  Urine Returned: Yes   Number of days: 1       NG/OG/Feeding Tube NG - Linch sump Right nostril (Active)   Placement Date/Time: 03/03/25 1900   Type of Tube: NG/OG Tube  Tube Length: 53 cm  Tube Type: NG - Linch sump  Tube Location: Right nostril   Number of days: 1       Social:  Code: DNR    HPOA: Payal Durant (daughter) [phone]  Disposition: MARLENE Mason MD   03/07/25 at 12:07 PM     Disclaimer: Documentation completed with the information available at the time of input. The times in the chart may not be reflective of actual patient care times, interventions, or procedures. Documentation occurs after the physical care of the patient.

## 2025-03-08 LAB
ALBUMIN SERPL BCP-MCNC: 2.5 G/DL (ref 3.4–5)
ALBUMIN SERPL BCP-MCNC: 2.7 G/DL (ref 3.4–5)
ANION GAP BLDA CALCULATED.4IONS-SCNC: 12 MMO/L (ref 10–25)
ANION GAP SERPL CALC-SCNC: 15 MMOL/L (ref 10–20)
ANION GAP SERPL CALC-SCNC: 15 MMOL/L (ref 10–20)
APTT PPP: 49 SECONDS (ref 26–36)
APTT PPP: 50 SECONDS (ref 26–36)
BASE EXCESS BLDA CALC-SCNC: -1.7 MMOL/L (ref -2–3)
BASOPHILS # BLD AUTO: 0.02 X10*3/UL (ref 0–0.1)
BASOPHILS NFR BLD AUTO: 0.1 %
BODY TEMPERATURE: 37 DEGREES CELSIUS
BUN SERPL-MCNC: 46 MG/DL (ref 6–23)
BUN SERPL-MCNC: 52 MG/DL (ref 6–23)
BURR CELLS BLD QL SMEAR: NORMAL
CA-I BLDA-SCNC: 1.15 MMOL/L (ref 1.1–1.33)
CALCIUM SERPL-MCNC: 7.5 MG/DL (ref 8.6–10.6)
CALCIUM SERPL-MCNC: 8.2 MG/DL (ref 8.6–10.6)
CHLORIDE BLDA-SCNC: 101 MMOL/L (ref 98–107)
CHLORIDE SERPL-SCNC: 101 MMOL/L (ref 98–107)
CHLORIDE SERPL-SCNC: 102 MMOL/L (ref 98–107)
CO2 SERPL-SCNC: 21 MMOL/L (ref 21–32)
CO2 SERPL-SCNC: 23 MMOL/L (ref 21–32)
CREAT SERPL-MCNC: 1.75 MG/DL (ref 0.5–1.05)
CREAT SERPL-MCNC: 1.95 MG/DL (ref 0.5–1.05)
EGFRCR SERPLBLD CKD-EPI 2021: 33 ML/MIN/1.73M*2
EGFRCR SERPLBLD CKD-EPI 2021: 38 ML/MIN/1.73M*2
EOSINOPHIL # BLD AUTO: 0 X10*3/UL (ref 0–0.7)
EOSINOPHIL NFR BLD AUTO: 0 %
ERYTHROCYTE [DISTWIDTH] IN BLOOD BY AUTOMATED COUNT: 15.2 % (ref 11.5–14.5)
GLUCOSE BLD MANUAL STRIP-MCNC: 107 MG/DL (ref 74–99)
GLUCOSE BLD MANUAL STRIP-MCNC: 108 MG/DL (ref 74–99)
GLUCOSE BLD MANUAL STRIP-MCNC: 128 MG/DL (ref 74–99)
GLUCOSE BLD MANUAL STRIP-MCNC: 130 MG/DL (ref 74–99)
GLUCOSE BLD MANUAL STRIP-MCNC: 143 MG/DL (ref 74–99)
GLUCOSE BLDA-MCNC: 104 MG/DL (ref 74–99)
GLUCOSE SERPL-MCNC: 105 MG/DL (ref 74–99)
GLUCOSE SERPL-MCNC: 115 MG/DL (ref 74–99)
HCO3 BLDA-SCNC: 23.7 MMOL/L (ref 22–26)
HCT VFR BLD AUTO: 29.9 % (ref 36–46)
HCT VFR BLD EST: 32 % (ref 36–46)
HGB BLD-MCNC: 10.4 G/DL (ref 12–16)
HGB BLDA-MCNC: 10.5 G/DL (ref 12–16)
IMM GRANULOCYTES # BLD AUTO: 0.68 X10*3/UL (ref 0–0.7)
IMM GRANULOCYTES NFR BLD AUTO: 2.6 % (ref 0–0.9)
INHALED O2 CONCENTRATION: 100 %
INR PPP: 3.2 (ref 0.9–1.1)
LACTATE BLDA-SCNC: 3.2 MMOL/L (ref 0.4–2)
LYMPHOCYTES # BLD AUTO: 2.18 X10*3/UL (ref 1.2–4.8)
LYMPHOCYTES NFR BLD AUTO: 8.2 %
MAGNESIUM SERPL-MCNC: 2.66 MG/DL (ref 1.6–2.4)
MCH RBC QN AUTO: 31.4 PG (ref 26–34)
MCHC RBC AUTO-ENTMCNC: 34.8 G/DL (ref 32–36)
MCV RBC AUTO: 90 FL (ref 80–100)
MONOCYTES # BLD AUTO: 2.09 X10*3/UL (ref 0.1–1)
MONOCYTES NFR BLD AUTO: 7.9 %
NEUTROPHILS # BLD AUTO: 21.55 X10*3/UL (ref 1.2–7.7)
NEUTROPHILS NFR BLD AUTO: 81.2 %
NRBC BLD-RTO: 1.2 /100 WBCS (ref 0–0)
OXYHGB MFR BLDA: 92.9 % (ref 94–98)
PCO2 BLDA: 42 MM HG (ref 38–42)
PH BLDA: 7.36 PH (ref 7.38–7.42)
PHOSPHATE SERPL-MCNC: 3.4 MG/DL (ref 2.5–4.9)
PHOSPHATE SERPL-MCNC: 3.6 MG/DL (ref 2.5–4.9)
PLATELET # BLD AUTO: 52 X10*3/UL (ref 150–450)
PO2 BLDA: 74 MM HG (ref 85–95)
POLYCHROMASIA BLD QL SMEAR: NORMAL
POTASSIUM BLDA-SCNC: 4.4 MMOL/L (ref 3.5–5.3)
POTASSIUM SERPL-SCNC: 3.8 MMOL/L (ref 3.5–5.3)
POTASSIUM SERPL-SCNC: 4.2 MMOL/L (ref 3.5–5.3)
PROTHROMBIN TIME: 35.4 SECONDS (ref 9.8–12.4)
RBC # BLD AUTO: 3.31 X10*6/UL (ref 4–5.2)
RBC MORPH BLD: NORMAL
SAO2 % BLDA: 93 % (ref 94–100)
SODIUM BLDA-SCNC: 132 MMOL/L (ref 136–145)
SODIUM SERPL-SCNC: 134 MMOL/L (ref 136–145)
SODIUM SERPL-SCNC: 135 MMOL/L (ref 136–145)
WBC # BLD AUTO: 26.5 X10*3/UL (ref 4.4–11.3)

## 2025-03-08 PROCEDURE — 80069 RENAL FUNCTION PANEL: CPT

## 2025-03-08 PROCEDURE — 83735 ASSAY OF MAGNESIUM: CPT

## 2025-03-08 PROCEDURE — 2500000005 HC RX 250 GENERAL PHARMACY W/O HCPCS: Mod: SE

## 2025-03-08 PROCEDURE — 2500000005 HC RX 250 GENERAL PHARMACY W/O HCPCS: Mod: SE | Performed by: EMERGENCY MEDICINE

## 2025-03-08 PROCEDURE — 94645 CONT INHLJ TX EACH ADDL HOUR: CPT

## 2025-03-08 PROCEDURE — 82435 ASSAY OF BLOOD CHLORIDE: CPT

## 2025-03-08 PROCEDURE — 2500000004 HC RX 250 GENERAL PHARMACY W/ HCPCS (ALT 636 FOR OP/ED): Mod: SE

## 2025-03-08 PROCEDURE — 2500000001 HC RX 250 WO HCPCS SELF ADMINISTERED DRUGS (ALT 637 FOR MEDICARE OP): Mod: SE

## 2025-03-08 PROCEDURE — 37799 UNLISTED PX VASCULAR SURGERY: CPT

## 2025-03-08 PROCEDURE — 99233 SBSQ HOSP IP/OBS HIGH 50: CPT

## 2025-03-08 PROCEDURE — 99291 CRITICAL CARE FIRST HOUR: CPT

## 2025-03-08 PROCEDURE — 85025 COMPLETE CBC W/AUTO DIFF WBC: CPT

## 2025-03-08 PROCEDURE — 2020000001 HC ICU ROOM DAILY

## 2025-03-08 PROCEDURE — 84132 ASSAY OF SERUM POTASSIUM: CPT

## 2025-03-08 PROCEDURE — 90937 HEMODIALYSIS REPEATED EVAL: CPT

## 2025-03-08 PROCEDURE — 82947 ASSAY GLUCOSE BLOOD QUANT: CPT

## 2025-03-08 PROCEDURE — 85730 THROMBOPLASTIN TIME PARTIAL: CPT

## 2025-03-08 PROCEDURE — 94003 VENT MGMT INPAT SUBQ DAY: CPT

## 2025-03-08 PROCEDURE — 85610 PROTHROMBIN TIME: CPT

## 2025-03-08 RX ORDER — VANCOMYCIN HYDROCHLORIDE 500 MG/100ML
500 INJECTION, SOLUTION INTRAVENOUS EVERY 12 HOURS
Status: DISCONTINUED | OUTPATIENT
Start: 2025-03-08 | End: 2025-03-09

## 2025-03-08 RX ORDER — NOREPINEPHRINE BITARTRATE 0.06 MG/ML
0-.5 INJECTION, SOLUTION INTRAVENOUS CONTINUOUS
Status: DISPENSED | OUTPATIENT
Start: 2025-03-08

## 2025-03-08 RX ORDER — LACTULOSE 10 G/15ML
20 SOLUTION ORAL EVERY 6 HOURS
Status: DISPENSED | OUTPATIENT
Start: 2025-03-08

## 2025-03-08 RX ORDER — CEFTRIAXONE 2 G/50ML
2 INJECTION, SOLUTION INTRAVENOUS EVERY 24 HOURS
Status: DISPENSED | OUTPATIENT
Start: 2025-03-08

## 2025-03-08 RX ORDER — VANCOMYCIN HYDROCHLORIDE 1 G/20ML
INJECTION, POWDER, LYOPHILIZED, FOR SOLUTION INTRAVENOUS DAILY PRN
Status: DISPENSED | OUTPATIENT
Start: 2025-03-08

## 2025-03-08 RX ADMIN — BISACODYL 10 MG: 10 SUPPOSITORY RECTAL at 08:37

## 2025-03-08 RX ADMIN — CALCIUM CHLORIDE, MAGNESIUM CHLORIDE, DEXTROSE MONOHYDRATE, LACTIC ACID, SODIUM CHLORIDE, SODIUM BICARBONATE AND POTASSIUM CHLORIDE 25 ML/KG/HR: 3.68; 3.05; 22; 5.4; 6.46; 3.09; .314 INJECTION INTRAVENOUS at 07:09

## 2025-03-08 RX ADMIN — CALCIUM CHLORIDE, MAGNESIUM CHLORIDE, DEXTROSE MONOHYDRATE, LACTIC ACID, SODIUM CHLORIDE, SODIUM BICARBONATE AND POTASSIUM CHLORIDE 25 ML/KG/HR: 3.68; 3.05; 22; 5.4; 6.46; 3.09; .314 INJECTION INTRAVENOUS at 17:25

## 2025-03-08 RX ADMIN — VASOPRESSIN 0.03 UNITS/MIN: 0.2 INJECTION INTRAVENOUS at 10:23

## 2025-03-08 RX ADMIN — POLYETHYLENE GLYCOL 3350 17 G: 17 POWDER, FOR SOLUTION ORAL at 08:37

## 2025-03-08 RX ADMIN — Medication 250 MCG/HR: at 12:37

## 2025-03-08 RX ADMIN — CALCIUM CHLORIDE, MAGNESIUM CHLORIDE, DEXTROSE MONOHYDRATE, LACTIC ACID, SODIUM CHLORIDE, SODIUM BICARBONATE AND POTASSIUM CHLORIDE 25 ML/KG/HR: 3.68; 3.05; 22; 5.4; 6.46; 3.09; .314 INJECTION INTRAVENOUS at 17:27

## 2025-03-08 RX ADMIN — THIAMINE HYDROCHLORIDE 500 MG: 100 INJECTION, SOLUTION INTRAMUSCULAR; INTRAVENOUS at 08:37

## 2025-03-08 RX ADMIN — CALCIUM CHLORIDE, MAGNESIUM CHLORIDE, DEXTROSE MONOHYDRATE, LACTIC ACID, SODIUM CHLORIDE, SODIUM BICARBONATE AND POTASSIUM CHLORIDE 25 ML/KG/HR: 3.68; 3.05; 22; 5.4; 6.46; 3.09; .314 INJECTION INTRAVENOUS at 02:00

## 2025-03-08 RX ADMIN — ASCORBIC ACID, THIAMINE MONONITRATE,RIBOFLAVIN, NIACINAMIDE, PYRIDOXINE HYDROCHLORIDE, FOLIC ACID, CYANOCOBALAMIN, BIOTIN, CALCIUM PANTOTHENATE, 1 CAPSULE: 100; 1.5; 1.7; 20; 10; 1; 6000; 150000; 5 CAPSULE, LIQUID FILLED ORAL at 10:24

## 2025-03-08 RX ADMIN — SENNOSIDES AND DOCUSATE SODIUM 2 TABLET: 50; 8.6 TABLET ORAL at 08:37

## 2025-03-08 RX ADMIN — Medication 250 MCG/HR: at 16:41

## 2025-03-08 RX ADMIN — METHYLPREDNISOLONE SODIUM SUCCINATE 50 MG: 125 INJECTION, POWDER, FOR SOLUTION INTRAMUSCULAR; INTRAVENOUS at 20:46

## 2025-03-08 RX ADMIN — NOREPINEPHRINE BITARTRATE 0.03 MCG/KG/MIN: 0.06 INJECTION, SOLUTION INTRAVENOUS at 10:24

## 2025-03-08 RX ADMIN — CARBOXYMETHYLCELLULOSE SODIUM 2 DROP: 5 SOLUTION/ DROPS OPHTHALMIC at 16:52

## 2025-03-08 RX ADMIN — MIDAZOLAM HYDROCHLORIDE 5 MG/HR: 1 INJECTION, SOLUTION INTRAVENOUS at 18:13

## 2025-03-08 RX ADMIN — LACTULOSE 20 G: 20 SOLUTION ORAL at 16:46

## 2025-03-08 RX ADMIN — THIAMINE HYDROCHLORIDE 500 MG: 100 INJECTION, SOLUTION INTRAMUSCULAR; INTRAVENOUS at 16:46

## 2025-03-08 RX ADMIN — KETAMINE HYDROCHLORIDE 2 MG/KG/HR: 10 INJECTION INTRAMUSCULAR; INTRAVENOUS at 14:25

## 2025-03-08 RX ADMIN — PANTOPRAZOLE SODIUM 40 MG: 40 INJECTION, POWDER, LYOPHILIZED, FOR SOLUTION INTRAVENOUS at 08:35

## 2025-03-08 RX ADMIN — CARBOXYMETHYLCELLULOSE SODIUM 2 DROP: 5 SOLUTION/ DROPS OPHTHALMIC at 08:48

## 2025-03-08 RX ADMIN — LACTULOSE 20 G: 10 SOLUTION ORAL at 08:37

## 2025-03-08 RX ADMIN — VANCOMYCIN HYDROCHLORIDE 500 MG: 500 INJECTION, SOLUTION INTRAVENOUS at 18:39

## 2025-03-08 RX ADMIN — METHYLPREDNISOLONE SODIUM SUCCINATE 50 MG: 125 INJECTION, POWDER, FOR SOLUTION INTRAMUSCULAR; INTRAVENOUS at 08:34

## 2025-03-08 RX ADMIN — Medication 250 MCG/HR: at 08:35

## 2025-03-08 RX ADMIN — WHITE PETROLATUM 57.7 %-MINERAL OIL 31.9 % EYE OINTMENT 1 APPLICATION: at 20:51

## 2025-03-08 RX ADMIN — MEROPENEM 2 G: 1 INJECTION INTRAVENOUS at 10:24

## 2025-03-08 RX ADMIN — WHITE PETROLATUM 57.7 %-MINERAL OIL 31.9 % EYE OINTMENT 1 APPLICATION: at 08:42

## 2025-03-08 RX ADMIN — CALCIUM CHLORIDE, MAGNESIUM CHLORIDE, DEXTROSE MONOHYDRATE, LACTIC ACID, SODIUM CHLORIDE, SODIUM BICARBONATE AND POTASSIUM CHLORIDE 25 ML/KG/HR: 3.68; 3.05; 22; 5.4; 6.46; 3.09; .314 INJECTION INTRAVENOUS at 17:26

## 2025-03-08 RX ADMIN — CEFTRIAXONE SODIUM 2 G: 2 INJECTION, SOLUTION INTRAVENOUS at 20:46

## 2025-03-08 RX ADMIN — CALCIUM CHLORIDE, MAGNESIUM CHLORIDE, DEXTROSE MONOHYDRATE, LACTIC ACID, SODIUM CHLORIDE, SODIUM BICARBONATE AND POTASSIUM CHLORIDE 25 ML/KG/HR: 3.68; 3.05; 22; 5.4; 6.46; 3.09; .314 INJECTION INTRAVENOUS at 02:02

## 2025-03-08 RX ADMIN — KETAMINE HYDROCHLORIDE 2 MG/KG/HR: 10 INJECTION INTRAMUSCULAR; INTRAVENOUS at 05:50

## 2025-03-08 RX ADMIN — CALCIUM CHLORIDE, MAGNESIUM CHLORIDE, DEXTROSE MONOHYDRATE, LACTIC ACID, SODIUM CHLORIDE, SODIUM BICARBONATE AND POTASSIUM CHLORIDE 25 ML/KG/HR: 3.68; 3.05; 22; 5.4; 6.46; 3.09; .314 INJECTION INTRAVENOUS at 12:25

## 2025-03-08 RX ADMIN — Medication 100 PERCENT: at 08:00

## 2025-03-08 RX ADMIN — CARBOXYMETHYLCELLULOSE SODIUM 2 DROP: 5 SOLUTION/ DROPS OPHTHALMIC at 20:52

## 2025-03-08 RX ADMIN — Medication 250 MCG/HR: at 20:46

## 2025-03-08 RX ADMIN — ANGIOTENSIN II 25 NG/KG/MIN: 2.5 INJECTION INTRAVENOUS at 05:50

## 2025-03-08 RX ADMIN — Medication 250 MCG/HR: at 00:09

## 2025-03-08 RX ADMIN — Medication 250 MCG/HR: at 04:09

## 2025-03-08 RX ADMIN — CALCIUM CHLORIDE, MAGNESIUM CHLORIDE, DEXTROSE MONOHYDRATE, LACTIC ACID, SODIUM CHLORIDE, SODIUM BICARBONATE AND POTASSIUM CHLORIDE 25 ML/KG/HR: 3.68; 3.05; 22; 5.4; 6.46; 3.09; .314 INJECTION INTRAVENOUS at 02:01

## 2025-03-08 RX ADMIN — KETAMINE HYDROCHLORIDE 2 MG/KG/HR: 10 INJECTION INTRAMUSCULAR; INTRAVENOUS at 22:14

## 2025-03-08 RX ADMIN — WHITE PETROLATUM 57.7 %-MINERAL OIL 31.9 % EYE OINTMENT 1 APPLICATION: at 14:06

## 2025-03-08 ASSESSMENT — PAIN - FUNCTIONAL ASSESSMENT
PAIN_FUNCTIONAL_ASSESSMENT: CPOT (CRITICAL CARE PAIN OBSERVATION TOOL)

## 2025-03-08 NOTE — PROGRESS NOTES
Medical Intensive Care - Daily Progress Note   Subjective    Gissel Harvey is a 39 y.o. year old female patient admitted on 3/3/2025 with following ICU needs: intubation and pressor support    Interval History:  NAEON. The patient is getting dialysis this morning. During the exam, she has an O2 sat of 94%.    Pressors: angiotensin II 25 ng/kg/min  Sedation: Versed 5 mg/hr, fent 250 mcg/hr, ketamine 2 mg/kg/hr  Paralytic: Off Nimbex  Anticoagulation: Bivalirudin 0.01 mg/kg/hr    Meds    Scheduled medications  cisatracurium, 0.1 mg/kg, intravenous, Once  pantoprazole, 40 mg, oral, Daily before breakfast   Or  esomeprazole, 40 mg, nasoduodenal tube, Daily before breakfast   Or  pantoprazole, 40 mg, intravenous, Daily before breakfast  fentaNYL, 25 mcg, intravenous, Once  insulin lispro, 0-5 Units, subcutaneous, q4h  lactulose, 20 g, oral, q6h  artificial tears, 2 drop, Both Eyes, q6h  meropenem, 2 g, intravenous, q12h  methylPREDNISolone sodium succinate (PF), 50 mg, intravenous, q12h  midazolam, 2 mg, intravenous, Once  oxygen, , inhalation, Continuous - Inhalation  polyethylene glycol, 17 g, oral, BID  sennosides-docusate sodium, 2 tablet, oral, BID  thiamine, 500 mg, intravenous, TID   Followed by  [START ON 3/9/2025] thiamine, 100 mg, oral, Daily  vitamin B complex-vitamin C-folic acid, 1 capsule, oral, Daily  white petrolatum-mineral oiL, 1 Application, Both Eyes, q6h      Continuous medications  angiotensin II (Giapreza) 2.5 mg in sodium chloride 0.9% 250 mL (0.01 mg/mL) infusion, 1.25-40 ng/kg/min, Last Rate: Stopped (03/08/25 1730)  bivalirudin, 0.05-0.49 mg/kg/hr, Last Rate: 0.01 mg/kg/hr (03/08/25 0700)  epoprostenol, 0.05 mcg/kg/min (Ideal), Last Rate: 0.05 mcg/kg/min (03/07/25 2204)  fentaNYL, 0-300 mcg/hr, Last Rate: 250 mcg/hr (03/08/25 1641)  ketamine (Ketalar) 1,000 mg in 100 mL (10 mg/mL) infusion, 0.05-2 mg/kg/hr, Last Rate: 2 mg/kg/hr (03/08/25 1425)  midazolam, 0-20 mg/hr, Last Rate: 5 mg/hr  "(03/08/25 1813)  norepinephrine, 0-0.5 mcg/kg/min, Last Rate: 0.04 mcg/kg/min (03/08/25 1041)  PrismaSol 4/2.5, 25 mL/kg/hr, Last Rate: 25 mL/kg/hr (03/08/25 1727)  vasopressin, 0-0.03 Units/min, Last Rate: Stopped (03/08/25 1041)      PRN medications  PRN medications: acetaminophen **OR** acetaminophen, alteplase, dextrose, dextrose, fentaNYL, glucagon, glucagon, midazolam     Objective    Blood pressure 104/63, pulse 89, temperature 36.3 °C (97.3 °F), resp. rate 24, height 1.676 m (5' 6\"), weight 78.8 kg (173 lb 11.6 oz), SpO2 90%.     Physical Exam   General: intubated, sedated  HEENT: normocephalic, atraumatic  CV: regular rate and rhythm. Normal S1 and S2. No murmurs  Resp: lungs coarse with rhonchi and mild wheezing.   Abd: soft, distended  Ext: BUE and BLE edema      Intake/Output Summary (Last 24 hours) at 3/8/2025 1815  Last data filed at 3/8/2025 1700  Gross per 24 hour   Intake 2526.63 ml   Output 3711 ml   Net -1184.37 ml     Labs:   Results from last 72 hours   Lab Units 03/08/25  0246 03/07/25  1617 03/07/25  0439   SODIUM mmol/L 135* 134* 133*   POTASSIUM mmol/L 4.2 4.1 4.0   CHLORIDE mmol/L 101 102 97*   CO2 mmol/L 23 22 25   BUN mg/dL 52* 58* 71*   CREATININE mg/dL 1.95* 1.96* 2.24*   GLUCOSE mg/dL 105* 97 113*   CALCIUM mg/dL 8.2* 7.4* 7.8*   ANION GAP mmol/L 15 14 15   EGFR mL/min/1.73m*2 33* 33* 28*   PHOSPHORUS mg/dL 3.6 3.3 3.9      Results from last 72 hours   Lab Units 03/08/25  0246 03/07/25  0439 03/06/25  0452   WBC AUTO x10*3/uL 26.5* 18.3* 12.8*   HEMOGLOBIN g/dL 10.4* 10.5* 11.0*   HEMATOCRIT % 29.9* 30.1* 34.0*   PLATELETS AUTO x10*3/uL 52* 56* 62*   NEUTROS PCT AUTO % 81.2  --   --    LYMPHO PCT MAN %  --  3.3 5.7   LYMPHS PCT AUTO % 8.2  --   --    MONO PCT MAN %  --  8.2 9.7   MONOS PCT AUTO % 7.9  --   --    EOSINO PCT MAN %  --  0.0 0.0   EOS PCT AUTO % 0.0  --   --       Results from last 72 hours   Lab Units 03/08/25  0245 03/07/25  1616 03/07/25  0200   POCT PH, ARTERIAL pH " 7.36* 7.37* 7.37*   POCT PCO2, ARTERIAL mm Hg 42 44* 42   POCT PO2, ARTERIAL mm Hg 74* 73* 70*   POCT SO2, ARTERIAL % 93* 96 94              Micro/ID:     Lab Results   Component Value Date    BLOODCULT No growth at 4 days -  FINAL REPORT 03/03/2025    BLOODCULT No growth at 4 days -  FINAL REPORT 03/03/2025       Summary of key imaging results from the last 24 hours  No results found.     Assessment and Plan     Assessment: Gissel Harvey is a 39 y.o. year old female patient admitted on 3/3/2025 with intubation and pressor support.     Gissel Harvey is a 39 y.o. 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 Treating for septic shock with possible cardiogenic shock component as well as ARDS.    Her pressor requirement has decreased and she is now on single pressor therapy. Lactate is trending down and trickle feeds have been started. She is tolerating CVVH this morning. She is clinically improving and does have room for escalation, so we will discuss with her family about changing her code status.    Mechanical Ventilation: 4-10 days  Sedation/Analgesia:  fentanyl, ketamine, Versed  Restraints: no    Summary for 03/08/25:  Down to single pressor (angiotension II)  Off Nimbex  Lactate down, continue serial ABGs  Add levo and vaso to wean off angio  Continue CVVH  Increase lactulose  Discontinue meropenem and start vanc and ceftriaxone  Discuss code status with family    NEUROLOGY/PSYCH:  #Sedation   - Sedating with fentanyl, ketamine, Versed. Propofol discontinued for hypotension.  - RASS goal -5 while weaning Nimbex to allow more maximum ventilatory compliance  - Thiamine supplementation    CARDIOVASCULAR:  #Shock, likely septic I/s/o severe pneumonia and  possible cardiogenic component  #HFrEF  :: lactate > 9 on presentation, now downtrending  :: TTE 3/4 with EF 30-35% with reduced RV function, no previous TTE for comparison  :: BNP 2127 at admission  :: Troponin plateaued (62>63) on 3/4/25  - On vasopressin, epinephrine, and angiotensin II to support blood pressure with MAP goal > 65   - Increase levo and decrease epi  - Elevated lactate, continue serial ABGs    #Aflutter  #Sustained VT   :: went into NSVT while wire threaded for central line placement  :: s/p amiodarone 150 mg bolus x2  :: pt cardioverted  - Pt is back to sinus rhythm, PRN amio bolus per EP    PULMONARY:  Vent Mode: Volume control/assist control  FiO2 (%):  [100 %] 100 %  S RR:  [24] 24  S VT:  [450 mL] 450 mL  PEEP/CPAP (cm H2O):  [12 cm H20] 12 cm H20  MAP (cm H2O):  [] 18     #Community acquired pneumonia   #ARDS   :: intubated 3/3   :: P:F ~ 70 on admission   :: Procalcitonin 57.3  :: flu A/B, COVID, parainfluenza, RSV, metapneumovirus, legionella urine Ag: negative  :: s/p vanc, Zosyn, azithro 3/2 in the ED   :: MRSA nares positive 3/4  :: Tracheal aspirate cx 3/3: pan sensitive staph aureus  :: Strep pneumo urine ag: positive  :: Bcx 3/3: NGTD x3 days    Plan  - f/up pneumonia workup: full resp viral panel, tracheal aspirate cx  - f/up blood cultures   - Discontinue meropenem and start vanc and ceftriaxone  - Continue solumedrol 50 mg q12 for ARDS and mineralocorticoid effect  - Inhaled epo  - BP not stable enough for proning  - Would not tolerate PVRP  - Per shock call, not a candidate for ECMO    RENAL/GENITOURINARY:  #Decreased renal function c/f SCOUT vs CKD, worsening  #HAGMA  :: Cr 1.9 on admission, unclear baseline as no labs in 2 years   :: FeNa 3/3: 0.1% prerenal  :: anion gap 19, delta 7, delta ratio 3.5 suggests HAGMA with concurrent metabolic alkalosis  - Nephrology for RRT  - Continue to monitor Cr with daily RFP  - CVVH initiated    GASTROENTEROLOGY:  #liver cirrhosis    #portal hypertension s/p TIPS   #hx of Budd-Chiari   - Continue to monitor abdomen for signs of ascites formation  - 3/7 POCUS with distended bowel, no appreciable ascites    ENDOCRINOLOGY:  - SSI while on steroids     HEMATOLOGY:  #Lymphopenia   :: Abs leukocyte count 260  :: HIV neg  - Now with leukocytosis likely 2/2 known infection and steroid use  - CTM     #Hx of antiphospholipid syndrome   #Hx of positive PF4  #Chronic IVC occlusion   #Hx of DVT   :: Supposed to be on fondaparinux outpatient, unclear if taking as no recent med fills   - Continue bivalirudin while inpatient I/s/o previous positive PF4     #Thrombocytopenia, downtrending  - Trend plts, INR, coags  - Fibrinogen to r/o DIC  - Vancomycin could cause drug-induced thrombocytopenia  - Amiodarone unlikely because timeline doesn't fit  - If platelets <50, transfuse but do not stop bivalirubin    SKIN:  -No acute issues    MUSCULOSKELETAL:        -No acute issues    INFECTIOUS DISEASE:  ::See above pulm section for more details    Antibiotics  Azithromycin 500 mg: 3/3  Zosyn 4.5 g: 3/3  Tobramycin 440 mg: 3/4  Vancomycin: 3/3 - 3/6  Ceftaroline 3/5 - 3/6  Meropenem 2 g BID: 3/3 - present  Micafungin: 3/4 - 3/6  Doxycycline 100 mg BID: 3/4 - 3/6    Social:  - Discuss code status with family 3/8    ICU Check List     FEN  Fluids: CVVH  Electrolytes: CVVH  Nutrition: trickle feeds  Prophylaxis:  DVT ppx: bivalirudin gtt  GI ppx: PPI  Bowel care: lactulose, Miralax, senna  Hardware:         CVC 03/04/25 Triple lumen Left Internal jugular (Active)   Placement Date: 03/04/25   Earliest Known Present: 03/04/25  Lumen Type: Triple lumen  Orientation: Left  Location: Internal jugular   Number of days: 1       Arterial Line 03/03/25 Left Radial (Active)   Placement Date/Time: 03/03/25 (c) 2040   Size: 22 G  Orientation: Left  Location: Radial  Site Prep: Chlorhexidine   Local Anesthetic: Injectable  Insertion attempts: 2  Securement Method: Transparent dressing    Number of days: 1       ETT  7.5 mm (Active)   Placement Date: 03/03/25   ETT Type: ETT - single  Single Lumen Tube Size: 7.5 mm  Cuffed: Yes  Location: Oral   Number of days: 2       Urethral Catheter 16 Fr. (Active)   Placement Date/Time: 03/03/25 1700   Hand Hygiene Completed: Yes  Tube Size (Fr.): 16 Fr.  Catheter Balloon Size: 10 mL  Urine Returned: Yes   Number of days: 1       NG/OG/Feeding Tube NG - [place] sump Right nostril (Active)   Placement Date/Time: 03/03/25 1900   Type of Tube: NG/OG Tube  Tube Length: 53 cm  Tube Type: NG - [place] sump  Tube Location: Right nostril   Number of days: 1       Social:  Code: Full Code    HPOA: Payal Durant (daughter) [phone]  Disposition: MARLENE Das MD   03/08/25 at 6:15 PM     Disclaimer: Documentation completed with the information available at the time of input. The times in the chart may not be reflective of actual patient care times, interventions, or procedures. Documentation occurs after the physical care of the patient.

## 2025-03-08 NOTE — PROGRESS NOTES
Nephrology Progress Note     Date of admission: 3/3/2025     Subjective: on cvvh, soft hemodynamics, on angiotensin and epoprostenol      CURRENT MEDICATIONS:  Scheduled medications  cisatracurium, 0.1 mg/kg, intravenous, Once  pantoprazole, 40 mg, oral, Daily before breakfast   Or  esomeprazole, 40 mg, nasoduodenal tube, Daily before breakfast   Or  pantoprazole, 40 mg, intravenous, Daily before breakfast  fentaNYL, 25 mcg, intravenous, Once  insulin lispro, 0-5 Units, subcutaneous, q4h  lactulose, 20 g, oral, TID  artificial tears, 2 drop, Both Eyes, q6h  meropenem, 2 g, intravenous, q12h  methylPREDNISolone sodium succinate (PF), 50 mg, intravenous, q12h  midazolam, 2 mg, intravenous, Once  oxygen, , inhalation, Continuous - Inhalation  polyethylene glycol, 17 g, oral, BID  sennosides-docusate sodium, 2 tablet, oral, BID  thiamine, 500 mg, intravenous, TID   Followed by  [START ON 3/9/2025] thiamine, 100 mg, oral, Daily  vitamin B complex-vitamin C-folic acid, 1 capsule, oral, Daily  white petrolatum-mineral oiL, 1 Application, Both Eyes, q6h      Continuous medications  angiotensin II (Giapreza) 2.5 mg in sodium chloride 0.9% 250 mL (0.01 mg/mL) infusion, 1.25-40 ng/kg/min, Last Rate: 25 ng/kg/min (03/08/25 0600)  bivalirudin, 0.05-0.49 mg/kg/hr, Last Rate: 0.01 mg/kg/hr (03/08/25 0600)  epoprostenol, 0.05 mcg/kg/min (Ideal), Last Rate: 0.05 mcg/kg/min (03/07/25 2204)  fentaNYL, 0-300 mcg/hr, Last Rate: 250 mcg/hr (03/08/25 0835)  ketamine (Ketalar) 1,000 mg in 100 mL (10 mg/mL) infusion, 0.05-2 mg/kg/hr, Last Rate: 2 mg/kg/hr (03/08/25 0600)  midazolam, 0-20 mg/hr, Last Rate: 5 mg/hr (03/08/25 0600)  PrismaSol 4/2.5, 25 mL/kg/hr, Last Rate: 25 mL/kg/hr (03/08/25 0709)      PRN medications  PRN medications: acetaminophen **OR** acetaminophen, alteplase, dextrose, dextrose, fentaNYL, glucagon, glucagon, midazolam         OBJECTIVE:     VITALS: Visit Vitals  /63 (Patient Position: Lying)   Pulse 93   Temp  "36.4 °C (97.5 °F)   Resp (!) 32   Ht 1.676 m (5' 6\")   Wt 78.8 kg (173 lb 11.6 oz)   SpO2 90%   BMI 28.04 kg/m²   Smoking Status Every Day   BSA 1.92 m²       GEN: Vent/sedated  CVS: S1 S2 no murmurs  RESP:  Coarse BS BL  Neuro: Sedated  Extremities: Trace edema      LABS:  Results from last 72 hours   Lab Units 03/08/25  0246   WBC AUTO x10*3/uL 26.5*   HEMOGLOBIN g/dL 10.4*   MCV fL 90   PLATELETS AUTO x10*3/uL 52*   BUN mg/dL 52*   CREATININE mg/dL 1.95*   CALCIUM mg/dL 8.2*           Intake/Output Summary (Last 24 hours) at 3/8/2025 0911  Last data filed at 3/8/2025 0600  Gross per 24 hour   Intake 2396.06 ml   Output 1985 ml   Net 411.06 ml        ASSESSMENT AND PLAN:   Gissel Harvey is a 39-year-old female admitted for acute respiratory failure s/p intubation with multifocal pneumonia and worsening respiratory status and hemodynamics. On 4 vasopressors currently with lactic acidosis not improving. Renal function has been slowly worsening and likely ATN from profound shock.    #septic shock 2/2 pna  #ARDS 2/2 CAP  #Metabolic acidosis  #Lactic acidosis  #Pneumonia  #VTACH    #SCOUT -D anuric  Baseline creatinine presumed normal. 0.64 2022. No CKD history. Presented at 1.91 on 3/3.  UOP: Minimal dark urine  Acid-base: Metabolic / lactic acidosis  Volume: No significant overload. Poor resp status PEEP 12, FIO2 100%.  BP: soft hemodynamics, on angiotensin and epoprostenol    Recommendations:  -CRRT per submitted orders  -check phos and Mag BID and replete as needed  -RFP BID  -Will Follow    D/w Dr. Elpidio Rodrigues MD  Nephrology Fellow  "

## 2025-03-08 NOTE — CONSULTS
Vancomycin Dosing by Pharmacy- INITIAL    Gissel Harvey is a 39 y.o. year old female who Pharmacy has been consulted for vancomycin dosing for pneumonia. Based on the patient's indication and renal status this patient will be dosed based on a goal trough/random level of 15-20.     Patient continues on CVVH.    Visit Vitals  /63 (Patient Position: Lying)   Pulse 89   Temp 36.3 °C (97.3 °F)   Resp 24        Lab Results   Component Value Date    CREATININE 1.95 (H) 2025    CREATININE 1.96 (H) 2025    CREATININE 2.24 (H) 2025    CREATININE 2.49 (H) 2025        Patient weight is as follows:   Vitals:    25 0600   Weight: 78.8 kg (173 lb 11.6 oz)       Cultures:  Susceptibility data for the encounter in last 14 days.  Collected Specimen Info Organism Ceftriaxone (Meningitis) Ceftriaxone (Nonmeningitis) Clindamycin Erythromycin Oxacillin Penicillin (IV, Meningitidis) Penicillin (IV, Nonmeningitis) Penicillin (Oral) Tetracycline Trimethoprim/Sulfamethoxazole   25 Fluid from Tracheal Aspirate Methicillin Susceptible Staphylococcus aureus (MSSA)    S  S  S     S  S     Streptococcus pneumoniae  S  S  S  S   R  S  S  S  S     Collected Specimen Info Organism Vancomycin   25 Fluid from Tracheal Aspirate Methicillin Susceptible Staphylococcus aureus (MSSA)  S     Streptococcus pneumoniae  S         I/O last 3 completed shifts:  In: 5461.1 (69.3 mL/kg) [I.V.:3622.8 (46 mL/kg); NG/GT:980; IV Piggyback:858.3]  Out: 2179 (27.7 mL/kg) [Urine:180 (0.1 mL/kg/hr); Other:]  Weight: 78.8 kg   I/O during current shift:  I/O this shift:  In: 1209.6 [I.V.:589.6; NG/GT:480; IV Piggyback:140]  Out: 2561 [Other:2261; Stool:300]    Temp (24hrs), Av.3 °C (97.3 °F), Min:35.9 °C (96.6 °F), Max:36.5 °C (97.7 °F)         Assessment/Plan     Vanco restarted; patient remains on CVVH. Will restart previous dosing of 500 mg every 12 hours.  Follow-up level will be ordered prior to the 3rd dose on 3/9  at 1800 unless clinically indicated sooner.  Will continue to monitor renal function daily while on vancomycin and order serum creatinine at least every 48 hours if not already ordered.  Follow for continued vancomycin needs, clinical response, and signs/symptoms of toxicity.       Rahat Loya, [facility]

## 2025-03-08 NOTE — CARE PLAN
Problem: Safety - Adult  Goal: Free from fall injury  Outcome: Progressing     Problem: Nutrition  Goal: Nutrient intake appropriate for maintaining nutritional needs  Outcome: Progressing     Problem: Skin  Goal: Decreased wound size/increased tissue granulation at next dressing change  Outcome: Progressing  Flowsheets (Taken 3/8/2025 1843)  Decreased wound size/increased tissue granulation at next dressing change: Protective dressings over bony prominences  Goal: Participates in plan/prevention/treatment measures  Outcome: Progressing  Flowsheets (Taken 3/7/2025 2026 by Rosie Penn RN)  Participates in plan/prevention/treatment measures:   Discuss with provider PT/OT consult   Elevate heels  Goal: Prevent/manage excess moisture  Outcome: Progressing  Flowsheets (Taken 3/8/2025 1843)  Prevent/manage excess moisture:   Cleanse incontinence/protect with barrier cream   Follow provider orders for dressing changes  Goal: Prevent/minimize sheer/friction injuries  Outcome: Progressing  Flowsheets (Taken 3/8/2025 1843)  Prevent/minimize sheer/friction injuries:   Complete micro-shifts as needed if patient unable. Adjust patient position to relieve pressure points, not a full turn   Turn/reposition every 2 hours/use positioning/transfer devices  Goal: Promote/optimize nutrition  Outcome: Progressing  Flowsheets (Taken 3/8/2025 1843)  Promote/optimize nutrition:   Monitor/record intake including meals   Discuss with provider if NPO > 2 days   Reassess MST if dietician not consulted  Goal: Promote skin healing  Outcome: Progressing  Flowsheets (Taken 3/8/2025 1843)  Promote skin healing:   Turn/reposition every 2 hours/use positioning/transfer devices   Protective dressings over bony prominences   Assess skin/pad under line(s)/device(s)   Rotate device position/do not position patient on device

## 2025-03-08 NOTE — SIGNIFICANT EVENT
Spoke to Payal Durant and her aunt.    I explained that we generally have 3 types of code statuses, broadly:     1. full code:  We do everything that we can in terms of treatment, and God forbid if the patient's heart were to stop, we will do chest compressions and will have to insert a tube into the lungs for mechanical ventilation.  Chest compressions come with their own risks and may or may not lead to favorable outcomes.      2.  DNR:  We do everything that we can terms of treatment, up to the point where nature tells us that's it, and if the heart were to stop we will stop right there and we do not resuscitate.  Explained that we can also choose to have elective intubation, meaning that if the patient's heart were still beating however the breathing was not doing great that we can place a tube into the lungs to help them breathe, but will not perform chest compressions if the heart were to stop.     In addition, we can choose further limitations such as no ICU transfer/escalation of care, no dialysis, ... Based on patient's wishes.     3. DNR Comfort Care:  We focus mainly on comfort and do not perform tests that may cause discomfort to the patient. We only provide treatment to ease pain with the intention of comfort and not life prolongation.      After hearing this, Payal Garcia stated that her mother would want to be full code. She stated that her mother would want invasive measures such as chest compressions in the case her heart would stop beating. She would also want intubation if she could not breathe as long as there was a chance she could come off of the ventilator. She would not want to be kept alive by machines for years, but would be fine with it as long as it was temporary. She states that her mother would fight to stay alive.    Patient's code status will be changed to full code after a discussion with Dr. White.

## 2025-03-08 NOTE — CARE PLAN
Problem: Pain - Adult  Goal: Verbalizes/displays adequate comfort level or baseline comfort level  Outcome: Progressing     Problem: Safety - Adult  Goal: Free from fall injury  Outcome: Progressing     Problem: Discharge Planning  Goal: Discharge to home or other facility with appropriate resources  Outcome: Progressing     Problem: Chronic Conditions and Co-morbidities  Goal: Patient's chronic conditions and co-morbidity symptoms are monitored and maintained or improved  Outcome: Progressing     Problem: Nutrition  Goal: Nutrient intake appropriate for maintaining nutritional needs  Outcome: Progressing     Problem: Knowledge Deficit  Goal: Patient/family/caregiver demonstrates understanding of disease process, treatment plan, medications, and discharge instructions  Outcome: Progressing     Problem: Mechanical Ventilation  Goal: Patient Will Maintain Patent Airway  Outcome: Progressing  Goal: Oral health is maintained or improved  Outcome: Progressing  Goal: ET tube will be managed safely  Outcome: Progressing  Goal: Ability to express needs and understand communication  Outcome: Progressing  Goal: Mobility/activity is maintained at optimum level for patient  Outcome: Progressing     Problem: Skin  Goal: Decreased wound size/increased tissue granulation at next dressing change  Outcome: Progressing  Flowsheets (Taken 3/7/2025 2026)  Decreased wound size/increased tissue granulation at next dressing change: Protective dressings over bony prominences  Goal: Participates in plan/prevention/treatment measures  Outcome: Progressing  Flowsheets (Taken 3/7/2025 2026)  Participates in plan/prevention/treatment measures:   Discuss with provider PT/OT consult   Elevate heels  Goal: Prevent/manage excess moisture  Outcome: Progressing  Flowsheets (Taken 3/7/2025 2026)  Prevent/manage excess moisture:   Cleanse incontinence/protect with barrier cream   Follow provider orders for dressing changes   Monitor for/manage  infection if present  Goal: Prevent/minimize sheer/friction injuries  Outcome: Progressing  Flowsheets (Taken 3/7/2025 2026)  Prevent/minimize sheer/friction injuries:   Complete micro-shifts as needed if patient unable. Adjust patient position to relieve pressure points, not a full turn   Increase activity/out of bed for meals   Use pull sheet   HOB 30 degrees or less   Turn/reposition every 2 hours/use positioning/transfer devices  Goal: Promote/optimize nutrition  Outcome: Progressing  Flowsheets (Taken 3/7/2025 2026)  Promote/optimize nutrition: Monitor/record intake including meals  Goal: Promote skin healing  Outcome: Progressing  Flowsheets (Taken 3/7/2025 2026)  Promote skin healing:   Assess skin/pad under line(s)/device(s)   Protective dressings over bony prominences   Turn/reposition every 2 hours/use positioning/transfer devices   Ensure correct size (line/device) and apply per  instructions   Rotate device position/do not position patient on device     Problem: Safety - Medical Restraint  Goal: Remains free of injury from restraints (Restraint for Interference with Medical Device)  Outcome: Progressing  Flowsheets (Taken 3/7/2025 2026)  Remains free of injury from restraints (restraint for interference with medical device):   Evaluate the patient's condition at the time of restraint application   Inform patient/family regarding the reason for restraint   Every 2 hours: Monitor safety, psychosocial status, comfort, nutrition and hydration   Determine that other, less restrictive measures have been tried or would not be effective before applying the restraint  Goal: Free from restraint(s) (Restraint for Interference with Medical Device)  Outcome: Progressing  Flowsheets (Taken 3/7/2025 2026)  Free from restraint(s) (restraint for interference with medical device):   ONCE/SHIFT or MINIMUM Every 12 hours: Assess and document the continuing need for restraints   Every 24 hours: Continued use of  restraint requires Licensed Independent Practitioner to perform face to face examination and written order   Identify and implement measures to help patient regain control

## 2025-03-09 LAB
ALBUMIN SERPL BCP-MCNC: 2.7 G/DL (ref 3.4–5)
ALBUMIN SERPL BCP-MCNC: 2.7 G/DL (ref 3.4–5)
ANION GAP BLDA CALCULATED.4IONS-SCNC: 13 MMO/L (ref 10–25)
ANION GAP BLDA CALCULATED.4IONS-SCNC: 13 MMO/L (ref 10–25)
ANION GAP SERPL CALC-SCNC: 12 MMOL/L (ref 10–20)
ANION GAP SERPL CALC-SCNC: 14 MMOL/L (ref 10–20)
APTT PPP: 53 SECONDS (ref 26–36)
BASE EXCESS BLDA CALC-SCNC: -3.1 MMOL/L (ref -2–3)
BASE EXCESS BLDA CALC-SCNC: -4.9 MMOL/L (ref -2–3)
BASOPHILS # BLD AUTO: ABNORMAL 10*3/UL
BASOPHILS NFR BLD AUTO: ABNORMAL %
BODY TEMPERATURE: 37 DEGREES CELSIUS
BODY TEMPERATURE: 37 DEGREES CELSIUS
BUN SERPL-MCNC: 47 MG/DL (ref 6–23)
BUN SERPL-MCNC: 47 MG/DL (ref 6–23)
CA-I BLDA-SCNC: 1.04 MMOL/L (ref 1.1–1.33)
CA-I BLDA-SCNC: 1.14 MMOL/L (ref 1.1–1.33)
CALCIUM SERPL-MCNC: 7.4 MG/DL (ref 8.6–10.6)
CALCIUM SERPL-MCNC: 7.7 MG/DL (ref 8.6–10.6)
CHLORIDE BLDA-SCNC: 101 MMOL/L (ref 98–107)
CHLORIDE BLDA-SCNC: 103 MMOL/L (ref 98–107)
CHLORIDE SERPL-SCNC: 101 MMOL/L (ref 98–107)
CHLORIDE SERPL-SCNC: 102 MMOL/L (ref 98–107)
CO2 SERPL-SCNC: 21 MMOL/L (ref 21–32)
CO2 SERPL-SCNC: 26 MMOL/L (ref 21–32)
CREAT SERPL-MCNC: 1.85 MG/DL (ref 0.5–1.05)
CREAT SERPL-MCNC: 1.85 MG/DL (ref 0.5–1.05)
EGFRCR SERPLBLD CKD-EPI 2021: 35 ML/MIN/1.73M*2
EGFRCR SERPLBLD CKD-EPI 2021: 35 ML/MIN/1.73M*2
EOSINOPHIL # BLD AUTO: ABNORMAL 10*3/UL
EOSINOPHIL NFR BLD AUTO: ABNORMAL %
ERYTHROCYTE [DISTWIDTH] IN BLOOD BY AUTOMATED COUNT: 15.3 % (ref 11.5–14.5)
GLUCOSE BLD MANUAL STRIP-MCNC: 113 MG/DL (ref 74–99)
GLUCOSE BLD MANUAL STRIP-MCNC: 120 MG/DL (ref 74–99)
GLUCOSE BLD MANUAL STRIP-MCNC: 163 MG/DL (ref 74–99)
GLUCOSE BLD MANUAL STRIP-MCNC: 172 MG/DL (ref 74–99)
GLUCOSE BLDA-MCNC: 128 MG/DL (ref 74–99)
GLUCOSE BLDA-MCNC: 155 MG/DL (ref 74–99)
GLUCOSE SERPL-MCNC: 152 MG/DL (ref 74–99)
GLUCOSE SERPL-MCNC: 174 MG/DL (ref 74–99)
HCO3 BLDA-SCNC: 20.6 MMOL/L (ref 22–26)
HCO3 BLDA-SCNC: 23.2 MMOL/L (ref 22–26)
HCT VFR BLD AUTO: 30.5 % (ref 36–46)
HCT VFR BLD EST: 33 % (ref 36–46)
HCT VFR BLD EST: 38 % (ref 36–46)
HGB BLD-MCNC: 10.3 G/DL (ref 12–16)
HGB BLDA-MCNC: 10.9 G/DL (ref 12–16)
HGB BLDA-MCNC: 12.7 G/DL (ref 12–16)
IMM GRANULOCYTES # BLD AUTO: 1.12 X10*3/UL (ref 0–0.7)
IMM GRANULOCYTES NFR BLD AUTO: 4.4 % (ref 0–0.9)
INHALED O2 CONCENTRATION: 100 %
INHALED O2 CONCENTRATION: 80 %
INR PPP: 2.9 (ref 0.9–1.1)
LACTATE BLDA-SCNC: 2.5 MMOL/L (ref 0.4–2)
LACTATE BLDA-SCNC: 2.6 MMOL/L (ref 0.4–2)
LYMPHOCYTES # BLD AUTO: ABNORMAL 10*3/UL
LYMPHOCYTES NFR BLD AUTO: ABNORMAL %
MAGNESIUM SERPL-MCNC: 2.64 MG/DL (ref 1.6–2.4)
MCH RBC QN AUTO: 31.4 PG (ref 26–34)
MCHC RBC AUTO-ENTMCNC: 33.8 G/DL (ref 32–36)
MCV RBC AUTO: 93 FL (ref 80–100)
MONOCYTES # BLD AUTO: ABNORMAL 10*3/UL
MONOCYTES NFR BLD AUTO: ABNORMAL %
NEUTROPHILS # BLD AUTO: ABNORMAL 10*3/UL
NEUTROPHILS NFR BLD AUTO: ABNORMAL %
NRBC BLD-RTO: 1.5 /100 WBCS (ref 0–0)
OXYHGB MFR BLDA: 94.6 % (ref 94–98)
OXYHGB MFR BLDA: 97.3 % (ref 94–98)
PCO2 BLDA: 39 MM HG (ref 38–42)
PCO2 BLDA: 46 MM HG (ref 38–42)
PH BLDA: 7.31 PH (ref 7.38–7.42)
PH BLDA: 7.33 PH (ref 7.38–7.42)
PHOSPHATE SERPL-MCNC: 3.5 MG/DL (ref 2.5–4.9)
PHOSPHATE SERPL-MCNC: 3.7 MG/DL (ref 2.5–4.9)
PLATELET # BLD AUTO: 51 X10*3/UL (ref 150–450)
PO2 BLDA: 144 MM HG (ref 85–95)
PO2 BLDA: 77 MM HG (ref 85–95)
POTASSIUM BLDA-SCNC: 3.8 MMOL/L (ref 3.5–5.3)
POTASSIUM BLDA-SCNC: 4.8 MMOL/L (ref 3.5–5.3)
POTASSIUM SERPL-SCNC: 4.1 MMOL/L (ref 3.5–5.3)
POTASSIUM SERPL-SCNC: 4.3 MMOL/L (ref 3.5–5.3)
PROTHROMBIN TIME: 31.8 SECONDS (ref 9.8–12.4)
RBC # BLD AUTO: 3.28 X10*6/UL (ref 4–5.2)
SAO2 % BLDA: 100 % (ref 94–100)
SAO2 % BLDA: 97 % (ref 94–100)
SODIUM BLDA-SCNC: 132 MMOL/L (ref 136–145)
SODIUM BLDA-SCNC: 133 MMOL/L (ref 136–145)
SODIUM SERPL-SCNC: 133 MMOL/L (ref 136–145)
SODIUM SERPL-SCNC: 135 MMOL/L (ref 136–145)
VANCOMYCIN TROUGH SERPL-MCNC: 14.1 UG/ML (ref 5–20)
WBC # BLD AUTO: 25.6 X10*3/UL (ref 4.4–11.3)

## 2025-03-09 PROCEDURE — 84132 ASSAY OF SERUM POTASSIUM: CPT

## 2025-03-09 PROCEDURE — 82810 BLOOD GASES O2 SAT ONLY: CPT

## 2025-03-09 PROCEDURE — 2500000004 HC RX 250 GENERAL PHARMACY W/ HCPCS (ALT 636 FOR OP/ED): Mod: SE

## 2025-03-09 PROCEDURE — 83735 ASSAY OF MAGNESIUM: CPT

## 2025-03-09 PROCEDURE — 99291 CRITICAL CARE FIRST HOUR: CPT

## 2025-03-09 PROCEDURE — 85018 HEMOGLOBIN: CPT

## 2025-03-09 PROCEDURE — 2020000001 HC ICU ROOM DAILY

## 2025-03-09 PROCEDURE — 2500000005 HC RX 250 GENERAL PHARMACY W/O HCPCS: Mod: SE | Performed by: EMERGENCY MEDICINE

## 2025-03-09 PROCEDURE — 37799 UNLISTED PX VASCULAR SURGERY: CPT

## 2025-03-09 PROCEDURE — 82947 ASSAY GLUCOSE BLOOD QUANT: CPT

## 2025-03-09 PROCEDURE — 85730 THROMBOPLASTIN TIME PARTIAL: CPT

## 2025-03-09 PROCEDURE — 85060 BLOOD SMEAR INTERPRETATION: CPT | Performed by: PATHOLOGY

## 2025-03-09 PROCEDURE — 2500000001 HC RX 250 WO HCPCS SELF ADMINISTERED DRUGS (ALT 637 FOR MEDICARE OP): Mod: SE

## 2025-03-09 PROCEDURE — 2500000005 HC RX 250 GENERAL PHARMACY W/O HCPCS: Mod: SE

## 2025-03-09 PROCEDURE — 80202 ASSAY OF VANCOMYCIN: CPT

## 2025-03-09 PROCEDURE — 99233 SBSQ HOSP IP/OBS HIGH 50: CPT

## 2025-03-09 PROCEDURE — 94645 CONT INHLJ TX EACH ADDL HOUR: CPT

## 2025-03-09 PROCEDURE — 84295 ASSAY OF SERUM SODIUM: CPT

## 2025-03-09 PROCEDURE — 2500000002 HC RX 250 W HCPCS SELF ADMINISTERED DRUGS (ALT 637 FOR MEDICARE OP, ALT 636 FOR OP/ED): Mod: SE

## 2025-03-09 PROCEDURE — 85610 PROTHROMBIN TIME: CPT

## 2025-03-09 PROCEDURE — 82435 ASSAY OF BLOOD CHLORIDE: CPT

## 2025-03-09 PROCEDURE — 90937 HEMODIALYSIS REPEATED EVAL: CPT

## 2025-03-09 PROCEDURE — 85027 COMPLETE CBC AUTOMATED: CPT

## 2025-03-09 PROCEDURE — 94003 VENT MGMT INPAT SUBQ DAY: CPT

## 2025-03-09 PROCEDURE — 82805 BLOOD GASES W/O2 SATURATION: CPT

## 2025-03-09 RX ORDER — CALCIUM GLUCONATE 20 MG/ML
2 INJECTION, SOLUTION INTRAVENOUS ONCE
Status: COMPLETED | OUTPATIENT
Start: 2025-03-09 | End: 2025-03-09

## 2025-03-09 RX ORDER — POLYETHYLENE GLYCOL 3350 17 G/17G
17 POWDER, FOR SOLUTION ORAL DAILY
Status: ACTIVE | OUTPATIENT
Start: 2025-03-10

## 2025-03-09 RX ORDER — VANCOMYCIN HYDROCHLORIDE 750 MG/150ML
750 INJECTION, SOLUTION INTRAVENOUS EVERY 12 HOURS
Status: DISPENSED | OUTPATIENT
Start: 2025-03-10

## 2025-03-09 RX ORDER — AMOXICILLIN 250 MG
2 CAPSULE ORAL DAILY
Status: ACTIVE | OUTPATIENT
Start: 2025-03-10

## 2025-03-09 RX ADMIN — CALCIUM CHLORIDE, MAGNESIUM CHLORIDE, DEXTROSE MONOHYDRATE, LACTIC ACID, SODIUM CHLORIDE, SODIUM BICARBONATE AND POTASSIUM CHLORIDE 25 ML/KG/HR: 3.68; 3.05; 22; 5.4; 6.46; 3.09; .314 INJECTION INTRAVENOUS at 11:26

## 2025-03-09 RX ADMIN — Medication 200 MCG/HR: at 14:07

## 2025-03-09 RX ADMIN — CALCIUM CHLORIDE, MAGNESIUM CHLORIDE, DEXTROSE MONOHYDRATE, LACTIC ACID, SODIUM CHLORIDE, SODIUM BICARBONATE AND POTASSIUM CHLORIDE 25 ML/KG/HR: 3.68; 3.05; 22; 5.4; 6.46; 3.09; .314 INJECTION INTRAVENOUS at 06:06

## 2025-03-09 RX ADMIN — THIAMINE HYDROCHLORIDE 500 MG: 100 INJECTION, SOLUTION INTRAMUSCULAR; INTRAVENOUS at 03:34

## 2025-03-09 RX ADMIN — INSULIN LISPRO 1 UNITS: 100 INJECTION, SOLUTION INTRAVENOUS; SUBCUTANEOUS at 05:14

## 2025-03-09 RX ADMIN — CARBOXYMETHYLCELLULOSE SODIUM 2 DROP: 5 SOLUTION/ DROPS OPHTHALMIC at 15:26

## 2025-03-09 RX ADMIN — LACTULOSE 20 G: 20 SOLUTION ORAL at 15:22

## 2025-03-09 RX ADMIN — METHYLPREDNISOLONE SODIUM SUCCINATE 50 MG: 125 INJECTION, POWDER, FOR SOLUTION INTRAMUSCULAR; INTRAVENOUS at 21:38

## 2025-03-09 RX ADMIN — CEFTRIAXONE SODIUM 2 G: 2 INJECTION, SOLUTION INTRAVENOUS at 17:51

## 2025-03-09 RX ADMIN — WHITE PETROLATUM 57.7 %-MINERAL OIL 31.9 % EYE OINTMENT 1 APPLICATION: at 14:09

## 2025-03-09 RX ADMIN — Medication 80 PERCENT: at 20:13

## 2025-03-09 RX ADMIN — LACTULOSE 20 G: 20 SOLUTION ORAL at 03:34

## 2025-03-09 RX ADMIN — CALCIUM GLUCONATE 2 G: 20 INJECTION, SOLUTION INTRAVENOUS at 09:01

## 2025-03-09 RX ADMIN — Medication 250 MCG/HR: at 10:00

## 2025-03-09 RX ADMIN — INSULIN LISPRO 1 UNITS: 100 INJECTION, SOLUTION INTRAVENOUS; SUBCUTANEOUS at 12:58

## 2025-03-09 RX ADMIN — METHYLPREDNISOLONE SODIUM SUCCINATE 50 MG: 125 INJECTION, POWDER, FOR SOLUTION INTRAMUSCULAR; INTRAVENOUS at 08:57

## 2025-03-09 RX ADMIN — Medication 250 MCG/HR: at 05:30

## 2025-03-09 RX ADMIN — ASCORBIC ACID, THIAMINE MONONITRATE,RIBOFLAVIN, NIACINAMIDE, PYRIDOXINE HYDROCHLORIDE, FOLIC ACID, CYANOCOBALAMIN, BIOTIN, CALCIUM PANTOTHENATE, 1 CAPSULE: 100; 1.5; 1.7; 20; 10; 1; 6000; 150000; 5 CAPSULE, LIQUID FILLED ORAL at 08:57

## 2025-03-09 RX ADMIN — PANTOPRAZOLE SODIUM 40 MG: 40 INJECTION, POWDER, LYOPHILIZED, FOR SOLUTION INTRAVENOUS at 08:40

## 2025-03-09 RX ADMIN — Medication 200 MCG/HR: at 20:37

## 2025-03-09 RX ADMIN — LACTULOSE 20 G: 20 SOLUTION ORAL at 08:57

## 2025-03-09 RX ADMIN — VANCOMYCIN HYDROCHLORIDE 500 MG: 500 INJECTION, SOLUTION INTRAVENOUS at 08:40

## 2025-03-09 RX ADMIN — KETAMINE HYDROCHLORIDE 2 MG/KG/HR: 10 INJECTION INTRAMUSCULAR; INTRAVENOUS at 08:58

## 2025-03-09 RX ADMIN — LACTULOSE 20 G: 20 SOLUTION ORAL at 21:38

## 2025-03-09 RX ADMIN — Medication 100 PERCENT: at 08:00

## 2025-03-09 RX ADMIN — WHITE PETROLATUM 57.7 %-MINERAL OIL 31.9 % EYE OINTMENT 1 APPLICATION: at 08:58

## 2025-03-09 RX ADMIN — KETAMINE HYDROCHLORIDE 2 MG/KG/HR: 10 INJECTION INTRAMUSCULAR; INTRAVENOUS at 17:36

## 2025-03-09 RX ADMIN — THIAMINE HCL TAB 100 MG 100 MG: 100 TAB at 09:06

## 2025-03-09 RX ADMIN — CARBOXYMETHYLCELLULOSE SODIUM 2 DROP: 5 SOLUTION/ DROPS OPHTHALMIC at 09:05

## 2025-03-09 RX ADMIN — Medication 250 MCG/HR: at 01:02

## 2025-03-09 RX ADMIN — VANCOMYCIN HYDROCHLORIDE 500 MG: 500 INJECTION, SOLUTION INTRAVENOUS at 18:09

## 2025-03-09 RX ADMIN — CALCIUM CHLORIDE, MAGNESIUM CHLORIDE, DEXTROSE MONOHYDRATE, LACTIC ACID, SODIUM CHLORIDE, SODIUM BICARBONATE AND POTASSIUM CHLORIDE 25 ML/KG/HR: 3.68; 3.05; 22; 5.4; 6.46; 3.09; .314 INJECTION INTRAVENOUS at 16:26

## 2025-03-09 ASSESSMENT — PAIN - FUNCTIONAL ASSESSMENT
PAIN_FUNCTIONAL_ASSESSMENT: CPOT (CRITICAL CARE PAIN OBSERVATION TOOL)

## 2025-03-09 NOTE — PROGRESS NOTES
Nephrology Progress Note     Date of admission: 3/3/2025     Subjective: on cvvh, soft hemodynamics, on lev, epoprostenol      CURRENT MEDICATIONS:  Scheduled medications  cefTRIAXone, 2 g, intravenous, q24h  cisatracurium, 0.1 mg/kg, intravenous, Once  pantoprazole, 40 mg, oral, Daily before breakfast   Or  esomeprazole, 40 mg, nasoduodenal tube, Daily before breakfast   Or  pantoprazole, 40 mg, intravenous, Daily before breakfast  fentaNYL, 25 mcg, intravenous, Once  insulin lispro, 0-5 Units, subcutaneous, q4h  lactulose, 20 g, oral, q6h  artificial tears, 2 drop, Both Eyes, q6h  methylPREDNISolone sodium succinate (PF), 50 mg, intravenous, q12h  midazolam, 2 mg, intravenous, Once  oxygen, , inhalation, Continuous - Inhalation  polyethylene glycol, 17 g, oral, BID  sennosides-docusate sodium, 2 tablet, oral, BID  thiamine, 100 mg, oral, Daily  vancomycin, 500 mg, intravenous, q12h  vitamin B complex-vitamin C-folic acid, 1 capsule, oral, Daily  white petrolatum-mineral oiL, 1 Application, Both Eyes, q6h      Continuous medications  angiotensin II (Giapreza) 2.5 mg in sodium chloride 0.9% 250 mL (0.01 mg/mL) infusion, 1.25-40 ng/kg/min, Last Rate: Stopped (03/08/25 1730)  bivalirudin, 0.05-0.49 mg/kg/hr, Last Rate: 0.1 mg/kg/hr (03/09/25 0700)  epoprostenol, 0.05 mcg/kg/min (Ideal), Last Rate: 0.05 mcg/kg/min (03/09/25 0420)  fentaNYL, 0-300 mcg/hr, Last Rate: 250 mcg/hr (03/09/25 1000)  ketamine (Ketalar) 1,000 mg in 100 mL (10 mg/mL) infusion, 0.05-2 mg/kg/hr, Last Rate: 2 mg/kg/hr (03/09/25 0858)  midazolam, 0-20 mg/hr, Last Rate: 5 mg/hr (03/09/25 0700)  norepinephrine, 0-0.5 mcg/kg/min, Last Rate: 0.01 mcg/kg/min (03/09/25 1000)  PrismaSol 4/2.5, 25 mL/kg/hr, Last Rate: 25 mL/kg/hr (03/09/25 0606)  vasopressin, 0-0.03 Units/min, Last Rate: 0 Units/min (03/08/25 2000)      PRN medications  PRN medications: acetaminophen **OR** acetaminophen, alteplase, dextrose, dextrose, fentaNYL, glucagon, glucagon,  "midazolam, vancomycin         OBJECTIVE:     VITALS: Visit Vitals  /63 (Patient Position: Lying)   Pulse 88   Temp 36 °C (96.8 °F)   Resp 24   Ht 1.676 m (5' 6\")   Wt 78.8 kg (173 lb 11.6 oz)   SpO2 97%   BMI 28.04 kg/m²   Smoking Status Every Day   BSA 1.92 m²       GEN: Vent/sedated  CVS: S1 S2 no murmurs  RESP:  Coarse BS BL  Neuro: Sedated  Extremities: Trace edema      LABS:  Results from last 72 hours   Lab Units 03/09/25  0512   WBC AUTO x10*3/uL 25.6*   HEMOGLOBIN g/dL 10.3*   MCV fL 93   PLATELETS AUTO x10*3/uL 51*   BUN mg/dL 47*   CREATININE mg/dL 1.85*   CALCIUM mg/dL 7.7*           Intake/Output Summary (Last 24 hours) at 3/9/2025 1047  Last data filed at 3/9/2025 1000  Gross per 24 hour   Intake 2216.23 ml   Output 2840 ml   Net -623.77 ml        ASSESSMENT AND PLAN:   Gissel Harvey is a 39-year-old female admitted for acute respiratory failure s/p intubation with multifocal pneumonia and worsening respiratory status and hemodynamics. On 4 vasopressors currently with lactic acidosis not improving. Renal function has been slowly worsening and likely ATN from profound shock.    #septic shock 2/2 pna  #ARDS 2/2 CAP  #Metabolic acidosis  #Lactic acidosis  #Pneumonia  #VTACH    #SCOUT -D anuric  Baseline creatinine presumed normal. 0.64 2022. No CKD history. Presented at 1.91 on 3/3.  UOP: Minimal dark urine  Acid-base: Metabolic / lactic acidosis  Volume: No significant overload. Poor resp status PEEP 12, FIO2 100%.  BP: soft hemodynamics, on angiotensin and epoprostenol    Recommendations:  -continue CRRT per submitted orders  -check phos and Mag BID and replete as needed  -RFP BID  -Will Follow    D/w Dr. Elpidio Rodrigues MD  Nephrology Fellow  "

## 2025-03-09 NOTE — PROGRESS NOTES
Medical Intensive Care - Daily Progress Note   Subjective    Gissel Harvey is a 39 y.o. year old female patient admitted on 3/3/2025 with following ICU needs: intubation and pressor support    Interval History:  NAEON. The patient has had large amounts of diarrhea per nursing.    Pressors: Levo 0.02  Sedation: Versed 5 mg/hr, fent 250 mcg/hr  Paralytic: None  Anticoagulation: Bivalirudin 0.01 mg/kg/hr    Meds    Scheduled medications  cefTRIAXone, 2 g, intravenous, q24h  cisatracurium, 0.1 mg/kg, intravenous, Once  pantoprazole, 40 mg, oral, Daily before breakfast   Or  esomeprazole, 40 mg, nasoduodenal tube, Daily before breakfast   Or  pantoprazole, 40 mg, intravenous, Daily before breakfast  fentaNYL, 25 mcg, intravenous, Once  insulin lispro, 0-5 Units, subcutaneous, q4h  lactulose, 20 g, oral, q6h  artificial tears, 2 drop, Both Eyes, q6h  methylPREDNISolone sodium succinate (PF), 50 mg, intravenous, q12h  midazolam, 2 mg, intravenous, Once  oxygen, , inhalation, Continuous - Inhalation  polyethylene glycol, 17 g, oral, BID  sennosides-docusate sodium, 2 tablet, oral, BID  thiamine, 100 mg, oral, Daily  vancomycin, 500 mg, intravenous, q12h  vitamin B complex-vitamin C-folic acid, 1 capsule, oral, Daily  white petrolatum-mineral oiL, 1 Application, Both Eyes, q6h      Continuous medications  angiotensin II (Giapreza) 2.5 mg in sodium chloride 0.9% 250 mL (0.01 mg/mL) infusion, 1.25-40 ng/kg/min, Last Rate: Stopped (03/08/25 1730)  bivalirudin, 0.05-0.49 mg/kg/hr, Last Rate: 0.1 mg/kg/hr (03/09/25 0700)  epoprostenol, 0.05 mcg/kg/min (Ideal), Last Rate: 0.05 mcg/kg/min (03/09/25 0420)  fentaNYL, 0-300 mcg/hr, Last Rate: 200 mcg/hr (03/09/25 1100)  ketamine (Ketalar) 1,000 mg in 100 mL (10 mg/mL) infusion, 0.05-2 mg/kg/hr, Last Rate: 2 mg/kg/hr (03/09/25 0858)  midazolam, 0-20 mg/hr, Last Rate: 4 mg/hr (03/09/25 1100)  norepinephrine, 0-0.5 mcg/kg/min, Last Rate: 0.01 mcg/kg/min (03/09/25 1000)  PrismaSol 4/2.5,  "25 mL/kg/hr, Last Rate: 25 mL/kg/hr (03/09/25 0606)  vasopressin, 0-0.03 Units/min, Last Rate: 0 Units/min (03/08/25 2000)      PRN medications  PRN medications: acetaminophen **OR** acetaminophen, alteplase, dextrose, dextrose, fentaNYL, glucagon, glucagon, midazolam, vancomycin     Objective    Blood pressure 104/63, pulse 91, temperature 36.5 °C (97.7 °F), resp. rate (!) 48, height 1.676 m (5' 6\"), weight 78.8 kg (173 lb 11.6 oz), SpO2 95%.     Physical Exam   General: intubated, sedated  HEENT: normocephalic, atraumatic  CV: regular rate and rhythm. Normal S1 and S2. No murmurs  Resp: lungs coarse with rhonchi and mild wheezing.   Abd: soft, distended  Ext: BUE and BLE edema      Intake/Output Summary (Last 24 hours) at 3/9/2025 1109  Last data filed at 3/9/2025 1100  Gross per 24 hour   Intake 2210.21 ml   Output 3124 ml   Net -913.79 ml     Labs:   Results from last 72 hours   Lab Units 03/09/25  0512 03/08/25  1725 03/08/25  0246   SODIUM mmol/L 135* 134* 135*   POTASSIUM mmol/L 4.1 3.8 4.2   CHLORIDE mmol/L 101 102 101   CO2 mmol/L 26 21 23   BUN mg/dL 47* 46* 52*   CREATININE mg/dL 1.85* 1.75* 1.95*   GLUCOSE mg/dL 174* 115* 105*   CALCIUM mg/dL 7.7* 7.5* 8.2*   ANION GAP mmol/L 12 15 15   EGFR mL/min/1.73m*2 35* 38* 33*   PHOSPHORUS mg/dL 3.5 3.4 3.6      Results from last 72 hours   Lab Units 03/09/25  0512 03/08/25  0246 03/07/25  0439   WBC AUTO x10*3/uL 25.6* 26.5* 18.3*   HEMOGLOBIN g/dL 10.3* 10.4* 10.5*   HEMATOCRIT % 30.5* 29.9* 30.1*   PLATELETS AUTO x10*3/uL 51* 52* 56*   NEUTROS PCT AUTO %  --  81.2  --    LYMPHO PCT MAN %  --   --  3.3   LYMPHS PCT AUTO %  --  8.2  --    MONO PCT MAN %  --   --  8.2   MONOS PCT AUTO %  --  7.9  --    EOSINO PCT MAN %  --   --  0.0   EOS PCT AUTO %  --  0.0  --       Results from last 72 hours   Lab Units 03/09/25  0009 03/08/25  0245 03/07/25  1616   POCT PH, ARTERIAL pH 7.33* 7.36* 7.37*   POCT PCO2, ARTERIAL mm Hg 39 42 44*   POCT PO2, ARTERIAL mm Hg 144* 74* " 73*   POCT SO2, ARTERIAL % 100 93* 96              Micro/ID:     Lab Results   Component Value Date    BLOODCULT No growth at 4 days -  FINAL REPORT 03/03/2025    BLOODCULT No growth at 4 days -  FINAL REPORT 03/03/2025       Summary of key imaging results from the last 24 hours  No results found.     Assessment and Plan     Assessment: Gissel Harvey is a 39 y.o. year old female patient admitted on 3/3/2025 with intubation and pressor support.     Gissel Harvey is a 39 y.o. 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 Treating for septic shock with possible cardiogenic shock component as well as ARDS.    Her pressor requirement has decreased and she is now on single pressor therapy. Lactate continues to downtrend. She is tolerating CVVH this morning. If her P/F ratio is greater than 150, we can try holding Epo. She is oxygenating well and we can also decrease her FiO2.    Mechanical Ventilation: 4-10 days  Sedation/Analgesia:  fentanyl, ketamine, Versed  Restraints: no    Summary for 03/09/25:  Down to single pressor (levo)  Lactate downtrending, no longer need serial ABGs  Continue CVVH  RASS -4 to -5   Wean versed and fent  Continue vanc and ceftriaxone  Recheck ABG for P/F ratio, if >150 then hold epo  Reduce FiO2 to 90%  Hep C Abs positive, will need outpt GI follow up    NEUROLOGY/PSYCH:  #Sedation   - Sedating with fentanyl and Versed  - RASS goal -4 to -5  - Wean versed and fent  - Thiamine supplementation    CARDIOVASCULAR:  #Shock, likely septic I/s/o severe pneumonia and possible cardiogenic component  #HFrEF  :: lactate > 9 on presentation, now downtrending  :: TTE 3/4 with EF 30-35% with reduced RV function, no previous TTE for comparison  :: BNP  2127 at admission  :: Troponin plateaued (62>63) on 3/4/25  - Down to single pressor (levo)  - Lactate downtrending, no longer need serial ABGs    #Aflutter  #Sustained VT   :: went into NSVT while wire threaded for central line placement  :: s/p amiodarone 150 mg bolus x2  :: pt cardioverted  - Pt is back to sinus rhythm, okay for amio gtt if arrhythmias return    PULMONARY:  Vent Mode: Volume control/assist control  FiO2 (%):  [100 %] 100 %  S RR:  [24] 24  S VT:  [450 mL] 450 mL  PEEP/CPAP (cm H2O):  [12 cm H20] 12 cm H20  MAP (cm H2O):  [17-18] 18     #Community acquired pneumonia   #ARDS   :: intubated 3/3   :: P:F ~ 70 on admission   :: Procalcitonin 57.3  :: flu A/B, COVID, parainfluenza, RSV, metapneumovirus, legionella urine Ag: negative  :: s/p vanc, Zosyn, azithro 3/2 in the ED   :: meropenem (3/3-3/8)  :: MRSA nares positive 3/4  :: Tracheal aspirate cx 3/3: pan sensitive staph aureus and strep pneumo  :: Strep pneumo urine ag: positive  :: Bcx 3/3: NGTD x3 days    Plan  - f/up resp viral panel  - Continue vanc and ceftriaxone (3/8-)  - Continue solumedrol 50 mg q12 for ARDS and mineralocorticoid effect  - Recheck ABG for P/F ratio, if >150 then hold epo  - Reduce FiO2 to 90%    RENAL/GENITOURINARY:  #Decreased renal function c/f SCOUT vs CKD, worsening  #HAGMA  :: Cr 1.9 on admission, unclear baseline as no labs in 2 years   :: FeNa 3/3: 0.1% prerenal  :: anion gap 19, delta 7, delta ratio 3.5 suggests HAGMA with concurrent metabolic alkalosis  - Continue CVVH per Nephrology  - Continue to monitor Cr with daily RFP    GASTROENTEROLOGY:  #liver cirrhosis   #portal hypertension s/p TIPS   #hx of Budd-Chiari   :: 3/7 POCUS with distended bowel, no appreciable ascites  - Continue to monitor abdomen for signs of ascites formation  - Hep C Abs positive, will need outpt GI follow up  - Reduce bowel regimen d/t diarrhea    ENDOCRINOLOGY:  - SSI while on steroids     HEMATOLOGY:  #Lymphopenia   :: Abs leukocyte  count 260  :: HIV neg  - Now with leukocytosis likely 2/2 known infection and steroid use  - CTM     #Hx of antiphospholipid syndrome   #Hx of positive PF4  #Chronic IVC occlusion   #Hx of DVT   :: Supposed to be on fondaparinux outpatient, unclear if taking as no recent med fills   - Continue bivalirudin while inpatient I/s/o previous positive PF4     #Thrombocytopenia, stable  - Trend plts, INR, coags  - Fibrinogen wnl  - Vancomycin could cause drug-induced thrombocytopenia  - Amiodarone unlikely because timeline doesn't fit  - If platelets <50, transfuse but do not stop bivalirubin    SKIN:  -No acute issues    MUSCULOSKELETAL:        -No acute issues    INFECTIOUS DISEASE:  ::See above pulm section for more details    Antibiotics  Azithromycin 500 mg: 3/3  Zosyn 4.5 g: 3/3  Tobramycin 440 mg: 3/4  Vancomycin: 3/3 - 3/6, 3/8 - present  Ceftaroline 3/5 - 3/6  Meropenem 2 g BID: 3/3 - 3/8  Micafungin: 3/4 - 3/6  Doxycycline 100 mg BID: 3/4 - 3/6  Ceftriaxone 3/8 - present    Social:  - Full code    ICU Check List     FEN  Fluids: CVVH  Electrolytes: CVVH  Nutrition: trickle feeds  Prophylaxis:  DVT ppx: bivalirudin gtt  GI ppx: PPI  Bowel care: lactulose, Miralax, senna  Hardware:         CVC 03/04/25 Triple lumen Left Internal jugular (Active)   Placement Date: 03/04/25   Earliest Known Present: 03/04/25  Lumen Type: Triple lumen  Orientation: Left  Location: Internal jugular   Number of days: 1       Arterial Line 03/03/25 Left Radial (Active)   Placement Date/Time: 03/03/25 (c) 2040   Size: 22 G  Orientation: Left  Location: Radial  Site Prep: Chlorhexidine   Local Anesthetic: Injectable  Insertion attempts: 2  Securement Method: Transparent dressing   Number of days: 1       ETT  7.5 mm (Active)   Placement Date: 03/03/25   ETT Type: ETT - single  Single Lumen Tube Size: 7.5 mm  Cuffed: Yes  Location: Oral   Number of days: 2       Urethral Catheter 16 Fr. (Active)   Placement Date/Time: 03/03/25 1700   Hand  Hygiene Completed: Yes  Tube Size (Fr.): 16 Fr.  Catheter Balloon Size: 10 mL  Urine Returned: Yes   Number of days: 1       NG/OG/Feeding Tube NG - [place] sump Right nostril (Active)   Placement Date/Time: 03/03/25 1900   Type of Tube: NG/OG Tube  Tube Length: 53 cm  Tube Type: NG - [place] sump  Tube Location: Right nostril   Number of days: 1       Social:  Code: Full Code    HPOA: Payal Durant (daughter) [phone]  Disposition: MARLENE Das MD   03/09/25 at 11:09 AM     Disclaimer: Documentation completed with the information available at the time of input. The times in the chart may not be reflective of actual patient care times, interventions, or procedures. Documentation occurs after the physical care of the patient.

## 2025-03-09 NOTE — CARE PLAN
Problem: Safety - Adult  Goal: Free from fall injury  Outcome: Progressing     Problem: Nutrition  Goal: Nutrient intake appropriate for maintaining nutritional needs  Outcome: Progressing     Problem: Skin  Goal: Decreased wound size/increased tissue granulation at next dressing change  Outcome: Progressing  Goal: Participates in plan/prevention/treatment measures  Outcome: Progressing  Goal: Prevent/manage excess moisture  Outcome: Progressing  Goal: Prevent/minimize sheer/friction injuries  Outcome: Progressing  Goal: Promote/optimize nutrition  Outcome: Progressing  Goal: Promote skin healing  Outcome: Progressing

## 2025-03-09 NOTE — CONSULTS
Wound Care Consult     Visit Date: 3/9/2025      Patient Name: Gissel Harvey         MRN: [id number]           YOB: 1985     Reason for Consult: assess moth injury and multiple wounds         Wound History: Patient admitted on 3/3/2025 with following ICU needs: intubation and pressor support      Pertinent Labs:   Albumin   Date Value Ref Range Status   03/09/2025 2.7 (L) 3.4 - 5.0 g/dL Final       Wound Assessment:  Wound 03/07/25 Pressure Injury Buttock Left (Active)   Wound Image   03/09/25 1600   Site Assessment Pink;Purple;[place] 03/09/25 1600   [place]-Wound Assessment Intact 03/09/25 1600   Pressure Injury Stage DTPI 03/09/25 1600   Wound Length (cm) 5 cm 03/09/25 1600   Wound Width (cm) 2.5 cm 03/09/25 1600   Wound Surface Area (cm^2) 12.5 cm^2 03/09/25 1600   Wound Depth (cm) 0.1 cm 03/09/25 1600   Wound Volume (cm^3) 1.25 cm^3 03/09/25 1600   Drainage Description Serous 03/09/25 1600   Drainage Amount Scant 03/09/25 1600   Dressing Xeroform;Silicone border dressing 03/09/25 1600   Dressing Changed Changed 03/09/25 1600   Dressing Status Clean 03/09/25 1600       Wound 03/07/25 Other (comment) Back Lateral;Left;Lower (Active)   Wound Image   03/07/25 0622   Site Assessment Fragile 03/09/25 1600   Wound Length (cm) 1.2 cm 03/09/25 1600   Wound Width (cm) 1 cm 03/09/25 1600   Wound Surface Area (cm^2) 1.2 cm^2 03/09/25 1600   Treatments Cleansed 03/09/25 1600   Drainage Description None 03/09/25 1600   Drainage Amount None 03/09/25 1600   Dressing Open to air 03/08/25 0400       Wound 03/08/25 Pressure Injury Mouth (Active)   Wound Image   03/08/25 1946   Site Assessment Eschar 03/09/25 0800   [place]-Wound Assessment Fragile 03/09/25 0800   Pressure Injury Stage DTPI 03/09/25 0800   Dressing Open to air 03/09/25 0800       Wound 03/09/25 Pressure Injury Buttock Right (Active)   Wound Image   03/09/25 1601   Site Assessment Black 03/09/25 1601   [place]-Wound Assessment Intact;Edema 03/09/25 1601    Pressure Injury Stage DTPI 03/09/25 1601   Wound Length (cm) 5 cm 03/09/25 1601   Wound Width (cm) 1 cm 03/09/25 1601   Wound Surface Area (cm^2) 5 cm^2 03/09/25 1601   Margins Well-defined edges 03/09/25 1601   Drainage Description None 03/09/25 1601   Drainage Amount None 03/09/25 1601   Dressing Xeroform;Silicone border dressing 03/09/25 1601   Dressing Changed Changed 03/09/25 1601   Dressing Status Clean 03/09/25 1601       Wound 03/09/25 Leg Right;Upper (Active)   Wound Image   03/09/25 1608   Wound Length (cm) 4 cm 03/09/25 1608   Wound Width (cm) 3.5 cm 03/09/25 1608   Wound Surface Area (cm^2) 14 cm^2 03/09/25 1608   Wound Depth (cm) 0.1 cm 03/09/25 1608   Wound Volume (cm^3) 1.4 cm^3 03/09/25 1608   Drainage Description Serous 03/09/25 1608   Drainage Amount Small 03/09/25 1608   Dressing Foam 03/09/25 1608   Dressing Changed Changed 03/09/25 1608   Dressing Status Clean 03/09/25 1608       Wound Team Summary Assessment:   Recommendation for buttocks:  Cleanse B/L buttocks wounds with Vashe.   Cover with Xeroform dressing   Cover with Mepilex sacral dressing   Turn every 2 hours  Maintain air taps and waffle mattress    Recommendation for right thigh blister:   Cleanse with Vashe  Cover with Mepilex Lite     Recommendation for mouth wounds:  Would continue apply petroleum - keep wound moist   Wound care will follow up 3/11     Recommendation for Right Ear ( Purple 1.5 x 1 DTI)  Would leave open to air   Position head to avoid pressure   Would try to obtain head postioning pillow      Wound Team Plan: Please review recommendations      Lyssa ROBLES   3/9/2025  6:19 PM

## 2025-03-10 ENCOUNTER — APPOINTMENT (OUTPATIENT)
Dept: RADIOLOGY | Facility: HOSPITAL | Age: 40
End: 2025-03-10
Payer: COMMERCIAL

## 2025-03-10 VITALS
SYSTOLIC BLOOD PRESSURE: 104 MMHG | HEART RATE: 85 BPM | RESPIRATION RATE: 25 BRPM | WEIGHT: 173.72 LBS | OXYGEN SATURATION: 93 % | HEIGHT: 66 IN | TEMPERATURE: 95.7 F | DIASTOLIC BLOOD PRESSURE: 63 MMHG | BODY MASS INDEX: 27.92 KG/M2

## 2025-03-10 LAB
ABO GROUP (TYPE) IN BLOOD: NORMAL
ALBUMIN SERPL BCP-MCNC: 3 G/DL (ref 3.4–5)
ALBUMIN SERPL BCP-MCNC: 3.1 G/DL (ref 3.4–5)
ANION GAP BLDA CALCULATED.4IONS-SCNC: 14 MMO/L (ref 10–25)
ANION GAP BLDA CALCULATED.4IONS-SCNC: 14 MMO/L (ref 10–25)
ANION GAP BLDA CALCULATED.4IONS-SCNC: 15 MMO/L (ref 10–25)
ANION GAP BLDA CALCULATED.4IONS-SCNC: 15 MMO/L (ref 10–25)
ANION GAP BLDA CALCULATED.4IONS-SCNC: 16 MMO/L (ref 10–25)
ANION GAP BLDA CALCULATED.4IONS-SCNC: 16 MMO/L (ref 10–25)
ANION GAP SERPL CALC-SCNC: 17 MMOL/L (ref 10–20)
ANION GAP SERPL CALC-SCNC: 17 MMOL/L (ref 10–20)
ANTIBODY SCREEN: NORMAL
APTT PPP: 58 SECONDS (ref 26–36)
BASE EXCESS BLDA CALC-SCNC: -3.6 MMOL/L (ref -2–3)
BASE EXCESS BLDA CALC-SCNC: -4.8 MMOL/L (ref -2–3)
BASE EXCESS BLDA CALC-SCNC: -5.4 MMOL/L (ref -2–3)
BASE EXCESS BLDA CALC-SCNC: -5.6 MMOL/L (ref -2–3)
BASE EXCESS BLDA CALC-SCNC: -6.2 MMOL/L (ref -2–3)
BASE EXCESS BLDA CALC-SCNC: -6.2 MMOL/L (ref -2–3)
BASOPHILS # BLD AUTO: 0.08 X10*3/UL (ref 0–0.1)
BASOPHILS NFR BLD AUTO: 0.3 %
BODY TEMPERATURE: 37 DEGREES CELSIUS
BUN SERPL-MCNC: 46 MG/DL (ref 6–23)
BUN SERPL-MCNC: 49 MG/DL (ref 6–23)
C DIF TOX TCDA+TCDB STL QL NAA+PROBE: NOT DETECTED
CA-I BLDA-SCNC: 1.02 MMOL/L (ref 1.1–1.33)
CA-I BLDA-SCNC: 1.05 MMOL/L (ref 1.1–1.33)
CA-I BLDA-SCNC: 1.06 MMOL/L (ref 1.1–1.33)
CA-I BLDA-SCNC: 1.06 MMOL/L (ref 1.1–1.33)
CA-I BLDA-SCNC: 1.09 MMOL/L (ref 1.1–1.33)
CA-I BLDA-SCNC: 1.12 MMOL/L (ref 1.1–1.33)
CALCIUM SERPL-MCNC: 7.9 MG/DL (ref 8.6–10.6)
CALCIUM SERPL-MCNC: 8 MG/DL (ref 8.6–10.6)
CHLORIDE BLDA-SCNC: 100 MMOL/L (ref 98–107)
CHLORIDE BLDA-SCNC: 101 MMOL/L (ref 98–107)
CHLORIDE BLDA-SCNC: 99 MMOL/L (ref 98–107)
CHLORIDE BLDA-SCNC: 99 MMOL/L (ref 98–107)
CHLORIDE SERPL-SCNC: 100 MMOL/L (ref 98–107)
CHLORIDE SERPL-SCNC: 99 MMOL/L (ref 98–107)
CO2 SERPL-SCNC: 20 MMOL/L (ref 21–32)
CO2 SERPL-SCNC: 22 MMOL/L (ref 21–32)
CREAT SERPL-MCNC: 1.83 MG/DL (ref 0.5–1.05)
CREAT SERPL-MCNC: 1.84 MG/DL (ref 0.5–1.05)
EGFRCR SERPLBLD CKD-EPI 2021: 35 ML/MIN/1.73M*2
EGFRCR SERPLBLD CKD-EPI 2021: 36 ML/MIN/1.73M*2
EOSINOPHIL # BLD AUTO: 0 X10*3/UL (ref 0–0.7)
EOSINOPHIL NFR BLD AUTO: 0 %
ERYTHROCYTE [DISTWIDTH] IN BLOOD BY AUTOMATED COUNT: 16.2 % (ref 11.5–14.5)
GLUCOSE BLD MANUAL STRIP-MCNC: 168 MG/DL (ref 74–99)
GLUCOSE BLD MANUAL STRIP-MCNC: 175 MG/DL (ref 74–99)
GLUCOSE BLD MANUAL STRIP-MCNC: 180 MG/DL (ref 74–99)
GLUCOSE BLD MANUAL STRIP-MCNC: 200 MG/DL (ref 74–99)
GLUCOSE BLD MANUAL STRIP-MCNC: 206 MG/DL (ref 74–99)
GLUCOSE BLD MANUAL STRIP-MCNC: 215 MG/DL (ref 74–99)
GLUCOSE BLD MANUAL STRIP-MCNC: 277 MG/DL (ref 74–99)
GLUCOSE BLDA-MCNC: 189 MG/DL (ref 74–99)
GLUCOSE BLDA-MCNC: 192 MG/DL (ref 74–99)
GLUCOSE BLDA-MCNC: 211 MG/DL (ref 74–99)
GLUCOSE BLDA-MCNC: 242 MG/DL (ref 74–99)
GLUCOSE BLDA-MCNC: 243 MG/DL (ref 74–99)
GLUCOSE BLDA-MCNC: 284 MG/DL (ref 74–99)
GLUCOSE SERPL-MCNC: 215 MG/DL (ref 74–99)
GLUCOSE SERPL-MCNC: 233 MG/DL (ref 74–99)
HCO3 BLDA-SCNC: 21 MMOL/L (ref 22–26)
HCO3 BLDA-SCNC: 21 MMOL/L (ref 22–26)
HCO3 BLDA-SCNC: 21.1 MMOL/L (ref 22–26)
HCO3 BLDA-SCNC: 21.6 MMOL/L (ref 22–26)
HCO3 BLDA-SCNC: 21.6 MMOL/L (ref 22–26)
HCO3 BLDA-SCNC: 22.7 MMOL/L (ref 22–26)
HCT VFR BLD AUTO: 37.6 % (ref 36–46)
HCT VFR BLD EST: 35 % (ref 36–46)
HCT VFR BLD EST: 35 % (ref 36–46)
HCT VFR BLD EST: 37 % (ref 36–46)
HCT VFR BLD EST: 38 % (ref 36–46)
HGB BLD-MCNC: 11.8 G/DL (ref 12–16)
HGB BLDA-MCNC: 11.6 G/DL (ref 12–16)
HGB BLDA-MCNC: 11.7 G/DL (ref 12–16)
HGB BLDA-MCNC: 12.3 G/DL (ref 12–16)
HGB BLDA-MCNC: 12.5 G/DL (ref 12–16)
HGB BLDA-MCNC: 12.6 G/DL (ref 12–16)
HGB BLDA-MCNC: 12.8 G/DL (ref 12–16)
IMM GRANULOCYTES # BLD AUTO: 1.58 X10*3/UL (ref 0–0.7)
IMM GRANULOCYTES NFR BLD AUTO: 5 % (ref 0–0.9)
INHALED O2 CONCENTRATION: 70 %
INHALED O2 CONCENTRATION: 80 %
INR PPP: 2.8 (ref 0.9–1.1)
LACTATE BLDA-SCNC: 3.1 MMOL/L (ref 0.4–2)
LACTATE BLDA-SCNC: 3.2 MMOL/L (ref 0.4–2)
LACTATE BLDA-SCNC: 3.3 MMOL/L (ref 0.4–2)
LACTATE BLDA-SCNC: 3.3 MMOL/L (ref 0.4–2)
LACTATE BLDA-SCNC: 3.4 MMOL/L (ref 0.4–2)
LACTATE BLDA-SCNC: 3.8 MMOL/L (ref 0.4–2)
LACTATE SERPL-SCNC: 3 MMOL/L (ref 0.4–2)
LYMPHOCYTES # BLD AUTO: 1.97 X10*3/UL (ref 1.2–4.8)
LYMPHOCYTES NFR BLD AUTO: 6.2 %
MAGNESIUM SERPL-MCNC: 2.83 MG/DL (ref 1.6–2.4)
MCH RBC QN AUTO: 30.6 PG (ref 26–34)
MCHC RBC AUTO-ENTMCNC: 31.4 G/DL (ref 32–36)
MCV RBC AUTO: 98 FL (ref 80–100)
MONOCYTES # BLD AUTO: 1.44 X10*3/UL (ref 0.1–1)
MONOCYTES NFR BLD AUTO: 4.6 %
NEUTROPHILS # BLD AUTO: 26.47 X10*3/UL (ref 1.2–7.7)
NEUTROPHILS NFR BLD AUTO: 83.9 %
NRBC BLD-RTO: 2.1 /100 WBCS (ref 0–0)
OXYHGB MFR BLDA: 93.6 % (ref 94–98)
OXYHGB MFR BLDA: 95 % (ref 94–98)
OXYHGB MFR BLDA: 95.6 % (ref 94–98)
OXYHGB MFR BLDA: 96.2 % (ref 94–98)
OXYHGB MFR BLDA: 96.3 % (ref 94–98)
OXYHGB MFR BLDA: 97.1 % (ref 94–98)
PATH REVIEW-CBC DIFFERENTIAL: NORMAL
PCO2 BLDA: 44 MM HG (ref 38–42)
PCO2 BLDA: 45 MM HG (ref 38–42)
PCO2 BLDA: 45 MM HG (ref 38–42)
PCO2 BLDA: 47 MM HG (ref 38–42)
PCO2 BLDA: 48 MM HG (ref 38–42)
PCO2 BLDA: 48 MM HG (ref 38–42)
PH BLDA: 7.25 PH (ref 7.38–7.42)
PH BLDA: 7.25 PH (ref 7.38–7.42)
PH BLDA: 7.27 PH (ref 7.38–7.42)
PH BLDA: 7.28 PH (ref 7.38–7.42)
PH BLDA: 7.3 PH (ref 7.38–7.42)
PH BLDA: 7.31 PH (ref 7.38–7.42)
PHOSPHATE SERPL-MCNC: 3.9 MG/DL (ref 2.5–4.9)
PHOSPHATE SERPL-MCNC: 4.1 MG/DL (ref 2.5–4.9)
PLATELET # BLD AUTO: 66 X10*3/UL (ref 150–450)
PO2 BLDA: 107 MM HG (ref 85–95)
PO2 BLDA: 108 MM HG (ref 85–95)
PO2 BLDA: 125 MM HG (ref 85–95)
PO2 BLDA: 74 MM HG (ref 85–95)
PO2 BLDA: 89 MM HG (ref 85–95)
PO2 BLDA: 91 MM HG (ref 85–95)
POTASSIUM BLDA-SCNC: 4.5 MMOL/L (ref 3.5–5.3)
POTASSIUM BLDA-SCNC: 4.6 MMOL/L (ref 3.5–5.3)
POTASSIUM BLDA-SCNC: 4.8 MMOL/L (ref 3.5–5.3)
POTASSIUM BLDA-SCNC: 4.9 MMOL/L (ref 3.5–5.3)
POTASSIUM BLDA-SCNC: 5 MMOL/L (ref 3.5–5.3)
POTASSIUM BLDA-SCNC: 5.5 MMOL/L (ref 3.5–5.3)
POTASSIUM SERPL-SCNC: 4.6 MMOL/L (ref 3.5–5.3)
POTASSIUM SERPL-SCNC: 4.8 MMOL/L (ref 3.5–5.3)
PROTHROMBIN TIME: 31 SECONDS (ref 9.8–12.4)
RBC # BLD AUTO: 3.85 X10*6/UL (ref 4–5.2)
RH FACTOR (ANTIGEN D): NORMAL
SAO2 % BLDA: 100 % (ref 94–100)
SAO2 % BLDA: 96 % (ref 94–100)
SAO2 % BLDA: 98 % (ref 94–100)
SAO2 % BLDA: 98 % (ref 94–100)
SAO2 % BLDA: 99 % (ref 94–100)
SAO2 % BLDA: 99 % (ref 94–100)
SODIUM BLDA-SCNC: 130 MMOL/L (ref 136–145)
SODIUM BLDA-SCNC: 130 MMOL/L (ref 136–145)
SODIUM BLDA-SCNC: 131 MMOL/L (ref 136–145)
SODIUM BLDA-SCNC: 132 MMOL/L (ref 136–145)
SODIUM BLDA-SCNC: 133 MMOL/L (ref 136–145)
SODIUM BLDA-SCNC: 133 MMOL/L (ref 136–145)
SODIUM SERPL-SCNC: 132 MMOL/L (ref 136–145)
SODIUM SERPL-SCNC: 133 MMOL/L (ref 136–145)
WBC # BLD AUTO: 31.5 X10*3/UL (ref 4.4–11.3)

## 2025-03-10 PROCEDURE — 82810 BLOOD GASES O2 SAT ONLY: CPT

## 2025-03-10 PROCEDURE — 2500000005 HC RX 250 GENERAL PHARMACY W/O HCPCS: Mod: SE

## 2025-03-10 PROCEDURE — 2500000004 HC RX 250 GENERAL PHARMACY W/ HCPCS (ALT 636 FOR OP/ED): Mod: SE

## 2025-03-10 PROCEDURE — 82947 ASSAY GLUCOSE BLOOD QUANT: CPT

## 2025-03-10 PROCEDURE — 83735 ASSAY OF MAGNESIUM: CPT

## 2025-03-10 PROCEDURE — 84295 ASSAY OF SERUM SODIUM: CPT

## 2025-03-10 PROCEDURE — 71045 X-RAY EXAM CHEST 1 VIEW: CPT

## 2025-03-10 PROCEDURE — 83605 ASSAY OF LACTIC ACID: CPT

## 2025-03-10 PROCEDURE — 94003 VENT MGMT INPAT SUBQ DAY: CPT

## 2025-03-10 PROCEDURE — 90937 HEMODIALYSIS REPEATED EVAL: CPT

## 2025-03-10 PROCEDURE — 2500000001 HC RX 250 WO HCPCS SELF ADMINISTERED DRUGS (ALT 637 FOR MEDICARE OP): Mod: SE

## 2025-03-10 PROCEDURE — 99291 CRITICAL CARE FIRST HOUR: CPT

## 2025-03-10 PROCEDURE — 2020000001 HC ICU ROOM DAILY

## 2025-03-10 PROCEDURE — 2500000002 HC RX 250 W HCPCS SELF ADMINISTERED DRUGS (ALT 637 FOR MEDICARE OP, ALT 636 FOR OP/ED): Mod: SE

## 2025-03-10 PROCEDURE — 86850 RBC ANTIBODY SCREEN: CPT

## 2025-03-10 PROCEDURE — 71045 X-RAY EXAM CHEST 1 VIEW: CPT | Performed by: RADIOLOGY

## 2025-03-10 PROCEDURE — 90945 DIALYSIS ONE EVALUATION: CPT | Performed by: INTERNAL MEDICINE

## 2025-03-10 PROCEDURE — 2500000005 HC RX 250 GENERAL PHARMACY W/O HCPCS: Mod: SE | Performed by: EMERGENCY MEDICINE

## 2025-03-10 PROCEDURE — 85025 COMPLETE CBC W/AUTO DIFF WBC: CPT

## 2025-03-10 PROCEDURE — 87493 C DIFF AMPLIFIED PROBE: CPT

## 2025-03-10 PROCEDURE — 36415 COLL VENOUS BLD VENIPUNCTURE: CPT

## 2025-03-10 PROCEDURE — 36600 WITHDRAWAL OF ARTERIAL BLOOD: CPT

## 2025-03-10 PROCEDURE — 80069 RENAL FUNCTION PANEL: CPT

## 2025-03-10 PROCEDURE — 85730 THROMBOPLASTIN TIME PARTIAL: CPT

## 2025-03-10 PROCEDURE — 94645 CONT INHLJ TX EACH ADDL HOUR: CPT

## 2025-03-10 PROCEDURE — 2500000004 HC RX 250 GENERAL PHARMACY W/ HCPCS (ALT 636 FOR OP/ED): Mod: JW,SE

## 2025-03-10 PROCEDURE — 37799 UNLISTED PX VASCULAR SURGERY: CPT

## 2025-03-10 RX ORDER — CEFAZOLIN SODIUM 2 G/100ML
2 INJECTION, SOLUTION INTRAVENOUS EVERY 12 HOURS
Status: DISCONTINUED | OUTPATIENT
Start: 2025-03-10 | End: 2025-03-12

## 2025-03-10 RX ADMIN — ESOMEPRAZOLE MAGNESIUM 40 MG: 40 FOR SUSPENSION ORAL at 09:22

## 2025-03-10 RX ADMIN — Medication 5 ML: at 11:39

## 2025-03-10 RX ADMIN — CEFAZOLIN SODIUM 2 G: 2 INJECTION, SOLUTION INTRAVENOUS at 11:39

## 2025-03-10 RX ADMIN — CALCIUM CHLORIDE, MAGNESIUM CHLORIDE, DEXTROSE MONOHYDRATE, LACTIC ACID, SODIUM CHLORIDE, SODIUM BICARBONATE AND POTASSIUM CHLORIDE 25 ML/KG/HR: 3.68; 3.05; 22; 5.4; 6.46; 3.09; .314 INJECTION INTRAVENOUS at 07:19

## 2025-03-10 RX ADMIN — WHITE PETROLATUM 57.7 %-MINERAL OIL 31.9 % EYE OINTMENT 1 APPLICATION: at 20:59

## 2025-03-10 RX ADMIN — POLYETHYLENE GLYCOL 3350 17 G: 17 POWDER, FOR SOLUTION ORAL at 09:19

## 2025-03-10 RX ADMIN — KETAMINE HYDROCHLORIDE 1 MG/KG/HR: 10 INJECTION INTRAMUSCULAR; INTRAVENOUS at 02:19

## 2025-03-10 RX ADMIN — Medication 100 MCG/HR: at 23:00

## 2025-03-10 RX ADMIN — CARBOXYMETHYLCELLULOSE SODIUM 2 DROP: 5 SOLUTION/ DROPS OPHTHALMIC at 15:28

## 2025-03-10 RX ADMIN — THIAMINE HCL TAB 100 MG 100 MG: 100 TAB at 09:19

## 2025-03-10 RX ADMIN — CEFAZOLIN SODIUM 2 G: 2 INJECTION, SOLUTION INTRAVENOUS at 23:01

## 2025-03-10 RX ADMIN — SENNOSIDES AND DOCUSATE SODIUM 2 TABLET: 50; 8.6 TABLET ORAL at 09:19

## 2025-03-10 RX ADMIN — Medication 70 PERCENT: at 19:58

## 2025-03-10 RX ADMIN — Medication 100 MCG/HR: at 13:20

## 2025-03-10 RX ADMIN — WHITE PETROLATUM 57.7 %-MINERAL OIL 31.9 % EYE OINTMENT 1 APPLICATION: at 15:28

## 2025-03-10 RX ADMIN — INSULIN LISPRO 2 UNITS: 100 INJECTION, SOLUTION INTRAVENOUS; SUBCUTANEOUS at 06:06

## 2025-03-10 RX ADMIN — Medication 150 MCG/HR: at 06:04

## 2025-03-10 RX ADMIN — CALCIUM CHLORIDE, MAGNESIUM CHLORIDE, DEXTROSE MONOHYDRATE, LACTIC ACID, SODIUM CHLORIDE, SODIUM BICARBONATE AND POTASSIUM CHLORIDE 25 ML/KG/HR: 3.68; 3.05; 22; 5.4; 6.46; 3.09; .314 INJECTION INTRAVENOUS at 07:20

## 2025-03-10 RX ADMIN — Medication 80 PERCENT: at 07:36

## 2025-03-10 RX ADMIN — WHITE PETROLATUM 57.7 %-MINERAL OIL 31.9 % EYE OINTMENT 1 APPLICATION: at 09:19

## 2025-03-10 RX ADMIN — INSULIN LISPRO 2 UNITS: 100 INJECTION, SOLUTION INTRAVENOUS; SUBCUTANEOUS at 16:52

## 2025-03-10 RX ADMIN — INSULIN LISPRO 1 UNITS: 100 INJECTION, SOLUTION INTRAVENOUS; SUBCUTANEOUS at 20:58

## 2025-03-10 RX ADMIN — KETAMINE HYDROCHLORIDE 1 MG/KG/HR: 10 INJECTION INTRAMUSCULAR; INTRAVENOUS at 19:32

## 2025-03-10 RX ADMIN — CARBOXYMETHYLCELLULOSE SODIUM 2 DROP: 5 SOLUTION/ DROPS OPHTHALMIC at 09:22

## 2025-03-10 RX ADMIN — MIDAZOLAM HYDROCHLORIDE 2 MG/HR: 1 INJECTION, SOLUTION INTRAVENOUS at 01:59

## 2025-03-10 RX ADMIN — CARBOXYMETHYLCELLULOSE SODIUM 2 DROP: 5 SOLUTION/ DROPS OPHTHALMIC at 21:20

## 2025-03-10 RX ADMIN — Medication 200 MCG/HR: at 00:21

## 2025-03-10 RX ADMIN — INSULIN LISPRO 3 UNITS: 100 INJECTION, SOLUTION INTRAVENOUS; SUBCUTANEOUS at 12:01

## 2025-03-10 RX ADMIN — METHYLPREDNISOLONE SODIUM SUCCINATE 50 MG: 125 INJECTION, POWDER, FOR SOLUTION INTRAMUSCULAR; INTRAVENOUS at 09:19

## 2025-03-10 RX ADMIN — INSULIN LISPRO 1 UNITS: 100 INJECTION, SOLUTION INTRAVENOUS; SUBCUTANEOUS at 00:49

## 2025-03-10 RX ADMIN — METHYLPREDNISOLONE SODIUM SUCCINATE 50 MG: 125 INJECTION, POWDER, FOR SOLUTION INTRAMUSCULAR; INTRAVENOUS at 21:19

## 2025-03-10 RX ADMIN — VANCOMYCIN HYDROCHLORIDE 750 MG: 750 INJECTION, SOLUTION INTRAVENOUS at 06:00

## 2025-03-10 ASSESSMENT — PAIN - FUNCTIONAL ASSESSMENT
PAIN_FUNCTIONAL_ASSESSMENT: CPOT (CRITICAL CARE PAIN OBSERVATION TOOL)

## 2025-03-10 NOTE — PROGRESS NOTES
Medical Intensive Care - Daily Progress Note   Subjective    Gissel Harvey is a 39 y.o. year old female patient admitted on 3/3/2025 with following ICU needs: Hypoxemia and AHRF requiring intubation and septic & cardiogenic shock requiring pressor support.     Interval History:  3/9 evening FiO2 weaned to 80%  3/10 AM ABG w/ pH 7.28, PaO2 108, P/F: 135  Weaning Epo 3/10 AM- off  PEEP 12  Hypothermic 33.6 -> bear hugger placed, temp increased to 34.3  1.2 L out of rectal tube overnight, liquid.     Meds    Scheduled medications  cefTRIAXone, 2 g, intravenous, q24h  pantoprazole, 40 mg, oral, Daily before breakfast   Or  esomeprazole, 40 mg, nasoduodenal tube, Daily before breakfast   Or  pantoprazole, 40 mg, intravenous, Daily before breakfast  insulin lispro, 0-5 Units, subcutaneous, q4h  lactulose, 20 g, oral, q6h  artificial tears, 2 drop, Both Eyes, q6h  methylPREDNISolone sodium succinate (PF), 50 mg, intravenous, q12h  oxygen, , inhalation, Continuous - Inhalation  polyethylene glycol, 17 g, oral, Daily  sennosides-docusate sodium, 2 tablet, oral, Daily  thiamine, 100 mg, oral, Daily  vancomycin, 750 mg, intravenous, q12h  vitamin B complex-vitamin C-folic acid, 1 capsule, oral, Daily  white petrolatum-mineral oiL, 1 Application, Both Eyes, q6h      Continuous medications  bivalirudin, 0.05-0.49 mg/kg/hr, Last Rate: 0.01 mg/kg/hr (03/10/25 0700)  epoprostenol, 0.03 mcg/kg/min (Ideal), Last Rate: Stopped (03/10/25 0840)  fentaNYL, 0-300 mcg/hr, Last Rate: 150 mcg/hr (03/10/25 0700)  ketamine (Ketalar) 1,000 mg in 100 mL (10 mg/mL) infusion, 0.05-2 mg/kg/hr, Last Rate: 1 mg/kg/hr (03/10/25 0700)  midazolam, 0-20 mg/hr, Last Rate: 1 mg/hr (03/10/25 0700)  norepinephrine, 0-0.5 mcg/kg/min, Last Rate: Stopped (03/09/25 1131)  PrismaSol 4/2.5, 25 mL/kg/hr, Last Rate: 25 mL/kg/hr (03/10/25 8220)      PRN medications  PRN medications: acetaminophen **OR** acetaminophen, alteplase, dextrose, dextrose, fentaNYL, glucagon,  "glucagon, midazolam, oxygen, vancomycin     Objective    Blood pressure 104/63, pulse 77, temperature 34.3 °C (93.7 °F), resp. rate 24, height 1.676 m (5' 6\"), weight 78.8 kg (173 lb 11.6 oz), SpO2 96%.     Physical Exam  Constitutional:       Comments: Intubated and Sedated  Not opening eyes to voice   HENT:      Head: Normocephalic and atraumatic.   Cardiovascular:      Rate and Rhythm: Normal rate and regular rhythm.      Pulses: Normal pulses.   Pulmonary:      Breath sounds: No wheezing.      Comments: Mechanically Ventilated  Coarse breath sounds  Abdominal:      General: There is no distension.      Palpations: Abdomen is soft.      Comments: Rectal tube draining brown liquid stool   Musculoskeletal:      Right lower leg: Edema present.      Left lower leg: Edema present.      Comments: 2+ pitting edema bilaterally   Skin:     General: Skin is warm and dry.   Neurological:      Comments: Sedated, not awakening to voice            Intake/Output Summary (Last 24 hours) at 3/10/2025 0849  Last data filed at 3/10/2025 0800  Gross per 24 hour   Intake 1610.5 ml   Output 8843 ml   Net -7232.5 ml     Labs:   Results from last 72 hours   Lab Units 03/10/25  0516 03/09/25  1753 03/09/25  0512   SODIUM mmol/L 132* 133* 135*   POTASSIUM mmol/L 4.6 4.3 4.1   CHLORIDE mmol/L 100 102 101   CO2 mmol/L 20* 21 26   BUN mg/dL 46* 47* 47*   CREATININE mg/dL 1.84* 1.85* 1.85*   GLUCOSE mg/dL 233* 152* 174*   CALCIUM mg/dL 8.0* 7.4* 7.7*   ANION GAP mmol/L 17 14 12   EGFR mL/min/1.73m*2 35* 35* 35*   PHOSPHORUS mg/dL 4.1 3.7 3.5      Results from last 72 hours   Lab Units 03/10/25  0516 03/09/25  0512 03/08/25  0246   WBC AUTO x10*3/uL 31.5* 25.6* 26.5*   HEMOGLOBIN g/dL 11.8* 10.3* 10.4*   HEMATOCRIT % 37.6 30.5* 29.9*   PLATELETS AUTO x10*3/uL 66* 51* 52*   NEUTROS PCT AUTO % 83.9  --  81.2   LYMPHS PCT AUTO % 6.2  --  8.2   MONOS PCT AUTO % 4.6  --  7.9   EOS PCT AUTO % 0.0  --  0.0      Results from last 72 hours   Lab Units " 03/10/25  0832 03/10/25  0509 03/09/25  2348   POCT PH, ARTERIAL pH 7.28* 7.27* 7.25*   POCT PCO2, ARTERIAL mm Hg 45* 47* 48*   POCT PO2, ARTERIAL mm Hg 107* 108* 89   POCT SO2, ARTERIAL % 99 99 98            Micro/ID:     Lab Results   Component Value Date    BLOODCULT No growth at 4 days -  FINAL REPORT 03/03/2025    BLOODCULT No growth at 4 days -  FINAL REPORT 03/03/2025           Assessment and Plan     Assessment: Gissel Harvey is a 39 y.o. 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 She is being treated for ARDS and AHRF, initially severe septic shock now improved, some concern for cardiogenic shock and new HFrEF but improving.     Her pressor requirement has decreased and she is now off of pressor support. PF ratio increased this morning to 135, weaning epo. Hypothermic requiring bear hugger, rectal tube with increased output overnight.     Mechanical Ventilation: 4-10 days  Sedation/Analgesia:  Fentanyl, Ketamine, and Versed  Restraints: no    Summary for 03/10/25  :  P/F 135 this AM- off Epo, PEEP 12, FiO2 80%  P/F 130 this PM- off Epo, PEEP 10, FiO2 70%  MAPs stable, off pressors  Maintaining rectal tube- 1.2L out overnight, lactulose held  Plan to obtain limited TTE when patient more stable- possible tomorrow  Will warm CVVH fluid to help with hypothermia  Switch abx to Cefazolin to cover strep and MSSA      NEUROLOGY/PSYCH:  #Sedation   -Current sedation w/ fentanyl 100- versed and ketamine weaned 3/10 AM  - RASS goal -4 to -5 w/ ARDS    CARDIOVASCULAR:  #Shock, resolved, likely septic I/s/o severe pneumonia and possible cardiogenic component  #HFrEF  :: lactate > 9 on presentation, now down trending,  stable at 3  :: TTE 3/4 with EF 30-35% with reduced RV function, no previous TTE for comparison  :: BNP 2127 at admission  :: Troponin plateaued (62>63) on 3/4/25  - Off pressor support  - Fluid removal with CVVH- goal net neg 2L / day, anuric     #Aflutter  #Sustained VT   :: went into NSVT while wire threaded for central line placement  :: s/p amiodarone 150 mg bolus x2  :: pt cardioverted  - Pt is back to sinus rhythm, okay for amio gtt if arrhythmias return    PULMONARY:  Vent Mode: Volume control/assist control  FiO2 (%):  [80 %-100 %] 80 %  S RR:  [24] 24  S VT:  [450 mL] 450 mL  PEEP/CPAP (cm H2O):  [12 cm H20] 12 cm H20  MAP (cm H2O):  [17-18] 17   #Community acquired pneumonia   #ARDS   :: intubated 3/3   :: P:F ~ 70 on admission   :: Procalcitonin 57.3  :: flu A/B, COVID, parainfluenza, RSV, metapneumovirus, legionella urine Ag: negative  :: see ID section for full abx history this admission  :: Tracheal aspirate cx 3/3: pan sensitive staph aureus and strep pneumo  :: Strep pneumo urine ag: positive  :: Bcx 3/3: NGTD x3 days     Plan  - Continue Cefazolin 3/10-  - Continue solumedrol 50 mg q12 for ARDS and mineralocorticoid effect  - Continue to trend ABG to assess PF ratio. Currently off epo, weaning FIO2%, PF > 130  - most recent ABG 3/10 12:43 PM: pH 7.25, PaO2 91,        RENAL/GENITOURINARY:  #Acute renal failure, anuric SCOUT  #HAGMA  :: Cr 1.9 on admission, unclear baseline as no labs in 2 years   :: FeNa 3/3: 0.1% prerenal  :: anion gap 19, delta 7, delta ratio 3.5 suggests HAGMA with concurrent metabolic alkalosis  - Continue CVVH per Nephrology  - Continue to monitor Cr with daily RFP  - Warm CVVH fluid to assist with hypothermia    GASTROENTEROLOGY:  #liver cirrhosis   #portal hypertension s/p TIPS   #hx of Budd-Chiari   :: 3/7 POCUS with distended bowel, no appreciable ascites  - Continue to monitor abdomen for signs of ascites formation  - Hep C Abs positive, will need outpt GI follow up  -  Reduce bowel regimen d/t diarrhea- lactulose decreased    ENDOCRINOLOGY:  - SSI while on steroids     HEMATOLOGY:  #Lymphopenia   :: Abs leukocyte count 260  :: HIV neg  - Now with leukocytosis likely 2/2 known infection and steroid use  - CTM     #Hx of antiphospholipid syndrome   #Hx of positive PF4  #Chronic IVC occlusion   #Hx of DVT   :: Supposed to be on fondaparinux outpatient, unclear if taking as no recent med fills   - Continue bivalirudin while inpatient I/s/o previous positive PF4      #Thrombocytopenia, stable  - Trend plts, INR, coags  - Fibrinogen wnl  - Vancomycin could cause drug-induced thrombocytopenia  - Amiodarone unlikely because timeline doesn't fit  - If platelets <50, transfuse but do not stop bivalirubin    SKIN:  #Skin Failure  - BL buttock wounds, ear wound  - wound care following, appreciate recs      MUSCULOSKELETAL:  GIFTY    INFECTIOUS DISEASE:  #MSSA PNA  #Strep PNA  #ARDS  #Septic shock, resolved  Antibiotics  Azithromycin 500 mg: 3/3  Zosyn 4.5 g: 3/3  Tobramycin 440 mg: 3/4  Vancomycin: 3/3 - 3/6, 3/8 - 3/10  Ceftaroline 3/5 - 3/6  Meropenem 2 g BID: 3/3 - 3/8  Micafungin: 3/4 - 3/6  Doxycycline 100 mg BID: 3/4 - 3/6  Ceftriaxone 3/8 - 3/10  *Cefazolin 3/10-  - hypothermia this AM, will warm CVVH fluid    ICU Check List       ICU Liberation: Intervention:   Assess, Prevent, Manage Pain      Both SAT and SBT [] SAT  [] SBT 30-60 min [] Extubate to NC  [] Extubate to NIV  [] Discuss Trach   Choice of analgesia and sedation Gao Agitation Sedation Scale (RASS): Unarousable  Target Arousal Level (RASS): RASS -4 to -5 Fentanyl     Delirium: Assess, prevent and manage  CAM ICU Positive    Early Mobility and Exercise RASS less than -2 [] PT /OT consult   Family Engagement and Empowerment []Family updated []SW consult     FEN  Fluids: CVVH  Electrolytes: CVVH  Nutrition: Trickle feeds   Prophylaxis:  DVT ppx: Bival gtt  GI ppx: PPI  Bowel care: Decreased due to increased rectal tube  output  Hardware:         CVC 03/04/25 Triple lumen Left Internal jugular (Active)   Placement Date: 03/04/25   Earliest Known Present: 03/04/25  Lumen Type: Triple lumen  Orientation: Left  Location: Internal jugular   Number of days: 6       Arterial Line 03/03/25 Left Radial (Active)   Placement Date/Time: 03/03/25 (c) 2040   Size: 22 G  Orientation: Left  Location: Radial  Site Prep: Chlorhexidine   Local Anesthetic: Injectable  Insertion attempts: 2  Securement Method: Transparent dressing   Number of days: 6       ETT  7.5 mm (Active)   Placement Date: 03/03/25   ETT Type: ETT - single  Single Lumen Tube Size: 7.5 mm  Cuffed: Yes  Location: Oral   Number of days: 7       NG/OG/Feeding Tube NG - [place] sump Right nostril (Active)   Placement Date/Time: 03/03/25 1900   Type of Tube: Feeding Tube  Tube Length: 53 cm  Tube Type: NG - [place] sump  Tube Location: Right nostril   Number of days: 6       Fecal Management Rectal tube with balloon (Active)   Placement Date/Time: 03/09/25 0018   Placed by: ashlie  Hand Hygiene Completed: Yes  Fecal Management Tube Type: Rectal tube with balloon   Number of days: 1       Hemodialysis Cath 03/06/25 Right (Active)   Placement Date/Time: 03/06/25 1700   Orientation: Right   Number of days: 3       Social:  Code: Full Code    HPOA: Payal Durant (daughter) [phone]  Disposition: MARLENE Buck MD   03/10/25 at 8:49 AM     Disclaimer: Documentation completed with the information available at the time of input. The times in the chart may not be reflective of actual patient care times, interventions, or procedures. Documentation occurs after the physical care of the patient.

## 2025-03-10 NOTE — PROGRESS NOTES
Vancomycin Dosing by Pharmacy- FOLLOW UP    Gissel Harvey is a 39 y.o. year old female who Pharmacy has been consulted for vancomycin dosing for pneumonia. Based on the patient's indication and renal status this patient is being dosed based on a goal trough/random level of 15-20.     Patient continues on CVVH.    Current vancomycin dose: 500 mg given every 12 hours    Most recent trough level: 14.1 mcg/mL    Visit Vitals  /63 (Patient Position: Lying)   Pulse 95   Temp 36.8 °C (98.2 °F)   Resp 25        Lab Results   Component Value Date    CREATININE 1.85 (H) 2025    CREATININE 1.85 (H) 2025    CREATININE 1.75 (H) 2025    CREATININE 1.95 (H) 2025        Patient weight is as follows:   Vitals:    25 0600   Weight: 78.8 kg (173 lb 11.6 oz)       Cultures:  Susceptibility data for the encounter in last 14 days.  Collected Specimen Info Organism Ceftriaxone (Meningitis) Ceftriaxone (Nonmeningitis) Clindamycin Erythromycin Oxacillin Penicillin (IV, Meningitidis) Penicillin (IV, Nonmeningitis) Penicillin (Oral) Tetracycline Trimethoprim/Sulfamethoxazole   25 Fluid from Tracheal Aspirate Methicillin Susceptible Staphylococcus aureus (MSSA)    S  S  S     S  S     Streptococcus pneumoniae  S  S  S  S   R  S  S  S  S     Collected Specimen Info Organism Vancomycin   25 Fluid from Tracheal Aspirate Methicillin Susceptible Staphylococcus aureus (MSSA)  S     Streptococcus pneumoniae  S        I/O last 3 completed shifts:  In: 3521.8 (44.7 mL/kg) [I.V.:1616.8 (20.5 mL/kg); NG/GT:1265; IV Piggyback:640]  Out: 5769 (73.2 mL/kg) [Other:5369; Stool:400]  Weight: 78.8 kg   I/O during current shift:  No intake/output data recorded.    Temp (24hrs), Av.1 °C (96.9 °F), Min:34.4 °C (93.9 °F), Max:37.3 °C (99.1 °F)      Assessment/Plan    Below goal random/trough level. Orders placed for new vancomycin regimen of 750 mg every 12 hours to begin at 0600 on 3/10 .  The next level will be  obtained on 3/11 @0500. May be obtained sooner if clinically indicated.   Will continue to monitor renal function daily while on vancomycin and order serum creatinine at least every 48 hours if not already ordered.  Follow for continued vancomycin needs, clinical response, and signs/symptoms of toxicity.       Rahat Loya, [facility]

## 2025-03-10 NOTE — PROGRESS NOTES
CRRT procedure note   Seen and assessed on CVVH. Labs and events reviewed   Is off pressors  this AM ; on vent - non-responsive ; tolerating fluid off ( 6L negative in the past 24 hrs with CVVH)  Vascular access: right IJ trialysis catheter  BFR :200 ml/min  Filter:M150  Total Replacement Rate: 2000 ml/hour  Pre blood pump : 1000 ml/hour    Replacement solution Prismasol potassium:  4 mEq/L  Patient fluid removal: per ICU team ; Current target to be negative 200 ml/hour.  Please check Phosphorus, Calcium and Magnesium daily and replace peripherally as needed  Continue with current CVVH prescription.    Heart Rate:  [71-99]   Temp:  [33.6 °C (92.5 °F)-37.2 °C (99 °F)]   Resp:  [22-40]   BP: (115-119)/(70-74)   SpO2:  [90 %-99 %]        Intake/Output Summary (Last 24 hours) at 3/10/2025 1552  Last data filed at 3/10/2025 1220  Gross per 24 hour   Intake 1959.77 ml   Output 8323 ml   Net -6363.23 ml       Scheduled medications  ceFAZolin, 2 g, intravenous, q12h  pantoprazole, 40 mg, oral, Daily before breakfast   Or  esomeprazole, 40 mg, nasoduodenal tube, Daily before breakfast   Or  pantoprazole, 40 mg, intravenous, Daily before breakfast  insulin lispro, 0-5 Units, subcutaneous, q4h  [Held by provider] lactulose, 20 g, oral, q6h  artificial tears, 2 drop, Both Eyes, q6h  methylPREDNISolone sodium succinate (PF), 50 mg, intravenous, q12h  oxygen, , inhalation, Continuous - Inhalation  polyethylene glycol, 17 g, oral, Daily  sennosides-docusate sodium, 2 tablet, oral, Daily  thiamine, 100 mg, oral, Daily  vitamin B complex-vitamin C-folic acid, 5 mL, oral, Daily  white petrolatum-mineral oiL, 1 Application, Both Eyes, q6h      Continuous medications  bivalirudin, 0.05-0.49 mg/kg/hr, Last Rate: 0.01 mg/kg/hr (03/10/25 0700)  epoprostenol, 0.03 mcg/kg/min (Ideal), Last Rate: Stopped (03/10/25 0840)  fentaNYL, 0-300 mcg/hr, Last Rate: 100 mcg/hr (03/10/25 2170)  ketamine (Ketalar) 1,000 mg in 100 mL (10 mg/mL) infusion,  0.05-2 mg/kg/hr, Last Rate: 1 mg/kg/hr (03/10/25 0700)  midazolam, 0-20 mg/hr, Last Rate: Stopped (03/10/25 0930)  norepinephrine, 0-0.5 mcg/kg/min, Last Rate: Stopped (03/09/25 1131)  PrismaSol 4/2.5, 25 mL/kg/hr, Last Rate: 25 mL/kg/hr (03/10/25 0720)      PRN medications  PRN medications: acetaminophen **OR** acetaminophen, alteplase, dextrose, dextrose, fentaNYL, glucagon, glucagon, midazolam, oxygen    Recent Results (from the past 24 hours)   Renal function panel    Collection Time: 03/09/25  5:53 PM   Result Value Ref Range    Glucose 152 (H) 74 - 99 mg/dL    Sodium 133 (L) 136 - 145 mmol/L    Potassium 4.3 3.5 - 5.3 mmol/L    Chloride 102 98 - 107 mmol/L    Bicarbonate 21 21 - 32 mmol/L    Anion Gap 14 10 - 20 mmol/L    Urea Nitrogen 47 (H) 6 - 23 mg/dL    Creatinine 1.85 (H) 0.50 - 1.05 mg/dL    eGFR 35 (L) >60 mL/min/1.73m*2    Calcium 7.4 (L) 8.6 - 10.6 mg/dL    Phosphorus 3.7 2.5 - 4.9 mg/dL    Albumin 2.7 (L) 3.4 - 5.0 g/dL   Vancomycin, Trough    Collection Time: 03/09/25  5:53 PM   Result Value Ref Range    Vancomycin, Trough 14.1 5.0 - 20.0 ug/mL   BLOOD GAS ARTERIAL FULL PANEL    Collection Time: 03/09/25 11:48 PM   Result Value Ref Range    POCT pH, Arterial 7.25 (LL) 7.38 - 7.42 pH    POCT pCO2, Arterial 48 (H) 38 - 42 mm Hg    POCT pO2, Arterial 89 85 - 95 mm Hg    POCT SO2, Arterial 98 94 - 100 %    POCT Oxy Hemoglobin, Arterial 95.6 94.0 - 98.0 %    POCT Hematocrit Calculated, Arterial 35.0 (L) 36.0 - 46.0 %    POCT Sodium, Arterial 133 (L) 136 - 145 mmol/L    POCT Potassium, Arterial 4.6 3.5 - 5.3 mmol/L    POCT Chloride, Arterial 101 98 - 107 mmol/L    POCT Ionized Calcium, Arterial 1.02 (L) 1.10 - 1.33 mmol/L    POCT Glucose, Arterial 192 (H) 74 - 99 mg/dL    POCT Lactate, Arterial 3.1 (H) 0.4 - 2.0 mmol/L    POCT Base Excess, Arterial -6.2 (L) -2.0 - 3.0 mmol/L    POCT HCO3 Calculated, Arterial 21.0 (L) 22.0 - 26.0 mmol/L    POCT Hemoglobin, Arterial 11.6 (L) 12.0 - 16.0 g/dL    POCT Anion  Gap, Arterial 16 10 - 25 mmo/L    Patient Temperature 37.0 degrees Celsius    FiO2 80 %   POCT GLUCOSE    Collection Time: 03/10/25 12:42 AM   Result Value Ref Range    POCT Glucose 180 (H) 74 - 99 mg/dL   BLOOD GAS ARTERIAL FULL PANEL    Collection Time: 03/10/25  5:09 AM   Result Value Ref Range    POCT pH, Arterial 7.27 (L) 7.38 - 7.42 pH    POCT pCO2, Arterial 47 (H) 38 - 42 mm Hg    POCT pO2, Arterial 108 (H) 85 - 95 mm Hg    POCT SO2, Arterial 99 94 - 100 %    POCT Oxy Hemoglobin, Arterial 96.2 94.0 - 98.0 %    POCT Hematocrit Calculated, Arterial 38.0 36.0 - 46.0 %    POCT Sodium, Arterial 131 (L) 136 - 145 mmol/L    POCT Potassium, Arterial 4.9 3.5 - 5.3 mmol/L    POCT Chloride, Arterial 100 98 - 107 mmol/L    POCT Ionized Calcium, Arterial 1.05 (L) 1.10 - 1.33 mmol/L    POCT Glucose, Arterial 243 (H) 74 - 99 mg/dL    POCT Lactate, Arterial 3.4 (H) 0.4 - 2.0 mmol/L    POCT Base Excess, Arterial -5.4 (L) -2.0 - 3.0 mmol/L    POCT HCO3 Calculated, Arterial 21.6 (L) 22.0 - 26.0 mmol/L    POCT Hemoglobin, Arterial 12.5 12.0 - 16.0 g/dL    POCT Anion Gap, Arterial 14 10 - 25 mmo/L    Patient Temperature 37.0 degrees Celsius    FiO2 80 %   Magnesium    Collection Time: 03/10/25  5:16 AM   Result Value Ref Range    Magnesium 2.83 (H) 1.60 - 2.40 mg/dL   Renal Function Panel    Collection Time: 03/10/25  5:16 AM   Result Value Ref Range    Glucose 233 (H) 74 - 99 mg/dL    Sodium 132 (L) 136 - 145 mmol/L    Potassium 4.6 3.5 - 5.3 mmol/L    Chloride 100 98 - 107 mmol/L    Bicarbonate 20 (L) 21 - 32 mmol/L    Anion Gap 17 10 - 20 mmol/L    Urea Nitrogen 46 (H) 6 - 23 mg/dL    Creatinine 1.84 (H) 0.50 - 1.05 mg/dL    eGFR 35 (L) >60 mL/min/1.73m*2    Calcium 8.0 (L) 8.6 - 10.6 mg/dL    Phosphorus 4.1 2.5 - 4.9 mg/dL    Albumin 3.1 (L) 3.4 - 5.0 g/dL   CBC and Auto Differential    Collection Time: 03/10/25  5:16 AM   Result Value Ref Range    WBC 31.5 (H) 4.4 - 11.3 x10*3/uL    nRBC 2.1 (H) 0.0 - 0.0 /100 WBCs    RBC  3.85 (L) 4.00 - 5.20 x10*6/uL    Hemoglobin 11.8 (L) 12.0 - 16.0 g/dL    Hematocrit 37.6 36.0 - 46.0 %    MCV 98 80 - 100 fL    MCH 30.6 26.0 - 34.0 pg    MCHC 31.4 (L) 32.0 - 36.0 g/dL    RDW 16.2 (H) 11.5 - 14.5 %    Platelets 66 (L) 150 - 450 x10*3/uL    Neutrophils % 83.9 40.0 - 80.0 %    Immature Granulocytes %, Automated 5.0 (H) 0.0 - 0.9 %    Lymphocytes % 6.2 13.0 - 44.0 %    Monocytes % 4.6 2.0 - 10.0 %    Eosinophils % 0.0 0.0 - 6.0 %    Basophils % 0.3 0.0 - 2.0 %    Neutrophils Absolute 26.47 (H) 1.20 - 7.70 x10*3/uL    Immature Granulocytes Absolute, Automated 1.58 (H) 0.00 - 0.70 x10*3/uL    Lymphocytes Absolute 1.97 1.20 - 4.80 x10*3/uL    Monocytes Absolute 1.44 (H) 0.10 - 1.00 x10*3/uL    Eosinophils Absolute 0.00 0.00 - 0.70 x10*3/uL    Basophils Absolute 0.08 0.00 - 0.10 x10*3/uL   Coagulation Screen    Collection Time: 03/10/25  5:16 AM   Result Value Ref Range    Protime 31.0 (H) 9.8 - 12.4 seconds    INR 2.8 (H) 0.9 - 1.1    aPTT 58 (H) 26 - 36 seconds   Type And Screen    Collection Time: 03/10/25  5:16 AM   Result Value Ref Range    ABO TYPE O     Rh TYPE POS     ANTIBODY SCREEN NEG    Lactate    Collection Time: 03/10/25  5:16 AM   Result Value Ref Range    Lactate 3.0 (H) 0.4 - 2.0 mmol/L   POCT GLUCOSE    Collection Time: 03/10/25  6:00 AM   Result Value Ref Range    POCT Glucose 206 (H) 74 - 99 mg/dL   POCT GLUCOSE    Collection Time: 03/10/25  7:58 AM   Result Value Ref Range    POCT Glucose 168 (H) 74 - 99 mg/dL   BLOOD GAS ARTERIAL FULL PANEL    Collection Time: 03/10/25  8:32 AM   Result Value Ref Range    POCT pH, Arterial 7.28 (L) 7.38 - 7.42 pH    POCT pCO2, Arterial 45 (H) 38 - 42 mm Hg    POCT pO2, Arterial 107 (H) 85 - 95 mm Hg    POCT SO2, Arterial 99 94 - 100 %    POCT Oxy Hemoglobin, Arterial 96.3 94.0 - 98.0 %    POCT Hematocrit Calculated, Arterial 38.0 36.0 - 46.0 %    POCT Sodium, Arterial 133 (L) 136 - 145 mmol/L    POCT Potassium, Arterial 4.5 3.5 - 5.3 mmol/L    POCT  Chloride, Arterial 100 98 - 107 mmol/L    POCT Ionized Calcium, Arterial 1.06 (L) 1.10 - 1.33 mmol/L    POCT Glucose, Arterial 189 (H) 74 - 99 mg/dL    POCT Lactate, Arterial 3.2 (H) 0.4 - 2.0 mmol/L    POCT Base Excess, Arterial -5.6 (L) -2.0 - 3.0 mmol/L    POCT HCO3 Calculated, Arterial 21.1 (L) 22.0 - 26.0 mmol/L    POCT Hemoglobin, Arterial 12.6 12.0 - 16.0 g/dL    POCT Anion Gap, Arterial 16 10 - 25 mmo/L    Patient Temperature 37.0 degrees Celsius    FiO2 80 %   C. difficile, PCR    Collection Time: 03/10/25  8:43 AM    Specimen: Stool   Result Value Ref Range    C. difficile, PCR Not Detected Not Detected   POCT GLUCOSE    Collection Time: 03/10/25 11:28 AM   Result Value Ref Range    POCT Glucose 277 (H) 74 - 99 mg/dL   BLOOD GAS ARTERIAL FULL PANEL    Collection Time: 03/10/25 12:43 PM   Result Value Ref Range    POCT pH, Arterial 7.25 (LL) 7.38 - 7.42 pH    POCT pCO2, Arterial 48 (H) 38 - 42 mm Hg    POCT pO2, Arterial 91 85 - 95 mm Hg    POCT SO2, Arterial 98 94 - 100 %    POCT Oxy Hemoglobin, Arterial 95.0 94.0 - 98.0 %    POCT Hematocrit Calculated, Arterial 35.0 (L) 36.0 - 46.0 %    POCT Sodium, Arterial 130 (L) 136 - 145 mmol/L    POCT Potassium, Arterial 5.0 3.5 - 5.3 mmol/L    POCT Chloride, Arterial 99 98 - 107 mmol/L    POCT Ionized Calcium, Arterial 1.06 (L) 1.10 - 1.33 mmol/L    POCT Glucose, Arterial 284 (H) 74 - 99 mg/dL    POCT Lactate, Arterial 3.3 (H) 0.4 - 2.0 mmol/L    POCT Base Excess, Arterial -6.2 (L) -2.0 - 3.0 mmol/L    POCT HCO3 Calculated, Arterial 21.0 (L) 22.0 - 26.0 mmol/L    POCT Hemoglobin, Arterial 11.7 (L) 12.0 - 16.0 g/dL    POCT Anion Gap, Arterial 15 10 - 25 mmo/L    Patient Temperature 37.0 degrees Celsius    FiO2 70 %

## 2025-03-10 NOTE — CARE PLAN
Problem: Safety - Adult  Goal: Free from fall injury  Outcome: Progressing     Problem: Nutrition  Goal: Nutrient intake appropriate for maintaining nutritional needs  Outcome: Progressing     Problem: Mechanical Ventilation  Goal: Patient Will Maintain Patent Airway  Outcome: Progressing  Goal: Oral health is maintained or improved  Outcome: Progressing  Goal: ET tube will be managed safely  Outcome: Progressing     Problem: Skin  Goal: Decreased wound size/increased tissue granulation at next dressing change  Outcome: Progressing  Flowsheets (Taken 3/10/2025 1608)  Decreased wound size/increased tissue granulation at next dressing change:   Promote sleep for wound healing   Protective dressings over bony prominences   Utilize specialty bed per algorithm  Goal: Participates in plan/prevention/treatment measures  Outcome: Progressing  Flowsheets (Taken 3/10/2025 1608)  Participates in plan/prevention/treatment measures: Elevate heels  Goal: Prevent/manage excess moisture  Outcome: Progressing  Flowsheets (Taken 3/10/2025 1608)  Prevent/manage excess moisture:   Monitor for/manage infection if present   Follow provider orders for dressing changes   Moisturize dry skin  Goal: Prevent/minimize sheer/friction injuries  Outcome: Progressing  Flowsheets (Taken 3/10/2025 1608)  Prevent/minimize sheer/friction injuries:   Complete micro-shifts as needed if patient unable. Adjust patient position to relieve pressure points, not a full turn   Use pull sheet   Increase activity/out of bed for meals   HOB 30 degrees or less   Turn/reposition every 2 hours/use positioning/transfer devices   Utilize specialty bed per algorithm  Goal: Promote/optimize nutrition  Outcome: Progressing  Flowsheets (Taken 3/10/2025 1608)  Promote/optimize nutrition: Monitor/record intake including meals  Goal: Promote skin healing  Outcome: Progressing  Flowsheets (Taken 3/10/2025 1608)  Promote skin healing:   Assess skin/pad under  line(s)/device(s)   Turn/reposition every 2 hours/use positioning/transfer devices   Protective dressings over bony prominences   Ensure correct size (line/device) and apply per  instructions   Rotate device position/do not position patient on device

## 2025-03-11 LAB
25(OH)D3 SERPL-MCNC: 23 NG/ML (ref 30–100)
ALBUMIN SERPL BCP-MCNC: 3 G/DL (ref 3.4–5)
ALBUMIN SERPL BCP-MCNC: 3.1 G/DL (ref 3.4–5)
ALBUMIN SERPL BCP-MCNC: 3.1 G/DL (ref 3.4–5)
ALP SERPL-CCNC: 218 U/L (ref 33–110)
ALT SERPL W P-5'-P-CCNC: 57 U/L (ref 7–45)
ANION GAP BLDA CALCULATED.4IONS-SCNC: 12 MMO/L (ref 10–25)
ANION GAP BLDA CALCULATED.4IONS-SCNC: 9 MMO/L (ref 10–25)
ANION GAP SERPL CALC-SCNC: 16 MMOL/L (ref 10–20)
ANION GAP SERPL CALC-SCNC: 16 MMOL/L (ref 10–20)
APTT PPP: 51 SECONDS (ref 26–36)
APTT PPP: 72 SECONDS (ref 26–36)
AST SERPL W P-5'-P-CCNC: 147 U/L (ref 9–39)
BASE EXCESS BLDA CALC-SCNC: -3.6 MMOL/L (ref -2–3)
BASE EXCESS BLDA CALC-SCNC: -4.9 MMOL/L (ref -2–3)
BASOPHILS # BLD MANUAL: 0 X10*3/UL (ref 0–0.1)
BASOPHILS NFR BLD MANUAL: 0 %
BILIRUB DIRECT SERPL-MCNC: 8.8 MG/DL (ref 0–0.3)
BILIRUB SERPL-MCNC: 14 MG/DL (ref 0–1.2)
BNP SERPL-MCNC: 61 PG/ML (ref 0–99)
BODY TEMPERATURE: 37 DEGREES CELSIUS
BODY TEMPERATURE: 37 DEGREES CELSIUS
BUN SERPL-MCNC: 48 MG/DL (ref 6–23)
BUN SERPL-MCNC: 49 MG/DL (ref 6–23)
CA-I BLD-SCNC: 1.03 MMOL/L (ref 1.1–1.33)
CA-I BLDA-SCNC: 1.09 MMOL/L (ref 1.1–1.33)
CA-I BLDA-SCNC: 1.14 MMOL/L (ref 1.1–1.33)
CALCIUM SERPL-MCNC: 8 MG/DL (ref 8.6–10.6)
CALCIUM SERPL-MCNC: 8.1 MG/DL (ref 8.6–10.6)
CFT FORM KAOLIN IND BLD RES TEG: 1.7 MIN (ref 0.8–2.1)
CHLORIDE BLDA-SCNC: 100 MMOL/L (ref 98–107)
CHLORIDE BLDA-SCNC: 100 MMOL/L (ref 98–107)
CHLORIDE SERPL-SCNC: 96 MMOL/L (ref 98–107)
CHLORIDE SERPL-SCNC: 98 MMOL/L (ref 98–107)
CLOT ANGLE.KAOLIN INDUCED BLD RES TEG: 67 DEG (ref 63–78)
CLOT INIT KAO IND P HEP NEUT BLD RES TEG: 11.2 MIN (ref 4.6–9.1)
CLOT INIT KAO IND P HEP NEUT BLD RES TEG: 13.8 MIN (ref 4.3–8.3)
CO2 SERPL-SCNC: 23 MMOL/L (ref 21–32)
CO2 SERPL-SCNC: 23 MMOL/L (ref 21–32)
CREAT SERPL-MCNC: 1.72 MG/DL (ref 0.5–1.05)
CREAT SERPL-MCNC: 1.76 MG/DL (ref 0.5–1.05)
EGFRCR SERPLBLD CKD-EPI 2021: 37 ML/MIN/1.73M*2
EGFRCR SERPLBLD CKD-EPI 2021: 38 ML/MIN/1.73M*2
EOSINOPHIL # BLD MANUAL: 0 X10*3/UL (ref 0–0.7)
EOSINOPHIL NFR BLD MANUAL: 0 %
ERYTHROCYTE [DISTWIDTH] IN BLOOD BY AUTOMATED COUNT: 15.6 % (ref 11.5–14.5)
FIBRINOGEN BLD CALC-MCNC: 432 MG/DL (ref 278–581)
GLUCOSE BLD MANUAL STRIP-MCNC: 148 MG/DL (ref 74–99)
GLUCOSE BLD MANUAL STRIP-MCNC: 164 MG/DL (ref 74–99)
GLUCOSE BLD MANUAL STRIP-MCNC: 172 MG/DL (ref 74–99)
GLUCOSE BLD MANUAL STRIP-MCNC: 176 MG/DL (ref 74–99)
GLUCOSE BLD MANUAL STRIP-MCNC: 191 MG/DL (ref 74–99)
GLUCOSE BLD MANUAL STRIP-MCNC: 205 MG/DL (ref 74–99)
GLUCOSE BLDA-MCNC: 199 MG/DL (ref 74–99)
GLUCOSE BLDA-MCNC: 227 MG/DL (ref 74–99)
GLUCOSE SERPL-MCNC: 188 MG/DL (ref 74–99)
GLUCOSE SERPL-MCNC: 213 MG/DL (ref 74–99)
HCO3 BLDA-SCNC: 22.4 MMOL/L (ref 22–26)
HCO3 BLDA-SCNC: 22.7 MMOL/L (ref 22–26)
HCT VFR BLD AUTO: 34.2 % (ref 36–46)
HCT VFR BLD EST: 38 % (ref 36–46)
HCT VFR BLD EST: 38 % (ref 36–46)
HGB BLD-MCNC: 11.7 G/DL (ref 12–16)
HGB BLDA-MCNC: 12.5 G/DL (ref 12–16)
HGB BLDA-MCNC: 12.6 G/DL (ref 12–16)
IMM GRANULOCYTES # BLD AUTO: 1.56 X10*3/UL (ref 0–0.7)
IMM GRANULOCYTES NFR BLD AUTO: 5.1 % (ref 0–0.9)
INHALED O2 CONCENTRATION: 55 %
INHALED O2 CONCENTRATION: 55 %
INR PPP: 3.6 (ref 0.9–1.1)
INR PPP: 5 (ref 0.9–1.1)
LACTATE BLDA-SCNC: 2.9 MMOL/L (ref 0.4–2)
LACTATE BLDA-SCNC: 3 MMOL/L (ref 0.4–2)
LYMPHOCYTES # BLD MANUAL: 0.79 X10*3/UL (ref 1.2–4.8)
LYMPHOCYTES NFR BLD MANUAL: 2.6 %
MA KAOLIN BLD RES TEG: 59 MM (ref 52–69)
MA KAOLIN+TF BLD RES TEG: 64 MM (ref 52–70)
MA TF IND+IIB-IIIA INH BLD RES TEG: 24 MM (ref 15–32)
MAGNESIUM SERPL-MCNC: 2.96 MG/DL (ref 1.6–2.4)
MCH RBC QN AUTO: 31.3 PG (ref 26–34)
MCHC RBC AUTO-ENTMCNC: 34.2 G/DL (ref 32–36)
MCV RBC AUTO: 91 FL (ref 80–100)
MONOCYTES # BLD MANUAL: 0.79 X10*3/UL (ref 0.1–1)
MONOCYTES NFR BLD MANUAL: 2.6 %
NEUTROPHILS # BLD MANUAL: 28.55 X10*3/UL (ref 1.2–7.7)
NEUTS BAND # BLD MANUAL: 2.68 X10*3/UL (ref 0–0.7)
NEUTS BAND NFR BLD MANUAL: 8.8 %
NEUTS SEG # BLD MANUAL: 25.87 X10*3/UL (ref 1.2–7)
NEUTS SEG NFR BLD MANUAL: 85.1 %
NRBC BLD-RTO: 2.3 /100 WBCS (ref 0–0)
OXYHGB MFR BLDA: 94.6 % (ref 94–98)
OXYHGB MFR BLDA: 96.3 % (ref 94–98)
PCO2 BLDA: 45 MM HG (ref 38–42)
PCO2 BLDA: 50 MM HG (ref 38–42)
PH BLDA: 7.26 PH (ref 7.38–7.42)
PH BLDA: 7.31 PH (ref 7.38–7.42)
PHOSPHATE SERPL-MCNC: 3.5 MG/DL (ref 2.5–4.9)
PHOSPHATE SERPL-MCNC: 4.6 MG/DL (ref 2.5–4.9)
PLATELET # BLD AUTO: 80 X10*3/UL (ref 150–450)
PO2 BLDA: 83 MM HG (ref 85–95)
PO2 BLDA: 91 MM HG (ref 85–95)
POTASSIUM BLDA-SCNC: 5.2 MMOL/L (ref 3.5–5.3)
POTASSIUM BLDA-SCNC: 5.2 MMOL/L (ref 3.5–5.3)
POTASSIUM SERPL-SCNC: 4.9 MMOL/L (ref 3.5–5.3)
POTASSIUM SERPL-SCNC: 5.3 MMOL/L (ref 3.5–5.3)
PROMYELOCYTES # BLD MANUAL: 0.27 X10*3/UL
PROMYELOCYTES NFR BLD MANUAL: 0.9 %
PROT SERPL-MCNC: 7.5 G/DL (ref 6.4–8.2)
PROTHROMBIN TIME: 40.4 SECONDS (ref 9.8–12.4)
PROTHROMBIN TIME: 55.9 SECONDS (ref 9.8–12.4)
RBC # BLD AUTO: 3.74 X10*6/UL (ref 4–5.2)
RBC MORPH BLD: ABNORMAL
SAO2 % BLDA: 97 % (ref 94–100)
SAO2 % BLDA: 99 % (ref 94–100)
SODIUM BLDA-SCNC: 126 MMOL/L (ref 136–145)
SODIUM BLDA-SCNC: 129 MMOL/L (ref 136–145)
SODIUM SERPL-SCNC: 130 MMOL/L (ref 136–145)
SODIUM SERPL-SCNC: 132 MMOL/L (ref 136–145)
TOTAL CELLS COUNTED BLD: 114
WBC # BLD AUTO: 30.4 X10*3/UL (ref 4.4–11.3)

## 2025-03-11 PROCEDURE — 83880 ASSAY OF NATRIURETIC PEPTIDE: CPT

## 2025-03-11 PROCEDURE — 2500000002 HC RX 250 W HCPCS SELF ADMINISTERED DRUGS (ALT 637 FOR MEDICARE OP, ALT 636 FOR OP/ED): Mod: SE

## 2025-03-11 PROCEDURE — 83605 ASSAY OF LACTIC ACID: CPT

## 2025-03-11 PROCEDURE — 2500000001 HC RX 250 WO HCPCS SELF ADMINISTERED DRUGS (ALT 637 FOR MEDICARE OP): Mod: SE

## 2025-03-11 PROCEDURE — 2500000005 HC RX 250 GENERAL PHARMACY W/O HCPCS: Mod: SE

## 2025-03-11 PROCEDURE — 2500000004 HC RX 250 GENERAL PHARMACY W/ HCPCS (ALT 636 FOR OP/ED): Mod: SE

## 2025-03-11 PROCEDURE — 94003 VENT MGMT INPAT SUBQ DAY: CPT

## 2025-03-11 PROCEDURE — 5A1D90Z PERFORMANCE OF URINARY FILTRATION, CONTINUOUS, GREATER THAN 18 HOURS PER DAY: ICD-10-PCS | Performed by: INTERNAL MEDICINE

## 2025-03-11 PROCEDURE — 82947 ASSAY GLUCOSE BLOOD QUANT: CPT

## 2025-03-11 PROCEDURE — 85610 PROTHROMBIN TIME: CPT

## 2025-03-11 PROCEDURE — 99291 CRITICAL CARE FIRST HOUR: CPT

## 2025-03-11 PROCEDURE — 82248 BILIRUBIN DIRECT: CPT

## 2025-03-11 PROCEDURE — 93975 VASCULAR STUDY: CPT | Performed by: RADIOLOGY

## 2025-03-11 PROCEDURE — 82310 ASSAY OF CALCIUM: CPT

## 2025-03-11 PROCEDURE — 76705 ECHO EXAM OF ABDOMEN: CPT | Performed by: RADIOLOGY

## 2025-03-11 PROCEDURE — 83735 ASSAY OF MAGNESIUM: CPT

## 2025-03-11 PROCEDURE — 90937 HEMODIALYSIS REPEATED EVAL: CPT

## 2025-03-11 PROCEDURE — 85027 COMPLETE CBC AUTOMATED: CPT

## 2025-03-11 PROCEDURE — 2020000001 HC ICU ROOM DAILY

## 2025-03-11 PROCEDURE — 85007 BL SMEAR W/DIFF WBC COUNT: CPT

## 2025-03-11 PROCEDURE — 36600 WITHDRAWAL OF ARTERIAL BLOOD: CPT

## 2025-03-11 PROCEDURE — 82330 ASSAY OF CALCIUM: CPT

## 2025-03-11 PROCEDURE — 36415 COLL VENOUS BLD VENIPUNCTURE: CPT

## 2025-03-11 PROCEDURE — 82306 VITAMIN D 25 HYDROXY: CPT

## 2025-03-11 PROCEDURE — 82040 ASSAY OF SERUM ALBUMIN: CPT

## 2025-03-11 PROCEDURE — 90945 DIALYSIS ONE EVALUATION: CPT | Performed by: INTERNAL MEDICINE

## 2025-03-11 PROCEDURE — 2500000005 HC RX 250 GENERAL PHARMACY W/O HCPCS: Mod: SE | Performed by: EMERGENCY MEDICINE

## 2025-03-11 PROCEDURE — 85384 FIBRINOGEN ACTIVITY: CPT

## 2025-03-11 RX ORDER — CALCIUM GLUCONATE 20 MG/ML
2 INJECTION, SOLUTION INTRAVENOUS ONCE
Status: COMPLETED | OUTPATIENT
Start: 2025-03-11 | End: 2025-03-11

## 2025-03-11 RX ORDER — INSULIN LISPRO 100 [IU]/ML
0-10 INJECTION, SOLUTION INTRAVENOUS; SUBCUTANEOUS
Status: DISCONTINUED | OUTPATIENT
Start: 2025-03-11 | End: 2025-03-12

## 2025-03-11 RX ORDER — ERGOCALCIFEROL (VITAMIN D2) 200 MCG/ML
1250 DROPS ORAL WEEKLY
Status: DISCONTINUED | OUTPATIENT
Start: 2025-03-11 | End: 2025-03-21

## 2025-03-11 RX ORDER — DEXAMETHASONE SODIUM PHOSPHATE 10 MG/ML
10 INJECTION INTRAMUSCULAR; INTRAVENOUS EVERY 24 HOURS
Status: DISCONTINUED | OUTPATIENT
Start: 2025-03-12 | End: 2025-03-12

## 2025-03-11 RX ADMIN — Medication 55 PERCENT: at 07:42

## 2025-03-11 RX ADMIN — SENNOSIDES AND DOCUSATE SODIUM 2 TABLET: 50; 8.6 TABLET ORAL at 08:05

## 2025-03-11 RX ADMIN — INSULIN LISPRO 2 UNITS: 100 INJECTION, SOLUTION INTRAVENOUS; SUBCUTANEOUS at 19:20

## 2025-03-11 RX ADMIN — METHYLPREDNISOLONE SODIUM SUCCINATE 50 MG: 125 INJECTION, POWDER, FOR SOLUTION INTRAMUSCULAR; INTRAVENOUS at 08:30

## 2025-03-11 RX ADMIN — Medication 1250 MCG: at 08:31

## 2025-03-11 RX ADMIN — WHITE PETROLATUM 57.7 %-MINERAL OIL 31.9 % EYE OINTMENT 1 APPLICATION: at 08:17

## 2025-03-11 RX ADMIN — INSULIN LISPRO 1 UNITS: 100 INJECTION, SOLUTION INTRAVENOUS; SUBCUTANEOUS at 08:08

## 2025-03-11 RX ADMIN — WHITE PETROLATUM 57.7 %-MINERAL OIL 31.9 % EYE OINTMENT 1 APPLICATION: at 01:24

## 2025-03-11 RX ADMIN — BIVALIRUDIN 0.1 MG/KG/HR: 250 INJECTION INTRACAVERNOUS at 05:26

## 2025-03-11 RX ADMIN — THIAMINE HCL TAB 100 MG 100 MG: 100 TAB at 08:05

## 2025-03-11 RX ADMIN — CALCIUM CHLORIDE, MAGNESIUM CHLORIDE, DEXTROSE MONOHYDRATE, LACTIC ACID, SODIUM CHLORIDE, SODIUM BICARBONATE AND POTASSIUM CHLORIDE 25 ML/KG/HR: 3.68; 3.05; 22; 5.4; 6.46; 3.09; .314 INJECTION INTRAVENOUS at 05:25

## 2025-03-11 RX ADMIN — CARBOXYMETHYLCELLULOSE SODIUM 2 DROP: 5 SOLUTION/ DROPS OPHTHALMIC at 02:04

## 2025-03-11 RX ADMIN — POLYETHYLENE GLYCOL 3350 17 G: 17 POWDER, FOR SOLUTION ORAL at 08:05

## 2025-03-11 RX ADMIN — INSULIN LISPRO 4 UNITS: 100 INJECTION, SOLUTION INTRAVENOUS; SUBCUTANEOUS at 16:15

## 2025-03-11 RX ADMIN — Medication 100 MCG/HR: at 05:54

## 2025-03-11 RX ADMIN — CALCIUM CHLORIDE, MAGNESIUM CHLORIDE, DEXTROSE MONOHYDRATE, LACTIC ACID, SODIUM CHLORIDE, SODIUM BICARBONATE AND POTASSIUM CHLORIDE 25 ML/KG/HR: 3.68; 3.05; 22; 5.4; 6.46; 3.09; .314 INJECTION INTRAVENOUS at 10:42

## 2025-03-11 RX ADMIN — Medication 5 ML: at 08:17

## 2025-03-11 RX ADMIN — INSULIN LISPRO 1 UNITS: 100 INJECTION, SOLUTION INTRAVENOUS; SUBCUTANEOUS at 04:04

## 2025-03-11 RX ADMIN — INSULIN LISPRO 2 UNITS: 100 INJECTION, SOLUTION INTRAVENOUS; SUBCUTANEOUS at 00:01

## 2025-03-11 RX ADMIN — PANTOPRAZOLE SODIUM 40 MG: 40 INJECTION, POWDER, LYOPHILIZED, FOR SOLUTION INTRAVENOUS at 06:10

## 2025-03-11 RX ADMIN — CALCIUM GLUCONATE 2 G: 20 INJECTION, SOLUTION INTRAVENOUS at 08:05

## 2025-03-11 RX ADMIN — INSULIN LISPRO 1 UNITS: 100 INJECTION, SOLUTION INTRAVENOUS; SUBCUTANEOUS at 11:52

## 2025-03-11 RX ADMIN — CEFAZOLIN SODIUM 2 G: 2 INJECTION, SOLUTION INTRAVENOUS at 10:46

## 2025-03-11 RX ADMIN — Medication 55 PERCENT: at 20:45

## 2025-03-11 RX ADMIN — CARBOXYMETHYLCELLULOSE SODIUM 2 DROP: 5 SOLUTION/ DROPS OPHTHALMIC at 10:46

## 2025-03-11 RX ADMIN — KETAMINE HYDROCHLORIDE 1 MG/KG/HR: 10 INJECTION INTRAMUSCULAR; INTRAVENOUS at 05:33

## 2025-03-11 RX ADMIN — CEFAZOLIN SODIUM 2 G: 2 INJECTION, SOLUTION INTRAVENOUS at 22:17

## 2025-03-11 ASSESSMENT — PAIN - FUNCTIONAL ASSESSMENT
PAIN_FUNCTIONAL_ASSESSMENT: CPOT (CRITICAL CARE PAIN OBSERVATION TOOL)

## 2025-03-11 NOTE — PROCEDURES
CRRT procedure note   Seen and assessed on CVVH. Labs and events reviewed   off pressors  this AM ; on vent - non-responsive ;   Vascular access: right IJ trialysis catheter. Net -3 L over prior 24 hours  BFR :200 ml/min  Filter: M150  Total Replacement Rate: 2000 ml/hour  Pre blood pump : 1000 ml/hour    Replacement solution Prismasol potassium:  4 mEq/L, 2.5 Ca  Patient fluid removal: per ICU team   Please check Phosphorus, Calcium and Magnesium daily and replace peripherally as needed  Continue with current CVVH prescription.    Vitals:    03/11/25 1300 03/11/25 1357 03/11/25 1400 03/11/25 1500   BP: 106/62  104/64 104/63   Patient Position:       Pulse: 84 85 85 86   Resp: 23 (!) 30 (!) 28 26   Temp: 35.4 °C (95.7 °F)  35.6 °C (96.1 °F) 35.9 °C (96.6 °F)   TempSrc:       SpO2: 93% 94% 92% 93%   Weight:       Height:            Intake/Output Summary (Last 24 hours) at 3/11/2025 1520  Last data filed at 3/11/2025 1200  Gross per 24 hour   Intake 1140.17 ml   Output 4149 ml   Net -3008.83 ml      Results from last 72 hours   Lab Units 03/11/25  0339 03/10/25  1657 03/10/25  0516 03/09/25  1753 03/09/25  0512   SODIUM mmol/L 132* 133* 132*   < > 135*   POTASSIUM mmol/L 4.9 4.8 4.6   < > 4.1   CHLORIDE mmol/L 98 99 100   < > 101   CO2 mmol/L 23 22 20*   < > 26   BUN mg/dL 49* 49* 46*   < > 47*   PHOSPHORUS mg/dL 3.5 3.9 4.1   < > 3.5   HEMOGLOBIN g/dL 11.7*  --  11.8*  --  10.3*    < > = values in this interval not displayed.          ceFAZolin, 2 g, intravenous, q12h  [START ON 3/12/2025] dexAMETHasone, 10 mg, intravenous, q24h  ergocalciferol, 1,250 mcg, oral, Weekly  pantoprazole, 40 mg, oral, Daily before breakfast   Or  esomeprazole, 40 mg, nasoduodenal tube, Daily before breakfast   Or  pantoprazole, 40 mg, intravenous, Daily before breakfast  insulin lispro, 0-10 Units, subcutaneous, TID AC  [Held by provider] lactulose, 20 g, oral, q6h  [START ON 3/12/2025] artificial tears, 2 drop, Both Eyes, q24h  oxygen, ,  inhalation, Continuous - Inhalation  polyethylene glycol, 17 g, oral, Daily  sennosides-docusate sodium, 2 tablet, oral, Daily  thiamine, 100 mg, oral, Daily  vitamin B complex-vitamin C-folic acid, 5 mL, oral, Daily

## 2025-03-11 NOTE — PROGRESS NOTES
Medical Intensive Care - Daily Progress Note   Subjective    Gissel Harvey is a 39 y.o. year old female patient admitted on 3/3/2025 with following ICU needs: Hypoxemia and AHRF requiring intubation due to ARDS from CAP.     Interval History:  3/10 PM ABG w/ pH 7.31, PaO2 125, FiO2 decreased to 55% overnight  INR increased to 5.0 from 2.8  AST/ALT/Bili/Alk phos all mildly elevated compared to 6 days ago  Remains off pressors with strong MAP      Meds    Scheduled medications  calcium gluconate, 2 g, intravenous, Once  ceFAZolin, 2 g, intravenous, q12h  ergocalciferol, 1,250 mcg, oral, Weekly  pantoprazole, 40 mg, oral, Daily before breakfast   Or  esomeprazole, 40 mg, nasoduodenal tube, Daily before breakfast   Or  pantoprazole, 40 mg, intravenous, Daily before breakfast  insulin lispro, 0-5 Units, subcutaneous, q4h  [Held by provider] lactulose, 20 g, oral, q6h  artificial tears, 2 drop, Both Eyes, q6h  methylPREDNISolone sodium succinate (PF), 50 mg, intravenous, q12h  oxygen, , inhalation, Continuous - Inhalation  polyethylene glycol, 17 g, oral, Daily  sennosides-docusate sodium, 2 tablet, oral, Daily  thiamine, 100 mg, oral, Daily  vitamin B complex-vitamin C-folic acid, 5 mL, oral, Daily  white petrolatum-mineral oiL, 1 Application, Both Eyes, q6h      Continuous medications  bivalirudin, 0.05-0.49 mg/kg/hr, Last Rate: 0.1 mg/kg/hr (03/11/25 0526)  epoprostenol, 0.03 mcg/kg/min (Ideal), Last Rate: Stopped (03/10/25 0840)  fentaNYL, 0-300 mcg/hr, Last Rate: 100 mcg/hr (03/11/25 0554)  ketamine (Ketalar) 1,000 mg in 100 mL (10 mg/mL) infusion, 0.05-2 mg/kg/hr, Last Rate: 1 mg/kg/hr (03/11/25 1533)  midazolam, 0-20 mg/hr, Last Rate: Stopped (03/10/25 0930)  norepinephrine, 0-0.5 mcg/kg/min, Last Rate: Stopped (03/09/25 1131)  PrismaSol 4/2.5, 25 mL/kg/hr, Last Rate: 25 mL/kg/hr (03/11/25 9989)      PRN medications  PRN medications: acetaminophen **OR** acetaminophen, alteplase, dextrose, dextrose, fentaNYL,  "glucagon, glucagon, midazolam, oxygen     Objective    Blood pressure 114/68, pulse 81, temperature 35.1 °C (95.2 °F), resp. rate 26, height 1.676 m (5' 6\"), weight 65 kg (143 lb 4.8 oz), SpO2 96%.     Physical Exam  Constitutional:       Comments: Intubated and Sedated  Not opening eyes to voice   HENT:      Head: Normocephalic and atraumatic.   Cardiovascular:      Rate and Rhythm: Normal rate and regular rhythm.      Pulses: Normal pulses.   Pulmonary:      Breath sounds: No wheezing.      Comments: Mechanically Ventilated  Coarse breath sounds  Abdominal:      General: There is no distension.      Palpations: Abdomen is soft.      Comments: Rectal tube draining brown stool, no blood   Musculoskeletal:      Right lower leg: Edema present.      Left lower leg: Edema present.      Comments: 2+ pitting edema bilaterally to knees   Skin:     General: Skin is warm and dry.   Neurological:      Comments: Sedated, not awakening to voice            Intake/Output Summary (Last 24 hours) at 3/11/2025 0817  Last data filed at 3/11/2025 0600  Gross per 24 hour   Intake 1414.96 ml   Output 4126 ml   Net -2711.04 ml     Labs:   Results from last 72 hours   Lab Units 03/11/25  0339 03/10/25  1657 03/10/25  0516   SODIUM mmol/L 132* 133* 132*   POTASSIUM mmol/L 4.9 4.8 4.6   CHLORIDE mmol/L 98 99 100   CO2 mmol/L 23 22 20*   BUN mg/dL 49* 49* 46*   CREATININE mg/dL 1.76* 1.83* 1.84*   GLUCOSE mg/dL 188* 215* 233*   CALCIUM mg/dL 8.0* 7.9* 8.0*   ANION GAP mmol/L 16 17 17   EGFR mL/min/1.73m*2 37* 36* 35*   PHOSPHORUS mg/dL 3.5 3.9 4.1      Results from last 72 hours   Lab Units 03/11/25  0339 03/10/25  0516 03/09/25  0512   WBC AUTO x10*3/uL 30.4* 31.5* 25.6*   HEMOGLOBIN g/dL 11.7* 11.8* 10.3*   HEMATOCRIT % 34.2* 37.6 30.5*   PLATELETS AUTO x10*3/uL 80* 66* 51*   NEUTROS PCT AUTO %  --  83.9  --    LYMPHO PCT MAN % 2.6  --   --    LYMPHS PCT AUTO %  --  6.2  --    MONO PCT MAN % 2.6  --   --    MONOS PCT AUTO %  --  4.6  --  "   EOSINO PCT MAN % 0.0  --   --    EOS PCT AUTO %  --  0.0  --       Results from last 72 hours   Lab Units 03/10/25  2227 03/10/25  1641 03/10/25  1243   POCT PH, ARTERIAL pH 7.31* 7.30* 7.25*   POCT PCO2, ARTERIAL mm Hg 45* 44* 48*   POCT PO2, ARTERIAL mm Hg 125* 74* 91   POCT SO2, ARTERIAL % 100 96 98            Micro/ID:     Lab Results   Component Value Date    BLOODCULT No growth at 4 days -  FINAL REPORT 03/03/2025    BLOODCULT No growth at 4 days -  FINAL REPORT 03/03/2025           Assessment and Plan     Assessment: Gissel Harvey is a 39 y.o. 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 She is being treated for ARDS and AHRF, initially severe septic shock now improved, some concern for cardiogenic shock and new HFrEF but improving.     Her pressor requirement has decreased and she is now off of pressor support. PF ratio increased this morning to 135, weaning epo. Hypothermic requiring bear hugger, rectal tube with increased output overnight.     Mechanical Ventilation: 4-10 days  Sedation/Analgesia:  Fentanyl, Ketamine, and Versed  Restraints: no    Summary for 03/11/25  :  Weaning sedation w/ target RASS -2 as long as FiO2% not increasing  Liver US w/ doppler to assess for TIPS malfunction, thrombosis, iso rising LFTs, bili, INR  Will finish steroid course w/ 3 days of dex- she completed 7 days methyl pred (for septic shock + ARDS) and now that her shock has resolved we will finish total of 10 days for ARDS      NEUROLOGY/PSYCH:  #Sedation   - 3/11 weaned all sedation off- versed off 3/10, ketamine and fentanyl off 3/11  - w/ target RASS -2 as long as FiO2% not increasing    CARDIOVASCULAR:  #Shock, resolved,  likely septic I/s/o severe pneumonia and possible cardiogenic component  #HFrEF  :: lactate > 9 on presentation, now down trending, stable at 3  :: TTE 3/4 with EF 30-35% with reduced RV function, no previous TTE for comparison  :: BNP 2127 at admission  :: Troponin plateaued (62>63) on 3/4/25  - Off pressor support  - Fluid removal with CVVH- goal net neg 2L / day, anuric  - plan to repeat limited TTE and assess EF in 1-2 weeks     #Aflutter  #Sustained VT   :: went into NSVT while wire threaded for central line placement  :: s/p amiodarone 150 mg bolus x2  :: pt cardioverted  - Pt is back to sinus rhythm, okay for amio gtt if arrhythmias return    PULMONARY:  Vent Mode: Volume control/assist control  FiO2 (%):  [55 %-70 %] 55 %  S RR:  [20-24] 20  S VT:  [450 mL] 450 mL  PEEP/CPAP (cm H2O):  [10 cm H20-12 cm H20] 10 cm H20  MAP (cm H2O):  [15-17] 16   #Community acquired pneumonia   #ARDS   :: intubated 3/3   :: P:F ~ 70 on admission   :: Procalcitonin 57.3  :: flu A/B, COVID, parainfluenza, RSV, metapneumovirus, legionella urine Ag: negative  :: see ID section for full abx history this admission  :: Tracheal aspirate cx 3/3: pan sensitive staph aureus and strep pneumo  :: Strep pneumo urine ag: positive  :: Bcx 3/3: NGTD x3 days     Plan  - Continue Cefazolin 3/10-  - Will finish steroid course w/ 3 days of dex- she completed 7 days methyl pred (for septic shock + ARDS) and now that her shock has resolved we will finish total of 10 days for ARDS  - Continue to trend ABG to assess PF ratio. Currently off epo, weaning FIO2%,        RENAL/GENITOURINARY:  #Acute renal failure, anuric SCOUT  #HAGMA  :: Cr 1.9 on admission, unclear baseline as no labs in 2 years   :: FeNa 3/3: 0.1% prerenal  :: anion gap 19, delta 7, delta ratio 3.5 suggests HAGMA with concurrent metabolic alkalosis  - Continue CVVH per Nephrology  - Continue to monitor Cr with daily RFP  - Warm CVVH fluid to assist with  hypothermia    GASTROENTEROLOGY:  #liver cirrhosis   #portal hypertension s/p TIPS   #hx of Budd-Chiari   :: 3/7 POCUS with distended bowel, no appreciable ascites  - Continue to monitor abdomen for signs of ascites formation  - Hep C Abs positive, will need outpt GI follow up  - Reduce bowel regimen d/t diarrhea- lactulose decreased  - 3/11 labs w/ elevated INR (5), AST (147), ALT (57), Alk Phos (218)   - Pending liver US w/ doppler to assess for thrombosis, TIPS malfunctions    ENDOCRINOLOGY:  - SSI #2 while on steroids     HEMATOLOGY:  #Lymphopenia   :: Abs leukocyte count 260  :: HIV neg  - Now with leukocytosis likely 2/2 known infection and steroid use  - CTM     #Hx of antiphospholipid syndrome   #Hx of positive PF4  #Chronic IVC occlusion   #Hx of DVT   :: Supposed to be on fondaparinux outpatient, unclear if taking as no recent med fills   - Continue bivalirudin while inpatient I/s/o previous positive PF4      #Thrombocytopenia, stable  - Trend plts, INR, coags  - Fibrinogen wnl  - Vancomycin could cause drug-induced thrombocytopenia  - Amiodarone unlikely because timeline doesn't fit  - If platelets <50, transfuse but do not stop bivalirubin  - INR elevated 5 -> 3.8 on 3/11, iso known cirrhosis, plt 80   - TEG clot profile pending     SKIN:  #Skin Failure  - BL buttock wounds, ear wound  - wound care following, appreciate recs      MUSCULOSKELETAL:  GIFTY    INFECTIOUS DISEASE:  #MSSA PNA  #Strep PNA  #ARDS  #Septic shock, resolved  Antibiotics  Azithromycin 500 mg: 3/3  Zosyn 4.5 g: 3/3  Tobramycin 440 mg: 3/4  Vancomycin: 3/3 - 3/6, 3/8 - 3/10  Ceftaroline 3/5 - 3/6  Meropenem 2 g BID: 3/3 - 3/8  Micafungin: 3/4 - 3/6  Doxycycline 100 mg BID: 3/4 - 3/6  Ceftriaxone 3/8 - 3/10  *Cefazolin 3/10-      ICU Check List       ICU Liberation: Intervention:   Assess, Prevent, Manage Pain      Both SAT and SBT [] SAT  [] SBT 30-60 min [] Extubate to NC  [] Extubate to NIV  [] Discuss Trach   Choice of analgesia and  sedation Target RASS -2    No sedation   Delirium: Assess, prevent and manage  CAM ICU Positive    Early Mobility and Exercise RASS less than -2 [] PT /OT consult   Family Engagement and Empowerment []Family updated [x]SW consult     FEN  Fluids: CVVH  Electrolytes: CVVH  Nutrition: Trickle feeds   Prophylaxis:  DVT ppx: Bival gtt  GI ppx: PPI  Bowel care: Decreased due to increased rectal tube output  Hardware:         CVC 03/04/25 Triple lumen Left Internal jugular (Active)   Placement Date: 03/04/25   Earliest Known Present: 03/04/25  Lumen Type: Triple lumen  Orientation: Left  Location: Internal jugular   Number of days: 6       Arterial Line 03/03/25 Left Radial (Active)   Placement Date/Time: 03/03/25 (c) 2040   Size: 22 G  Orientation: Left  Location: Radial  Site Prep: Chlorhexidine   Local Anesthetic: Injectable  Insertion attempts: 2  Securement Method: Transparent dressing   Number of days: 6       ETT  7.5 mm (Active)   Placement Date: 03/03/25   ETT Type: ETT - single  Single Lumen Tube Size: 7.5 mm  Cuffed: Yes  Location: Oral   Number of days: 7       NG/OG/Feeding Tube NG - Tower sump Right nostril (Active)   Placement Date/Time: 03/03/25 1900   Type of Tube: Feeding Tube  Tube Length: 53 cm  Tube Type: NG - Tower sump  Tube Location: Right nostril   Number of days: 6       Fecal Management Rectal tube with balloon (Active)   Placement Date/Time: 03/09/25 0018   Placed by: ashlie  Hand Hygiene Completed: Yes  Fecal Management Tube Type: Rectal tube with balloon   Number of days: 1       Hemodialysis Cath 03/06/25 Right (Active)   Placement Date/Time: 03/06/25 1700   Orientation: Right   Number of days: 3       Social:  Code: Full Code    HPOA: Payal Durant (daughter) [phone]  Patient had previously had unilateral DNR per ethics team before daughter could be reached and when her clinical status was very critical. She has since come off of pressors and her daughter was reached and changed  patient to full code   3/10 and 3/11, unable to reach patient's daughter. Called 2x 3/10, 1x 3/11, no ability to leave voicemail. SW also called and texted. Will re-engage ethics  Disposition: MARLENE Buck MD   03/11/25 at 8:17 AM     Disclaimer: Documentation completed with the information available at the time of input. The times in the chart may not be reflective of actual patient care times, interventions, or procedures. Documentation occurs after the physical care of the patient.

## 2025-03-11 NOTE — PROGRESS NOTES
SOCIAL WORK NOTE  -ICU Treatment Plan: SW met with team for interdisciplinary rounds.  Patient intubated, working on weaning sedation with goal of extubation   -Support System: daughter not currently reachable, ethics reengaged. SW attempted to text her 3/10  -Planned Disposition: TBD, PT/OT evaluation ongoing   -Additional information:  Social work to follow.   CHARITO Montejo, LISW-S ([id number])

## 2025-03-12 LAB
ALBUMIN SERPL BCP-MCNC: 2.8 G/DL (ref 3.4–5)
ALBUMIN SERPL BCP-MCNC: 3 G/DL (ref 3.4–5)
ALP SERPL-CCNC: 241 U/L (ref 33–110)
ALT SERPL W P-5'-P-CCNC: 66 U/L (ref 7–45)
ANION GAP BLDV CALCULATED.4IONS-SCNC: 12 MMOL/L (ref 10–25)
ANION GAP BLDV CALCULATED.4IONS-SCNC: 13 MMOL/L (ref 10–25)
ANION GAP BLDV CALCULATED.4IONS-SCNC: 14 MMOL/L (ref 10–25)
ANION GAP BLDV CALCULATED.4IONS-SCNC: 14 MMOL/L (ref 10–25)
ANION GAP SERPL CALC-SCNC: 15 MMOL/L (ref 10–20)
ANION GAP SERPL CALC-SCNC: 16 MMOL/L (ref 10–20)
APTT PPP: 57 SECONDS (ref 26–36)
AST SERPL W P-5'-P-CCNC: 171 U/L (ref 9–39)
ATRIAL RATE: 101 BPM
ATRIAL RATE: 118 BPM
BASE EXCESS BLDV CALC-SCNC: -3.4 MMOL/L (ref -2–3)
BASE EXCESS BLDV CALC-SCNC: -3.8 MMOL/L (ref -2–3)
BASE EXCESS BLDV CALC-SCNC: -3.9 MMOL/L (ref -2–3)
BASE EXCESS BLDV CALC-SCNC: -4.5 MMOL/L (ref -2–3)
BASE EXCESS BLDV CALC-SCNC: -5.6 MMOL/L (ref -2–3)
BASE EXCESS BLDV CALC-SCNC: -5.7 MMOL/L (ref -2–3)
BASOPHILS # BLD AUTO: 0.16 X10*3/UL (ref 0–0.1)
BASOPHILS NFR BLD AUTO: 0.5 %
BILIRUB DIRECT SERPL-MCNC: 9.7 MG/DL (ref 0–0.3)
BILIRUB SERPL-MCNC: 15.1 MG/DL (ref 0–1.2)
BODY TEMPERATURE: 37 DEGREES CELSIUS
BUN SERPL-MCNC: 49 MG/DL (ref 6–23)
BUN SERPL-MCNC: 54 MG/DL (ref 6–23)
CA-I BLD-SCNC: 1.16 MMOL/L (ref 1.1–1.33)
CA-I BLDV-SCNC: 1.07 MMOL/L (ref 1.1–1.33)
CA-I BLDV-SCNC: 1.08 MMOL/L (ref 1.1–1.33)
CA-I BLDV-SCNC: 1.17 MMOL/L (ref 1.1–1.33)
CA-I BLDV-SCNC: 1.18 MMOL/L (ref 1.1–1.33)
CA-I BLDV-SCNC: 1.2 MMOL/L (ref 1.1–1.33)
CA-I BLDV-SCNC: 1.21 MMOL/L (ref 1.1–1.33)
CALCIUM SERPL-MCNC: 7.8 MG/DL (ref 8.6–10.6)
CALCIUM SERPL-MCNC: 8.5 MG/DL (ref 8.6–10.6)
CHLORIDE BLDV-SCNC: 97 MMOL/L (ref 98–107)
CHLORIDE BLDV-SCNC: 98 MMOL/L (ref 98–107)
CHLORIDE BLDV-SCNC: 99 MMOL/L (ref 98–107)
CHLORIDE BLDV-SCNC: 99 MMOL/L (ref 98–107)
CHLORIDE SERPL-SCNC: 96 MMOL/L (ref 98–107)
CHLORIDE SERPL-SCNC: 97 MMOL/L (ref 98–107)
CO2 SERPL-SCNC: 21 MMOL/L (ref 21–32)
CO2 SERPL-SCNC: 24 MMOL/L (ref 21–32)
CREAT SERPL-MCNC: 1.72 MG/DL (ref 0.5–1.05)
CREAT SERPL-MCNC: 1.84 MG/DL (ref 0.5–1.05)
EGFRCR SERPLBLD CKD-EPI 2021: 35 ML/MIN/1.73M*2
EGFRCR SERPLBLD CKD-EPI 2021: 38 ML/MIN/1.73M*2
EOSINOPHIL # BLD AUTO: 0 X10*3/UL (ref 0–0.7)
EOSINOPHIL NFR BLD AUTO: 0 %
ERYTHROCYTE [DISTWIDTH] IN BLOOD BY AUTOMATED COUNT: 16.3 % (ref 11.5–14.5)
ERYTHROCYTE [DISTWIDTH] IN BLOOD BY AUTOMATED COUNT: 16.7 % (ref 11.5–14.5)
FIBRINOGEN PPP-MCNC: 319 MG/DL (ref 200–400)
GLUCOSE BLD MANUAL STRIP-MCNC: 157 MG/DL (ref 74–99)
GLUCOSE BLD MANUAL STRIP-MCNC: 160 MG/DL (ref 74–99)
GLUCOSE BLD MANUAL STRIP-MCNC: 162 MG/DL (ref 74–99)
GLUCOSE BLD MANUAL STRIP-MCNC: 165 MG/DL (ref 74–99)
GLUCOSE BLD MANUAL STRIP-MCNC: 165 MG/DL (ref 74–99)
GLUCOSE BLD MANUAL STRIP-MCNC: 167 MG/DL (ref 74–99)
GLUCOSE BLD MANUAL STRIP-MCNC: 187 MG/DL (ref 74–99)
GLUCOSE BLDV-MCNC: 156 MG/DL (ref 74–99)
GLUCOSE BLDV-MCNC: 167 MG/DL (ref 74–99)
GLUCOSE BLDV-MCNC: 167 MG/DL (ref 74–99)
GLUCOSE BLDV-MCNC: 175 MG/DL (ref 74–99)
GLUCOSE BLDV-MCNC: 179 MG/DL (ref 74–99)
GLUCOSE BLDV-MCNC: 180 MG/DL (ref 74–99)
GLUCOSE SERPL-MCNC: 151 MG/DL (ref 74–99)
GLUCOSE SERPL-MCNC: 174 MG/DL (ref 74–99)
HBV SURFACE AG SERPL QL IA: NONREACTIVE
HCO3 BLDV-SCNC: 22.1 MMOL/L (ref 22–26)
HCO3 BLDV-SCNC: 22.2 MMOL/L (ref 22–26)
HCO3 BLDV-SCNC: 22.2 MMOL/L (ref 22–26)
HCO3 BLDV-SCNC: 22.7 MMOL/L (ref 22–26)
HCO3 BLDV-SCNC: 23.3 MMOL/L (ref 22–26)
HCO3 BLDV-SCNC: 23.4 MMOL/L (ref 22–26)
HCT VFR BLD AUTO: 30.9 % (ref 36–46)
HCT VFR BLD AUTO: 33.2 % (ref 36–46)
HCT VFR BLD EST: 35 % (ref 36–46)
HCT VFR BLD EST: 35 % (ref 36–46)
HCT VFR BLD EST: 36 % (ref 36–46)
HCT VFR BLD EST: 36 % (ref 36–46)
HCT VFR BLD EST: 37 % (ref 36–46)
HCT VFR BLD EST: 37 % (ref 36–46)
HCV AB SER QL: REACTIVE
HGB BLD-MCNC: 11 G/DL (ref 12–16)
HGB BLD-MCNC: 11.3 G/DL (ref 12–16)
HGB BLDV-MCNC: 11.5 G/DL (ref 12–16)
HGB BLDV-MCNC: 11.7 G/DL (ref 12–16)
HGB BLDV-MCNC: 11.9 G/DL (ref 12–16)
HGB BLDV-MCNC: 11.9 G/DL (ref 12–16)
HGB BLDV-MCNC: 12.2 G/DL (ref 12–16)
HGB BLDV-MCNC: 12.2 G/DL (ref 12–16)
IMM GRANULOCYTES # BLD AUTO: 1.22 X10*3/UL (ref 0–0.7)
IMM GRANULOCYTES NFR BLD AUTO: 4 % (ref 0–0.9)
INHALED O2 CONCENTRATION: 55 %
INHALED O2 CONCENTRATION: 60 %
INHALED O2 CONCENTRATION: 60 %
INHALED O2 CONCENTRATION: 80 %
INR PPP: 5.9 (ref 0.9–1.1)
LACTATE BLDV-SCNC: 2.7 MMOL/L (ref 0.4–2)
LACTATE BLDV-SCNC: 2.7 MMOL/L (ref 0.4–2)
LACTATE BLDV-SCNC: 2.8 MMOL/L (ref 0.4–2)
LACTATE BLDV-SCNC: 2.8 MMOL/L (ref 0.4–2)
LACTATE BLDV-SCNC: 3.9 MMOL/L (ref 0.4–2)
LACTATE BLDV-SCNC: 3.9 MMOL/L (ref 0.4–2)
LYMPHOCYTES # BLD AUTO: 1.51 X10*3/UL (ref 1.2–4.8)
LYMPHOCYTES NFR BLD AUTO: 4.9 %
MAGNESIUM SERPL-MCNC: 3.03 MG/DL (ref 1.6–2.4)
MCH RBC QN AUTO: 31.8 PG (ref 26–34)
MCH RBC QN AUTO: 32.2 PG (ref 26–34)
MCHC RBC AUTO-ENTMCNC: 34 G/DL (ref 32–36)
MCHC RBC AUTO-ENTMCNC: 35.6 G/DL (ref 32–36)
MCV RBC AUTO: 90 FL (ref 80–100)
MCV RBC AUTO: 94 FL (ref 80–100)
MONOCYTES # BLD AUTO: 1.13 X10*3/UL (ref 0.1–1)
MONOCYTES NFR BLD AUTO: 3.7 %
MRSA DNA SPEC QL NAA+PROBE: NOT DETECTED
NEUTROPHILS # BLD AUTO: 26.74 X10*3/UL (ref 1.2–7.7)
NEUTROPHILS NFR BLD AUTO: 86.9 %
NRBC BLD-RTO: 3.1 /100 WBCS (ref 0–0)
NRBC BLD-RTO: 3.9 /100 WBCS (ref 0–0)
OXYHGB MFR BLDV: 60.2 % (ref 45–75)
OXYHGB MFR BLDV: 69.9 % (ref 45–75)
OXYHGB MFR BLDV: 70.6 % (ref 45–75)
OXYHGB MFR BLDV: 70.7 % (ref 45–75)
OXYHGB MFR BLDV: 81.1 % (ref 45–75)
OXYHGB MFR BLDV: 86.4 % (ref 45–75)
P AXIS: -47 DEGREES
P AXIS: 86 DEGREES
P OFFSET: 203 MS
P ONSET: 139 MS
PCO2 BLDV: 43 MM HG (ref 41–51)
PCO2 BLDV: 44 MM HG (ref 41–51)
PCO2 BLDV: 46 MM HG (ref 41–51)
PCO2 BLDV: 51 MM HG (ref 41–51)
PCO2 BLDV: 53 MM HG (ref 41–51)
PCO2 BLDV: 61 MM HG (ref 41–51)
PH BLDV: 7.19 PH (ref 7.33–7.43)
PH BLDV: 7.23 PH (ref 7.33–7.43)
PH BLDV: 7.27 PH (ref 7.33–7.43)
PH BLDV: 7.29 PH (ref 7.33–7.43)
PH BLDV: 7.32 PH (ref 7.33–7.43)
PH BLDV: 7.32 PH (ref 7.33–7.43)
PHOSPHATE SERPL-MCNC: 3.7 MG/DL (ref 2.5–4.9)
PLATELET # BLD AUTO: 105 X10*3/UL (ref 150–450)
PLATELET # BLD AUTO: 120 X10*3/UL (ref 150–450)
PO2 BLDV: 38 MM HG (ref 35–45)
PO2 BLDV: 45 MM HG (ref 35–45)
PO2 BLDV: 46 MM HG (ref 35–45)
PO2 BLDV: 51 MM HG (ref 35–45)
PO2 BLDV: 53 MM HG (ref 35–45)
PO2 BLDV: 60 MM HG (ref 35–45)
POTASSIUM BLDV-SCNC: 5.2 MMOL/L (ref 3.5–5.3)
POTASSIUM BLDV-SCNC: 5.3 MMOL/L (ref 3.5–5.3)
POTASSIUM BLDV-SCNC: 5.4 MMOL/L (ref 3.5–5.3)
POTASSIUM BLDV-SCNC: 5.5 MMOL/L (ref 3.5–5.3)
POTASSIUM BLDV-SCNC: 5.7 MMOL/L (ref 3.5–5.3)
POTASSIUM BLDV-SCNC: 5.8 MMOL/L (ref 3.5–5.3)
POTASSIUM SERPL-SCNC: 5.3 MMOL/L (ref 3.5–5.3)
POTASSIUM SERPL-SCNC: 5.4 MMOL/L (ref 3.5–5.3)
PR INTERVAL: 104 MS
PR INTERVAL: 166 MS
PROT SERPL-MCNC: 7.1 G/DL (ref 6.4–8.2)
PROTHROMBIN TIME: 64.9 SECONDS (ref 9.8–12.4)
Q ONSET: 222 MS
Q ONSET: 222 MS
QRS COUNT: 16 BEATS
QRS COUNT: 19 BEATS
QRS DURATION: 102 MS
QRS DURATION: 142 MS
QT INTERVAL: 372 MS
QT INTERVAL: 376 MS
QTC CALCULATION(BAZETT): 482 MS
QTC CALCULATION(BAZETT): 527 MS
QTC FREDERICIA: 442 MS
QTC FREDERICIA: 471 MS
R AXIS: 100 DEGREES
R AXIS: 83 DEGREES
RBC # BLD AUTO: 3.42 X10*6/UL (ref 4–5.2)
RBC # BLD AUTO: 3.55 X10*6/UL (ref 4–5.2)
SAO2 % BLDV: 62 % (ref 45–75)
SAO2 % BLDV: 72 % (ref 45–75)
SAO2 % BLDV: 73 % (ref 45–75)
SAO2 % BLDV: 73 % (ref 45–75)
SAO2 % BLDV: 83 % (ref 45–75)
SAO2 % BLDV: 89 % (ref 45–75)
SODIUM BLDV-SCNC: 127 MMOL/L (ref 136–145)
SODIUM BLDV-SCNC: 128 MMOL/L (ref 136–145)
SODIUM BLDV-SCNC: 128 MMOL/L (ref 136–145)
SODIUM BLDV-SCNC: 129 MMOL/L (ref 136–145)
SODIUM SERPL-SCNC: 128 MMOL/L (ref 136–145)
SODIUM SERPL-SCNC: 131 MMOL/L (ref 136–145)
T AXIS: 27 DEGREES
T AXIS: 38 DEGREES
T OFFSET: 408 MS
T OFFSET: 410 MS
VENTRICULAR RATE: 101 BPM
VENTRICULAR RATE: 118 BPM
WBC # BLD AUTO: 30.1 X10*3/UL (ref 4.4–11.3)
WBC # BLD AUTO: 30.8 X10*3/UL (ref 4.4–11.3)

## 2025-03-12 PROCEDURE — 85025 COMPLETE CBC W/AUTO DIFF WBC: CPT

## 2025-03-12 PROCEDURE — 2500000004 HC RX 250 GENERAL PHARMACY W/ HCPCS (ALT 636 FOR OP/ED): Mod: SE

## 2025-03-12 PROCEDURE — 99291 CRITICAL CARE FIRST HOUR: CPT

## 2025-03-12 PROCEDURE — 86803 HEPATITIS C AB TEST: CPT | Performed by: STUDENT IN AN ORGANIZED HEALTH CARE EDUCATION/TRAINING PROGRAM

## 2025-03-12 PROCEDURE — 36415 COLL VENOUS BLD VENIPUNCTURE: CPT

## 2025-03-12 PROCEDURE — 2500000005 HC RX 250 GENERAL PHARMACY W/O HCPCS: Mod: SE

## 2025-03-12 PROCEDURE — 2500000001 HC RX 250 WO HCPCS SELF ADMINISTERED DRUGS (ALT 637 FOR MEDICARE OP): Mod: SE

## 2025-03-12 PROCEDURE — 85027 COMPLETE CBC AUTOMATED: CPT

## 2025-03-12 PROCEDURE — 85610 PROTHROMBIN TIME: CPT

## 2025-03-12 PROCEDURE — 87522 HEPATITIS C REVRS TRNSCRPJ: CPT | Performed by: STUDENT IN AN ORGANIZED HEALTH CARE EDUCATION/TRAINING PROGRAM

## 2025-03-12 PROCEDURE — 84132 ASSAY OF SERUM POTASSIUM: CPT

## 2025-03-12 PROCEDURE — 90937 HEMODIALYSIS REPEATED EVAL: CPT

## 2025-03-12 PROCEDURE — 87070 CULTURE OTHR SPECIMN AEROBIC: CPT

## 2025-03-12 PROCEDURE — 2500000002 HC RX 250 W HCPCS SELF ADMINISTERED DRUGS (ALT 637 FOR MEDICARE OP, ALT 636 FOR OP/ED): Mod: SE

## 2025-03-12 PROCEDURE — 82805 BLOOD GASES W/O2 SATURATION: CPT

## 2025-03-12 PROCEDURE — 83735 ASSAY OF MAGNESIUM: CPT

## 2025-03-12 PROCEDURE — 87641 MR-STAPH DNA AMP PROBE: CPT

## 2025-03-12 PROCEDURE — 90945 DIALYSIS ONE EVALUATION: CPT | Performed by: INTERNAL MEDICINE

## 2025-03-12 PROCEDURE — 87075 CULTR BACTERIA EXCEPT BLOOD: CPT

## 2025-03-12 PROCEDURE — 82810 BLOOD GASES O2 SAT ONLY: CPT

## 2025-03-12 PROCEDURE — 87340 HEPATITIS B SURFACE AG IA: CPT | Performed by: STUDENT IN AN ORGANIZED HEALTH CARE EDUCATION/TRAINING PROGRAM

## 2025-03-12 PROCEDURE — 2020000001 HC ICU ROOM DAILY

## 2025-03-12 PROCEDURE — 82330 ASSAY OF CALCIUM: CPT

## 2025-03-12 PROCEDURE — 85384 FIBRINOGEN ACTIVITY: CPT

## 2025-03-12 PROCEDURE — 84597 ASSAY OF VITAMIN K: CPT

## 2025-03-12 PROCEDURE — 82435 ASSAY OF BLOOD CHLORIDE: CPT

## 2025-03-12 PROCEDURE — 82947 ASSAY GLUCOSE BLOOD QUANT: CPT

## 2025-03-12 PROCEDURE — 84295 ASSAY OF SERUM SODIUM: CPT

## 2025-03-12 PROCEDURE — 71045 X-RAY EXAM CHEST 1 VIEW: CPT | Performed by: RADIOLOGY

## 2025-03-12 PROCEDURE — 94003 VENT MGMT INPAT SUBQ DAY: CPT

## 2025-03-12 RX ORDER — VASOPRESSIN 20 UNIT/100 ML (0.2 UNIT/ML) IN 0.9 % SODIUM CHLORIDE IV
0.03 SOLUTION CONTINUOUS
Status: DISCONTINUED | OUTPATIENT
Start: 2025-03-12 | End: 2025-03-12

## 2025-03-12 RX ORDER — VANCOMYCIN HYDROCHLORIDE 1 G/20ML
INJECTION, POWDER, LYOPHILIZED, FOR SOLUTION INTRAVENOUS DAILY PRN
Status: DISCONTINUED | OUTPATIENT
Start: 2025-03-12 | End: 2025-03-15

## 2025-03-12 RX ORDER — LORAZEPAM 2 MG/ML
1 INJECTION INTRAMUSCULAR ONCE
Status: COMPLETED | OUTPATIENT
Start: 2025-03-12 | End: 2025-03-12

## 2025-03-12 RX ORDER — VANCOMYCIN HYDROCHLORIDE 1 G/200ML
1000 INJECTION, SOLUTION INTRAVENOUS ONCE
Status: COMPLETED | OUTPATIENT
Start: 2025-03-12 | End: 2025-03-12

## 2025-03-12 RX ORDER — INSULIN LISPRO 100 [IU]/ML
0-10 INJECTION, SOLUTION INTRAVENOUS; SUBCUTANEOUS EVERY 6 HOURS
Status: DISCONTINUED | OUTPATIENT
Start: 2025-03-12 | End: 2025-03-21

## 2025-03-12 RX ORDER — VANCOMYCIN HYDROCHLORIDE 500 MG/100ML
500 INJECTION, SOLUTION INTRAVENOUS EVERY 12 HOURS
Status: DISCONTINUED | OUTPATIENT
Start: 2025-03-12 | End: 2025-03-13

## 2025-03-12 RX ORDER — CALCIUM GLUCONATE 20 MG/ML
2 INJECTION, SOLUTION INTRAVENOUS ONCE
Status: COMPLETED | OUTPATIENT
Start: 2025-03-12 | End: 2025-03-12

## 2025-03-12 RX ORDER — VANCOMYCIN HYDROCHLORIDE 500 MG/100ML
500 INJECTION, SOLUTION INTRAVENOUS ONCE
Status: COMPLETED | OUTPATIENT
Start: 2025-03-12 | End: 2025-03-12

## 2025-03-12 RX ORDER — NOREPINEPHRINE BITARTRATE/D5W 8 MG/250ML
.01-1 PLASTIC BAG, INJECTION (ML) INTRAVENOUS CONTINUOUS
Status: DISCONTINUED | OUTPATIENT
Start: 2025-03-12 | End: 2025-03-21

## 2025-03-12 RX ORDER — FENTANYL CITRATE-0.9 % NACL/PF 10 MCG/ML
0-300 PLASTIC BAG, INJECTION (ML) INTRAVENOUS CONTINUOUS
Status: DISCONTINUED | OUTPATIENT
Start: 2025-03-12 | End: 2025-03-12

## 2025-03-12 RX ORDER — MEROPENEM 1 G/1
2 INJECTION, POWDER, FOR SOLUTION INTRAVENOUS EVERY 12 HOURS
Status: DISCONTINUED | OUTPATIENT
Start: 2025-03-12 | End: 2025-03-12

## 2025-03-12 RX ADMIN — INSULIN LISPRO 2 UNITS: 100 INJECTION, SOLUTION INTRAVENOUS; SUBCUTANEOUS at 11:41

## 2025-03-12 RX ADMIN — HYDROCORTISONE SODIUM SUCCINATE 50 MG: 100 INJECTION, POWDER, FOR SOLUTION INTRAMUSCULAR; INTRAVENOUS at 21:16

## 2025-03-12 RX ADMIN — PANTOPRAZOLE SODIUM 40 MG: 40 INJECTION, POWDER, LYOPHILIZED, FOR SOLUTION INTRAVENOUS at 07:46

## 2025-03-12 RX ADMIN — NOREPINEPHRINE BITARTRATE 0.1 MCG/KG/MIN: 8 INJECTION, SOLUTION INTRAVENOUS at 23:08

## 2025-03-12 RX ADMIN — CALCIUM CHLORIDE, MAGNESIUM CHLORIDE, DEXTROSE MONOHYDRATE, LACTIC ACID, SODIUM CHLORIDE, SODIUM BICARBONATE AND POTASSIUM CHLORIDE 25 ML/KG/HR: 5.15; 2.03; 22; 5.4; 6.46; 3.09; .157 INJECTION INTRAVENOUS at 23:43

## 2025-03-12 RX ADMIN — DEXAMETHASONE SODIUM PHOSPHATE 10 MG: 10 INJECTION INTRAMUSCULAR; INTRAVENOUS at 08:31

## 2025-03-12 RX ADMIN — VANCOMYCIN HYDROCHLORIDE 500 MG: 500 INJECTION, SOLUTION INTRAVENOUS at 20:24

## 2025-03-12 RX ADMIN — CARBOXYMETHYLCELLULOSE SODIUM 2 DROP: 5 SOLUTION/ DROPS OPHTHALMIC at 09:44

## 2025-03-12 RX ADMIN — INSULIN LISPRO 2 UNITS: 100 INJECTION, SOLUTION INTRAVENOUS; SUBCUTANEOUS at 23:49

## 2025-03-12 RX ADMIN — HYDROCORTISONE SODIUM SUCCINATE 50 MG: 100 INJECTION, POWDER, FOR SOLUTION INTRAMUSCULAR; INTRAVENOUS at 16:11

## 2025-03-12 RX ADMIN — VANCOMYCIN HYDROCHLORIDE 1000 MG: 1 INJECTION, SOLUTION INTRAVENOUS at 09:43

## 2025-03-12 RX ADMIN — NOREPINEPHRINE BITARTRATE 0.03 MCG/KG/MIN: 8 INJECTION, SOLUTION INTRAVENOUS at 06:32

## 2025-03-12 RX ADMIN — VASOPRESSIN 20 UNITS: 0.2 INJECTION INTRAVENOUS at 08:43

## 2025-03-12 RX ADMIN — MEROPENEM 2 G: 1 INJECTION INTRAVENOUS at 12:25

## 2025-03-12 RX ADMIN — Medication 5 ML: at 08:36

## 2025-03-12 RX ADMIN — INSULIN LISPRO 2 UNITS: 100 INJECTION, SOLUTION INTRAVENOUS; SUBCUTANEOUS at 07:48

## 2025-03-12 RX ADMIN — MEROPENEM 2 G: 1 INJECTION INTRAVENOUS at 21:55

## 2025-03-12 RX ADMIN — CALCIUM CHLORIDE, MAGNESIUM CHLORIDE, DEXTROSE MONOHYDRATE, LACTIC ACID, SODIUM CHLORIDE, SODIUM BICARBONATE AND POTASSIUM CHLORIDE 25 ML/KG/HR: 5.15; 2.03; 22; 5.4; 6.46; 3.09; .157 INJECTION INTRAVENOUS at 08:35

## 2025-03-12 RX ADMIN — HYDROCORTISONE SODIUM SUCCINATE 50 MG: 100 INJECTION, POWDER, FOR SOLUTION INTRAMUSCULAR; INTRAVENOUS at 09:42

## 2025-03-12 RX ADMIN — Medication 100 MCG/HR: at 21:57

## 2025-03-12 RX ADMIN — Medication 100 PERCENT: at 08:53

## 2025-03-12 RX ADMIN — SODIUM CHLORIDE, POTASSIUM CHLORIDE, SODIUM LACTATE AND CALCIUM CHLORIDE 500 ML: 600; 310; 30; 20 INJECTION, SOLUTION INTRAVENOUS at 09:43

## 2025-03-12 RX ADMIN — INSULIN LISPRO 2 UNITS: 100 INJECTION, SOLUTION INTRAVENOUS; SUBCUTANEOUS at 18:16

## 2025-03-12 RX ADMIN — LORAZEPAM 1 MG: 2 INJECTION INTRAMUSCULAR; INTRAVENOUS at 03:57

## 2025-03-12 RX ADMIN — CALCIUM GLUCONATE 2 G: 20 INJECTION, SOLUTION INTRAVENOUS at 07:46

## 2025-03-12 RX ADMIN — Medication 80 PERCENT: at 19:28

## 2025-03-12 RX ADMIN — THIAMINE HCL TAB 100 MG 100 MG: 100 TAB at 08:43

## 2025-03-12 RX ADMIN — KETAMINE HYDROCHLORIDE 1 MG/KG/HR: 10 INJECTION INTRAMUSCULAR; INTRAVENOUS at 20:25

## 2025-03-12 RX ADMIN — POLYETHYLENE GLYCOL 3350 17 G: 17 POWDER, FOR SOLUTION ORAL at 08:42

## 2025-03-12 RX ADMIN — VANCOMYCIN HYDROCHLORIDE 500 MG: 500 INJECTION, SOLUTION INTRAVENOUS at 09:43

## 2025-03-12 RX ADMIN — SENNOSIDES AND DOCUSATE SODIUM 2 TABLET: 50; 8.6 TABLET ORAL at 08:43

## 2025-03-12 RX ADMIN — Medication 100 MCG/HR: at 12:48

## 2025-03-12 ASSESSMENT — PAIN - FUNCTIONAL ASSESSMENT
PAIN_FUNCTIONAL_ASSESSMENT: CPOT (CRITICAL CARE PAIN OBSERVATION TOOL)

## 2025-03-12 NOTE — CONSULTS
Wound Care Consult     Visit Date: 3/12/2025      Patient Name: Gissel Harvey         MRN: [id number]           YOB: 1985     Reason for Consult: MDRPI mouth reassessment.         Wound History: 39 y.o. 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      Wound Assessment:   03/12/25 1537   Wound 03/08/25 Pressure Injury Mouth   Date First Assessed/Time First Assessed: 03/08/25 1946   Primary Wound Type: Pressure Injury  Location: Mouth   Site Assessment Black;Eschar;Purple   [place]-Wound Assessment Dry   Pressure Injury Stage DTPI   Drainage Description None   Drainage Amount None   Dressing Other (Comment);Open to air  ([place]as[place])   Wound location: mouth    Recommendations: continue with current wound care recommendations.    3/9/25 Recommendation for mouth wounds:  Would continue apply petroleum - keep wound moist     Wound Team Summary Assessment: Patient on CVVH with intubation tubing, bridle, and OG feeding tube. Wounds difficult to visualize at this time. Black eschar noted to upper and lower lips. Mouth and oral mucosa dry and peeling. Please practice good oral hygiene and apply petroleum for moisture.  Please reconsult wound care once medical devices are removed to reassess and make recommendations.      Wound Team Plan:  Primary provider, please review recommendation. If you agree with recommendation please enter as wound orders in EMR. Thank you.     While inpatient, Secure chat with questions or reconsult wound care if condition worsens or changes. For urgent communications please message the group through [employer] at: [facility] Wound Care Team, Thank you.      Jaymie Leigh RN, CWON  3/12/2025  3:40  PM

## 2025-03-12 NOTE — CONSULTS
Vancomycin Dosing by Pharmacy- INITIAL    Gissel Harvey is a 39 y.o. year old female who Pharmacy has been consulted for vancomycin dosing for pneumonia. Based on the patient's indication and renal status this patient will be dosed based on a goal trough/random level of 15-20.     Patient is currently on CVVH as of 3/12 AM.    Previously on vancomycin 500 mg Q12 while on CVVH. Borderline therapeutic on this regimen. Most recent dose of vancomycin was 750 mg x1 on 3/9 AM. Vancomycin is now being restarted.         Visit Vitals  /70   Pulse 97   Temp 36.3 °C (97.3 °F)   Resp 26        Lab Results   Component Value Date    CREATININE 1.72 (H) 2025    CREATININE 1.72 (H) 2025    CREATININE 1.76 (H) 2025    CREATININE 1.83 (H) 03/10/2025        Patient weight is as follows:   Vitals:    25 0600   Weight: 65 kg (143 lb 4.8 oz)       Cultures:  Susceptibility data for the encounter in last 14 days.  Collected Specimen Info Organism Ceftriaxone (Meningitis) Ceftriaxone (Nonmeningitis) Clindamycin Erythromycin Oxacillin Penicillin (IV, Meningitidis) Penicillin (IV, Nonmeningitis) Penicillin (Oral) Tetracycline Trimethoprim/Sulfamethoxazole   25 Fluid from Tracheal Aspirate Methicillin Susceptible Staphylococcus aureus (MSSA)    S  S  S     S  S     Streptococcus pneumoniae  S  S  S  S   R  S  S  S  S     Collected Specimen Info Organism Vancomycin   25 Fluid from Tracheal Aspirate Methicillin Susceptible Staphylococcus aureus (MSSA)  S     Streptococcus pneumoniae  S         I/O last 3 completed shifts:  In: 1267.9 (19.5 mL/kg) [I.V.:267.9 (4.1 mL/kg); NG/GT:600; IV Piggyback:400]  Out: 4527 (69.6 mL/kg) [Other:3927; Stool:600]  Dosing Weight: 65 kg   I/O during current shift:  No intake/output data recorded.    Temp (24hrs), Av.9 °C (96.7 °F), Min:35.1 °C (95.2 °F), Max:36.6 °C (97.9 °F)         Assessment/Plan     Patient will be given a loading dose of 1500 mg on 3/12 AM.  Will  initiate vancomycin maintenance,  7500 mg every 12 hours.  Follow-up level will be ordered on 3/13 at 8 AM prior to third dose of vancomycin unless clinically indicated sooner.  Will continue to monitor renal function daily while on vancomycin and order serum creatinine at least every 48 hours if not already ordered.  Follow for continued vancomycin needs, clinical response, and signs/symptoms of toxicity.       Morgan Morales, PharmD

## 2025-03-12 NOTE — PROCEDURES
CRRT procedure note   Seen and assessed on CVVH. Labs and events reviewed   off pressors  this AM ; on vent - non-responsive ;   Vascular access: right IJ trialysis catheter. Net -3 L over prior 24 hours  BFR :200 ml/min  Filter: M150  Total Replacement Rate: 2000 ml/hour  Pre blood pump : 1000 ml/hour    Replacement solution Prismasol potassium:  2 mEq/L, 2.5 Ca  Patient fluid removal: per ICU team   Please check Phosphorus, Calcium and Magnesium daily and replace peripherally as needed  Continue with current CVVH prescription.    Vitals:    03/12/25 1406 03/12/25 1500 03/12/25 1600 03/12/25 1700   BP:  113/67 116/62 100/52   Patient Position:   Lying    Pulse: 93 93 93 94   Resp: 26 25 (!) 27 (!) 27   Temp:  36.6 °C (97.9 °F) 36.8 °C (98.2 °F) 36.9 °C (98.4 °F)   TempSrc:       SpO2: 99% 98% 98% 98%   Weight:       Height:          Intake/Output Summary (Last 24 hours) at 3/12/2025 1739  Last data filed at 3/12/2025 1600  Gross per 24 hour   Intake 1893.64 ml   Output 341 ml   Net 1552.64 ml    Anuric  Most recent labs:   Results from last 7 days   Lab Units 03/12/25  0210 03/11/25  0339 03/10/25  0516   WBC AUTO x10*3/uL 30.8* 30.4* 31.5*   HEMOGLOBIN g/dL 11.3* 11.7* 11.8*   HEMATOCRIT % 33.2* 34.2* 37.6   PLATELETS AUTO x10*3/uL 105* 80* 66*     Results from last 7 days   Lab Units 03/12/25  0210 03/11/25  1727 03/11/25  0339 03/10/25  1657 03/10/25  0516   SODIUM mmol/L 131* 130* 132* 133* 132*   CO2 mmol/L 24 23 23 22 20*   ANION GAP mmol/L 15 16 16 17 17   BUN mg/dL 49* 48* 49* 49* 46*   CREATININE mg/dL 1.72* 1.72* 1.76* 1.83* 1.84*   GLUCOSE mg/dL 151* 213* 188* 215* 233*   CALCIUM mg/dL 7.8* 8.1* 8.0* 7.9* 8.0*   MAGNESIUM mg/dL 3.03*  --  2.96*  --  2.83*   PHOSPHORUS mg/dL  --  4.6 3.5 3.9 4.1      Results from last 7 days   Lab Units 03/12/25  0210 03/11/25  0339   ALT U/L 66* 57*   AST U/L 171* 147*   ALK PHOS U/L 241* 218*      Results from last 7 days   Lab Units 03/12/25 0210 03/11/25  0808  03/11/25  0339   INR  5.9* 3.6* 5.0*     Results from last 7 days   Lab Units 03/12/25  1348 03/12/25  1133 03/12/25  0358 03/12/25  0210 03/11/25  1036 03/11/25  0804 03/10/25  2227   POCT PH, ARTERIAL pH  --   --   --   --  7.26* 7.31* 7.31*   POCT PO2, ARTERIAL mm Hg  --   --   --   --  83* 91 125*   POCT PCO2, ARTERIAL mm Hg  --   --   --   --  50* 45* 45*   FIO2 % 80 80 60   < > 55 55 70    < > = values in this interval not displayed.     Results from last 7 days   Lab Units 03/12/25  1348 03/12/25  1133 03/12/25  0358   POCT PH, VENOUS pH 7.27* 7.19* 7.29*   POCT PCO2, VENOUS mm Hg 51 61* 46     Results from last 7 days   Lab Units 03/10/25  0516   LACTATE mmol/L 3.0*     ergocalciferol, 1,250 mcg, oral, Weekly  pantoprazole, 40 mg, oral, Daily before breakfast   Or  esomeprazole, 40 mg, nasoduodenal tube, Daily before breakfast   Or  pantoprazole, 40 mg, intravenous, Daily before breakfast  hydrocortisone sodium succinate, 50 mg, intravenous, q6h  insulin lispro, 0-10 Units, subcutaneous, q6h  [Held by provider] lactulose, 20 g, oral, q6h  artificial tears, 2 drop, Both Eyes, q24h  meropenem, 2 g, intravenous, q12h  oxygen, , inhalation, Continuous - Inhalation  perflutren lipid microspheres, 0.5-10 mL of dilution, intravenous, Once in imaging  perflutren protein A microsphere, 0.5 mL, intravenous, Once in imaging  polyethylene glycol, 17 g, oral, Daily  sennosides-docusate sodium, 2 tablet, oral, Daily  sulfur hexafluoride microsphr, 2 mL, intravenous, Once in imaging  thiamine, 100 mg, oral, Daily  vancomycin, 500 mg, intravenous, q12h  vitamin B complex-vitamin C-folic acid, 5 mL, oral, Daily

## 2025-03-12 NOTE — TREATMENT PLAN
Service: INTERVENTIONAL RADIOLOGY     Consult: TIPS thrombosis.     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 She is being treated for ARDS and AHRF, initially severe septic shock now improved, some concern for cardiogenic shock and new HFrEF but improving.    Still intubated but now off pressors.      On 3/11/2025, patient was noted to have worsening bilirubinemia with new transaminitis when compared to labs from 03/05/2025 which showed elevated bilirubin at 8.8 with normal AST, ALT, and ALP. Doppler liver US was done on 3/11/2025 which noted TIPS thrombosis.     Elevated fluctuating INR most recently at 5.9. Platelets at 105.     Thombosis of the TIPS stent and IVC was noted dating back to the Liver US from 06/07/2010.     CT abdomen/pelvis from 3/3/2025 with extensive abdominal  collaterals including cavernous transformation.     Plan:   1. Given the chronicity of the TIPS stent thrombosis with extensive collateral transformation, no plan for intervention at this time.   2. Recommend hepatology consultation with possible liver biopsy (if INR <2.5).     Ingrid Snowden DO, PGY-3   Diagnostic Radiology   [facility]

## 2025-03-12 NOTE — PROGRESS NOTES
Medical Intensive Care - Daily Progress Note   Subjective    Gissel Harvey is a 39 y.o. year old female patient admitted on 3/3/2025 with following ICU needs: Hypoxemia and AHRF requiring intubation due to ARDS from CAP.     Interval History:  - Overnight, tachypneic as high as 55. Required fentanyl to be resumed, MAP decreased, required levo.  - CVVH changed to no fluid removal 3/11 evening  - This AM, patient MAP continued to decrease. While being turned, she had acute desaturations to 60-70%, further decreased in MAP.  - She is currently requiring increased FiO2% compared to yesterday and is now on high dose levophed and vasopressin.   - Given 500 ml LR  - VBG with worsening acidemia.   - Increased sedation with Ketamine and fentanyl to allow her to be more synchronous with vent. Temporarily stabilized.   - Family bedside, discussed patient's clinical course, current status, and overall goals. Patient made DNR by her daughter.      Meds    Scheduled medications  ergocalciferol, 1,250 mcg, oral, Weekly  pantoprazole, 40 mg, oral, Daily before breakfast   Or  esomeprazole, 40 mg, nasoduodenal tube, Daily before breakfast   Or  pantoprazole, 40 mg, intravenous, Daily before breakfast  hydrocortisone sodium succinate, 50 mg, intravenous, q6h  insulin lispro, 0-10 Units, subcutaneous, q6h  [Held by provider] lactulose, 20 g, oral, q6h  artificial tears, 2 drop, Both Eyes, q24h  meropenem, 2 g, intravenous, q12h  oxygen, , inhalation, Continuous - Inhalation  perflutren lipid microspheres, 0.5-10 mL of dilution, intravenous, Once in imaging  perflutren protein A microsphere, 0.5 mL, intravenous, Once in imaging  polyethylene glycol, 17 g, oral, Daily  sennosides-docusate sodium, 2 tablet, oral, Daily  sulfur hexafluoride microsphr, 2 mL, intravenous, Once in imaging  thiamine, 100 mg, oral, Daily  vancomycin, 500 mg, intravenous, q12h  vitamin B complex-vitamin C-folic acid, 5 mL, oral, Daily      Continuous  "medications  bivalirudin, 0.05-0.49 mg/kg/hr, Last Rate: 0.01 mg/kg/hr (03/12/25 0456)  fentaNYL, 0-300 mcg/hr, Last Rate: 100 mcg/hr (03/12/25 1248)  ketamine (Ketalar) 1,000 mg in 100 mL (10 mg/mL) infusion, 0.05-2 mg/kg/hr, Last Rate: 1 mg/kg/hr (03/12/25 1200)  norepinephrine, 0.01-1 mcg/kg/min (Dosing Weight), Last Rate: 0.15 mcg/kg/min (03/12/25 1100)  PrismaSol BGK 2/3.5, 25 mL/kg/hr (Dosing Weight), Last Rate: 25 mL/kg/hr (03/12/25 0835)  vasopressin, 0.03 Units/min, Last Rate: Stopped (03/12/25 0915)      PRN medications  PRN medications: acetaminophen **OR** acetaminophen, alteplase, dextrose, dextrose, fentaNYL, glucagon, glucagon, oxygen, vancomycin     Objective    Blood pressure 126/76, pulse 94, temperature 36.5 °C (97.7 °F), resp. rate 26, height 1.676 m (5' 6\"), weight 65 kg (143 lb 4.8 oz), SpO2 98%.     Physical Exam  Constitutional:       Comments: Intubated and Sedated  Not opening eyes to voice   HENT:      Head: Normocephalic and atraumatic.   Cardiovascular:      Rate and Rhythm: Normal rate and regular rhythm.      Pulses: Normal pulses.   Pulmonary:      Breath sounds: No wheezing.      Comments: Mechanically Ventilated  Coarse breath sounds  Abdominal:      General: There is no distension.      Palpations: Abdomen is soft.      Comments: Rectal tube draining brown stool, no blood   Musculoskeletal:      Right lower leg: Edema present.      Left lower leg: Edema present.      Comments: 2+ pitting edema bilaterally to knees   Skin:     General: Skin is warm and dry.      Comments: Warm to touch   Neurological:      Comments: Sedated, not awakening to voice            Intake/Output Summary (Last 24 hours) at 3/12/2025 1304  Last data filed at 3/12/2025 1225  Gross per 24 hour   Intake 1862.96 ml   Output 868 ml   Net 994.96 ml     Labs:   Results from last 72 hours   Lab Units 03/12/25  0210 03/11/25  1727 03/11/25  0339 03/10/25  1657   SODIUM mmol/L 131* 130* 132* 133*   POTASSIUM mmol/L " 5.4* 5.3 4.9 4.8   CHLORIDE mmol/L 97* 96* 98 99   CO2 mmol/L 24 23 23 22   BUN mg/dL 49* 48* 49* 49*   CREATININE mg/dL 1.72* 1.72* 1.76* 1.83*   GLUCOSE mg/dL 151* 213* 188* 215*   CALCIUM mg/dL 7.8* 8.1* 8.0* 7.9*   ANION GAP mmol/L 15 16 16 17   EGFR mL/min/1.73m*2 38* 38* 37* 36*   PHOSPHORUS mg/dL  --  4.6 3.5 3.9      Results from last 72 hours   Lab Units 03/12/25  0210 03/11/25  0339 03/10/25  0516   WBC AUTO x10*3/uL 30.8* 30.4* 31.5*   HEMOGLOBIN g/dL 11.3* 11.7* 11.8*   HEMATOCRIT % 33.2* 34.2* 37.6   PLATELETS AUTO x10*3/uL 105* 80* 66*   NEUTROS PCT AUTO % 86.9  --  83.9   LYMPHO PCT MAN %  --  2.6  --    LYMPHS PCT AUTO % 4.9  --  6.2   MONO PCT MAN %  --  2.6  --    MONOS PCT AUTO % 3.7  --  4.6   EOSINO PCT MAN %  --  0.0  --    EOS PCT AUTO % 0.0  --  0.0      Results from last 72 hours   Lab Units 03/11/25  1036 03/11/25  0804 03/10/25  2227   POCT PH, ARTERIAL pH 7.26* 7.31* 7.31*   POCT PCO2, ARTERIAL mm Hg 50* 45* 45*   POCT PO2, ARTERIAL mm Hg 83* 91 125*   POCT SO2, ARTERIAL % 97 99 100     Results from last 72 hours   Lab Units 03/12/25  1133 03/12/25  0358 03/12/25  0210   POCT PH, VENOUS pH 7.19* 7.29* 7.23*   POCT PCO2, VENOUS mm Hg 61* 46 53*   POCT PO2, VENOUS mm Hg 51* 38 45        Micro/ID:     Lab Results   Component Value Date    BLOODCULT Loaded on Instrument - Culture in progress 03/12/2025           Assessment and Plan     Assessment: Gissel Harvey is a 39 y.o. 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 She is being treated for ARDS and severe septic shock complicated by acute anuric renal failure, now on CVVH.    Mechanical Ventilation: 4-10 days  Sedation/Analgesia:  Fentanyl,  Ketamine  Restraints: no    Summary for 03/12/25  :  Patient's MAP decreased today, requiring levo and vaso   Increasing FiO2% requirements   VBG with worsening acidemia  CVVH- no fluid removal. Giving 500 ml bolus  Ketamine + fentanyl for sedation, goal RASS -4/-5  Family bedside, discussed patient's clinical course, current status, and overall goals. Patient made DNR by her daughter.  Switched from dex to hydrocortisone 50 q6 for stress dose steroids   Broadened antibiotics to vanc/meropenem given worsening of shock, likely sepsis      NEUROLOGY/PSYCH:  #Sedation   - Ketamine and Fentanyl, RASS goal -4 to -5    CARDIOVASCULAR:  #Shock, likely septic I/s/o severe pneumonia  #HFrEF  :: lactate > 9 on presentation, now down trending, stable at 3  :: TTE 3/4 with EF 30-35% with reduced RV function, no previous TTE for comparison  :: BNP 2127 at admission  :: Troponin plateaued (62>63) on 3/4/25  - 3/12 acute worsening of shock, hypotension requiring vasopressor support   - continue levo, vaso, weaning as able   - s/p 500 ml bolus   - stress dose steroids: hydrocortisone 50 q6   - broaden abx as septic shock is most likely     #Aflutter  #Sustained VT   :: went into NSVT while wire threaded for central line placement  :: s/p amiodarone 150 mg bolus x2  :: pt cardioverted  - Pt is back to sinus rhythm, okay for amio gtt if arrhythmias return    PULMONARY:  Vent Mode: Volume control/assist control  FiO2 (%):  [55 %-80 %] 80 %  S RR:  [20-26] 26  S VT:  [350 mL-450 mL] 450 mL  PEEP/CPAP (cm H2O):  [10 cm H20] 10 cm H20  MAP (cm H2O):  [16-18] 16   #Community acquired pneumonia   #ARDS   :: intubated 3/3   :: P:F ~ 70 on admission   :: Procalcitonin 57.3  :: flu A/B, COVID, parainfluenza, RSV, metapneumovirus, legionella urine Ag: negative  :: see ID section for full abx history this admission  :: Tracheal aspirate cx 3/3: pan sensitive staph aureus and strep pneumo  :: Strep pneumo urine ag: positive  :: Bcx 3/3: NGTD x3  days     Plan  - acute worsening of hypotension and hypoxia 3/12, FiO2% increased  - increase sedation to improve synchronicity with vent   - VBG with worsening acidemia, elevated CO2- trend VBG  - broadened abx back to vanc/rebeca          RENAL/GENITOURINARY:  #Acute renal failure, anuric SCOUT  #HAGMA  :: Cr 1.9 on admission, unclear baseline as no labs in 2 years   :: FeNa 3/3: 0.1% prerenal  :: anion gap 19, delta 7, delta ratio 3.5 suggests HAGMA with concurrent metabolic alkalosis  - Continue CVVH per Nephrology- no fluid removal 3/12 due to hypotension  - Continue to monitor Cr with daily RFP  - Warm CVVH fluid to assist with hypothermia    GASTROENTEROLOGY:  #liver cirrhosis   #portal hypertension s/p TIPS   #hx of Budd-Chiari   :: 3/7 POCUS with distended bowel, no appreciable ascites  - Continue to monitor abdomen for signs of ascites formation  - Hep C Abs positive, will need outpt GI follow up  - Reduce bowel regimen d/t diarrhea- lactulose decreased  - 3/11 labs w/ elevated INR (5), AST (147), ALT (57), Alk Phos (218)  - Liver US w/ thrombosed TIPS, IR consulted, likely chronic as this has been seen on prior CT and patient non-adherent with anticoagulation     ENDOCRINOLOGY:  - SSI #2 while on steroids     HEMATOLOGY:  #Lymphopenia   :: Abs leukocyte count 260  :: HIV neg  - Now with leukocytosis likely 2/2 known infection and steroid use  - CTM WBC count     #Hx of antiphospholipid syndrome   #Hx of positive PF4  #Chronic IVC occlusion   #Hx of DVT   :: Supposed to be on fondaparinux outpatient, unclear if taking as no recent med fills   - Continue bivalirudin while inpatient I/s/o previous positive PF4      #Thrombocytopenia, stable  - Trend plts, INR, coags  - Fibrinogen wnl  - Vancomycin could cause drug-induced thrombocytopenia  - Amiodarone unlikely because timeline doesn't fit  - If platelets <50, transfuse but do not stop bivalirubin  - INR elevated 5 -> 3.8 on 3/11, 5.9 on 3/12, iso known  cirrhosis, plt 80  - FU vit K    SKIN:  #Skin Failure  - BL buttock wounds, ear wound  - wound care following, appreciate recs      MUSCULOSKELETAL:  GIFTY    INFECTIOUS DISEASE:  #MSSA PNA  #Strep PNA  #ARDS  #Septic shock, resolved  Antibiotics  Azithromycin 500 mg: 3/3  Zosyn 4.5 g: 3/3  Tobramycin 440 mg: 3/4  Vancomycin: 3/3 - 3/6, 3/8 - 3/10  Ceftaroline 3/5 - 3/6  Meropenem 2 g BID: 3/3 - 3/8  Micafungin: 3/4 - 3/6  Doxycycline 100 mg BID: 3/4 - 3/6  Ceftriaxone 3/8 - 3/10  Cefazolin 3/10-3/12  *Vanc/Day 3/12-      ICU Check List       ICU Liberation: Intervention:   Assess, Prevent, Manage Pain      Both SAT and SBT [] SAT  [] SBT 30-60 min [] Extubate to NC  [] Extubate to NIV  [] Discuss Trach   Choice of analgesia and sedation Target RASS -4/5   Fent and Ketamine   Delirium: Assess, prevent and manage  CAM ICU Positive    Early Mobility and Exercise RASS -4/-5 [] PT /OT consult   Family Engagement and Empowerment []Family updated [x]SW consult     FEN  Fluids: CVVH  Electrolytes: CVVH  Nutrition: Trickle feeds   Prophylaxis:  DVT ppx: Bival gtt  GI ppx: PPI  Bowel care: Decreased due to increased rectal tube output  Hardware:         CVC 03/04/25 Triple lumen Left Internal jugular (Active)   Placement Date: 03/04/25   Earliest Known Present: 03/04/25  Lumen Type: Triple lumen  Orientation: Left  Location: Internal jugular   Number of days: 6       Arterial Line 03/03/25 Left Radial (Active)   Placement Date/Time: 03/03/25 (c) 2040   Size: 22 G  Orientation: Left  Location: Radial  Site Prep: Chlorhexidine   Local Anesthetic: Injectable  Insertion attempts: 2  Securement Method: Transparent dressing   Number of days: 6       ETT  7.5 mm (Active)   Placement Date: 03/03/25   ETT Type: ETT - single  Single Lumen Tube Size: 7.5 mm  Cuffed: Yes  Location: Oral   Number of days: 7       NG/OG/Feeding Tube NG - [place] sump Right nostril (Active)   Placement Date/Time: 03/03/25 1900   Type of Tube: Feeding Tube   Tube Length: 53 cm  Tube Type: NG - [place] sump  Tube Location: Right nostril   Number of days: 6       Fecal Management Rectal tube with balloon (Active)   Placement Date/Time: 03/09/25 0018   Placed by: ashlie  Hand Hygiene Completed: Yes  Fecal Management Tube Type: Rectal tube with balloon   Number of days: 1       Hemodialysis Cath 03/06/25 Right (Active)   Placement Date/Time: 03/06/25 1700   Orientation: Right   Number of days: 3       Social:  Code: DNR    HPOA: Payal Durant (daughter) [phone]  Disposition: MARLENE Buck MD   03/12/25 at 1:04 PM     Disclaimer: Documentation completed with the information available at the time of input. The times in the chart may not be reflective of actual patient care times, interventions, or procedures. Documentation occurs after the physical care of the patient.

## 2025-03-12 NOTE — CARE PLAN
Problem: Pain - Adult  Goal: Verbalizes/displays adequate comfort level or baseline comfort level  Outcome: Progressing     Problem: Safety - Adult  Goal: Free from fall injury  Outcome: Progressing     Problem: Discharge Planning  Goal: Discharge to home or other facility with appropriate resources  Outcome: Progressing     Problem: Chronic Conditions and Co-morbidities  Goal: Patient's chronic conditions and co-morbidity symptoms are monitored and maintained or improved  Outcome: Progressing     Problem: Nutrition  Goal: Nutrient intake appropriate for maintaining nutritional needs  Outcome: Progressing     Problem: Knowledge Deficit  Goal: Patient/family/caregiver demonstrates understanding of disease process, treatment plan, medications, and discharge instructions  Outcome: Progressing     Problem: Mechanical Ventilation  Goal: Patient Will Maintain Patent Airway  Outcome: Progressing  Goal: Oral health is maintained or improved  Outcome: Progressing  Goal: ET tube will be managed safely  Outcome: Progressing  Goal: Ability to express needs and understand communication  Outcome: Progressing  Goal: Mobility/activity is maintained at optimum level for patient  Outcome: Progressing     Problem: Skin  Goal: Decreased wound size/increased tissue granulation at next dressing change  Outcome: Progressing  Goal: Participates in plan/prevention/treatment measures  Outcome: Progressing  Goal: Prevent/manage excess moisture  Outcome: Progressing  Goal: Prevent/minimize sheer/friction injuries  Outcome: Progressing  Goal: Promote/optimize nutrition  Outcome: Progressing  Goal: Promote skin healing  Outcome: Progressing     Problem: Safety - Medical Restraint  Goal: Remains free of injury from restraints (Restraint for Interference with Medical Device)  Outcome: Progressing  Goal: Free from restraint(s) (Restraint for Interference with Medical Device)  Outcome: Progressing

## 2025-03-12 NOTE — ACP (ADVANCE CARE PLANNING)
"CHANGING OF CODE STATUS:   Advance Care Planning Note     Discussion Date: 03/12/25   Discussion Participants: sister and daughter    The family wishes to discuss Advance Care Planning today and the following is a brief summary of our discussion.     Patient has capacity to make their own medical decisions: No  Health Care Agent/Surrogate Decision Maker documented in chart: No    Documents on file and valid:  Advance Directive/Living Will: No   Health Care Power of : No  Other:   Patient's daughter is NOK    Communication of Medical Status/Prognosis:   Medical team in ICU spoke with patient's daughter, Payal and her sister, Misty. Gissel's clinical course was explained, highlighting the severity of her current illness. Team explained that she had severe pneumonia, respiratory failure, and multiorgan failure as a result of her acute, critical illness on top of her prior co-morbidities, especially her cirrhosis. Although she had demonstrated some mild improvement in her blood pressure and oxygen requirements, she had worsening hypotension, needing vasopressor support once again. She also became increasingly tachypneic without heavy sedation, and needing increased amounts of oxygen.     Payal and Misty both expressed understanding of how sick Gissel is. They had questions about how she became so sick and chances of meaningful recovery. The team explained that it is hard to be certain about chances of recovery, but with Gissel's status and multiorgan dysfunction, we have concern she may continue to worsen.     Communication of Treatment Goals/Options:   Payal and Misty both felt strongly that Gissel would not want chest compression done. They stated she had been hospitalized many times in the past due to her cirrhosis, and that she had \"signed DNR papers\" previously. We explained that we will do everything we can to help her, up to the point of her heart stopping. They agreed this is the best plan for the time " "being, and explicitly stated that chest compressions would be too much suffering for Gissel. Payal expressed concern that Gissel was already suffering too much, but wanted to give her \"some more time\" to potentially recover.     Treatment Decisions  Goals of Care: survival is prioritized, if goals for quality or survival can reasonably be achieved   DNR  Continue ICU level care, continue escalation of care until arrest    Time Statement: Total face to face time spent on advance care planning was 60 minutes with 45 minutes spent in counseling, including the explanation.    Lima Buck MD  3/12/2025 12:20 PM  "

## 2025-03-13 LAB
ABO GROUP (TYPE) IN BLOOD: NORMAL
ALBUMIN SERPL BCP-MCNC: 2.7 G/DL (ref 3.4–5)
ALBUMIN SERPL BCP-MCNC: 2.7 G/DL (ref 3.4–5)
ALP SERPL-CCNC: 247 U/L (ref 33–110)
ALT SERPL W P-5'-P-CCNC: 64 U/L (ref 7–45)
ANION GAP SERPL CALC-SCNC: 15 MMOL/L (ref 10–20)
ANION GAP SERPL CALC-SCNC: 16 MMOL/L (ref 10–20)
ANTIBODY SCREEN: NORMAL
APTT PPP: 55 SECONDS (ref 26–36)
APTT PPP: 59 SECONDS (ref 26–36)
APTT PPP: 64 SECONDS (ref 26–36)
AST SERPL W P-5'-P-CCNC: 218 U/L (ref 9–39)
BASOPHILS # BLD AUTO: 0.13 X10*3/UL (ref 0–0.1)
BASOPHILS NFR BLD AUTO: 0.5 %
BILIRUB SERPL-MCNC: 17.2 MG/DL (ref 0–1.2)
BUN SERPL-MCNC: 59 MG/DL (ref 6–23)
BUN SERPL-MCNC: 59 MG/DL (ref 6–23)
CA-I BLD-SCNC: 1.1 MMOL/L (ref 1.1–1.33)
CA-I BLD-SCNC: 1.15 MMOL/L (ref 1.1–1.33)
CALCIUM SERPL-MCNC: 8.4 MG/DL (ref 8.6–10.6)
CALCIUM SERPL-MCNC: 8.7 MG/DL (ref 8.6–10.6)
CHLORIDE SERPL-SCNC: 97 MMOL/L (ref 98–107)
CHLORIDE SERPL-SCNC: 98 MMOL/L (ref 98–107)
CO2 SERPL-SCNC: 22 MMOL/L (ref 21–32)
CO2 SERPL-SCNC: 23 MMOL/L (ref 21–32)
CREAT SERPL-MCNC: 1.64 MG/DL (ref 0.5–1.05)
CREAT SERPL-MCNC: 1.68 MG/DL (ref 0.5–1.05)
EGFRCR SERPLBLD CKD-EPI 2021: 40 ML/MIN/1.73M*2
EGFRCR SERPLBLD CKD-EPI 2021: 41 ML/MIN/1.73M*2
EOSINOPHIL # BLD AUTO: 0 X10*3/UL (ref 0–0.7)
EOSINOPHIL NFR BLD AUTO: 0 %
ERYTHROCYTE [DISTWIDTH] IN BLOOD BY AUTOMATED COUNT: 16.1 % (ref 11.5–14.5)
ERYTHROCYTE [DISTWIDTH] IN BLOOD BY AUTOMATED COUNT: 17.8 % (ref 11.5–14.5)
GLUCOSE BLD MANUAL STRIP-MCNC: 191 MG/DL (ref 74–99)
GLUCOSE BLD MANUAL STRIP-MCNC: 197 MG/DL (ref 74–99)
GLUCOSE BLD MANUAL STRIP-MCNC: 200 MG/DL (ref 74–99)
GLUCOSE SERPL-MCNC: 202 MG/DL (ref 74–99)
GLUCOSE SERPL-MCNC: 226 MG/DL (ref 74–99)
HCT VFR BLD AUTO: 29.1 % (ref 36–46)
HCT VFR BLD AUTO: 29.5 % (ref 36–46)
HCV RNA SERPL NAA+PROBE-ACNC: 5110 IU/ML
HCV RNA SERPL NAA+PROBE-ACNC: DETECTED K[IU]/ML
HCV RNA SERPL NAA+PROBE-LOG IU: 3.71 LOG IU/ML
HGB BLD-MCNC: 10.2 G/DL (ref 12–16)
HGB BLD-MCNC: 10.5 G/DL (ref 12–16)
IMM GRANULOCYTES # BLD AUTO: 0.93 X10*3/UL (ref 0–0.7)
IMM GRANULOCYTES NFR BLD AUTO: 3.4 % (ref 0–0.9)
INR PPP: ABNORMAL
INR PPP: ABNORMAL
LYMPHOCYTES # BLD AUTO: 1.53 X10*3/UL (ref 1.2–4.8)
LYMPHOCYTES NFR BLD AUTO: 5.6 %
MAGNESIUM SERPL-MCNC: 2.52 MG/DL (ref 1.6–2.4)
MCH RBC QN AUTO: 32 PG (ref 26–34)
MCH RBC QN AUTO: 32.4 PG (ref 26–34)
MCHC RBC AUTO-ENTMCNC: 34.6 G/DL (ref 32–36)
MCHC RBC AUTO-ENTMCNC: 36.1 G/DL (ref 32–36)
MCV RBC AUTO: 89 FL (ref 80–100)
MCV RBC AUTO: 94 FL (ref 80–100)
MONOCYTES # BLD AUTO: 0.84 X10*3/UL (ref 0.1–1)
MONOCYTES NFR BLD AUTO: 3.1 %
NEUTROPHILS # BLD AUTO: 23.9 X10*3/UL (ref 1.2–7.7)
NEUTROPHILS NFR BLD AUTO: 87.4 %
NRBC BLD-RTO: 1.7 /100 WBCS (ref 0–0)
NRBC BLD-RTO: 2.5 /100 WBCS (ref 0–0)
PHOSPHATE SERPL-MCNC: 3.4 MG/DL (ref 2.5–4.9)
PLATELET # BLD AUTO: 116 X10*3/UL (ref 150–450)
PLATELET # BLD AUTO: 140 X10*3/UL (ref 150–450)
POTASSIUM SERPL-SCNC: 5 MMOL/L (ref 3.5–5.3)
POTASSIUM SERPL-SCNC: 5.2 MMOL/L (ref 3.5–5.3)
PROT SERPL-MCNC: 6.2 G/DL (ref 6.4–8.2)
PROTHROMBIN TIME: >100 SECONDS (ref 9.8–12.4)
PROTHROMBIN TIME: >100 SECONDS (ref 9.8–12.4)
RBC # BLD AUTO: 3.15 X10*6/UL (ref 4–5.2)
RBC # BLD AUTO: 3.28 X10*6/UL (ref 4–5.2)
RH FACTOR (ANTIGEN D): NORMAL
SODIUM SERPL-SCNC: 130 MMOL/L (ref 136–145)
SODIUM SERPL-SCNC: 131 MMOL/L (ref 136–145)
VANCOMYCIN SERPL-MCNC: 25 UG/ML (ref 5–20)
WBC # BLD AUTO: 23.2 X10*3/UL (ref 4.4–11.3)
WBC # BLD AUTO: 27.3 X10*3/UL (ref 4.4–11.3)

## 2025-03-13 PROCEDURE — 82947 ASSAY GLUCOSE BLOOD QUANT: CPT

## 2025-03-13 PROCEDURE — 94003 VENT MGMT INPAT SUBQ DAY: CPT

## 2025-03-13 PROCEDURE — 2500000001 HC RX 250 WO HCPCS SELF ADMINISTERED DRUGS (ALT 637 FOR MEDICARE OP): Mod: SE

## 2025-03-13 PROCEDURE — 90945 DIALYSIS ONE EVALUATION: CPT | Performed by: INTERNAL MEDICINE

## 2025-03-13 PROCEDURE — 82330 ASSAY OF CALCIUM: CPT

## 2025-03-13 PROCEDURE — 80053 COMPREHEN METABOLIC PANEL: CPT

## 2025-03-13 PROCEDURE — 85025 COMPLETE CBC W/AUTO DIFF WBC: CPT

## 2025-03-13 PROCEDURE — 86900 BLOOD TYPING SEROLOGIC ABO: CPT

## 2025-03-13 PROCEDURE — 2500000005 HC RX 250 GENERAL PHARMACY W/O HCPCS: Mod: SE

## 2025-03-13 PROCEDURE — 99291 CRITICAL CARE FIRST HOUR: CPT

## 2025-03-13 PROCEDURE — 85730 THROMBOPLASTIN TIME PARTIAL: CPT

## 2025-03-13 PROCEDURE — 36415 COLL VENOUS BLD VENIPUNCTURE: CPT

## 2025-03-13 PROCEDURE — 71045 X-RAY EXAM CHEST 1 VIEW: CPT | Performed by: RADIOLOGY

## 2025-03-13 PROCEDURE — 2020000001 HC ICU ROOM DAILY

## 2025-03-13 PROCEDURE — 85027 COMPLETE CBC AUTOMATED: CPT

## 2025-03-13 PROCEDURE — 84075 ASSAY ALKALINE PHOSPHATASE: CPT

## 2025-03-13 PROCEDURE — 2500000004 HC RX 250 GENERAL PHARMACY W/ HCPCS (ALT 636 FOR OP/ED): Mod: SE

## 2025-03-13 PROCEDURE — 83735 ASSAY OF MAGNESIUM: CPT

## 2025-03-13 PROCEDURE — 2500000002 HC RX 250 W HCPCS SELF ADMINISTERED DRUGS (ALT 637 FOR MEDICARE OP, ALT 636 FOR OP/ED): Mod: SE

## 2025-03-13 PROCEDURE — 90937 HEMODIALYSIS REPEATED EVAL: CPT

## 2025-03-13 PROCEDURE — 80069 RENAL FUNCTION PANEL: CPT | Mod: CCI

## 2025-03-13 PROCEDURE — 80202 ASSAY OF VANCOMYCIN: CPT

## 2025-03-13 RX ORDER — PROPOFOL 10 MG/ML
0-50 INJECTION, EMULSION INTRAVENOUS CONTINUOUS
Status: DISCONTINUED | OUTPATIENT
Start: 2025-03-13 | End: 2025-03-17

## 2025-03-13 RX ORDER — VANCOMYCIN HYDROCHLORIDE 500 MG/100ML
500 INJECTION, SOLUTION INTRAVENOUS EVERY 24 HOURS
Status: DISCONTINUED | OUTPATIENT
Start: 2025-03-14 | End: 2025-03-15

## 2025-03-13 RX ADMIN — CARBOXYMETHYLCELLULOSE SODIUM 2 DROP: 5 SOLUTION/ DROPS OPHTHALMIC at 09:26

## 2025-03-13 RX ADMIN — HYDROCORTISONE SODIUM SUCCINATE 50 MG: 100 INJECTION, POWDER, FOR SOLUTION INTRAMUSCULAR; INTRAVENOUS at 09:26

## 2025-03-13 RX ADMIN — LACTULOSE 20 G: 20 SOLUTION ORAL at 15:16

## 2025-03-13 RX ADMIN — INSULIN LISPRO 2 UNITS: 100 INJECTION, SOLUTION INTRAVENOUS; SUBCUTANEOUS at 06:43

## 2025-03-13 RX ADMIN — CALCIUM CHLORIDE, MAGNESIUM CHLORIDE, DEXTROSE MONOHYDRATE, LACTIC ACID, SODIUM CHLORIDE, SODIUM BICARBONATE AND POTASSIUM CHLORIDE 25 ML/KG/HR: 5.15; 2.03; 22; 5.4; 6.46; 3.09; .157 INJECTION INTRAVENOUS at 20:46

## 2025-03-13 RX ADMIN — CALCIUM CHLORIDE, MAGNESIUM CHLORIDE, DEXTROSE MONOHYDRATE, LACTIC ACID, SODIUM CHLORIDE, SODIUM BICARBONATE AND POTASSIUM CHLORIDE 25 ML/KG/HR: 5.15; 2.03; 22; 5.4; 6.46; 3.09; .157 INJECTION INTRAVENOUS at 04:56

## 2025-03-13 RX ADMIN — PROPOFOL 20 MCG/KG/MIN: 10 INJECTION, EMULSION INTRAVENOUS at 20:50

## 2025-03-13 RX ADMIN — THIAMINE HCL TAB 100 MG 100 MG: 100 TAB at 08:37

## 2025-03-13 RX ADMIN — HYDROCORTISONE SODIUM SUCCINATE 50 MG: 100 INJECTION, POWDER, FOR SOLUTION INTRAMUSCULAR; INTRAVENOUS at 03:16

## 2025-03-13 RX ADMIN — Medication 60 PERCENT: at 08:36

## 2025-03-13 RX ADMIN — CALCIUM CHLORIDE, MAGNESIUM CHLORIDE, DEXTROSE MONOHYDRATE, LACTIC ACID, SODIUM CHLORIDE, SODIUM BICARBONATE AND POTASSIUM CHLORIDE 25 ML/KG/HR: 5.15; 2.03; 22; 5.4; 6.46; 3.09; .157 INJECTION INTRAVENOUS at 05:03

## 2025-03-13 RX ADMIN — INSULIN LISPRO 2 UNITS: 100 INJECTION, SOLUTION INTRAVENOUS; SUBCUTANEOUS at 18:20

## 2025-03-13 RX ADMIN — MEROPENEM 2 G: 1 INJECTION INTRAVENOUS at 21:09

## 2025-03-13 RX ADMIN — SENNOSIDES AND DOCUSATE SODIUM 2 TABLET: 50; 8.6 TABLET ORAL at 08:37

## 2025-03-13 RX ADMIN — PROPOFOL 20 MCG/KG/MIN: 10 INJECTION, EMULSION INTRAVENOUS at 10:42

## 2025-03-13 RX ADMIN — CALCIUM CHLORIDE, MAGNESIUM CHLORIDE, DEXTROSE MONOHYDRATE, LACTIC ACID, SODIUM CHLORIDE, SODIUM BICARBONATE AND POTASSIUM CHLORIDE 25 ML/KG/HR: 5.15; 2.03; 22; 5.4; 6.46; 3.09; .157 INJECTION INTRAVENOUS at 15:23

## 2025-03-13 RX ADMIN — PANTOPRAZOLE SODIUM 40 MG: 40 INJECTION, POWDER, LYOPHILIZED, FOR SOLUTION INTRAVENOUS at 07:50

## 2025-03-13 RX ADMIN — Medication 100 MCG/HR: at 07:54

## 2025-03-13 RX ADMIN — NOREPINEPHRINE BITARTRATE 0.1 MCG/KG/MIN: 8 INJECTION, SOLUTION INTRAVENOUS at 18:19

## 2025-03-13 RX ADMIN — INSULIN LISPRO 2 UNITS: 100 INJECTION, SOLUTION INTRAVENOUS; SUBCUTANEOUS at 23:52

## 2025-03-13 RX ADMIN — POLYETHYLENE GLYCOL 3350 17 G: 17 POWDER, FOR SOLUTION ORAL at 08:37

## 2025-03-13 RX ADMIN — HYDROCORTISONE SODIUM SUCCINATE 50 MG: 100 INJECTION, POWDER, FOR SOLUTION INTRAMUSCULAR; INTRAVENOUS at 20:51

## 2025-03-13 RX ADMIN — HYDROCORTISONE SODIUM SUCCINATE 50 MG: 100 INJECTION, POWDER, FOR SOLUTION INTRAMUSCULAR; INTRAVENOUS at 15:16

## 2025-03-13 RX ADMIN — Medication 60 PERCENT: at 19:45

## 2025-03-13 RX ADMIN — VANCOMYCIN HYDROCHLORIDE 500 MG: 500 INJECTION, SOLUTION INTRAVENOUS at 08:41

## 2025-03-13 RX ADMIN — INSULIN LISPRO 4 UNITS: 100 INJECTION, SOLUTION INTRAVENOUS; SUBCUTANEOUS at 12:05

## 2025-03-13 RX ADMIN — Medication 5 ML: at 08:38

## 2025-03-13 RX ADMIN — CALCIUM CHLORIDE, MAGNESIUM CHLORIDE, DEXTROSE MONOHYDRATE, LACTIC ACID, SODIUM CHLORIDE, SODIUM BICARBONATE AND POTASSIUM CHLORIDE 25 ML/KG/HR: 5.15; 2.03; 22; 5.4; 6.46; 3.09; .157 INJECTION INTRAVENOUS at 04:57

## 2025-03-13 RX ADMIN — LACTULOSE 20 G: 20 SOLUTION ORAL at 20:51

## 2025-03-13 RX ADMIN — MEROPENEM 2 G: 1 INJECTION INTRAVENOUS at 09:28

## 2025-03-13 RX ADMIN — CALCIUM CHLORIDE, MAGNESIUM CHLORIDE, DEXTROSE MONOHYDRATE, LACTIC ACID, SODIUM CHLORIDE, SODIUM BICARBONATE AND POTASSIUM CHLORIDE 25 ML/KG/HR: 5.15; 2.03; 22; 5.4; 6.46; 3.09; .157 INJECTION INTRAVENOUS at 10:14

## 2025-03-13 RX ADMIN — Medication 100 MCG/HR: at 18:20

## 2025-03-13 ASSESSMENT — PAIN - FUNCTIONAL ASSESSMENT

## 2025-03-13 NOTE — PROGRESS NOTES
SOCIAL WORK NOTE   -ICU Treatment Plan: Per notes, patient is on pressors, intubated, GOC with family   -Support System: Daughter    -Planned Disposition: TBD, PT/OT Evaluation ongoing   -Additional information:  Social work to follow.   CHARITO Montejo, LISW-S ([id number])

## 2025-03-13 NOTE — CARE PLAN
The patient's goals for the shift include    Problem: Pain - Adult  Goal: Verbalizes/displays adequate comfort level or baseline comfort level  Outcome: Progressing     Problem: Safety - Adult  Goal: Free from fall injury  Outcome: Progressing     Problem: Chronic Conditions and Co-morbidities  Goal: Patient's chronic conditions and co-morbidity symptoms are monitored and maintained or improved  Outcome: Progressing     Problem: Mechanical Ventilation  Goal: Patient Will Maintain Patent Airway  Outcome: Progressing  Goal: ET tube will be managed safely  Outcome: Progressing     Problem: Skin  Goal: Decreased wound size/increased tissue granulation at next dressing change  Outcome: Progressing  Flowsheets (Taken 3/13/2025 1634)  Decreased wound size/increased tissue granulation at next dressing change:   Promote sleep for wound healing   Protective dressings over bony prominences  Goal: Participates in plan/prevention/treatment measures  Outcome: Progressing  Flowsheets (Taken 3/13/2025 1634)  Participates in plan/prevention/treatment measures: Elevate heels  Goal: Prevent/manage excess moisture  Outcome: Progressing  Flowsheets (Taken 3/13/2025 1634)  Prevent/manage excess moisture:   Cleanse incontinence/protect with barrier cream   Monitor for/manage infection if present   Follow provider orders for dressing changes   Moisturize dry skin  Goal: Prevent/minimize sheer/friction injuries  Outcome: Progressing  Flowsheets (Taken 3/13/2025 1634)  Prevent/minimize sheer/friction injuries:   Complete micro-shifts as needed if patient unable. Adjust patient position to relieve pressure points, not a full turn   Increase activity/out of bed for meals   Use pull sheet   HOB 30 degrees or less   Turn/reposition every 2 hours/use positioning/transfer devices  Goal: Promote/optimize nutrition  Outcome: Progressing  Flowsheets (Taken 3/13/2025 1634)  Promote/optimize nutrition:   Assist with feeding   Monitor/record intake  including meals  Goal: Promote skin healing  Outcome: Progressing  Flowsheets (Taken 3/13/2025 1634)  Promote skin healing:   Assess skin/pad under line(s)/device(s)   Protective dressings over bony prominences   Turn/reposition every 2 hours/use positioning/transfer devices   Ensure correct size (line/device) and apply per  instructions   Rotate device position/do not position patient on device       The clinical goals for the shift include Pt will be hemodynamically stable    Pt hemodynamically stable after switching sedation medications around. Was able to come down a little on the Levophed.

## 2025-03-13 NOTE — PROGRESS NOTES
"Nutrition Follow Up Assessment:   Nutrition Assessment         Patient is a 39 y.o. female presenting with hypoxemia.  Required intubation for support.  Treated for acute hypoxic respiratory failure, multifocal PNA, septic shock, ARDS from CAP,.  She has SCOUT and is on CVVH.  She remains intubated this morning, sedated with fentanyl and propofol and on levo for pressure support.  Also on Bival drip.  Has a history of anti-phospholipid syndrome, Budd-chiari and TIPS.    Pt has an NGT in place for enteral access.  She is on Two Carlos HN at 25mls/hr at visit-- goal rate feeds along with one prostat AWC BID.  Current propofol rate is 7.6mls/hr which provides 201kcals daily from fat.  Had not been on Propofol at initial RDN assessment.  Rectal tube in place for stool collection while on lactulose.      Anthropometrics:  Height: 167.6 cm (5' 6\")   Weight: 65 kg (143 lb 4.8 oz)   BMI (Calculated): 23.14  IBW/kg (Dietitian Calculated): 59 kg  Percent of IBW: 134 %       Weight History:     3/11/25          65kg  03/07/25 79 kg (174 lb 2.6 oz)   11/02/24 66.7 kg (147 lb)   10/21/24 66.7 kg (147 lb)   10/21/24 66.7 kg (147 lb)   07/02/19 61.2 kg (134 lb 14.7 oz)      Admit weight on 3/3 was 63.5kg.    Weight Change %:     Nutrition Focused Physical Exam Findings:    Subcutaneous Fat Loss:      Muscle Wasting:     Edema:  Edema Location: generalized  Physical Findings:  Skin: Positive (PI B/L butt, PI mouth, R leg, R ear)    Nutrition Significant Labs:  A1C:No results found for: \"HGBA1C\", BG POCT trend:   Results from last 7 days   Lab Units 03/13/25  0641 03/12/25  2320 03/12/25  1932 03/12/25  1814 03/12/25  1604   POCT GLUCOSE mg/dL 200* 160* 165* 167* 162*    , Renal Lab Trend:   Results from last 7 days   Lab Units 03/13/25  0509 03/12/25  1748 03/12/25  0210 03/11/25  1727   POTASSIUM mmol/L 5.0 5.3 5.4* 5.3   PHOSPHORUS mg/dL  --  3.7  --  4.6   SODIUM mmol/L 130* 128* 131* 130*   MAGNESIUM mg/dL 2.52*  --  3.03*  --  "   EGFR mL/min/1.73m*2 41* 35* 38* 38*   BUN mg/dL 59* 54* 49* 48*   CREATININE mg/dL 1.64* 1.84* 1.72* 1.72*    , Vit D:   Lab Results   Component Value Date    VITD25 23 (L) 03/11/2025        Nutrition Specific Medications:  Scheduled medications  ergocalciferol, 1,250 mcg, oral, Weekly  pantoprazole, 40 mg, oral, Daily before breakfast   Or  esomeprazole, 40 mg, nasoduodenal tube, Daily before breakfast   Or  pantoprazole, 40 mg, intravenous, Daily before breakfast  hydrocortisone sodium succinate, 50 mg, intravenous, q6h  insulin lispro, 0-10 Units, subcutaneous, q6h  lactulose, 20 g, oral, q6h  artificial tears, 2 drop, Both Eyes, q24h  meropenem, 2 g, intravenous, q12h  oxygen, , inhalation, Continuous - Inhalation  polyethylene glycol, 17 g, oral, Daily  sennosides-docusate sodium, 2 tablet, oral, Daily  thiamine, 100 mg, oral, Daily  [START ON 3/14/2025] vancomycin, 500 mg, intravenous, q24h  vitamin B complex-vitamin C-folic acid, 5 mL, oral, Daily      Continuous medications  bivalirudin, 0.05-0.49 mg/kg/hr, Last Rate: 0.01 mg/kg/hr (03/13/25 0700)  fentaNYL, 0-300 mcg/hr, Last Rate: 100 mcg/hr (03/13/25 0754)  norepinephrine, 0.01-1 mcg/kg/min (Dosing Weight), Last Rate: 0.13 mcg/kg/min (03/13/25 0700)  PrismaSol BGK 2/3.5, 25 mL/kg/hr (Dosing Weight), Last Rate: 25 mL/kg/hr (03/13/25 1014)  propofol, 0-50 mcg/kg/min (Dosing Weight), Last Rate: 20 mcg/kg/min (03/13/25 1042)  vasopressin, 0.03 Units/min, Last Rate: Stopped (03/12/25 0915)      PRN medications  PRN medications: acetaminophen **OR** acetaminophen, alteplase, dextrose, dextrose, fentaNYL, glucagon, glucagon, oxygen, vancomycin     I/O:   Last BM Date: 03/12/25; Stool Appearance: Liquid (03/11/25 2000)  Rectal tube output (placed 3/9):  3/13: 175mls  3/12: none  3/11: 600mls  3/10: 1400mls total      Dietary Orders (From admission, onward)       Start     Ordered    03/13/25 0922  Enteral feeding with NPO TwoCal HN; NG (nasogastric tube); 10;  No free water  Diet effective now        Comments: Two Carlos HN at start rate of 15mls/hr.  Increase once after 8hrs to goal rate of 25mls/hr reached.   Question Answer Comment   Tube feeding formula: TwoCal HN    Tube feeding additive: Pro-Stat AWC    Tube feeding additive frequency: Twice a day    Feeding route: NG (nasogastric tube)    Tube feeding continuous rate (mL/hr): 10    Free water restriction: No free water        03/13/25 0922    03/03/25 2151  May Participate in Room Service  ( ROOM SERVICE MAY PARTICIPATE)  Once        Question:  .  Answer:  Yes    03/03/25 2150                     Estimated Needs:   Total Energy Estimated Needs in 24 hours (kCal):  ([address]365-9129)  Method for Estimating Needs: MV= 12.6, RMR= 1564  Total Protein Estimated Needs in 24 Hours (g):  (90)  Method for Estimating 24 Hour Protein Needs: 59kg x 1.5            Nutrition Diagnosis        Nutrition Diagnosis  Patient has Nutrition Diagnosis: Yes  Diagnosis Status (1): Active  Nutrition Diagnosis 1: Increased nutrient needs  Related to (1): septic shock, ARDS  As Evidenced by (1): altered metabolism in the setting of critical illness       Nutrition Interventions/Recommendations         Nutrition Prescription:   For tube feed: continue current regimen at this time--> Two Carlos HN at 25mls/hr with prostat AWC BID.    Once off of Propofol, increase TF rate to 30mls/hr and keep Pro-Stat AWC ordered BID.          Nutrition Interventions:    TF at goal with propofol and prostat x 2= 1601kcals, 84grams protein  TF at goal without propofol and with prostat x 2= 1640kcals, 94grams protein       Nutrition Education:   Not appropriate       Nutrition Monitoring and Evaluation   Food/Nutrient Related History Monitoring  Enteral and Parenteral Nutrition Intake Determination: Enteral nutrition intake - Tolerate TF at goal rate         Time Spent (min): 60 minutes

## 2025-03-13 NOTE — PROCEDURES
CRRT procedure note   Seen and assessed on CVVH. Labs and events reviewed   off pressors  this AM ; on vent - non-responsive ;   Vascular access: right IJ trialysis catheter. Net -3 L over prior 24 hours  BFR :200 ml/min  Filter: M150  Total Replacement Rate: 2000 ml/hour  Pre blood pump : 1000 ml/hour    Replacement solution Prismasol potassium:  2 mEq/L, 3.5 Ca  Patient fluid removal: per ICU team   Please check Phosphorus, Calcium and Magnesium daily and replace peripherally as needed  Continue with current CVVH prescription.    Vitals:    03/13/25 1500 03/13/25 1600 03/13/25 1700 03/13/25 1800   BP: 118/58 117/63 113/56 115/56   Pulse: 89 92 93 92   Resp: (!) 28 (!) 28 (!) 30 (!) 30   Temp: 36.9 °C (98.4 °F) 37.1 °C (98.8 °F) 37.2 °C (99 °F) 37.1 °C (98.8 °F)   TempSrc: Esophageal      SpO2: 94% 94% 92% 93%   Weight:       Height:          I/O last 3 completed shifts:  In: 2908.6 (44.7 mL/kg) [I.V.:873.6 (13.4 mL/kg); NG/GT:755; IV Piggyback:1280]  Out: 589 (9.1 mL/kg) [Other:414; Stool:175]  Dosing Weight: 65 kg   I/O this shift:  In: 1308.7 [I.V.:448.7; NG/GT:620; IV Piggyback:240]  Out: 212 [Other:112; Stool:100]   Anuric    Most recent labs:   Results from last 7 days   Lab Units 03/13/25  0509 03/12/25  1759 03/12/25  0210   WBC AUTO x10*3/uL 27.3* 30.1* 30.8*   HEMOGLOBIN g/dL 10.5* 11.0* 11.3*   HEMATOCRIT % 29.1* 30.9* 33.2*   PLATELETS AUTO x10*3/uL 116* 120* 105*     Results from last 7 days   Lab Units 03/13/25  0509 03/12/25  1748 03/12/25  0210 03/11/25  1727 03/11/25  0339   SODIUM mmol/L 130* 128* 131* 130* 132*   CO2 mmol/L 22 21 24 23 23   ANION GAP mmol/L 16 16 15 16 16   BUN mg/dL 59* 54* 49* 48* 49*   CREATININE mg/dL 1.64* 1.84* 1.72* 1.72* 1.76*   GLUCOSE mg/dL 226* 174* 151* 213* 188*   CALCIUM mg/dL 8.4* 8.5* 7.8* 8.1* 8.0*   MAGNESIUM mg/dL 2.52*  --  3.03*  --  2.96*   PHOSPHORUS mg/dL  --  3.7  --  4.6 3.5      Results from last 7 days   Lab Units 03/13/25  0509 03/12/25 0210  03/11/25  0339   ALT U/L 64* 66* 57*   AST U/L 218* 171* 147*   ALK PHOS U/L 247* 241* 218*      Results from last 7 days   Lab Units 03/12/25  0210 03/11/25  0808 03/11/25  0339   INR  5.9* 3.6* 5.0*     Results from last 7 days   Lab Units 03/12/25  2304 03/12/25  1748 03/12/25  1348 03/12/25  0210 03/11/25  1036 03/11/25  0804 03/10/25  2227   POCT PH, ARTERIAL pH  --   --   --   --  7.26* 7.31* 7.31*   POCT PO2, ARTERIAL mm Hg  --   --   --   --  83* 91 125*   POCT PCO2, ARTERIAL mm Hg  --   --   --   --  50* 45* 45*   FIO2 % 60 80 80   < > 55 55 70    < > = values in this interval not displayed.     Results from last 7 days   Lab Units 03/12/25 2304 03/12/25 1748 03/12/25  1348   POCT PH, VENOUS pH 7.32* 7.32* 7.27*   POCT PCO2, VENOUS mm Hg 43 44 51     Results from last 7 days   Lab Units 03/10/25  0516   LACTATE mmol/L 3.0*     ergocalciferol, 1,250 mcg, oral, Weekly  pantoprazole, 40 mg, oral, Daily before breakfast   Or  esomeprazole, 40 mg, nasoduodenal tube, Daily before breakfast   Or  pantoprazole, 40 mg, intravenous, Daily before breakfast  hydrocortisone sodium succinate, 50 mg, intravenous, q6h  insulin lispro, 0-10 Units, subcutaneous, q6h  lactulose, 20 g, oral, q6h  artificial tears, 2 drop, Both Eyes, q24h  meropenem, 2 g, intravenous, q12h  oxygen, , inhalation, Continuous - Inhalation  polyethylene glycol, 17 g, oral, Daily  sennosides-docusate sodium, 2 tablet, oral, Daily  thiamine, 100 mg, oral, Daily  [START ON 3/14/2025] vancomycin, 500 mg, intravenous, q24h  vitamin B complex-vitamin C-folic acid, 5 mL, oral, Daily

## 2025-03-13 NOTE — PROGRESS NOTES
Vancomycin Dosing by Pharmacy  FOLLOW UP    Gissel Harvey is a 39 y.o. female who Pharmacy is consulted to dose vancomycin for pneumonia.     Based on the patient's indication and renal status, this patient is being dosed based on a goal vancomycin level of 15-20.     Current vancomycin dose: 500 mg every 12 hours    Most recent vancomycin trough: 25 mcg/mL    Renal function is currently dependent on Continuous Renal Replacement Therapy (CRRT).    Estimated Creatinine Clearance: 43.1 mL/min (A) (by C-G formula based on SCr of 1.64 mg/dL (H)).    Results from last 7 days   Lab Units 03/13/25  0509 03/12/25  1759 03/12/25  1748 03/12/25  0210 03/11/25  1727 03/11/25  0339   CREATININE mg/dL 1.64*  --  1.84* 1.72* 1.72* 1.76*   BUN mg/dL 59*  --  54* 49* 48* 49*   WBC AUTO x10*3/uL 27.3* 30.1*  --  30.8*  --  30.4*        Visit Vitals  /62   Pulse 89   Temp 36.6 °C (97.9 °F)   Resp (!) 29       Gram Stain   Date/Time Value Ref Range Status   03/12/2025 08:24 PM (3+) Moderate Polymorphonuclear leukocytes  Preliminary   03/12/2025 08:24 PM No organisms seen  Preliminary     Blood Culture   Date/Time Value Ref Range Status   03/12/2025 08:53 AM Loaded on Instrument - Culture in progress  Preliminary        Susceptibility data from last 90 days.  Collected Specimen Info Organism Ceftriaxone (Meningitis) Ceftriaxone (Nonmeningitis) Clindamycin Erythromycin Oxacillin Penicillin (IV, Meningitidis) Penicillin (IV, Nonmeningitis) Penicillin (Oral) Tetracycline Trimethoprim/Sulfamethoxazole   03/03/25 Fluid from Tracheal Aspirate Methicillin Susceptible Staphylococcus aureus (MSSA)    S  S  S     S  S     Streptococcus pneumoniae  S  S  S  S   R  S  S  S  S     Collected Specimen Info Organism Vancomycin   03/03/25 Fluid from Tracheal Aspirate Methicillin Susceptible Staphylococcus aureus (MSSA)  S     Streptococcus pneumoniae  S       Assessment/Plan    Vancomycin level is above goal range of 15-20. Will decrease  vancomycin dose to 500 mg every 24 hours.    The next vancomycin level will be ordered for 3/15 at @0800 prior to dose, unless clinically indicated sooner.  Will continue to monitor renal function daily while on vancomycin and order serum creatinine at least every 48 hours if not already ordered.  Will follow for continued vancomycin needs, clinical response, and signs/symptoms of toxicity.       Luis Hays, PharmD

## 2025-03-13 NOTE — PROGRESS NOTES
Medical Intensive Care - Daily Progress Note   Subjective    Gissel Harvey is a 39 y.o. year old female patient admitted on 3/3/2025 with following ICU needs: Hypoxemia and AHRF requiring intubation due to ARDS from CAP.     Interval History:  NAEON    Sedation increased 3/12 w/ RASS goal -4/-5.   Pressor requirement slowly decreasing- currently levo 0.13   Stress dose steroids w/ hydrocortisone 50 q6  Bili, Alk phos, ALT/AST continue to rise.     Meds    Scheduled medications  ergocalciferol, 1,250 mcg, oral, Weekly  pantoprazole, 40 mg, oral, Daily before breakfast   Or  esomeprazole, 40 mg, nasoduodenal tube, Daily before breakfast   Or  pantoprazole, 40 mg, intravenous, Daily before breakfast  hydrocortisone sodium succinate, 50 mg, intravenous, q6h  insulin lispro, 0-10 Units, subcutaneous, q6h  [Held by provider] lactulose, 20 g, oral, q6h  artificial tears, 2 drop, Both Eyes, q24h  meropenem, 2 g, intravenous, q12h  oxygen, , inhalation, Continuous - Inhalation  perflutren lipid microspheres, 0.5-10 mL of dilution, intravenous, Once in imaging  perflutren protein A microsphere, 0.5 mL, intravenous, Once in imaging  polyethylene glycol, 17 g, oral, Daily  sennosides-docusate sodium, 2 tablet, oral, Daily  sulfur hexafluoride microsphr, 2 mL, intravenous, Once in imaging  thiamine, 100 mg, oral, Daily  vancomycin, 500 mg, intravenous, q12h  vitamin B complex-vitamin C-folic acid, 5 mL, oral, Daily      Continuous medications  bivalirudin, 0.05-0.49 mg/kg/hr, Last Rate: 0.01 mg/kg/hr (03/12/25 1900)  fentaNYL, 0-300 mcg/hr, Last Rate: 100 mcg/hr (03/12/25 2157)  ketamine (Ketalar) 1,000 mg in 100 mL (10 mg/mL) infusion, 0.05-2 mg/kg/hr, Last Rate: 1 mg/kg/hr (03/12/25 2025)  norepinephrine, 0.01-1 mcg/kg/min (Dosing Weight), Last Rate: 0.13 mcg/kg/min (03/13/25 0617)  PrismaSol BGK 2/3.5, 25 mL/kg/hr (Dosing Weight), Last Rate: 25 mL/kg/hr (03/13/25 0503)  vasopressin, 0.03 Units/min, Last Rate: Stopped (03/12/25  "0915)      PRN medications  PRN medications: acetaminophen **OR** acetaminophen, alteplase, dextrose, dextrose, fentaNYL, glucagon, glucagon, oxygen, vancomycin     Objective    Blood pressure 106/54, pulse 95, temperature 36.8 °C (98.2 °F), resp. rate (!) 29, height 1.676 m (5' 6\"), weight 65 kg (143 lb 4.8 oz), SpO2 95%.     Physical Exam   Constitutional:       Comments: Intubated and Sedated  Not opening eyes to voice   HENT:      Head: Normocephalic and atraumatic.   Cardiovascular:      Rate and Rhythm: Normal rate and regular rhythm.      Pulses: Normal pulses.   Pulmonary:      Breath sounds: No wheezing.      Comments: Mechanically Ventilated  Coarse breath sounds  Abdominal:      General: Abdomen more rigid compared to day prior     Palpations: Abdomen is soft.      Comments: Rectal tube draining brown stool, no blood   Musculoskeletal:      Right lower leg: Edema present.      Left lower leg: Edema present.      Comments: 2+ pitting edema bilaterally to knees   Skin:     General: Skin is warm and dry.      Comments: Warm to touch   Neurological:      Comments: Sedated, not awakening to voice     Intake/Output Summary (Last 24 hours) at 3/13/2025 0710  Last data filed at 3/13/2025 0600  Gross per 24 hour   Intake 2482.18 ml   Output 334 ml   Net 2148.18 ml     Labs:   Results from last 72 hours   Lab Units 03/13/25  0509 03/12/25  1748 03/12/25  0210 03/11/25  1727 03/11/25  0339   SODIUM mmol/L 130* 128* 131* 130* 132*   POTASSIUM mmol/L 5.0 5.3 5.4* 5.3 4.9   CHLORIDE mmol/L 97* 96* 97* 96* 98   CO2 mmol/L 22 21 24 23 23   BUN mg/dL 59* 54* 49* 48* 49*   CREATININE mg/dL 1.64* 1.84* 1.72* 1.72* 1.76*   GLUCOSE mg/dL 226* 174* 151* 213* 188*   CALCIUM mg/dL 8.4* 8.5* 7.8* 8.1* 8.0*   ANION GAP mmol/L 16 16 15 16 16   EGFR mL/min/1.73m*2 41* 35* 38* 38* 37*   PHOSPHORUS mg/dL  --  3.7  --  4.6 3.5      Results from last 72 hours   Lab Units 03/13/25  0509 03/12/25  1759 03/12/25  0210 03/11/25  0339   WBC " AUTO x10*3/uL 27.3* 30.1* 30.8* 30.4*   HEMOGLOBIN g/dL 10.5* 11.0* 11.3* 11.7*   HEMATOCRIT % 29.1* 30.9* 33.2* 34.2*   PLATELETS AUTO x10*3/uL 116* 120* 105* 80*   NEUTROS PCT AUTO % 87.4  --  86.9  --    LYMPHO PCT MAN %  --   --   --  2.6   LYMPHS PCT AUTO % 5.6  --  4.9  --    MONO PCT MAN %  --   --   --  2.6   MONOS PCT AUTO % 3.1  --  3.7  --    EOSINO PCT MAN %  --   --   --  0.0   EOS PCT AUTO % 0.0  --  0.0  --       Results from last 72 hours   Lab Units 03/11/25  1036 03/11/25  0804 03/10/25  2227   POCT PH, ARTERIAL pH 7.26* 7.31* 7.31*   POCT PCO2, ARTERIAL mm Hg 50* 45* 45*   POCT PO2, ARTERIAL mm Hg 83* 91 125*   POCT SO2, ARTERIAL % 97 99 100     Results from last 72 hours   Lab Units 03/12/25  2304 03/12/25  1748 03/12/25  1348   POCT PH, VENOUS pH 7.32* 7.32* 7.27*   POCT PCO2, VENOUS mm Hg 43 44 51   POCT PO2, VENOUS mm Hg 60* 53* 46*        Micro/ID:     Lab Results   Component Value Date    BLOODCULT Loaded on Instrument - Culture in progress 03/12/2025           Assessment and Plan     Assessment: Gissel Harvey is a 39 y.o. 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 She is being treated for ARDS 2/2 CAP (MSSA and s. pneumo) and severe septic shock complicated by acute anuric renal failure, now on CVVH. She is still requiring significant respiratory support w/ increased FiO2 requirements and PEEP, on day 10 of mechanical ventilation. She also has worsening INR/Tbili/ALT/Alk phos concerning for intrahepatic insult/injury. She was made DNR by her daughter 3/12.    Mechanical Ventilation: 4-10 days  Sedation/Analgesia:  Fentanyl and Ketamine  Restraints: no    Summary for 03/13/25  :  INR, PT, Bili,  ALT, Alk phos all increasing. 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 C/f DILI or intrahepatic cholestasis / hepatic congestion  Switch from ketamine to propofol for sedation given concern for ketamine induced liver injury  LUE w/ swelling, c/f infiltrated IV vs thrombosis. Obtaining LUE US  Stool output decreased, mildly increased abdominal distension -> resuming lactulose     Plan:  NEUROLOGY/PSYCH:  #Sedation   - RASS goal -4 to -5 (to allow synchronicity with vent)  - c/w fentanyl   - switch from ketamine to propofol due to concern for ketamine induced liver injury    CARDIOVASCULAR:    #HFrEF    :: TTE 3/4 with EF 30-35% with reduced RV function, no previous TTE for comparison  :: BNP 2127 at admission  :: Troponin plateaued (62>63) on 3/4/25    #Shock, likely septic I/s/o severe pneumonia  :: lactate > 9 on presentation,  stable at 3  :: on initial presentation 3/3-3/7, she required 4 pressors (levo, vaso, epi, angiotensin)  :: on 3/8-3/11, able to come off pressors, maintained strong MAP  - 3/12 acute worsening of shock, (2/2 worsening of sepsis, worsened when sedation was weaned and she became dyssynchronous with vent demonstrating air trapping and significant tachypnea), had significant hypotension once again requiring vasopressor support        - continue levo, weaning as able        - stress dose steroids: hydrocortisone 50 q6        - c/w vanc + rebeca for broad spectrum coverage iso septic shock        - CVVH without fluid removal iso hypotension     #Aflutter  #Sustained VT   :: went into NSVT while wire threaded for central line placement  :: s/p amiodarone 150 mg bolus x2  :: pt cardioverted  - Pt is back to sinus rhythm, okay for amio gtt if arrhythmias return    PULMONARY:  Vent Mode: Volume control/assist control  FiO2 (%):  [60 %-80 %] 60 %  S RR:  [20-26] 26  S VT:  [450 mL] 450 mL  PEEP/CPAP (cm H2O):  [10 cm H20] 10 cm H20  MAP (cm H2O):  [14-20] 14   #Critical access hospital  acquired pneumonia   #ARDS   :: intubated 3/3   :: P:F ~ 70 on admission   :: Procalcitonin 57.3  :: flu A/B, COVID, parainfluenza, RSV, metapneumovirus, legionella urine Ag: negative  :: see ID section for full abx history this admission  :: Tracheal aspirate cx 3/3: pan sensitive staph aureus and strep pneumo  :: Strep pneumo urine ag: positive  :: Bcx 3/3: NGTD x3 days  :: re-cultured 3/12: sputum negative, blood culture pending   :: acute worsening of hypotension and hypoxia 3/12, FiO2% increased  Plan  - increase sedation to improve synchronicity with vent    - Target RASS -4/-5   - sedation with fentanyl and propofol  - broadened abx back to vanc/rebeca    RENAL/GENITOURINARY:  #Acute renal failure, anuric SCOUT requiring CVVH  :: etiology: septic shock and multiorgan failure  :: Cr 1.9 on admission, unclear baseline as no labs in 2 years   :: FeNa 3/3: 0.1% prerenal  :: Persistently anuric   - Continue CVVH per Nephrology- no fluid removal 3/12 due to hypotension  - While on CVVH: BID RFP, ionized calcium and CBC  - Warm CVVH fluid to assist with hypothermia    GASTROENTEROLOGY:  #liver cirrhosis   #portal hypertension s/p TIPS   #hx of Budd-Chiari   :: 3/7 POCUS with distended bowel, no appreciable ascites  - Continue to monitor abdomen for signs of ascites formation  - Hep C Abs positive, PCR pending  - 3/11 labs w/ acute elevation in INR (5), AST (147), ALT (57), Alk Phos (218)  - Liver US w/ thrombosed TIPS, IR consulted, likely chronic as this has been seen on prior CT and patient non-adherent with anticoagulation  -3/13 INR, PT, Bili, ALT, Alk phos all increasing. 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 C/f DILI or intrahepatic cholestasis / hepatic congestion, or shock liver   PLAN   - DC ketamine and use propofol for sedation given c/f DILI from ketamine   - CTM- she is not stable enough for further hepatology workup or procedure  - Stool output decreased, mildly increased  abdominal distension -> resuming lactulose     ENDOCRINOLOGY:   #High dose steroids   #Supplemental nutrition    - SSI     - Tube feeds via NG    HEMATOLOGY:  #Lymphopenia -> leukocytosis   :: Abs leukocyte count 260  :: HIV neg  - Now with leukocytosis likely 2/2 known infection and steroid use  - CTM WBC count     #Hx of antiphospholipid syndrome   #Hx of positive PF4  #Chronic IVC occlusion   #Hx of DVT   :: Supposed to be on fondaparinux outpatient, unclear if taking as no recent med fills   - Continue bivalirudin while inpatient I/s/o previous positive PF4      #Thrombocytopenia, stable  #Elevated PT and INR  - Trend plts, INR, coags  - Fibrinogen wnl  - If platelets <50, transfuse but do not stop bivalirubin  - INR elevated 5 -> 3.8 on 3/11, 5.9 on 3/12, iso known cirrhosis, plt 80  - FU vit K  - PT and INR increase can be secondary to cirrhosis, but with her APS c/w bival    SKIN:  #Skin Failure  - BL buttock wounds, ear wound  - wound care following, appreciate recs  - c/w vaseline for lips/mouth while intubated    MUSCULOSKELETAL:  GIFTY    INFECTIOUS DISEASE:  #MSSA PNA  #Strep PNA  #ARDS  #Septic shock  Antibiotics  Azithromycin 500 mg: 3/3  Zosyn 4.5 g: 3/3  Tobramycin 440 mg: 3/4  Vancomycin: 3/3 - 3/6, 3/8 - 3/10  Ceftaroline 3/5 - 3/6  Meropenem 2 g BID: 3/3 - 3/8  Micafungin: 3/4 - 3/6  Doxycycline 100 mg BID: 3/4 - 3/6  Ceftriaxone 3/8 - 3/10  Cefazolin 3/10-3/12  *Vanc/Day 3/12-    ICU Check List       ICU Liberation: Intervention:   Assess, Prevent, Manage Pain   Fentanyl    Both SAT and SBT [] SAT  [] SBT 30-60 min [] Extubate to NC  [] Extubate to NIV  [] Discuss Trach   Choice of analgesia and sedation Gao Agitation Sedation Scale (RASS): Deep sedation  Target Arousal Level (RASS): RASS -4 to -5 Fentanyl and Propofol     Delirium: Assess, prevent and manage  CAM ICU Positive    Early Mobility and Exercise RASS less than -2 [] PT /OT consult   Family Engagement and Empowerment []Family  updated []SW consult     FEN  Fluids: CVVH  Electrolytes: CVVH  Nutrition: Trickle feeds   Prophylaxis:  DVT ppx: Bival gtt  GI ppx: PPI  Bowel care: lactulose (rectal tube in place)  Hardware:         ETT  7.5 mm (Active)   Placement Date: 03/03/25   ETT Type: ETT - single  Single Lumen Tube Size: 7.5 mm  Cuffed: Yes  Location: Oral   Number of days: 10       NG/OG/Feeding Tube NG - [place] sump Right nostril (Active)   Placement Date/Time: 03/03/25 1900   Type of Tube: Feeding Tube  Tube Length: 53 cm  Tube Type: NG - [place] sump  Tube Location: Right nostril   Number of days: 9       Fecal Management Rectal tube with balloon (Active)   Placement Date/Time: 03/09/25 0018   Placed by: ashlie  Hand Hygiene Completed: Yes  Fecal Management Tube Type: Rectal tube with balloon   Number of days: 4       Hemodialysis Cath 03/06/25 Right (Active)   Placement Date/Time: 03/06/25 1700   Orientation: Right   Number of days: 6       Social:  Code: DNR    HPOA:  Payal Durant (daughter) [phone]  Disposition: MARLENE Buck MD   03/13/25 at 7:10 AM     Disclaimer: Documentation completed with the information available at the time of input. The times in the chart may not be reflective of actual patient care times, interventions, or procedures. Documentation occurs after the physical care of the patient.

## 2025-03-13 NOTE — CARE PLAN
Problem: Skin  Goal: Decreased wound size/increased tissue granulation at next dressing change  Outcome: Not Progressing  Goal: Participates in plan/prevention/treatment measures  Outcome: Not Progressing  Goal: Prevent/manage excess moisture  Outcome: Progressing  Goal: Prevent/minimize sheer/friction injuries  Outcome: Progressing  Flowsheets (Taken 3/13/2025 0240)  Prevent/minimize sheer/friction injuries:   Complete micro-shifts as needed if patient unable. Adjust patient position to relieve pressure points, not a full turn   HOB 30 degrees or less   Turn/reposition every 2 hours/use positioning/transfer devices  Goal: Promote/optimize nutrition  Outcome: Progressing  Goal: Promote skin healing  Outcome: Progressing   The patient's goals for the shift include      The clinical goals for the shift include pt will decrease in pressor requirements        Problem: Skin  Goal: Decreased wound size/increased tissue granulation at next dressing change  Outcome: Not Progressing  Goal: Participates in plan/prevention/treatment measures  Outcome: Not Progressing

## 2025-03-14 ENCOUNTER — APPOINTMENT (OUTPATIENT)
Dept: VASCULAR MEDICINE | Facility: HOSPITAL | Age: 40
End: 2025-03-14
Payer: COMMERCIAL

## 2025-03-14 LAB
ALBUMIN SERPL BCP-MCNC: 2.4 G/DL (ref 3.4–5)
ALBUMIN SERPL BCP-MCNC: 2.7 G/DL (ref 3.4–5)
ALBUMIN SERPL BCP-MCNC: 2.8 G/DL (ref 3.4–5)
ALP SERPL-CCNC: 245 U/L (ref 33–110)
ALT SERPL W P-5'-P-CCNC: 47 U/L (ref 7–45)
ANION GAP SERPL CALC-SCNC: 13 MMOL/L (ref 10–20)
ANION GAP SERPL CALC-SCNC: 15 MMOL/L (ref 10–20)
ANION GAP SERPL CALC-SCNC: 17 MMOL/L (ref 10–20)
APTT PPP: 57 SECONDS (ref 26–36)
AST SERPL W P-5'-P-CCNC: 180 U/L (ref 9–39)
BACTERIA SPEC RESP CULT: NO GROWTH
BASOPHILS # BLD AUTO: 0.06 X10*3/UL (ref 0–0.1)
BASOPHILS NFR BLD AUTO: 0.3 %
BILIRUB SERPL-MCNC: 18 MG/DL (ref 0–1.2)
BUN SERPL-MCNC: 53 MG/DL (ref 6–23)
BUN SERPL-MCNC: 54 MG/DL (ref 6–23)
BUN SERPL-MCNC: 55 MG/DL (ref 6–23)
CA-I BLD-SCNC: 0.98 MMOL/L (ref 1.1–1.33)
CA-I BLD-SCNC: 1.27 MMOL/L (ref 1.1–1.33)
CA-I BLD-SCNC: 1.29 MMOL/L (ref 1.1–1.33)
CALCIUM SERPL-MCNC: 13.9 MG/DL (ref 8.6–10.6)
CALCIUM SERPL-MCNC: 8.9 MG/DL (ref 8.6–10.6)
CALCIUM SERPL-MCNC: 9.6 MG/DL (ref 8.6–10.6)
CHLORIDE SERPL-SCNC: 97 MMOL/L (ref 98–107)
CHLORIDE SERPL-SCNC: 97 MMOL/L (ref 98–107)
CHLORIDE SERPL-SCNC: 98 MMOL/L (ref 98–107)
CO2 SERPL-SCNC: 22 MMOL/L (ref 21–32)
CO2 SERPL-SCNC: 23 MMOL/L (ref 21–32)
CO2 SERPL-SCNC: 24 MMOL/L (ref 21–32)
CREAT SERPL-MCNC: 1.52 MG/DL (ref 0.5–1.05)
CREAT SERPL-MCNC: 1.54 MG/DL (ref 0.5–1.05)
CREAT SERPL-MCNC: 1.86 MG/DL (ref 0.5–1.05)
EGFRCR SERPLBLD CKD-EPI 2021: 35 ML/MIN/1.73M*2
EGFRCR SERPLBLD CKD-EPI 2021: 44 ML/MIN/1.73M*2
EGFRCR SERPLBLD CKD-EPI 2021: 45 ML/MIN/1.73M*2
EOSINOPHIL # BLD AUTO: 0 X10*3/UL (ref 0–0.7)
EOSINOPHIL NFR BLD AUTO: 0 %
ERYTHROCYTE [DISTWIDTH] IN BLOOD BY AUTOMATED COUNT: 16.9 % (ref 11.5–14.5)
ERYTHROCYTE [DISTWIDTH] IN BLOOD BY AUTOMATED COUNT: 18.2 % (ref 11.5–14.5)
GLUCOSE BLD MANUAL STRIP-MCNC: 166 MG/DL (ref 74–99)
GLUCOSE BLD MANUAL STRIP-MCNC: 196 MG/DL (ref 74–99)
GLUCOSE BLD MANUAL STRIP-MCNC: 214 MG/DL (ref 74–99)
GLUCOSE BLD MANUAL STRIP-MCNC: 232 MG/DL (ref 74–99)
GLUCOSE BLD MANUAL STRIP-MCNC: 237 MG/DL (ref 74–99)
GLUCOSE SERPL-MCNC: 175 MG/DL (ref 74–99)
GLUCOSE SERPL-MCNC: 246 MG/DL (ref 74–99)
GLUCOSE SERPL-MCNC: 256 MG/DL (ref 74–99)
GRAM STN SPEC: NORMAL
GRAM STN SPEC: NORMAL
HCT VFR BLD AUTO: 27.1 % (ref 36–46)
HCT VFR BLD AUTO: 28.4 % (ref 36–46)
HGB BLD-MCNC: 10.2 G/DL (ref 12–16)
HGB BLD-MCNC: 9.8 G/DL (ref 12–16)
IMM GRANULOCYTES # BLD AUTO: 0.49 X10*3/UL (ref 0–0.7)
IMM GRANULOCYTES NFR BLD AUTO: 2.3 % (ref 0–0.9)
INR PPP: ABNORMAL
LYMPHOCYTES # BLD AUTO: 1.38 X10*3/UL (ref 1.2–4.8)
LYMPHOCYTES NFR BLD AUTO: 6.6 %
MAGNESIUM SERPL-MCNC: 2.69 MG/DL (ref 1.6–2.4)
MCH RBC QN AUTO: 32 PG (ref 26–34)
MCH RBC QN AUTO: 32.2 PG (ref 26–34)
MCHC RBC AUTO-ENTMCNC: 35.9 G/DL (ref 32–36)
MCHC RBC AUTO-ENTMCNC: 36.2 G/DL (ref 32–36)
MCV RBC AUTO: 89 FL (ref 80–100)
MCV RBC AUTO: 89 FL (ref 80–100)
MONOCYTES # BLD AUTO: 0.78 X10*3/UL (ref 0.1–1)
MONOCYTES NFR BLD AUTO: 3.7 %
NEUTROPHILS # BLD AUTO: 18.32 X10*3/UL (ref 1.2–7.7)
NEUTROPHILS NFR BLD AUTO: 87.1 %
NRBC BLD-RTO: 0.8 /100 WBCS (ref 0–0)
NRBC BLD-RTO: 0.8 /100 WBCS (ref 0–0)
PHOSPHATE SERPL-MCNC: 3.3 MG/DL (ref 2.5–4.9)
PHOSPHATE SERPL-MCNC: 3.3 MG/DL (ref 2.5–4.9)
PLATELET # BLD AUTO: 144 X10*3/UL (ref 150–450)
PLATELET # BLD AUTO: 237 X10*3/UL (ref 150–450)
POTASSIUM SERPL-SCNC: 4.2 MMOL/L (ref 3.5–5.3)
POTASSIUM SERPL-SCNC: 4.3 MMOL/L (ref 3.5–5.3)
POTASSIUM SERPL-SCNC: 6.5 MMOL/L (ref 3.5–5.3)
PROT SERPL-MCNC: 6.2 G/DL (ref 6.4–8.2)
PROTHROMBIN TIME: >100 SECONDS (ref 9.8–12.4)
RBC # BLD AUTO: 3.04 X10*6/UL (ref 4–5.2)
RBC # BLD AUTO: 3.19 X10*6/UL (ref 4–5.2)
SODIUM SERPL-SCNC: 129 MMOL/L (ref 136–145)
SODIUM SERPL-SCNC: 130 MMOL/L (ref 136–145)
SODIUM SERPL-SCNC: 132 MMOL/L (ref 136–145)
WBC # BLD AUTO: 19.1 X10*3/UL (ref 4.4–11.3)
WBC # BLD AUTO: 21 X10*3/UL (ref 4.4–11.3)

## 2025-03-14 PROCEDURE — 82330 ASSAY OF CALCIUM: CPT

## 2025-03-14 PROCEDURE — 80069 RENAL FUNCTION PANEL: CPT | Mod: CCI

## 2025-03-14 PROCEDURE — 71045 X-RAY EXAM CHEST 1 VIEW: CPT | Performed by: STUDENT IN AN ORGANIZED HEALTH CARE EDUCATION/TRAINING PROGRAM

## 2025-03-14 PROCEDURE — 2500000004 HC RX 250 GENERAL PHARMACY W/ HCPCS (ALT 636 FOR OP/ED): Mod: SE | Performed by: PHARMACIST

## 2025-03-14 PROCEDURE — 99291 CRITICAL CARE FIRST HOUR: CPT

## 2025-03-14 PROCEDURE — 2500000004 HC RX 250 GENERAL PHARMACY W/ HCPCS (ALT 636 FOR OP/ED): Mod: SE

## 2025-03-14 PROCEDURE — 85025 COMPLETE CBC W/AUTO DIFF WBC: CPT

## 2025-03-14 PROCEDURE — 2500000001 HC RX 250 WO HCPCS SELF ADMINISTERED DRUGS (ALT 637 FOR MEDICARE OP): Mod: SE

## 2025-03-14 PROCEDURE — 90937 HEMODIALYSIS REPEATED EVAL: CPT

## 2025-03-14 PROCEDURE — 2500000005 HC RX 250 GENERAL PHARMACY W/O HCPCS: Mod: SE

## 2025-03-14 PROCEDURE — 83735 ASSAY OF MAGNESIUM: CPT

## 2025-03-14 PROCEDURE — 85027 COMPLETE CBC AUTOMATED: CPT

## 2025-03-14 PROCEDURE — 93971 EXTREMITY STUDY: CPT

## 2025-03-14 PROCEDURE — 82947 ASSAY GLUCOSE BLOOD QUANT: CPT

## 2025-03-14 PROCEDURE — 80053 COMPREHEN METABOLIC PANEL: CPT

## 2025-03-14 PROCEDURE — 90945 DIALYSIS ONE EVALUATION: CPT | Performed by: INTERNAL MEDICINE

## 2025-03-14 PROCEDURE — 36415 COLL VENOUS BLD VENIPUNCTURE: CPT

## 2025-03-14 PROCEDURE — 93971 EXTREMITY STUDY: CPT | Performed by: INTERNAL MEDICINE

## 2025-03-14 PROCEDURE — 94003 VENT MGMT INPAT SUBQ DAY: CPT

## 2025-03-14 PROCEDURE — 2500000002 HC RX 250 W HCPCS SELF ADMINISTERED DRUGS (ALT 637 FOR MEDICARE OP, ALT 636 FOR OP/ED): Mod: SE

## 2025-03-14 PROCEDURE — 2020000001 HC ICU ROOM DAILY

## 2025-03-14 PROCEDURE — 85730 THROMBOPLASTIN TIME PARTIAL: CPT

## 2025-03-14 RX ORDER — CALCIUM GLUCONATE 20 MG/ML
2 INJECTION, SOLUTION INTRAVENOUS ONCE
Status: COMPLETED | OUTPATIENT
Start: 2025-03-14 | End: 2025-03-14

## 2025-03-14 RX ADMIN — Medication 5 ML: at 08:23

## 2025-03-14 RX ADMIN — LACTULOSE 20 G: 20 SOLUTION ORAL at 20:47

## 2025-03-14 RX ADMIN — Medication 100 MCG/HR: at 04:17

## 2025-03-14 RX ADMIN — CARBOXYMETHYLCELLULOSE SODIUM 2 DROP: 5 SOLUTION/ DROPS OPHTHALMIC at 08:23

## 2025-03-14 RX ADMIN — HYDROCORTISONE SODIUM SUCCINATE 50 MG: 100 INJECTION, POWDER, FOR SOLUTION INTRAMUSCULAR; INTRAVENOUS at 08:23

## 2025-03-14 RX ADMIN — CALCIUM CHLORIDE, MAGNESIUM CHLORIDE, DEXTROSE MONOHYDRATE, LACTIC ACID, SODIUM CHLORIDE, SODIUM BICARBONATE AND POTASSIUM CHLORIDE 25 ML/KG/HR: 5.15; 2.03; 22; 5.4; 6.46; 3.09; .157 INJECTION INTRAVENOUS at 07:14

## 2025-03-14 RX ADMIN — Medication 50 PERCENT: at 07:29

## 2025-03-14 RX ADMIN — Medication 50 MCG/HR: at 18:35

## 2025-03-14 RX ADMIN — CALCIUM CHLORIDE, MAGNESIUM CHLORIDE, DEXTROSE MONOHYDRATE, LACTIC ACID, SODIUM CHLORIDE, SODIUM BICARBONATE AND POTASSIUM CHLORIDE 25 ML/KG/HR: 5.15; 2.03; 22; 5.4; 6.46; 3.09; .157 INJECTION INTRAVENOUS at 02:03

## 2025-03-14 RX ADMIN — INSULIN LISPRO 4 UNITS: 100 INJECTION, SOLUTION INTRAVENOUS; SUBCUTANEOUS at 05:55

## 2025-03-14 RX ADMIN — HYDROCORTISONE SODIUM SUCCINATE 50 MG: 100 INJECTION, POWDER, FOR SOLUTION INTRAMUSCULAR; INTRAVENOUS at 15:19

## 2025-03-14 RX ADMIN — CALCIUM CHLORIDE, MAGNESIUM CHLORIDE, DEXTROSE MONOHYDRATE, LACTIC ACID, SODIUM CHLORIDE, SODIUM BICARBONATE AND POTASSIUM CHLORIDE 25 ML/KG/HR: 5.15; 2.03; 22; 5.4; 6.46; 3.09; .157 INJECTION INTRAVENOUS at 22:37

## 2025-03-14 RX ADMIN — THIAMINE HCL TAB 100 MG 100 MG: 100 TAB at 08:22

## 2025-03-14 RX ADMIN — INSULIN LISPRO 2 UNITS: 100 INJECTION, SOLUTION INTRAVENOUS; SUBCUTANEOUS at 18:13

## 2025-03-14 RX ADMIN — MEROPENEM 2 G: 1 INJECTION INTRAVENOUS at 21:54

## 2025-03-14 RX ADMIN — PROPOFOL 15 MCG/KG/MIN: 10 INJECTION, EMULSION INTRAVENOUS at 18:35

## 2025-03-14 RX ADMIN — CALCIUM CHLORIDE, MAGNESIUM CHLORIDE, DEXTROSE MONOHYDRATE, LACTIC ACID, SODIUM CHLORIDE, SODIUM BICARBONATE AND POTASSIUM CHLORIDE 25 ML/KG/HR: 5.15; 2.03; 22; 5.4; 6.46; 3.09; .157 INJECTION INTRAVENOUS at 22:39

## 2025-03-14 RX ADMIN — PHYTONADIONE 10 MG: 10 INJECTION, EMULSION INTRAMUSCULAR; INTRAVENOUS; SUBCUTANEOUS at 11:11

## 2025-03-14 RX ADMIN — HYDROCORTISONE SODIUM SUCCINATE 50 MG: 100 INJECTION, POWDER, FOR SOLUTION INTRAMUSCULAR; INTRAVENOUS at 02:55

## 2025-03-14 RX ADMIN — LACTULOSE 20 G: 20 SOLUTION ORAL at 08:25

## 2025-03-14 RX ADMIN — NOREPINEPHRINE BITARTRATE 0.07 MCG/KG/MIN: 8 INJECTION, SOLUTION INTRAVENOUS at 21:54

## 2025-03-14 RX ADMIN — INSULIN LISPRO 2 UNITS: 100 INJECTION, SOLUTION INTRAVENOUS; SUBCUTANEOUS at 23:24

## 2025-03-14 RX ADMIN — PROPOFOL 20 MCG/KG/MIN: 10 INJECTION, EMULSION INTRAVENOUS at 07:14

## 2025-03-14 RX ADMIN — CALCIUM GLUCONATE 2 G: 20 INJECTION, SOLUTION INTRAVENOUS at 09:50

## 2025-03-14 RX ADMIN — Medication 60 PERCENT: at 19:55

## 2025-03-14 RX ADMIN — CALCIUM CHLORIDE, MAGNESIUM CHLORIDE, DEXTROSE MONOHYDRATE, LACTIC ACID, SODIUM CHLORIDE, SODIUM BICARBONATE AND POTASSIUM CHLORIDE 25 ML/KG/HR: 5.15; 2.03; 22; 5.4; 6.46; 3.09; .157 INJECTION INTRAVENOUS at 22:43

## 2025-03-14 RX ADMIN — MEROPENEM 2 G: 1 INJECTION INTRAVENOUS at 09:31

## 2025-03-14 RX ADMIN — POLYETHYLENE GLYCOL 3350 17 G: 17 POWDER, FOR SOLUTION ORAL at 08:22

## 2025-03-14 RX ADMIN — PANTOPRAZOLE SODIUM 40 MG: 40 INJECTION, POWDER, LYOPHILIZED, FOR SOLUTION INTRAVENOUS at 05:50

## 2025-03-14 RX ADMIN — INSULIN LISPRO 4 UNITS: 100 INJECTION, SOLUTION INTRAVENOUS; SUBCUTANEOUS at 11:18

## 2025-03-14 RX ADMIN — SENNOSIDES AND DOCUSATE SODIUM 2 TABLET: 50; 8.6 TABLET ORAL at 08:22

## 2025-03-14 RX ADMIN — VANCOMYCIN HYDROCHLORIDE 500 MG: 500 INJECTION, SOLUTION INTRAVENOUS at 08:01

## 2025-03-14 RX ADMIN — HYDROCORTISONE SODIUM SUCCINATE 50 MG: 100 INJECTION, POWDER, FOR SOLUTION INTRAMUSCULAR; INTRAVENOUS at 20:47

## 2025-03-14 ASSESSMENT — PAIN - FUNCTIONAL ASSESSMENT
PAIN_FUNCTIONAL_ASSESSMENT: CPOT (CRITICAL CARE PAIN OBSERVATION TOOL)

## 2025-03-14 NOTE — CARE PLAN
Problem: Pain - Adult  Goal: Verbalizes/displays adequate comfort level or baseline comfort level  Outcome: Progressing     Problem: Safety - Adult  Goal: Free from fall injury  Outcome: Progressing     Problem: Discharge Planning  Goal: Discharge to home or other facility with appropriate resources  Outcome: Progressing     Problem: Chronic Conditions and Co-morbidities  Goal: Patient's chronic conditions and co-morbidity symptoms are monitored and maintained or improved  Outcome: Progressing     Problem: Nutrition  Goal: Nutrient intake appropriate for maintaining nutritional needs  Outcome: Progressing     Problem: Knowledge Deficit  Goal: Patient/family/caregiver demonstrates understanding of disease process, treatment plan, medications, and discharge instructions  Outcome: Progressing     Problem: Mechanical Ventilation  Goal: Patient Will Maintain Patent Airway  Outcome: Progressing  Goal: Oral health is maintained or improved  Outcome: Progressing  Goal: ET tube will be managed safely  Outcome: Progressing  Goal: Ability to express needs and understand communication  Outcome: Progressing  Goal: Mobility/activity is maintained at optimum level for patient  Outcome: Progressing     Problem: Skin  Goal: Decreased wound size/increased tissue granulation at next dressing change  Outcome: Progressing  Flowsheets (Taken 3/13/2025 1634 by Naren Parsons, RN)  Decreased wound size/increased tissue granulation at next dressing change:   Promote sleep for wound healing   Protective dressings over bony prominences  Goal: Participates in plan/prevention/treatment measures  Outcome: Progressing  Flowsheets (Taken 3/13/2025 1634 by Naren Parsons, RN)  Participates in plan/prevention/treatment measures: Elevate heels  Goal: Prevent/manage excess moisture  Outcome: Progressing  Flowsheets (Taken 3/13/2025 1634 by Naren Parsons, RN)  Prevent/manage excess moisture:   Cleanse incontinence/protect with barrier cream    Monitor for/manage infection if present   Follow provider orders for dressing changes   Moisturize dry skin  Goal: Prevent/minimize sheer/friction injuries  Outcome: Progressing  Flowsheets (Taken 3/13/2025 1634 by Naren Parsons, RN)  Prevent/minimize sheer/friction injuries:   Complete micro-shifts as needed if patient unable. Adjust patient position to relieve pressure points, not a full turn   Increase activity/out of bed for meals   Use pull sheet   HOB 30 degrees or less   Turn/reposition every 2 hours/use positioning/transfer devices  Goal: Promote/optimize nutrition  Outcome: Progressing  Flowsheets (Taken 3/13/2025 1634 by Naren Parsons, RN)  Promote/optimize nutrition:   Assist with feeding   Monitor/record intake including meals  Goal: Promote skin healing  Outcome: Progressing  Flowsheets (Taken 3/13/2025 1634 by Naren Parsons, RN)  Promote skin healing:   Assess skin/pad under line(s)/device(s)   Protective dressings over bony prominences   Turn/reposition every 2 hours/use positioning/transfer devices   Ensure correct size (line/device) and apply per  instructions   Rotate device position/do not position patient on device

## 2025-03-14 NOTE — CARE PLAN
Problem: Pain - Adult  Goal: Verbalizes/displays adequate comfort level or baseline comfort level  Outcome: Progressing     Problem: Safety - Adult  Goal: Free from fall injury  Outcome: Progressing     Problem: Discharge Planning  Goal: Discharge to home or other facility with appropriate resources  Outcome: Progressing     Problem: Chronic Conditions and Co-morbidities  Goal: Patient's chronic conditions and co-morbidity symptoms are monitored and maintained or improved  Outcome: Progressing     Problem: Nutrition  Goal: Nutrient intake appropriate for maintaining nutritional needs  Outcome: Progressing     Problem: Knowledge Deficit  Goal: Patient/family/caregiver demonstrates understanding of disease process, treatment plan, medications, and discharge instructions  Outcome: Progressing     Problem: Mechanical Ventilation  Goal: Patient Will Maintain Patent Airway  Outcome: Progressing  Goal: Oral health is maintained or improved  Outcome: Progressing  Goal: ET tube will be managed safely  Outcome: Progressing  Goal: Ability to express needs and understand communication  Outcome: Progressing  Goal: Mobility/activity is maintained at optimum level for patient  Outcome: Progressing     Problem: Skin  Goal: Decreased wound size/increased tissue granulation at next dressing change  Outcome: Progressing  Goal: Participates in plan/prevention/treatment measures  Outcome: Progressing  Goal: Prevent/manage excess moisture  Outcome: Progressing  Goal: Prevent/minimize sheer/friction injuries  Outcome: Progressing  Goal: Promote/optimize nutrition  Outcome: Progressing  Goal: Promote skin healing  Outcome: Progressing

## 2025-03-14 NOTE — PROCEDURES
CRRT procedure note   Seen and assessed on CVVH. Labs and events reviewed   on vent - non-responsive ;   Vascular access: right IJ trialysis catheter. Net -1.3 L over prior 24 hours  BFR :200 ml/min  Filter: M150  Total Replacement Rate: 2000 ml/hour  Pre blood pump : 1000 ml/hour    Replacement solution Prismasol potassium:  2 mEq/L, 3.5 Ca  Patient fluid removal: per ICU team   Please check Phosphorus, Calcium and Magnesium daily and replace peripherally as needed  Continue with current CVVH prescription.        3/14/2025    10:00 AM 3/14/2025    11:00 AM 3/14/2025    12:00 PM 3/14/2025     1:00 PM 3/14/2025     2:00 PM 3/14/2025     3:00 PM 3/14/2025     4:00 PM   Vitals   Systolic 124 141 124 114 113 103 100   Diastolic 64 74 63 60 61 52 55   Heart Rate 79 83 84 86 88 88 89   Temp 35.8 °C (96.4 °F) 35.9 °C (96.6 °F) 36.2 °C (97.2 °F) 36.4 °C (97.5 °F) 36.4 °C (97.5 °F) 36.4 °C (97.5 °F) 36.4 °C (97.5 °F)   Resp 26 28 26 27 26 26 26     I/O last 3 completed shifts:  In: 2950.3 (45.4 mL/kg) [I.V.:1240.3 (19.1 mL/kg); NG/GT:1230; IV Piggyback:480]  Out: 842 (13 mL/kg) [Other:292; Stool:550]  Dosing Weight: 65 kg   I/O this shift:  In: 927.1 [I.V.:146.1; NG/GT:250; IV Piggyback:531]  Out: 2325 [Other:1255; Stool:1070]   Most recent labs:   Results from last 7 days   Lab Units 03/14/25  0506 03/13/25  1803 03/13/25  0509   WBC AUTO x10*3/uL 21.0* 23.2* 27.3*   HEMOGLOBIN g/dL 9.8* 10.2* 10.5*   HEMATOCRIT % 27.1* 29.5* 29.1*   PLATELETS AUTO x10*3/uL 237 140* 116*     Results from last 7 days   Lab Units 03/14/25  0950 03/14/25  0753 03/13/25  1803 03/13/25  0509 03/12/25  1748 03/12/25  0210   SODIUM mmol/L 130* 129* 131* 130* 128* 131*   CO2 mmol/L 23 24 23 22 21 24   ANION GAP mmol/L 13 15 15 16 16 15   BUN mg/dL 53* 55* 59* 59* 54* 49*   CREATININE mg/dL 1.54* 1.86* 1.68* 1.64* 1.84* 1.72*   GLUCOSE mg/dL 256* 246* 202* 226* 174* 151*   CALCIUM mg/dL 13.9* 8.9 8.7 8.4* 8.5* 7.8*   MAGNESIUM mg/dL  --  2.69*  --   2.52*  --  3.03*   PHOSPHORUS mg/dL 3.3  --  3.4  --  3.7  --       Results from last 7 days   Lab Units 03/14/25  0753 03/13/25  0509 03/12/25  0210   ALT U/L 47* 64* 66*   AST U/L 180* 218* 171*   ALK PHOS U/L 245* 247* 241*      Results from last 7 days   Lab Units 03/12/25  0210 03/11/25  0808 03/11/25  0339   INR  5.9* 3.6* 5.0*     Results from last 7 days   Lab Units 03/12/25  2304 03/12/25  1748 03/12/25  1348 03/12/25  0210 03/11/25  1036 03/11/25  0804 03/10/25  2227   POCT PH, ARTERIAL pH  --   --   --   --  7.26* 7.31* 7.31*   POCT PO2, ARTERIAL mm Hg  --   --   --   --  83* 91 125*   POCT PCO2, ARTERIAL mm Hg  --   --   --   --  50* 45* 45*   FIO2 % 60 80 80   < > 55 55 70    < > = values in this interval not displayed.     Results from last 7 days   Lab Units 03/12/25  2304 03/12/25  1748 03/12/25  1348   POCT PH, VENOUS pH 7.32* 7.32* 7.27*   POCT PCO2, VENOUS mm Hg 43 44 51     Results from last 7 days   Lab Units 03/10/25  0516   LACTATE mmol/L 3.0*       ergocalciferol, 1,250 mcg, oral, Weekly  pantoprazole, 40 mg, oral, Daily before breakfast   Or  esomeprazole, 40 mg, nasoduodenal tube, Daily before breakfast   Or  pantoprazole, 40 mg, intravenous, Daily before breakfast  hydrocortisone sodium succinate, 50 mg, intravenous, q6h  insulin lispro, 0-10 Units, subcutaneous, q6h  lactulose, 20 g, oral, q6h  artificial tears, 2 drop, Both Eyes, q24h  meropenem, 2 g, intravenous, q12h  oxygen, , inhalation, Continuous - Inhalation  polyethylene glycol, 17 g, oral, Daily  sennosides-docusate sodium, 2 tablet, oral, Daily  thiamine, 100 mg, oral, Daily  vancomycin, 500 mg, intravenous, q24h  vitamin B complex-vitamin C-folic acid, 5 mL, oral, Daily

## 2025-03-14 NOTE — SIGNIFICANT EVENT
"Discussed Gissel's case with family at the bedside and explained that she has had sedation held since this morning and has had poor mental status, not responding significantly to pain or breathing over the vent. We discussed that this would be an additional barrier to liberation from mechanical ventilation, ARDS and pneumonia notwithstanding. We also discussed that Gissle may be approaching limitations of support via endotracheal tube as she is on day 11 with it. We discussed Given patient is day 11 with endotracheal tube we again discussed that Gissel is approaching the limit where the risk/benefits of continued intubation must be considered. We discussed that tracheostomy would not reverse the underlying disease processes that are limiting her ability to be liberated which the family expresses understanding of. I also explained that the procedure would pose risk of harm to the patient with little benefit to her overall prognosis. The family is frustrated by this as a result of previous discussion had where tracheostomy was described as the \"next step\". They feel the Gissle would have wanted to fight and are unhappy as they feel withdrawing care would be \"giving up\" and \"killing the patient\". They wish to confirm the patient's mental status/brain function with an MRI and wish to pursue tracheostomy. I expressed understanding and explained we would continue to have discussions as we give time for Gissel to respond to sedation holiday.     Chang Nolasco  PGY-2, Internal Medicine   "

## 2025-03-14 NOTE — PROGRESS NOTES
"Medical Intensive Care - Daily Progress Note   Subjective    Gissel Harvey is a 39 y.o. year old female patient admitted on 3/3/2025 with following ICU needs: Hypoxemia and AHRF requiring intubation due to ARDS from CAP. And CRRT     Interval History:  NAEON    More synchronous with vent, interval improvement in CXR    Meds    Scheduled medications  ergocalciferol, 1,250 mcg, oral, Weekly  pantoprazole, 40 mg, oral, Daily before breakfast   Or  esomeprazole, 40 mg, nasoduodenal tube, Daily before breakfast   Or  pantoprazole, 40 mg, intravenous, Daily before breakfast  hydrocortisone sodium succinate, 50 mg, intravenous, q6h  insulin lispro, 0-10 Units, subcutaneous, q6h  lactulose, 20 g, oral, q6h  artificial tears, 2 drop, Both Eyes, q24h  meropenem, 2 g, intravenous, q12h  oxygen, , inhalation, Continuous - Inhalation  polyethylene glycol, 17 g, oral, Daily  sennosides-docusate sodium, 2 tablet, oral, Daily  thiamine, 100 mg, oral, Daily  vancomycin, 500 mg, intravenous, q24h  vitamin B complex-vitamin C-folic acid, 5 mL, oral, Daily      Continuous medications  bivalirudin, 0.05-0.49 mg/kg/hr, Last Rate: 0.01 mg/kg/hr (03/14/25 0700)  fentaNYL, 0-300 mcg/hr, Last Rate: 100 mcg/hr (03/14/25 0700)  norepinephrine, 0.01-1 mcg/kg/min (Dosing Weight), Last Rate: 0.09 mcg/kg/min (03/14/25 0700)  PrismaSol BGK 2/3.5, 25 mL/kg/hr (Dosing Weight), Last Rate: 25 mL/kg/hr (03/14/25 0714)  propofol, 0-50 mcg/kg/min (Dosing Weight), Last Rate: 20 mcg/kg/min (03/14/25 0714)  vasopressin, 0.03 Units/min, Last Rate: Stopped (03/12/25 0915)      PRN medications  PRN medications: acetaminophen **OR** acetaminophen, alteplase, dextrose, dextrose, fentaNYL, glucagon, glucagon, oxygen, vancomycin     Objective    Blood pressure 129/67, pulse 80, temperature 35.8 °C (96.4 °F), resp. rate 26, height 1.676 m (5' 6\"), weight 67.1 kg (147 lb 14.9 oz), SpO2 100%.     Physical Exam   Constitutional:       Comments: Intubated and " Sedated  Not opening eyes to voice   HENT:      Head: Normocephalic and atraumatic.   Cardiovascular:      Rate and Rhythm: Normal rate and regular rhythm.      Pulses: Normal pulses.   Pulmonary:      Breath sounds: No wheezing.      Comments: Mechanically Ventilated, Coarse breath sounds  Abdominal:      General: Abdomen similar to yesterday, soft with significantly enlarged liver     Palpations: Abdomen is soft.      Comments: Rectal tube draining brown stool, no blood   Musculoskeletal:      Right lower leg: Edema present.      Left lower leg: Edema present.   Edema in the upper extremities bilaterally     Comments: 2+ pitting edema bilaterally to knees   Skin:     General: Skin is warm and dry.      Comments: Warm to touch   Neurological:      Comments: Sedated, not awakening to voice     Intake/Output Summary (Last 24 hours) at 3/14/2025 0834  Last data filed at 3/14/2025 0700  Gross per 24 hour   Intake 1701.26 ml   Output 597 ml   Net 1104.26 ml     Labs:   Results from last 72 hours   Lab Units 03/13/25  1803 03/13/25  0509 03/12/25  1748 03/12/25  0210 03/11/25  1727   SODIUM mmol/L 131* 130* 128*   < > 130*   POTASSIUM mmol/L 5.2 5.0 5.3   < > 5.3   CHLORIDE mmol/L 98 97* 96*   < > 96*   CO2 mmol/L 23 22 21   < > 23   BUN mg/dL 59* 59* 54*   < > 48*   CREATININE mg/dL 1.68* 1.64* 1.84*   < > 1.72*   GLUCOSE mg/dL 202* 226* 174*   < > 213*   CALCIUM mg/dL 8.7 8.4* 8.5*   < > 8.1*   ANION GAP mmol/L 15 16 16   < > 16   EGFR mL/min/1.73m*2 40* 41* 35*   < > 38*   PHOSPHORUS mg/dL 3.4  --  3.7  --  4.6    < > = values in this interval not displayed.      Results from last 72 hours   Lab Units 03/14/25  0506 03/13/25  1803 03/13/25  0509 03/12/25  1759 03/12/25  0210   WBC AUTO x10*3/uL 21.0* 23.2* 27.3*   < > 30.8*   HEMOGLOBIN g/dL 9.8* 10.2* 10.5*   < > 11.3*   HEMATOCRIT % 27.1* 29.5* 29.1*   < > 33.2*   PLATELETS AUTO x10*3/uL 237 140* 116*   < > 105*   NEUTROS PCT AUTO % 87.1  --  87.4  --  86.9   LYMPHS  PCT AUTO % 6.6  --  5.6  --  4.9   MONOS PCT AUTO % 3.7  --  3.1  --  3.7   EOS PCT AUTO % 0.0  --  0.0  --  0.0    < > = values in this interval not displayed.      Results from last 72 hours   Lab Units 03/11/25  1036   POCT PH, ARTERIAL pH 7.26*   POCT PCO2, ARTERIAL mm Hg 50*   POCT PO2, ARTERIAL mm Hg 83*   POCT SO2, ARTERIAL % 97     Results from last 72 hours   Lab Units 03/12/25  2304 03/12/25  1748 03/12/25  1348   POCT PH, VENOUS pH 7.32* 7.32* 7.27*   POCT PCO2, VENOUS mm Hg 43 44 51   POCT PO2, VENOUS mm Hg 60* 53* 46*        Micro/ID:     Lab Results   Component Value Date    BLOODCULT No growth at 1 day 03/12/2025           Assessment and Plan     Assessment: Gissel Harvey is a 39 y.o. 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 She is being treated for ARDS 2/2 CAP (MSSA and s. pneumo) and severe septic shock complicated by acute anuric renal failure, now on CVVH. She is still requiring significant respiratory support w/ increased FiO2 requirements and PEEP, on day 11 of mechanical ventilation. She also has worsening INR/Tbili/ALT/Alk phos concerning for intrahepatic insult/injury. She was made DNR by her daughter 3/12.    Mechanical Ventilation: 4-10 days  Sedation/Analgesia:  Fentanyl and Ketamine  Restraints: no    Summary for 03/14/25  :  LUE w/ swelling, c/f infiltrated IV vs thrombosis. Obtaining LUE US, imaging was not obtained yesterday   Sedation holiday today - had some facial grimace and jaw clench to noxious stimulus - and   Will run dialysis net negative for edema     Plan:  NEUROLOGY/PSYCH:  #Sedation   - RASS goal -4 to -5 (to allow synchronicity with vent)  - c/w  fentanyl   - switch from ketamine to propofol due to concern for ketamine induced liver injury  - sedation holidays when able - on 3/14 had some facial grimace and jaw clench to noxious stimulus     CARDIOVASCULAR:    #HFrEF    :: TTE 3/4 with EF 30-35% with reduced RV function, no previous TTE for comparison  :: BNP 2127 at admission  :: Troponin plateaued (62>63) on 3/4/25    #Shock, likely septic I/s/o severe pneumonia  :: lactate > 9 on presentation,  stable at 3  :: on initial presentation 3/3-3/7, she required 4 pressors (levo, vaso, epi, angiotensin)  :: on 3/8-3/11, able to come off pressors, maintained strong MAP  - 3/12 acute worsening of shock, (2/2 worsening of sepsis, worsened when sedation was weaned and she became dyssynchronous with vent demonstrating air trapping and significant tachypnea), had significant hypotension once again requiring vasopressor support        - continue levo, weaning as able        - stress dose steroids: hydrocortisone 50 q6        - c/w vanc + rebeca for broad spectrum coverage iso septic shock        - CVVH with fluid removal to trial for any improvement and due to edema   - sedation holiday decreased Levo need to near half      #Aflutter  #Sustained VT   :: went into NSVT while wire threaded for central line placement  :: s/p amiodarone 150 mg bolus x2  :: pt cardioverted  - Pt is back to sinus rhythm, okay for amio gtt if arrhythmias return    PULMONARY:  Vent Mode: Volume control/assist control  FiO2 (%):  [50 %-60 %] 50 %  S RR:  [26] 26  S VT:  [450 mL] 450 mL  PEEP/CPAP (cm H2O):  [10 cm H20] 10 cm H20  MAP (cm H2O):  [12-16] 12   #Community acquired pneumonia   #ARDS   :: intubated 3/3   :: P:F ~ 70 on admission   :: Procalcitonin 57.3  :: flu A/B, COVID, parainfluenza, RSV, metapneumovirus, legionella urine Ag: negative  :: see ID section for full abx history this admission  :: Tracheal aspirate cx 3/3: pan sensitive staph aureus and strep pneumo  :: Strep pneumo  urine ag: positive  :: Bcx 3/3: NGTD x3 days  :: re-cultured 3/12: sputum negative, blood culture pending   :: acute worsening of hypotension and hypoxia 3/12, FiO2% increased  Plan  - increase sedation to improve synchronicity with vent    - Target RASS -4/-5   - sedation with fentanyl and propofol  - broadened abx back to vanc/rebeca    RENAL/GENITOURINARY:  #Acute renal failure, anuric SCOUT requiring CVVH  :: etiology: septic shock and multiorgan failure  :: Cr 1.9 on admission, unclear baseline as no labs in 2 years   :: FeNa 3/3: 0.1% prerenal  :: Persistently anuric   - Continue CVVH per Nephrology- no fluid removal 3/12 due to hypotension  - While on CVVH: BID RFP, ionized calcium and CBC  - Warm CVVH fluid to assist with hypothermia    GASTROENTEROLOGY:  #liver cirrhosis   #portal hypertension s/p TIPS   #hx of Budd-Chiari   :: 3/7 POCUS with distended bowel, no appreciable ascites  - Continue to monitor abdomen for signs of ascites formation  - Hep C Abs positive, PCR pending  - 3/11 labs w/ acute elevation in INR (5), AST (147), ALT (57), Alk Phos (218)  - Liver US w/ thrombosed TIPS, IR consulted, likely chronic as this has been seen on prior CT and patient non-adherent with anticoagulation  -3/13 INR, PT, Bili, ALT, Alk phos all increasing. 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 C/f DILI or intrahepatic cholestasis / hepatic congestion, or shock liver   PLAN   - DC ketamine and use propofol for sedation given c/f DILI from ketamine   - CTM- she is not stable enough for further hepatology workup or procedure  - Stool output decreased, mildly increased abdominal distension -> resuming lactulose     ENDOCRINOLOGY:   #High dose steroids   #Supplemental nutrition    - SSI     - Tube feeds via NG    HEMATOLOGY:  #Lymphopenia -> leukocytosis   :: Abs leukocyte count 260  :: HIV neg  - Now with leukocytosis likely 2/2 known infection and steroid use  - CTM WBC count     #Hx of  antiphospholipid syndrome   #Hx of positive PF4  #Chronic IVC occlusion   #Hx of DVT   :: Supposed to be on fondaparinux outpatient, unclear if taking as no recent med fills   - Continue bivalirudin while inpatient I/s/o previous positive PF4      #Thrombocytopenia, stable  #Elevated PT and INR  - Trend plts, INR, coags  - Fibrinogen wnl  - If platelets <50, transfuse but do not stop bivalirubin  - INR elevated 5 -> 3.8 on 3/11, 5.9 on 3/12, iso known cirrhosis, plt 80  - FU vit K  - PT and INR increase can be secondary to cirrhosis, but with her APS c/w bival    SKIN:  #Skin Failure  - BL buttock wounds, ear wound  - wound care following, appreciate recs  - c/w vaseline for lips/mouth while intubated    MUSCULOSKELETAL:  GIFTY    INFECTIOUS DISEASE:  #MSSA PNA  #Strep PNA  #ARDS  #Septic shock  Antibiotics  Azithromycin 500 mg: 3/3  Zosyn 4.5 g: 3/3  Tobramycin 440 mg: 3/4  Vancomycin: 3/3 - 3/6, 3/8 - 3/10  Ceftaroline 3/5 - 3/6  Meropenem 2 g BID: 3/3 - 3/8  Micafungin: 3/4 - 3/6  Doxycycline 100 mg BID: 3/4 - 3/6  Ceftriaxone 3/8 - 3/10  Cefazolin 3/10-3/12  *Vanc/Day 3/12-    ICU Check List       ICU Liberation: Intervention:   Assess, Prevent, Manage Pain   Fentanyl    Both SAT and SBT [] SAT  [] SBT 30-60 min [] Extubate to NC  [] Extubate to NIV  [] Discuss Trach   Choice of analgesia and sedation Gao Agitation Sedation Scale (RASS): Deep sedation  Target Arousal Level (RASS): RASS -4 to -5 Fentanyl and Propofol     Delirium: Assess, prevent and manage  CAM ICU Positive    Early Mobility and Exercise RASS less than -2 [] PT /OT consult   Family Engagement and Empowerment []Family updated []SW consult     FEN  Fluids: CVVH  Electrolytes: CVVH  Nutrition: Trickle feeds   Prophylaxis:  DVT ppx: Bival gtt  GI ppx: PPI  Bowel care: lactulose (rectal tube in place)  Hardware:         ETT  7.5 mm (Active)   Placement Date: 03/03/25   ETT Type: ETT - single  Single Lumen Tube Size: 7.5 mm  Cuffed: Yes  Location:  Oral   Number of days: 10       NG/OG/Feeding Tube NG - Lamb sump Right nostril (Active)   Placement Date/Time: 03/03/25 1900   Type of Tube: Feeding Tube  Tube Length: 53 cm  Tube Type: NG - Lamb sump  Tube Location: Right nostril   Number of days: 9       Fecal Management Rectal tube with balloon (Active)   Placement Date/Time: 03/09/25 0018   Placed by: ashlie  Hand Hygiene Completed: Yes  Fecal Management Tube Type: Rectal tube with balloon   Number of days: 4       Hemodialysis Cath 03/06/25 Right (Active)   Placement Date/Time: 03/06/25 1700   Orientation: Right   Number of days: 6       Social:  Code: DNR    HPOA:  Payal Durant (daughter) [phone]  Disposition: MARLENE Moraes MD   03/14/25 at 8:34 AM     Disclaimer: Documentation completed with the information available at the time of input. The times in the chart may not be reflective of actual patient care times, interventions, or procedures. Documentation occurs after the physical care of the patient.

## 2025-03-15 LAB
ALBUMIN SERPL BCP-MCNC: 2.7 G/DL (ref 3.4–5)
ALBUMIN SERPL BCP-MCNC: 2.7 G/DL (ref 3.4–5)
ALP SERPL-CCNC: 257 U/L (ref 33–110)
ALT SERPL W P-5'-P-CCNC: 53 U/L (ref 7–45)
ANION GAP SERPL CALC-SCNC: 16 MMOL/L (ref 10–20)
ANION GAP SERPL CALC-SCNC: 16 MMOL/L (ref 10–20)
APTT PPP: 34 SECONDS (ref 26–36)
APTT PPP: 35 SECONDS (ref 26–36)
APTT PPP: 35 SECONDS (ref 26–36)
APTT PPP: 39 SECONDS (ref 26–36)
APTT PPP: 40 SECONDS (ref 26–36)
APTT PPP: 46 SECONDS (ref 26–36)
APTT PPP: 49 SECONDS (ref 26–36)
AST SERPL W P-5'-P-CCNC: 135 U/L (ref 9–39)
BASOPHILS # BLD AUTO: 0.08 X10*3/UL (ref 0–0.1)
BASOPHILS NFR BLD AUTO: 0.5 %
BILIRUB SERPL-MCNC: 20.7 MG/DL (ref 0–1.2)
BUN SERPL-MCNC: 51 MG/DL (ref 6–23)
BUN SERPL-MCNC: 54 MG/DL (ref 6–23)
CA-I BLD-SCNC: 1.26 MMOL/L (ref 1.1–1.33)
CA-I BLD-SCNC: 1.32 MMOL/L (ref 1.1–1.33)
CALCIUM SERPL-MCNC: 9.5 MG/DL (ref 8.6–10.6)
CALCIUM SERPL-MCNC: 9.7 MG/DL (ref 8.6–10.6)
CHLORIDE SERPL-SCNC: 97 MMOL/L (ref 98–107)
CHLORIDE SERPL-SCNC: 97 MMOL/L (ref 98–107)
CO2 SERPL-SCNC: 22 MMOL/L (ref 21–32)
CO2 SERPL-SCNC: 23 MMOL/L (ref 21–32)
CREAT SERPL-MCNC: 1.49 MG/DL (ref 0.5–1.05)
CREAT SERPL-MCNC: 1.71 MG/DL (ref 0.5–1.05)
CRYPTOC AG SPEC QL LA: NEGATIVE
EGFRCR SERPLBLD CKD-EPI 2021: 39 ML/MIN/1.73M*2
EGFRCR SERPLBLD CKD-EPI 2021: 46 ML/MIN/1.73M*2
EOSINOPHIL # BLD AUTO: 0 X10*3/UL (ref 0–0.7)
EOSINOPHIL NFR BLD AUTO: 0 %
ERYTHROCYTE [DISTWIDTH] IN BLOOD BY AUTOMATED COUNT: 17 % (ref 11.5–14.5)
ERYTHROCYTE [DISTWIDTH] IN BLOOD BY AUTOMATED COUNT: 17.4 % (ref 11.5–14.5)
GLUCOSE BLD MANUAL STRIP-MCNC: 153 MG/DL (ref 74–99)
GLUCOSE BLD MANUAL STRIP-MCNC: 163 MG/DL (ref 74–99)
GLUCOSE BLD MANUAL STRIP-MCNC: 182 MG/DL (ref 74–99)
GLUCOSE BLD MANUAL STRIP-MCNC: 193 MG/DL (ref 74–99)
GLUCOSE SERPL-MCNC: 206 MG/DL (ref 74–99)
GLUCOSE SERPL-MCNC: 215 MG/DL (ref 74–99)
HCT VFR BLD AUTO: 27.1 % (ref 36–46)
HCT VFR BLD AUTO: 27.7 % (ref 36–46)
HGB BLD-MCNC: 10 G/DL (ref 12–16)
HGB BLD-MCNC: 10.2 G/DL (ref 12–16)
IMM GRANULOCYTES # BLD AUTO: 0.42 X10*3/UL (ref 0–0.7)
IMM GRANULOCYTES NFR BLD AUTO: 2.4 % (ref 0–0.9)
INR PPP: 1.4 (ref 0.9–1.1)
LYMPHOCYTES # BLD AUTO: 1.05 X10*3/UL (ref 1.2–4.8)
LYMPHOCYTES NFR BLD AUTO: 6 %
MAGNESIUM SERPL-MCNC: 2.56 MG/DL (ref 1.6–2.4)
MCH RBC QN AUTO: 32 PG (ref 26–34)
MCH RBC QN AUTO: 32.2 PG (ref 26–34)
MCHC RBC AUTO-ENTMCNC: 36.8 G/DL (ref 32–36)
MCHC RBC AUTO-ENTMCNC: 36.9 G/DL (ref 32–36)
MCV RBC AUTO: 87 FL (ref 80–100)
MCV RBC AUTO: 87 FL (ref 80–100)
MONOCYTES # BLD AUTO: 0.75 X10*3/UL (ref 0.1–1)
MONOCYTES NFR BLD AUTO: 4.3 %
NEUTROPHILS # BLD AUTO: 15.11 X10*3/UL (ref 1.2–7.7)
NEUTROPHILS NFR BLD AUTO: 86.8 %
NRBC BLD-RTO: 0.5 /100 WBCS (ref 0–0)
NRBC BLD-RTO: 0.7 /100 WBCS (ref 0–0)
PHOSPHATE SERPL-MCNC: 3.5 MG/DL (ref 2.5–4.9)
PLATELET # BLD AUTO: 159 X10*3/UL (ref 150–450)
PLATELET # BLD AUTO: 161 X10*3/UL (ref 150–450)
POTASSIUM SERPL-SCNC: 4 MMOL/L (ref 3.5–5.3)
POTASSIUM SERPL-SCNC: 4.3 MMOL/L (ref 3.5–5.3)
PROT SERPL-MCNC: 6.4 G/DL (ref 6.4–8.2)
PROTHROMBIN TIME: 16 SECONDS (ref 9.8–12.4)
RBC # BLD AUTO: 3.11 X10*6/UL (ref 4–5.2)
RBC # BLD AUTO: 3.19 X10*6/UL (ref 4–5.2)
SODIUM SERPL-SCNC: 131 MMOL/L (ref 136–145)
SODIUM SERPL-SCNC: 132 MMOL/L (ref 136–145)
VANCOMYCIN TROUGH SERPL-MCNC: 12.6 UG/ML (ref 5–20)
WBC # BLD AUTO: 15.1 X10*3/UL (ref 4.4–11.3)
WBC # BLD AUTO: 17.4 X10*3/UL (ref 4.4–11.3)

## 2025-03-15 PROCEDURE — 2500000004 HC RX 250 GENERAL PHARMACY W/ HCPCS (ALT 636 FOR OP/ED): Mod: SE

## 2025-03-15 PROCEDURE — 71045 X-RAY EXAM CHEST 1 VIEW: CPT | Performed by: RADIOLOGY

## 2025-03-15 PROCEDURE — 70450 CT HEAD/BRAIN W/O DYE: CPT | Performed by: STUDENT IN AN ORGANIZED HEALTH CARE EDUCATION/TRAINING PROGRAM

## 2025-03-15 PROCEDURE — 82947 ASSAY GLUCOSE BLOOD QUANT: CPT

## 2025-03-15 PROCEDURE — 2500000001 HC RX 250 WO HCPCS SELF ADMINISTERED DRUGS (ALT 637 FOR MEDICARE OP): Mod: SE

## 2025-03-15 PROCEDURE — 2500000002 HC RX 250 W HCPCS SELF ADMINISTERED DRUGS (ALT 637 FOR MEDICARE OP, ALT 636 FOR OP/ED): Mod: SE

## 2025-03-15 PROCEDURE — 85730 THROMBOPLASTIN TIME PARTIAL: CPT

## 2025-03-15 PROCEDURE — 94003 VENT MGMT INPAT SUBQ DAY: CPT

## 2025-03-15 PROCEDURE — 2500000004 HC RX 250 GENERAL PHARMACY W/ HCPCS (ALT 636 FOR OP/ED): Mod: JZ,SE

## 2025-03-15 PROCEDURE — 2500000005 HC RX 250 GENERAL PHARMACY W/O HCPCS: Mod: SE

## 2025-03-15 PROCEDURE — 86403 PARTICLE AGGLUT ANTBDY SCRN: CPT

## 2025-03-15 PROCEDURE — 85025 COMPLETE CBC W/AUTO DIFF WBC: CPT

## 2025-03-15 PROCEDURE — 80069 RENAL FUNCTION PANEL: CPT | Mod: CCI

## 2025-03-15 PROCEDURE — 2500000004 HC RX 250 GENERAL PHARMACY W/ HCPCS (ALT 636 FOR OP/ED): Mod: SE | Performed by: PHARMACIST

## 2025-03-15 PROCEDURE — 90937 HEMODIALYSIS REPEATED EVAL: CPT

## 2025-03-15 PROCEDURE — 82330 ASSAY OF CALCIUM: CPT

## 2025-03-15 PROCEDURE — 87103 BLOOD FUNGUS CULTURE: CPT

## 2025-03-15 PROCEDURE — 80053 COMPREHEN METABOLIC PANEL: CPT

## 2025-03-15 PROCEDURE — 99291 CRITICAL CARE FIRST HOUR: CPT

## 2025-03-15 PROCEDURE — 36415 COLL VENOUS BLD VENIPUNCTURE: CPT

## 2025-03-15 PROCEDURE — 85027 COMPLETE CBC AUTOMATED: CPT

## 2025-03-15 PROCEDURE — 87075 CULTR BACTERIA EXCEPT BLOOD: CPT

## 2025-03-15 PROCEDURE — 80202 ASSAY OF VANCOMYCIN: CPT

## 2025-03-15 PROCEDURE — 90945 DIALYSIS ONE EVALUATION: CPT | Performed by: INTERNAL MEDICINE

## 2025-03-15 PROCEDURE — 83735 ASSAY OF MAGNESIUM: CPT

## 2025-03-15 PROCEDURE — 2020000001 HC ICU ROOM DAILY

## 2025-03-15 RX ORDER — LACTULOSE 10 G/15ML
10 SOLUTION ORAL EVERY 6 HOURS
Status: DISCONTINUED | OUTPATIENT
Start: 2025-03-15 | End: 2025-03-17

## 2025-03-15 RX ORDER — VANCOMYCIN HYDROCHLORIDE 750 MG/150ML
750 INJECTION, SOLUTION INTRAVENOUS EVERY 12 HOURS
Status: DISCONTINUED | OUTPATIENT
Start: 2025-03-15 | End: 2025-03-15

## 2025-03-15 RX ORDER — VANCOMYCIN HYDROCHLORIDE 750 MG/150ML
750 INJECTION, SOLUTION INTRAVENOUS EVERY 24 HOURS
Status: DISCONTINUED | OUTPATIENT
Start: 2025-03-16 | End: 2025-03-18

## 2025-03-15 RX ORDER — VANCOMYCIN HYDROCHLORIDE 1 G/20ML
INJECTION, POWDER, LYOPHILIZED, FOR SOLUTION INTRAVENOUS DAILY PRN
Status: DISCONTINUED | OUTPATIENT
Start: 2025-03-15 | End: 2025-03-15

## 2025-03-15 RX ORDER — VANCOMYCIN HYDROCHLORIDE 750 MG/150ML
750 INJECTION, SOLUTION INTRAVENOUS EVERY 24 HOURS
Status: DISCONTINUED | OUTPATIENT
Start: 2025-03-16 | End: 2025-03-15

## 2025-03-15 RX ORDER — HEPARIN SODIUM 1000 [USP'U]/ML
INJECTION, SOLUTION INTRAVENOUS; SUBCUTANEOUS
Status: DISPENSED
Start: 2025-03-15 | End: 2025-03-16

## 2025-03-15 RX ORDER — VANCOMYCIN HYDROCHLORIDE 500 MG/100ML
500 INJECTION, SOLUTION INTRAVENOUS ONCE
Status: COMPLETED | OUTPATIENT
Start: 2025-03-15 | End: 2025-03-15

## 2025-03-15 RX ADMIN — PANTOPRAZOLE SODIUM 40 MG: 40 INJECTION, POWDER, LYOPHILIZED, FOR SOLUTION INTRAVENOUS at 07:09

## 2025-03-15 RX ADMIN — MEROPENEM 2 G: 1 INJECTION INTRAVENOUS at 09:51

## 2025-03-15 RX ADMIN — LACTULOSE 20 G: 20 SOLUTION ORAL at 08:10

## 2025-03-15 RX ADMIN — THIAMINE HCL TAB 100 MG 100 MG: 100 TAB at 08:10

## 2025-03-15 RX ADMIN — VANCOMYCIN HYDROCHLORIDE 500 MG: 500 INJECTION, SOLUTION INTRAVENOUS at 15:00

## 2025-03-15 RX ADMIN — Medication 50 MCG/HR: at 09:58

## 2025-03-15 RX ADMIN — INSULIN LISPRO 2 UNITS: 100 INJECTION, SOLUTION INTRAVENOUS; SUBCUTANEOUS at 18:36

## 2025-03-15 RX ADMIN — RIFAXIMIN 550 MG: 550 TABLET ORAL at 15:00

## 2025-03-15 RX ADMIN — MEROPENEM 2 G: 1 INJECTION INTRAVENOUS at 21:55

## 2025-03-15 RX ADMIN — LACTULOSE 10 G: 20 SOLUTION ORAL at 15:00

## 2025-03-15 RX ADMIN — BIVALIRUDIN 0.04 MG/KG/HR: 250 INJECTION INTRACAVERNOUS at 06:39

## 2025-03-15 RX ADMIN — Medication 50 MCG/HR: at 20:51

## 2025-03-15 RX ADMIN — INSULIN LISPRO 2 UNITS: 100 INJECTION, SOLUTION INTRAVENOUS; SUBCUTANEOUS at 06:32

## 2025-03-15 RX ADMIN — LACTULOSE 20 G: 20 SOLUTION ORAL at 03:01

## 2025-03-15 RX ADMIN — INSULIN LISPRO 2 UNITS: 100 INJECTION, SOLUTION INTRAVENOUS; SUBCUTANEOUS at 12:07

## 2025-03-15 RX ADMIN — Medication 50 PERCENT: at 15:05

## 2025-03-15 RX ADMIN — HYDROCORTISONE SODIUM SUCCINATE 50 MG: 100 INJECTION, POWDER, FOR SOLUTION INTRAMUSCULAR; INTRAVENOUS at 20:27

## 2025-03-15 RX ADMIN — HYDROCORTISONE SODIUM SUCCINATE 50 MG: 100 INJECTION, POWDER, FOR SOLUTION INTRAMUSCULAR; INTRAVENOUS at 09:50

## 2025-03-15 RX ADMIN — CARBOXYMETHYLCELLULOSE SODIUM 2 DROP: 5 SOLUTION/ DROPS OPHTHALMIC at 09:50

## 2025-03-15 RX ADMIN — SENNOSIDES AND DOCUSATE SODIUM 2 TABLET: 50; 8.6 TABLET ORAL at 08:10

## 2025-03-15 RX ADMIN — VANCOMYCIN HYDROCHLORIDE 500 MG: 500 INJECTION, SOLUTION INTRAVENOUS at 08:10

## 2025-03-15 RX ADMIN — POLYETHYLENE GLYCOL 3350 17 G: 17 POWDER, FOR SOLUTION ORAL at 08:10

## 2025-03-15 RX ADMIN — HYDROCORTISONE SODIUM SUCCINATE 50 MG: 100 INJECTION, POWDER, FOR SOLUTION INTRAMUSCULAR; INTRAVENOUS at 03:01

## 2025-03-15 RX ADMIN — PROPOFOL 15 MCG/KG/MIN: 10 INJECTION, EMULSION INTRAVENOUS at 07:08

## 2025-03-15 RX ADMIN — Medication 60 PERCENT: at 08:00

## 2025-03-15 RX ADMIN — CALCIUM CHLORIDE, MAGNESIUM CHLORIDE, DEXTROSE MONOHYDRATE, LACTIC ACID, SODIUM CHLORIDE, SODIUM BICARBONATE AND POTASSIUM CHLORIDE 25 ML/KG/HR: 5.15; 2.03; 22; 5.4; 6.46; 3.09; .157 INJECTION INTRAVENOUS at 21:56

## 2025-03-15 RX ADMIN — LACTULOSE 10 G: 20 SOLUTION ORAL at 20:27

## 2025-03-15 RX ADMIN — INSULIN LISPRO 2 UNITS: 100 INJECTION, SOLUTION INTRAVENOUS; SUBCUTANEOUS at 23:21

## 2025-03-15 RX ADMIN — ALTEPLASE 2 MG: 2.2 INJECTION, POWDER, LYOPHILIZED, FOR SOLUTION INTRAVENOUS at 14:53

## 2025-03-15 RX ADMIN — Medication 5 ML: at 08:10

## 2025-03-15 ASSESSMENT — PAIN - FUNCTIONAL ASSESSMENT
PAIN_FUNCTIONAL_ASSESSMENT: CPOT (CRITICAL CARE PAIN OBSERVATION TOOL)

## 2025-03-15 NOTE — PROGRESS NOTES
Vancomycin Dosing by Pharmacy- FOLLOW UP    Gissel Harvey is a 39 y.o. year old female who Pharmacy has been consulted for vancomycin dosing for pneumonia. Based on the patient's indication and renal status this patient is being dosed based on a goal trough/random level of 15-20.     Renal function is currently improving.    Pt received vanco 500mg 0800 on 3/15 , pt vanco level was 12; will give an additional 500mg x 1 3/15 @1500.  Pt on CVVH    Most recent trough level: 12.6 mcg/mL    Visit Vitals  /75   Pulse 102   Temp 37.2 °C (99 °F)   Resp 25        Lab Results   Component Value Date    CREATININE 1.49 (H) 03/15/2025    CREATININE 1.52 (H) 2025    CREATININE 1.54 (H) 2025    CREATININE 1.86 (H) 2025        Patient weight is as follows:   Vitals:    25 0558   Weight: 67.1 kg (147 lb 14.9 oz)       Cultures:  Susceptibility data for the encounter in last 14 days.  Collected Specimen Info Organism Ceftriaxone (Meningitis) Ceftriaxone (Nonmeningitis) Clindamycin Erythromycin Oxacillin Penicillin (IV, Meningitidis) Penicillin (IV, Nonmeningitis) Penicillin (Oral) Tetracycline Trimethoprim/Sulfamethoxazole   25 Fluid from Tracheal Aspirate Methicillin Susceptible Staphylococcus aureus (MSSA)    S  S  S     S  S     Streptococcus pneumoniae  S  S  S  S   R  S  S  S  S     Collected Specimen Info Organism Vancomycin   25 Fluid from Tracheal Aspirate Methicillin Susceptible Staphylococcus aureus (MSSA)  S     Streptococcus pneumoniae  S        I/O last 3 completed shifts:  In: 2677.2 (41.2 mL/kg) [I.V.:746.2 (11.5 mL/kg); NG/GT:1260; IV Piggyback:671]  Out: 6730 (103.5 mL/kg) [Other:5035; Stool:1695]  Dosing Weight: 65 kg   I/O during current shift:  I/O this shift:  In: 594.8 [I.V.:94.8; NG/GT:300; IV Piggyback:200]  Out: 548 [Other:548]    Temp (24hrs), Av.9 °C (98.5 °F), Min:36.4 °C (97.5 °F), Max:37.3 °C (99.1 °F)      Assessment/Plan    Pt on CVVH , level was 12.6 this  morning.  Giving a toal of 1 gm today 3/15 then will start 750mg q24h starting 3/16 am        The next level will be obtained on 3/16 at 0500. May be obtained sooner if clinically indicated.   Will continue to monitor renal function daily while on vancomycin and order serum creatinine at least every 48 hours if not already ordered.  Follow for continued vancomycin needs, clinical response, and signs/symptoms of toxicity.       Itzel Amaral, PharmD

## 2025-03-15 NOTE — PROGRESS NOTES
"Vancomycin Dosing by Pharmacy    Gissel Harvey is a 39 y.o. year old female who Pharmacy has been consulted for vancomycin dosing for pneumonia. Based on the patient's indication and renal status this patient is being dosed based on a goal trough/random level of 15-20.     Renal function is currently on CVVH.      Current vancomycin dose: 500 mg given every 24 hours    Most recent trough level: 12.6 mcg/mL    Visit Vitals  /60   Pulse 98   Temp 37.3 °C (99.1 °F)   Resp 23   Ht 1.676 m (5' 6\")   Wt 67.1 kg (147 lb 14.9 oz)   SpO2 96%   BMI 23.88 kg/m²   Smoking Status Every Day   BSA 1.77 m²        Lab Results   Component Value Date    CREATININE 1.49 (H) 03/15/2025    CREATININE 1.52 (H) 03/14/2025    CREATININE 1.54 (H) 03/14/2025    CREATININE 1.86 (H) 03/14/2025        Lab Results   Component Value Date    VANCORANDOM 25.0 (H) 03/13/2025    VANCORANDOM 17.1 03/07/2025    VANCORANDOM 24.5 (H) 03/06/2025    VANCOTROUGH 12.6 03/15/2025    VANCOTROUGH 14.1 03/09/2025        I/O last 3 completed shifts:  In: 2677.2 (41.2 mL/kg) [I.V.:746.2 (11.5 mL/kg); NG/GT:1260; IV Piggyback:671]  Out: 6730 (103.5 mL/kg) [Other:5035; Stool:1695]  Dosing Weight: 65 kg       Blood Culture   Date/Time Value Ref Range Status   03/12/2025 08:53 AM No growth at 2 days  Preliminary     Gram Stain   Date/Time Value Ref Range Status   03/12/2025 08:24 PM (3+) Moderate Polymorphonuclear leukocytes  Final   03/12/2025 08:24 PM No organisms seen  Final          Assessment/Plan    Above goal random/trough level. Orders placed for new vancomycin regimen of 750 every 24 hours to begin at 1600 .      The next level will be obtained on 3/17/25 at 1st AM labs. May be obtained sooner if clinically indicated.   Will continue to monitor renal function daily while on vancomycin and order serum creatinine at least every 48 hours if not already ordered.  Follow for continued vancomycin needs, clinical response, and signs/symptoms of toxicity. "     Edward Mane, PharmD

## 2025-03-15 NOTE — CARE PLAN
Problem: Skin  Goal: Decreased wound size/increased tissue granulation at next dressing change  Outcome: Progressing  Goal: Participates in plan/prevention/treatment measures  Outcome: Progressing  Goal: Prevent/manage excess moisture  Outcome: Progressing  Goal: Prevent/minimize sheer/friction injuries  Outcome: Progressing  Flowsheets (Taken 3/14/2025 2232)  Prevent/minimize sheer/friction injuries:   Complete micro-shifts as needed if patient unable. Adjust patient position to relieve pressure points, not a full turn   Use pull sheet   HOB 30 degrees or less   Turn/reposition every 2 hours/use positioning/transfer devices  Goal: Promote/optimize nutrition  Outcome: Progressing  Goal: Promote skin healing  Outcome: Progressing   The patient's goals for the shift include      The clinical goals for the shift include will continue to wean down levophed

## 2025-03-15 NOTE — CARE PLAN
Problem: Pain - Adult  Goal: Verbalizes/displays adequate comfort level or baseline comfort level  Outcome: Progressing  Flowsheets (Taken 3/15/2025 0947)  Verbalizes/displays adequate comfort level or baseline comfort level: Assess pain using appropriate pain scale     Problem: Safety - Adult  Goal: Free from fall injury  Outcome: Progressing     Problem: Skin  Goal: Decreased wound size/increased tissue granulation at next dressing change  Outcome: Progressing  Flowsheets (Taken 3/15/2025 0947)  Decreased wound size/increased tissue granulation at next dressing change: Protective dressings over bony prominences  Goal: Participates in plan/prevention/treatment measures  Outcome: Progressing  Flowsheets (Taken 3/15/2025 0947)  Participates in plan/prevention/treatment measures: Elevate heels  Goal: Prevent/manage excess moisture  Outcome: Progressing  Flowsheets (Taken 3/15/2025 0947)  Prevent/manage excess moisture: Cleanse incontinence/protect with barrier cream  Goal: Prevent/minimize sheer/friction injuries  Outcome: Progressing  Flowsheets (Taken 3/15/2025 0947)  Prevent/minimize sheer/friction injuries:   Use pull sheet   Turn/reposition every 2 hours/use positioning/transfer devices

## 2025-03-15 NOTE — PROGRESS NOTES
CRRT procedure note   Seen and assessed on CVVH. Labs and events reviewed   Remains hemodynamically unstable, on levophed ; in negative 3L fluid balance with CVVH; on vent 60%FIO2 AC ; nonresponsive  Vascular access: right IJ trialysis catheter  BFR :200 ml/min  Filter:M150  Total Replacement Rate: 2000 ml/hour  Pre blood pump : 1000 ml/hour    Replacement solution Prismasol potassium:  2 mEq/L  Patient fluid removal: per ICU team ; Current target to be negative 50 ml/hour.  Please check Phosphorus, Calcium and Magnesium daily and replace peripherally as needed  Continue with current CVVH prescription.    Heart Rate:  []   Temp:  [36.4 °C (97.5 °F)-37.3 °C (99.1 °F)]   Resp:  [23-28]   BP: ()/(39-75)   SpO2:  [89 %-99 %]        Intake/Output Summary (Last 24 hours) at 3/15/2025 1443  Last data filed at 3/15/2025 1200  Gross per 24 hour   Intake 1677.82 ml   Output 4807 ml   Net -3129.18 ml       Scheduled medications  ergocalciferol, 1,250 mcg, oral, Weekly  pantoprazole, 40 mg, oral, Daily before breakfast   Or  esomeprazole, 40 mg, nasoduodenal tube, Daily before breakfast   Or  pantoprazole, 40 mg, intravenous, Daily before breakfast  heparin, , ,   hydrocortisone sodium succinate, 50 mg, intravenous, BID  insulin lispro, 0-10 Units, subcutaneous, q6h  lactulose, 10 g, oral, q6h  artificial tears, 2 drop, Both Eyes, q24h  meropenem, 2 g, intravenous, q12h  oxygen, , inhalation, Continuous - Inhalation  polyethylene glycol, 17 g, oral, Daily  rifAXIMin, 550 mg, oral, q12h COCO  sennosides-docusate sodium, 2 tablet, oral, Daily  thiamine, 100 mg, oral, Daily  vancomycin, 500 mg, intravenous, Once  [START ON 3/16/2025] vancomycin, 750 mg, intravenous, q24h  vitamin B complex-vitamin C-folic acid, 5 mL, oral, Daily      Continuous medications  bivalirudin, 0.05-0.49 mg/kg/hr, Last Rate: 0.06 mg/kg/hr (03/15/25 1200)  fentaNYL, 0-300 mcg/hr, Last Rate: Stopped (03/15/25 1145)  norepinephrine, 0.01-1  mcg/kg/min (Dosing Weight), Last Rate: 0.04 mcg/kg/min (03/15/25 1200)  PrismaSol BGK 2/3.5, 25 mL/kg/hr (Dosing Weight), Last Rate: 25 mL/kg/hr (03/14/25 2243)  propofol, 0-50 mcg/kg/min (Dosing Weight), Last Rate: Stopped (03/15/25 1145)      PRN medications  PRN medications: acetaminophen **OR** acetaminophen, alteplase, dextrose, dextrose, fentaNYL, glucagon, glucagon, heparin, oxygen, vancomycin    Recent Results (from the past 24 hours)   POCT GLUCOSE    Collection Time: 03/14/25  6:02 PM   Result Value Ref Range    POCT Glucose 166 (H) 74 - 99 mg/dL   Renal function panel    Collection Time: 03/14/25  6:16 PM   Result Value Ref Range    Glucose 175 (H) 74 - 99 mg/dL    Sodium 132 (L) 136 - 145 mmol/L    Potassium 4.3 3.5 - 5.3 mmol/L    Chloride 97 (L) 98 - 107 mmol/L    Bicarbonate 22 21 - 32 mmol/L    Anion Gap 17 10 - 20 mmol/L    Urea Nitrogen 54 (H) 6 - 23 mg/dL    Creatinine 1.52 (H) 0.50 - 1.05 mg/dL    eGFR 45 (L) >60 mL/min/1.73m*2    Calcium 9.6 8.6 - 10.6 mg/dL    Phosphorus 3.3 2.5 - 4.9 mg/dL    Albumin 2.7 (L) 3.4 - 5.0 g/dL   Calcium, ionized    Collection Time: 03/14/25  6:16 PM   Result Value Ref Range    POCT Calcium, Ionized 1.27 1.1 - 1.33 mmol/L   CBC    Collection Time: 03/14/25  6:16 PM   Result Value Ref Range    WBC 19.1 (H) 4.4 - 11.3 x10*3/uL    nRBC 0.8 (H) 0.0 - 0.0 /100 WBCs    RBC 3.19 (L) 4.00 - 5.20 x10*6/uL    Hemoglobin 10.2 (L) 12.0 - 16.0 g/dL    Hematocrit 28.4 (L) 36.0 - 46.0 %    MCV 89 80 - 100 fL    MCH 32.0 26.0 - 34.0 pg    MCHC 35.9 32.0 - 36.0 g/dL    RDW 16.9 (H) 11.5 - 14.5 %    Platelets 144 (L) 150 - 450 x10*3/uL   POCT GLUCOSE    Collection Time: 03/14/25 11:13 PM   Result Value Ref Range    POCT Glucose 196 (H) 74 - 99 mg/dL   Magnesium    Collection Time: 03/15/25  4:53 AM   Result Value Ref Range    Magnesium 2.56 (H) 1.60 - 2.40 mg/dL   CBC and Auto Differential    Collection Time: 03/15/25  4:53 AM   Result Value Ref Range    WBC 17.4 (H) 4.4 - 11.3  x10*3/uL    nRBC 0.5 (H) 0.0 - 0.0 /100 WBCs    RBC 3.19 (L) 4.00 - 5.20 x10*6/uL    Hemoglobin 10.2 (L) 12.0 - 16.0 g/dL    Hematocrit 27.7 (L) 36.0 - 46.0 %    MCV 87 80 - 100 fL    MCH 32.0 26.0 - 34.0 pg    MCHC 36.8 (H) 32.0 - 36.0 g/dL    RDW 17.0 (H) 11.5 - 14.5 %    Platelets 161 150 - 450 x10*3/uL    Neutrophils % 86.8 40.0 - 80.0 %    Immature Granulocytes %, Automated 2.4 (H) 0.0 - 0.9 %    Lymphocytes % 6.0 13.0 - 44.0 %    Monocytes % 4.3 2.0 - 10.0 %    Eosinophils % 0.0 0.0 - 6.0 %    Basophils % 0.5 0.0 - 2.0 %    Neutrophils Absolute 15.11 (H) 1.20 - 7.70 x10*3/uL    Immature Granulocytes Absolute, Automated 0.42 0.00 - 0.70 x10*3/uL    Lymphocytes Absolute 1.05 (L) 1.20 - 4.80 x10*3/uL    Monocytes Absolute 0.75 0.10 - 1.00 x10*3/uL    Eosinophils Absolute 0.00 0.00 - 0.70 x10*3/uL    Basophils Absolute 0.08 0.00 - 0.10 x10*3/uL   Coagulation Screen    Collection Time: 03/15/25  4:53 AM   Result Value Ref Range    Protime 16.0 (H) 9.8 - 12.4 seconds    INR 1.4 (H) 0.9 - 1.1    aPTT 35 26 - 36 seconds   Comprehensive metabolic panel    Collection Time: 03/15/25  4:53 AM   Result Value Ref Range    Glucose 206 (H) 74 - 99 mg/dL    Sodium 132 (L) 136 - 145 mmol/L    Potassium 4.0 3.5 - 5.3 mmol/L    Chloride 97 (L) 98 - 107 mmol/L    Bicarbonate 23 21 - 32 mmol/L    Anion Gap 16 10 - 20 mmol/L    Urea Nitrogen 54 (H) 6 - 23 mg/dL    Creatinine 1.49 (H) 0.50 - 1.05 mg/dL    eGFR 46 (L) >60 mL/min/1.73m*2    Calcium 9.7 8.6 - 10.6 mg/dL    Albumin 2.7 (L) 3.4 - 5.0 g/dL    Alkaline Phosphatase 257 (H) 33 - 110 U/L    Total Protein 6.4 6.4 - 8.2 g/dL     (H) 9 - 39 U/L    Bilirubin, Total 20.7 (H) 0.0 - 1.2 mg/dL    ALT 53 (H) 7 - 45 U/L   Calcium, ionized    Collection Time: 03/15/25  4:53 AM   Result Value Ref Range    POCT Calcium, Ionized 1.32 1.1 - 1.33 mmol/L   aPTT    Collection Time: 03/15/25  4:53 AM   Result Value Ref Range    aPTT 35 26 - 36 seconds   POCT GLUCOSE    Collection Time:  03/15/25  5:52 AM   Result Value Ref Range    POCT Glucose 193 (H) 74 - 99 mg/dL   Vancomycin, Trough Please collect before AM dose    Collection Time: 03/15/25  8:28 AM   Result Value Ref Range    Vancomycin, Trough 12.6 5.0 - 20.0 ug/mL   aPTT    Collection Time: 03/15/25  8:28 AM   Result Value Ref Range    aPTT 34 26 - 36 seconds   aPTT    Collection Time: 03/15/25 10:45 AM   Result Value Ref Range    aPTT 39 (H) 26 - 36 seconds   POCT GLUCOSE    Collection Time: 03/15/25 11:53 AM   Result Value Ref Range    POCT Glucose 182 (H) 74 - 99 mg/dL

## 2025-03-15 NOTE — PROGRESS NOTES
Vancomycin Dosing by Pharmacy- Cessation of Therapy    Consult to pharmacy for vancomycin dosing has been discontinued by the prescriber, pharmacy will sign off at this time.    Please call pharmacy if there are further questions or re-enter a consult if vancomycin is resumed.     Itzel Amaral, PharmD

## 2025-03-15 NOTE — PROGRESS NOTES
Medical Intensive Care - Daily Progress Note   Subjective    Gissel Harvey is a 39 y.o. year old female patient admitted on 3/3/2025 with following ICU needs: Hypoxemia and AHRF requiring intubation due to ARDS from CAP, septic shock requiring vasopressor support, acute renal failure requiring CVVH    Interval History:  NAEON    Significantly net neg yesterday, plan for net even w/out fluid removal on CVVH today  Family leaning towards trach  Vit K yesterday -> INR and PT normalized, titrating bival based on aptt  Some c/f aspiration on CXR- holding TF    Meds    Scheduled medications  ergocalciferol, 1,250 mcg, oral, Weekly  pantoprazole, 40 mg, oral, Daily before breakfast   Or  esomeprazole, 40 mg, nasoduodenal tube, Daily before breakfast   Or  pantoprazole, 40 mg, intravenous, Daily before breakfast  hydrocortisone sodium succinate, 50 mg, intravenous, BID  insulin lispro, 0-10 Units, subcutaneous, q6h  lactulose, 10 g, oral, q6h  artificial tears, 2 drop, Both Eyes, q24h  meropenem, 2 g, intravenous, q12h  oxygen, , inhalation, Continuous - Inhalation  polyethylene glycol, 17 g, oral, Daily  rifAXIMin, 550 mg, oral, q12h COCO  sennosides-docusate sodium, 2 tablet, oral, Daily  thiamine, 100 mg, oral, Daily  vitamin B complex-vitamin C-folic acid, 5 mL, oral, Daily      Continuous medications  bivalirudin, 0.05-0.49 mg/kg/hr, Last Rate: 0.06 mg/kg/hr (03/15/25 1200)  fentaNYL, 0-300 mcg/hr, Last Rate: Stopped (03/15/25 1145)  norepinephrine, 0.01-1 mcg/kg/min (Dosing Weight), Last Rate: 0.04 mcg/kg/min (03/15/25 1200)  PrismaSol BGK 2/3.5, 25 mL/kg/hr (Dosing Weight), Last Rate: 25 mL/kg/hr (03/14/25 2243)  propofol, 0-50 mcg/kg/min (Dosing Weight), Last Rate: Stopped (03/15/25 1145)      PRN medications  PRN medications: acetaminophen **OR** acetaminophen, alteplase, dextrose, dextrose, fentaNYL, glucagon, glucagon, oxygen, vancomycin     Objective    Blood pressure 112/64, pulse 98, temperature 37.2 °C (99 °F),  "resp. rate (!) 28, height 1.676 m (5' 6\"), weight 67.1 kg (147 lb 14.9 oz), SpO2 94%.     Physical Exam   Constitutional:       Comments: Intubated and Sedated  Not opening eyes to voice   Pupils reactive   HENT:      Head: Normocephalic and atraumatic.   Cardiovascular:      Rate and Rhythm: Normal rate and regular rhythm.      Pulses: Normal pulses.   Pulmonary:      Breath sounds: No wheezing.      Comments: Mechanically Ventilated, Coarse breath sounds  Abdominal:      General: Abdomen soft with significantly enlarged liver     Palpations: Abdomen is soft.      Comments: Rectal tube draining brown stool, no blood   Musculoskeletal:      Right lower leg: Edema present.      Left lower leg: Edema present.   Edema in the upper extremities bilaterally     Comments: 2+ pitting edema bilaterally to knees   Skin:     General: Skin is warm and dry.      Comments: Warm to touch   Neurological:      Comments: Sedated, not awakening to voice     Intake/Output Summary (Last 24 hours) at 3/15/2025 1357  Last data filed at 3/15/2025 1200  Gross per 24 hour   Intake 1706.89 ml   Output 5707 ml   Net -4000.11 ml     Labs:   Results from last 72 hours   Lab Units 03/15/25  0453 03/14/25  1816 03/14/25  0950 03/14/25  0753 03/13/25  1803   SODIUM mmol/L 132* 132* 130*   < > 131*   POTASSIUM mmol/L 4.0 4.3 4.2   < > 5.2   CHLORIDE mmol/L 97* 97* 98   < > 98   CO2 mmol/L 23 22 23   < > 23   BUN mg/dL 54* 54* 53*   < > 59*   CREATININE mg/dL 1.49* 1.52* 1.54*   < > 1.68*   GLUCOSE mg/dL 206* 175* 256*   < > 202*   CALCIUM mg/dL 9.7 9.6 13.9*   < > 8.7   ANION GAP mmol/L 16 17 13   < > 15   EGFR mL/min/1.73m*2 46* 45* 44*   < > 40*   PHOSPHORUS mg/dL  --  3.3 3.3  --  3.4    < > = values in this interval not displayed.      Results from last 72 hours   Lab Units 03/15/25  0453 03/14/25  1816 03/14/25  0506 03/13/25  1803 03/13/25  0509   WBC AUTO x10*3/uL 17.4* 19.1* 21.0*   < > 27.3*   HEMOGLOBIN g/dL 10.2* 10.2* 9.8*   < > 10.5* "   HEMATOCRIT % 27.7* 28.4* 27.1*   < > 29.1*   PLATELETS AUTO x10*3/uL 161 144* 237   < > 116*   NEUTROS PCT AUTO % 86.8  --  87.1  --  87.4   LYMPHS PCT AUTO % 6.0  --  6.6  --  5.6   MONOS PCT AUTO % 4.3  --  3.7  --  3.1   EOS PCT AUTO % 0.0  --  0.0  --  0.0    < > = values in this interval not displayed.          Results from last 72 hours   Lab Units 03/12/25  2304 03/12/25  1748   POCT PH, VENOUS pH 7.32* 7.32*   POCT PCO2, VENOUS mm Hg 43 44   POCT PO2, VENOUS mm Hg 60* 53*        Micro/ID:     Lab Results   Component Value Date    BLOODCULT No growth at 3 days 03/12/2025           Assessment and Plan     Assessment: Gissel Harvey is a 39 y.o. 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 She is being treated for ARDS 2/2 CAP (MSSA and s. pneumo) and severe septic shock complicated by acute anuric renal failure, now on CVVH. She is still requiring significant respiratory support w/ increased FiO2 requirements and PEEP, on day 12 of mechanical ventilation. She also has worsening INR/Tbili/ALT/Alk phos concerning for intrahepatic insult/injury. She was made DNR by her daughter 3/12.     Mechanical Ventilation: > 10 days  Sedation/Analgesia:  Fentanyl and Propofol  Restraints: no    Summary for 03/15/25  :  Sedation holiday- if no change in neurologic status will get CT head  CVVH w/ goal net even  Add rifaximin  Wean steroids  C/f aspiration on CXR- holding TF, continue broad abx  LUE swelling improved      NEUROLOGY/PSYCH:  #Sedation   - RASS goal -4 to -5 (to allow synchronicity with vent)  - c/w fentanyl + propofol  - sedation holidays when able - 3/15, not much change in neuro  status-> pending CT head     CARDIOVASCULAR:     #HFrEF     :: TTE 3/4 with EF 30-35% with reduced RV function, no previous TTE for comparison  :: BNP 2127 at admission  :: Troponin plateaued (62>63) on 3/4/25     #Shock, likely septic I/s/o severe pneumonia  :: lactate > 9 on presentation,  stable at 3  :: on initial presentation 3/3-3/7, she required 4 pressors (levo, vaso, epi, angiotensin)  :: on 3/8-3/11, able to come off pressors, maintained strong MAP  - 3/12 acute worsening of shock, (2/2 worsening of sepsis, worsened when sedation was weaned and she became dyssynchronous with vent demonstrating air trapping and significant tachypnea), had significant hypotension once again requiring vasopressor support        - continue levo, weaning as able        - stress dose steroids: hydrocortisone 50 q6 -> weaning to BID         - c/w vanc + rebeca for broad spectrum coverage iso septic shock     #Aflutter  #Sustained VT   :: went into NSVT while wire threaded for central line placement  :: s/p amiodarone 150 mg bolus x2  :: pt cardioverted  - Pt is back to sinus rhythm, okay for amio gtt if arrhythmias return     PULMONARY:  Vent Mode: Volume control/assist control  FiO2 (%):  [50 %-60 %] 50 %  S RR:  [26] 26  S VT:  [450 mL] 450 mL  PEEP/CPAP (cm H2O):  [10 cm H20] 10 cm H20  MAP (cm H2O):  [12-16] 12   #Community acquired pneumonia   #ARDS   :: intubated 3/3   :: P:F ~ 70 on admission   :: Procalcitonin 57.3  :: flu A/B, COVID, parainfluenza, RSV, metapneumovirus, legionella urine Ag: negative  :: see ID section for full abx history this admission  :: Tracheal aspirate cx 3/3: pan sensitive staph aureus and strep pneumo  :: Strep pneumo urine ag: positive  :: Bcx 3/3: NGTD x3 days  :: re-cultured 3/12: sputum negative, blood culture pending   :: acute worsening of hypotension and hypoxia 3/12, FiO2% increased  Plan  - some c/f aspiration on CXR- c/w broad abx  - increase sedation to improve synchronicity with vent          - Target RASS -4/-5        - sedation with fentanyl and propofol  - broadened abx back to vanc/rebeca     RENAL/GENITOURINARY:  #Acute renal failure, anuric SCOUT requiring CVVH  :: etiology: septic shock and multiorgan failure  :: Cr 1.9 on admission, unclear baseline as no labs in 2 years   :: FeNa 3/3: 0.1% prerenal  :: Persistently anuric   - Continue CVVH per Nephrology  - While on CVVH: BID RFP, ionized calcium and CBC  - Warm CVVH fluid to assist with hypothermia     GASTROENTEROLOGY:  #liver cirrhosis   #portal hypertension s/p TIPS   #hx of Budd-Chiari   :: 3/7 POCUS with distended bowel, no appreciable ascites  - Continue to monitor abdomen for signs of ascites formation  - Hep C Abs positive, PCR pending  - 3/11 labs w/ acute elevation in INR (5), AST (147), ALT (57), Alk Phos (218)  - Liver US w/ thrombosed TIPS, IR consulted, likely chronic as this has been seen on prior CT and patient non-adherent with anticoagulation  -3/13 INR, PT, Bili, ALT, Alk phos all increasing. 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 C/f DILI or intrahepatic cholestasis / hepatic congestion, or shock liver   PLAN        - DC ketamine and use propofol for sedation given c/f DILI from ketamine        - CTM- she is not stable enough for further hepatology workup or procedure  - Stool output decreased, mildly increased abdominal distension -> resuming lactulose and adding rifaximin   - s/p vitamin K 10 mg 3/14 w/ improvement in INR     ENDOCRINOLOGY:              #High dose steroids              #Supplemental nutrition                          - SSI                           - Tube feeds via NG- held iso c/f aspiration     HEMATOLOGY:  #Lymphopenia -> leukocytosis   :: Abs leukocyte count 260  :: HIV neg  - Now with leukocytosis likely 2/2 known infection and steroid use  - CTM WBC count     #Hx of antiphospholipid syndrome   #Hx of positive PF4  #Chronic IVC occlusion   #Hx of DVT   :: Supposed to  be on fondaparinux outpatient, unclear if taking as no recent med fills   - Continue bivalirudin while inpatient I/s/o previous positive PF4      #Thrombocytopenia, stable  #Elevated PT and INR  - Trend plts, INR, coags  - Fibrinogen wnl  - If platelets <50, transfuse but do not stop bivalirubin  - INR elevated 5 -> 3.8 on 3/11, 5.9 on 3/12, iso known cirrhosis, plt 80  - s/p vitamin K 10 mg 3/14 w/ improvement in INR  - PT and INR increase can be secondary to cirrhosis, but with her APS c/w bival     SKIN:  #Skin Failure  - BL buttock wounds, ear wound  - wound care following, appreciate recs  - c/w vaseline for lips/mouth while intubated     MUSCULOSKELETAL:  GIFTY     INFECTIOUS DISEASE:  #MSSA PNA  #Strep PNA  #ARDS  #Septic shock  Antibiotics  Azithromycin 500 mg: 3/3  Zosyn 4.5 g: 3/3  Tobramycin 440 mg: 3/4  Vancomycin: 3/3 - 3/6, 3/8 - 3/10  Ceftaroline 3/5 - 3/6  Meropenem 2 g BID: 3/3 - 3/8  Micafungin: 3/4 - 3/6  Doxycycline 100 mg BID: 3/4 - 3/6  Ceftriaxone 3/8 - 3/10  Cefazolin 3/10-3/12  *Vanc/Day 3/12-    ICU Check List       ICU Liberation: Intervention:   Assess, Prevent, Manage Pain      Both SAT and SBT [] SAT  [] SBT 30-60 min [] Extubate to NC  [] Extubate to NIV  [] Discuss Trach   Choice of analgesia and sedation Gao Agitation Sedation Scale (RASS): Unarousable  Target Arousal Level (RASS): RASS -4 to -5 Fentanyl and Propofol  weaning   Delirium: Assess, prevent and manage  CAM ICU Positive    Early Mobility and Exercise New or increase in vasopressor dose in last 2 hours [] PT /OT consult   Family Engagement and Empowerment []Family updated []SW consult     FEN  Fluids: CVVH  Electrolytes: CVVH  Nutrition: Trickle feeds   Prophylaxis:  DVT ppx: Bival gtt  GI ppx: PPI  Bowel care: lactulose (rectal tube in place)  Hardware:         ETT  7.5 mm (Active)   Placement Date: 03/03/25   ETT Type: ETT - single  Single Lumen Tube Size: 7.5 mm  Cuffed: Yes  Location: Oral   Number of days: 12        NG/OG/Feeding Tube NG - Wilkinson sump Right nostril (Active)   Placement Date/Time: 03/03/25 1900   Type of Tube: Feeding Tube  Tube Length: 53 cm  Tube Type: NG - Wilkinson sump  Tube Location: Right nostril   Number of days: 11       Fecal Management Rectal tube with balloon (Active)   Placement Date/Time: 03/09/25 0018   Placed by: ashlie  Hand Hygiene Completed: Yes  Fecal Management Tube Type: Rectal tube with balloon   Number of days: 6       Hemodialysis Cath 03/06/25 Right (Active)   Placement Date/Time: 03/06/25 1700   Orientation: Right   Number of days: 8       Social:  Code: DNR    HPOA: Payal Durant (daughter) [phone]  Disposition: MARLENE Buck MD   03/15/25 at 1:57 PM     Disclaimer: Documentation completed with the information available at the time of input. The times in the chart may not be reflective of actual patient care times, interventions, or procedures. Documentation occurs after the physical care of the patient.

## 2025-03-16 LAB
ABO GROUP (TYPE) IN BLOOD: NORMAL
ALBUMIN SERPL BCP-MCNC: 2.6 G/DL (ref 3.4–5)
ALBUMIN SERPL BCP-MCNC: 2.7 G/DL (ref 3.4–5)
ALP SERPL-CCNC: 246 U/L (ref 33–110)
ALT SERPL W P-5'-P-CCNC: 58 U/L (ref 7–45)
ANION GAP BLDV CALCULATED.4IONS-SCNC: 13 MMOL/L (ref 10–25)
ANION GAP SERPL CALC-SCNC: 17 MMOL/L (ref 10–20)
ANION GAP SERPL CALC-SCNC: 18 MMOL/L (ref 10–20)
ANTIBODY SCREEN: NORMAL
APTT PPP: 57 SECONDS (ref 26–36)
APTT PPP: 59 SECONDS (ref 26–36)
APTT PPP: 66 SECONDS (ref 26–36)
APTT PPP: 66 SECONDS (ref 26–36)
AST SERPL W P-5'-P-CCNC: 118 U/L (ref 9–39)
BACTERIA BLD CULT: NORMAL
BACTERIA SPEC RESP CULT: NORMAL
BASE EXCESS BLDV CALC-SCNC: -1.7 MMOL/L (ref -2–3)
BASOPHILS # BLD AUTO: 0.06 X10*3/UL (ref 0–0.1)
BASOPHILS NFR BLD AUTO: 0.4 %
BILIRUB SERPL-MCNC: 22.7 MG/DL (ref 0–1.2)
BODY TEMPERATURE: 37 DEGREES CELSIUS
BUN SERPL-MCNC: 43 MG/DL (ref 6–23)
BUN SERPL-MCNC: 53 MG/DL (ref 6–23)
CA-I BLD-SCNC: 1.21 MMOL/L (ref 1.1–1.33)
CA-I BLD-SCNC: 1.28 MMOL/L (ref 1.1–1.33)
CA-I BLDV-SCNC: 1.26 MMOL/L (ref 1.1–1.33)
CALCIUM SERPL-MCNC: 8.9 MG/DL (ref 8.6–10.6)
CALCIUM SERPL-MCNC: 9.2 MG/DL (ref 8.6–10.6)
CHLORIDE BLDV-SCNC: 99 MMOL/L (ref 98–107)
CHLORIDE SERPL-SCNC: 98 MMOL/L (ref 98–107)
CHLORIDE SERPL-SCNC: 98 MMOL/L (ref 98–107)
CO2 SERPL-SCNC: 21 MMOL/L (ref 21–32)
CO2 SERPL-SCNC: 23 MMOL/L (ref 21–32)
CREAT SERPL-MCNC: 1.68 MG/DL (ref 0.5–1.05)
CREAT SERPL-MCNC: 1.71 MG/DL (ref 0.5–1.05)
EGFRCR SERPLBLD CKD-EPI 2021: 39 ML/MIN/1.73M*2
EGFRCR SERPLBLD CKD-EPI 2021: 40 ML/MIN/1.73M*2
EOSINOPHIL # BLD AUTO: 0 X10*3/UL (ref 0–0.7)
EOSINOPHIL NFR BLD AUTO: 0 %
ERYTHROCYTE [DISTWIDTH] IN BLOOD BY AUTOMATED COUNT: 17.4 % (ref 11.5–14.5)
ERYTHROCYTE [DISTWIDTH] IN BLOOD BY AUTOMATED COUNT: 20 % (ref 11.5–14.5)
GLUCOSE BLD MANUAL STRIP-MCNC: 121 MG/DL (ref 74–99)
GLUCOSE BLD MANUAL STRIP-MCNC: 133 MG/DL (ref 74–99)
GLUCOSE BLD MANUAL STRIP-MCNC: 141 MG/DL (ref 74–99)
GLUCOSE BLD MANUAL STRIP-MCNC: 141 MG/DL (ref 74–99)
GLUCOSE BLD MANUAL STRIP-MCNC: 146 MG/DL (ref 74–99)
GLUCOSE BLDV-MCNC: 121 MG/DL (ref 74–99)
GLUCOSE SERPL-MCNC: 145 MG/DL (ref 74–99)
GLUCOSE SERPL-MCNC: 146 MG/DL (ref 74–99)
GRAM STN SPEC: NORMAL
GRAM STN SPEC: NORMAL
HCO3 BLDV-SCNC: 23 MMOL/L (ref 22–26)
HCT VFR BLD AUTO: 26.9 % (ref 36–46)
HCT VFR BLD AUTO: 29.7 % (ref 36–46)
HCT VFR BLD EST: 31 % (ref 36–46)
HGB BLD-MCNC: 10.1 G/DL (ref 12–16)
HGB BLD-MCNC: 9.9 G/DL (ref 12–16)
HGB BLDV-MCNC: 10.3 G/DL (ref 12–16)
IMM GRANULOCYTES # BLD AUTO: 0.19 X10*3/UL (ref 0–0.7)
IMM GRANULOCYTES NFR BLD AUTO: 1.4 % (ref 0–0.9)
INHALED O2 CONCENTRATION: 50 %
INR PPP: 1.9 (ref 0.9–1.1)
LACTATE BLDV-SCNC: 2.2 MMOL/L (ref 0.4–2)
LYMPHOCYTES # BLD AUTO: 1.11 X10*3/UL (ref 1.2–4.8)
LYMPHOCYTES NFR BLD AUTO: 8.3 %
MAGNESIUM SERPL-MCNC: 2.38 MG/DL (ref 1.6–2.4)
MCH RBC QN AUTO: 31.6 PG (ref 26–34)
MCH RBC QN AUTO: 32.8 PG (ref 26–34)
MCHC RBC AUTO-ENTMCNC: 34 G/DL (ref 32–36)
MCHC RBC AUTO-ENTMCNC: 36.8 G/DL (ref 32–36)
MCV RBC AUTO: 86 FL (ref 80–100)
MCV RBC AUTO: 96 FL (ref 80–100)
MONOCYTES # BLD AUTO: 0.66 X10*3/UL (ref 0.1–1)
MONOCYTES NFR BLD AUTO: 4.9 %
NEUTROPHILS # BLD AUTO: 11.41 X10*3/UL (ref 1.2–7.7)
NEUTROPHILS NFR BLD AUTO: 85 %
NRBC BLD-RTO: 0.3 /100 WBCS (ref 0–0)
NRBC BLD-RTO: 0.7 /100 WBCS (ref 0–0)
OXYHGB MFR BLDV: 72.6 % (ref 45–75)
PCO2 BLDV: 38 MM HG (ref 41–51)
PH BLDV: 7.39 PH (ref 7.33–7.43)
PHOSPHATE SERPL-MCNC: 3.9 MG/DL (ref 2.5–4.9)
PLATELET # BLD AUTO: 144 X10*3/UL (ref 150–450)
PLATELET # BLD AUTO: 158 X10*3/UL (ref 150–450)
PO2 BLDV: 43 MM HG (ref 35–45)
POTASSIUM BLDV-SCNC: 3.5 MMOL/L (ref 3.5–5.3)
POTASSIUM SERPL-SCNC: 3.7 MMOL/L (ref 3.5–5.3)
POTASSIUM SERPL-SCNC: 3.9 MMOL/L (ref 3.5–5.3)
PROT SERPL-MCNC: 6.2 G/DL (ref 6.4–8.2)
PROTHROMBIN TIME: 21.2 SECONDS (ref 9.8–12.4)
RBC # BLD AUTO: 3.08 X10*6/UL (ref 4–5.2)
RBC # BLD AUTO: 3.13 X10*6/UL (ref 4–5.2)
RH FACTOR (ANTIGEN D): NORMAL
SAO2 % BLDV: 75 % (ref 45–75)
SODIUM BLDV-SCNC: 131 MMOL/L (ref 136–145)
SODIUM SERPL-SCNC: 133 MMOL/L (ref 136–145)
SODIUM SERPL-SCNC: 134 MMOL/L (ref 136–145)
VANCOMYCIN SERPL-MCNC: 19.2 UG/ML (ref 5–20)
WBC # BLD AUTO: 13.4 X10*3/UL (ref 4.4–11.3)
WBC # BLD AUTO: 13.4 X10*3/UL (ref 4.4–11.3)

## 2025-03-16 PROCEDURE — 85730 THROMBOPLASTIN TIME PARTIAL: CPT

## 2025-03-16 PROCEDURE — 2500000004 HC RX 250 GENERAL PHARMACY W/ HCPCS (ALT 636 FOR OP/ED): Mod: SE

## 2025-03-16 PROCEDURE — 2500000001 HC RX 250 WO HCPCS SELF ADMINISTERED DRUGS (ALT 637 FOR MEDICARE OP): Mod: SE

## 2025-03-16 PROCEDURE — 2500000005 HC RX 250 GENERAL PHARMACY W/O HCPCS: Mod: SE

## 2025-03-16 PROCEDURE — 85610 PROTHROMBIN TIME: CPT

## 2025-03-16 PROCEDURE — 80202 ASSAY OF VANCOMYCIN: CPT

## 2025-03-16 PROCEDURE — 82330 ASSAY OF CALCIUM: CPT

## 2025-03-16 PROCEDURE — 2020000001 HC ICU ROOM DAILY

## 2025-03-16 PROCEDURE — 95700 EEG CONT REC W/VID EEG TECH: CPT

## 2025-03-16 PROCEDURE — 70553 MRI BRAIN STEM W/O & W/DYE: CPT | Performed by: RADIOLOGY

## 2025-03-16 PROCEDURE — 36415 COLL VENOUS BLD VENIPUNCTURE: CPT

## 2025-03-16 PROCEDURE — 95720 EEG PHY/QHP EA INCR W/VEEG: CPT | Performed by: STUDENT IN AN ORGANIZED HEALTH CARE EDUCATION/TRAINING PROGRAM

## 2025-03-16 PROCEDURE — 94003 VENT MGMT INPAT SUBQ DAY: CPT

## 2025-03-16 PROCEDURE — 85027 COMPLETE CBC AUTOMATED: CPT

## 2025-03-16 PROCEDURE — 90945 DIALYSIS ONE EVALUATION: CPT | Performed by: INTERNAL MEDICINE

## 2025-03-16 PROCEDURE — 86850 RBC ANTIBODY SCREEN: CPT

## 2025-03-16 PROCEDURE — A9575 INJ GADOTERATE MEGLUMI 0.1ML: HCPCS | Mod: SE | Performed by: STUDENT IN AN ORGANIZED HEALTH CARE EDUCATION/TRAINING PROGRAM

## 2025-03-16 PROCEDURE — 2550000001 HC RX 255 CONTRASTS: Mod: SE | Performed by: STUDENT IN AN ORGANIZED HEALTH CARE EDUCATION/TRAINING PROGRAM

## 2025-03-16 PROCEDURE — 83735 ASSAY OF MAGNESIUM: CPT

## 2025-03-16 PROCEDURE — 99291 CRITICAL CARE FIRST HOUR: CPT

## 2025-03-16 PROCEDURE — 82947 ASSAY GLUCOSE BLOOD QUANT: CPT

## 2025-03-16 PROCEDURE — 71045 X-RAY EXAM CHEST 1 VIEW: CPT | Performed by: RADIOLOGY

## 2025-03-16 PROCEDURE — 85025 COMPLETE CBC W/AUTO DIFF WBC: CPT

## 2025-03-16 PROCEDURE — 2500000002 HC RX 250 W HCPCS SELF ADMINISTERED DRUGS (ALT 637 FOR MEDICARE OP, ALT 636 FOR OP/ED): Mod: SE

## 2025-03-16 PROCEDURE — 95715 VEEG EA 12-26HR INTMT MNTR: CPT

## 2025-03-16 PROCEDURE — 80069 RENAL FUNCTION PANEL: CPT | Mod: CCI

## 2025-03-16 PROCEDURE — 90937 HEMODIALYSIS REPEATED EVAL: CPT

## 2025-03-16 PROCEDURE — 80053 COMPREHEN METABOLIC PANEL: CPT

## 2025-03-16 PROCEDURE — 99255 IP/OBS CONSLTJ NEW/EST HI 80: CPT

## 2025-03-16 RX ORDER — VANCOMYCIN HYDROCHLORIDE 1 G/20ML
INJECTION, POWDER, LYOPHILIZED, FOR SOLUTION INTRAVENOUS DAILY PRN
Status: DISCONTINUED | OUTPATIENT
Start: 2025-03-16 | End: 2025-03-16

## 2025-03-16 RX ORDER — GADOTERATE MEGLUMINE 376.9 MG/ML
15 INJECTION INTRAVENOUS
Status: COMPLETED | OUTPATIENT
Start: 2025-03-16 | End: 2025-03-16

## 2025-03-16 RX ORDER — VANCOMYCIN HYDROCHLORIDE 1 G/20ML
INJECTION, POWDER, LYOPHILIZED, FOR SOLUTION INTRAVENOUS DAILY PRN
Status: DISCONTINUED | OUTPATIENT
Start: 2025-03-16 | End: 2025-03-18

## 2025-03-16 RX ADMIN — HYDROCORTISONE SODIUM SUCCINATE 50 MG: 100 INJECTION, POWDER, FOR SOLUTION INTRAMUSCULAR; INTRAVENOUS at 08:05

## 2025-03-16 RX ADMIN — SENNOSIDES AND DOCUSATE SODIUM 2 TABLET: 50; 8.6 TABLET ORAL at 08:06

## 2025-03-16 RX ADMIN — CALCIUM CHLORIDE, MAGNESIUM CHLORIDE, DEXTROSE MONOHYDRATE, LACTIC ACID, SODIUM CHLORIDE, SODIUM BICARBONATE AND POTASSIUM CHLORIDE 25 ML/KG/HR: 5.15; 2.03; 22; 5.4; 6.46; 3.09; .157 INJECTION INTRAVENOUS at 20:46

## 2025-03-16 RX ADMIN — GADOTERATE MEGLUMINE 12 ML: 376.9 INJECTION INTRAVENOUS at 03:15

## 2025-03-16 RX ADMIN — PANTOPRAZOLE SODIUM 40 MG: 40 INJECTION, POWDER, LYOPHILIZED, FOR SOLUTION INTRAVENOUS at 08:04

## 2025-03-16 RX ADMIN — THIAMINE HCL TAB 100 MG 100 MG: 100 TAB at 08:07

## 2025-03-16 RX ADMIN — RIFAXIMIN 550 MG: 550 TABLET ORAL at 04:52

## 2025-03-16 RX ADMIN — LACTULOSE 10 G: 20 SOLUTION ORAL at 04:52

## 2025-03-16 RX ADMIN — RIFAXIMIN 550 MG: 550 TABLET ORAL at 08:06

## 2025-03-16 RX ADMIN — HYDROCORTISONE SODIUM SUCCINATE 50 MG: 100 INJECTION, POWDER, FOR SOLUTION INTRAMUSCULAR; INTRAVENOUS at 20:28

## 2025-03-16 RX ADMIN — VANCOMYCIN HYDROCHLORIDE 750 MG: 750 INJECTION, SOLUTION INTRAVENOUS at 08:43

## 2025-03-16 RX ADMIN — MEROPENEM 2 G: 1 INJECTION INTRAVENOUS at 09:49

## 2025-03-16 RX ADMIN — Medication 5 ML: at 08:07

## 2025-03-16 RX ADMIN — RIFAXIMIN 550 MG: 550 TABLET ORAL at 20:28

## 2025-03-16 RX ADMIN — LACTULOSE 10 G: 20 SOLUTION ORAL at 20:28

## 2025-03-16 RX ADMIN — CARBOXYMETHYLCELLULOSE SODIUM 2 DROP: 5 SOLUTION/ DROPS OPHTHALMIC at 09:05

## 2025-03-16 RX ADMIN — LACTULOSE 10 G: 20 SOLUTION ORAL at 14:35

## 2025-03-16 RX ADMIN — BIVALIRUDIN 0.1 MG/KG/HR: 250 INJECTION INTRACAVERNOUS at 01:26

## 2025-03-16 RX ADMIN — SODIUM CHLORIDE, POTASSIUM CHLORIDE, SODIUM LACTATE AND CALCIUM CHLORIDE 500 ML: 600; 310; 30; 20 INJECTION, SOLUTION INTRAVENOUS at 18:12

## 2025-03-16 RX ADMIN — LACTULOSE 10 G: 20 SOLUTION ORAL at 08:06

## 2025-03-16 ASSESSMENT — PAIN - FUNCTIONAL ASSESSMENT

## 2025-03-16 NOTE — CONSULTS
Consults    History Of Present Illness  Gissel Harvey is a 39 y.o. 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 She is being treated for ARDS 2/2 CAP (MSSA and s. pneumo) and severe septic shock complicated by acute anuric renal failure, now on CVVH. She is still requiring significant respiratory support w/ increased FiO2 requirements and PEEP, on day 13 of mechanical ventilation. She also has worsening Tbili concerning for intrahepatic insult/injury.     Neurology consulted for decreased responsiveness after discontinuation of sedation propofol discontinued 3/15. And Fentanyl discontinued this AM. But despite this has remained poorly responsive, grimacing to pain all extremities to nox stim, opens eyes to voice but not following commands but otherwise making purposeful movements in extremities. Given this an MRI was obtained today showing evidence of chronic micro-hemorrhages, which may have on prelim read been interpreted as acute micro hemorrhages. Given this her Bivalrudin was held and recommendations for continuing bivalrudin in the setting of possible acute micro hemorrhages was also posed to neurology.        Past Medical History  No past medical history on file.  Surgical History  No past surgical history on file.  Social History  Social History     Tobacco Use    Smoking status: Every Day     Types: Cigarettes    Smokeless tobacco: Never     Allergies  Heparin  Medications Prior to Admission   Medication Sig Dispense Refill Last Dose/Taking    fondaparinux (Arixtra) 7.5 mg/0.6 mL syringe Inject 7.5 mg under the skin once daily.          Review of Systems  Neurological Exam  Physical  "Exam  GENERAL APPEARANCE:  No distress, off sedation, intubated on CVVH, opens eyes to voice (with scleral icterus), questionable if able to follow eye closure command. Peripheral pitting edema 1+ up to knees bilaterally in lower extremities.    CRANIAL NERVES:   CN 2   Does not blink to threat  CN 3, 4, 6   Pupils round, 3 mm in diameter, equally reactive to light. No gaze deviation. No nystagmus.   CN 7   facial grimace to noxious stimuli   CN 8   Testing deferred due to altered mental status  CN 9/10  cough and gag intact  CN 11   Testing deferred due to altered mental status  CN 12   Testing deferred due to altered mental status    MOTOR:   Muscle bulk and tone were normal in both upper and lower extremities.   No fasciculations, tremor or other abnormal movements were present.   Subtle grimace to noxious stimuli in all extremities but no withdrawal    REFLEXES:                        R          L  Biceps:        2          2  Triceps:       2          2  Knee:           1         1  Ankle:         1          1    Babinski: toes equivocal to plantar stimulation.     No clonus or other pathologic reflexes present.     SENSORY:   Subtle grimace to noxious stimuli in all extremities    GAIT UNTESTABLE   Gait was untestable.     Secondary to an alteration in the patient's level of alertness and the ability to cooperate, gait testing was not possible and was medically contraindicated.     COORDINATION UNTESTABLE   Coordination was untestable.     Secondary to an alteration in the patient's level of alertness and the ability to cooperate, coordination testing was not possible.    Last Recorded Vitals  Blood pressure 95/59, pulse 96, temperature 36.9 °C (98.4 °F), resp. rate (!) 28, height 1.676 m (5' 6\"), weight 62 kg (136 lb 11 oz), SpO2 98%.    Relevant Results  Scheduled medications  ergocalciferol, 1,250 mcg, oral, Weekly  pantoprazole, 40 mg, oral, Daily before breakfast   Or  esomeprazole, 40 mg, nasoduodenal " tube, Daily before breakfast   Or  pantoprazole, 40 mg, intravenous, Daily before breakfast  hydrocortisone sodium succinate, 50 mg, intravenous, BID  insulin lispro, 0-10 Units, subcutaneous, q6h  lactulose, 10 g, oral, q6h  artificial tears, 2 drop, Both Eyes, q24h  meropenem, 2 g, intravenous, q12h  oxygen, , inhalation, Continuous - Inhalation  polyethylene glycol, 17 g, oral, Daily  rifAXIMin, 550 mg, oral, q12h COCO  sennosides-docusate sodium, 2 tablet, oral, Daily  thiamine, 100 mg, oral, Daily  vancomycin, 750 mg, intravenous, q24h  vitamin B complex-vitamin C-folic acid, 5 mL, oral, Daily      Continuous medications  bivalirudin, 0.05-0.49 mg/kg/hr, Last Rate: 0.1 mg/kg/hr (03/16/25 1330)  fentaNYL, 0-300 mcg/hr, Last Rate: Stopped (03/16/25 1100)  norepinephrine, 0.01-1 mcg/kg/min (Dosing Weight), Last Rate: Stopped (03/15/25 1800)  PrismaSol BGK 2/3.5, 25 mL/kg/hr (Dosing Weight), Last Rate: 25 mL/kg/hr (03/15/25 2156)  propofol, 0-50 mcg/kg/min (Dosing Weight), Last Rate: Stopped (03/15/25 1145)      PRN medications  PRN medications: acetaminophen **OR** acetaminophen, alteplase, dextrose, dextrose, fentaNYL, glucagon, glucagon, oxygen, vancomycin    Results from last 72 hours   Lab Units 03/16/25  0507 03/15/25  1405 03/15/25  0453 03/14/25  0950 03/14/25  0753   SODIUM mmol/L 134* 131* 132*   < > 129*   POTASSIUM mmol/L 3.7 4.3 4.0   < > 6.5*   BUN mg/dL 53* 51* 54*   < > 55*   CREATININE mg/dL 1.71* 1.71* 1.49*   < > 1.86*   CALCIUM mg/dL 8.9 9.5 9.7   < > 8.9   MAGNESIUM mg/dL 2.38  --  2.56*  --  2.69*    < > = values in this interval not displayed.      Results from last 72 hours   Lab Units 03/16/25  0507 03/15/25  1405 03/15/25  0453   WBC AUTO x10*3/uL 13.4* 15.1* 17.4*   HEMOGLOBIN g/dL 9.9* 10.0* 10.2*   HEMATOCRIT % 26.9* 27.1* 27.7*   PLATELETS AUTO x10*3/uL 144* 159 161      Results from last 72 hours   Lab Units 03/16/25  0507 03/15/25  0453   AST U/L 118* 135*   ALT U/L 58* 53*   ALK PHOS  U/L 246* 257*       Results from last 72 hours   Lab Units 03/16/25  0507 03/15/25  0453   INR  1.9* 1.4*                Demarco Coma Scale  Best Eye Response: None  Best Verbal Response: None  Best Motor Response: None  [place] Coma Scale Score: 3                 I have personally reviewed the following imaging results MR brain w and wo IV contrast    Result Date: 3/16/2025  Interpreted By:  Jon Wright  and Ana Prasad STUDY: MR BRAIN W AND WO IV CONTRAST;  3/16/2025 3:52 am   INDICATION: Signs/Symptoms:CT head w/ hyperdense lesionthat may relate to edema vs bland microhemorrhage, metastatic lesion, or cavernous malformation.   COMPARISON: CT head 03/15/2025.   ACCESSION NUMBER(S): [id number]   ORDERING CLINICIAN: LAINA PAEZ   TECHNIQUE: Axial T2, axial T1, axial T1 FS SPAIR, axial FLAIR, DWI, and gradient echo T2 weighted images of brain were acquired. Post contrast T1 weighted images were acquired after administration of 12 ML Dotarem gadolinium based intravenous contrast.   FINDINGS: There are multiple small foci of susceptibility artifact located within the bilateral cerebral hemispheres and cerebellum which may reflect microhemorrhages from emboli or could reflect small cavernomas.   There is no abnormal enhancement associated with any of these lesions or anywhere else in the intracranial compartment.   Hyperattenuation within the right frontal lobe seen on the prior CT head corresponds to a 6 mm focus susceptibility artifact located within the right frontal lobe as seen on series 5, image 29 of 38). There is mild T2 hyperintense signal located within the adjacent brain parenchyma which may reflect small amount of vasogenic edema versus gliosis. There is no surrounding mass effect. Remaining foci of susceptibility artifact demonstrate no striking surrounding signal abnormality. There are few scattered foci of T2 hyperintensity within the bilateral cerebral hemispheric white matter and marilyn which  are nonspecific in etiology, could be sequela of gliosis/chronic small vessel ischemic changes.   There is symmetric T2 hyperintense signal located within the bilateral thalami without associated restricted diffusion which could be a sequela of hepatic encephalopathy given patient's clinical history or could be sequela of recent respiratory arrest.   There is no acute intracranial infarct. Ventricles, sulci, and basilar cisterns are overall normal in size, shape, and configuration. There is no abnormal extra-axial fluid collection.   There is a partially empty sella, nonspecific finding.   There are small mucosal cysts located within the right maxillary sinus and there is partial opacification of the mastoid air cells with nonspecific fluid.       1. Multiple small foci of susceptibility artifact within the supra and infratentorial compartment may reflect old microhemorrhages, possibly embolic in etiology versus cavernomas. 2. Small focus of hyperattenuation seen on the prior CT head corresponds to a microhemorrhage versus cavernoma located within the right frontal lobe. There is mild signal abnormality within the adjacent brain parenchyma which may reflect gliosis versus vasogenic edema, favored gliosis. There is no striking surrounding mass effect. 3. Symmetric signal abnormality located within the bilateral thalamus may be sequela of acute hepatic encephalopathy or hypoxic encephalopathy, given patient's clinical history.   I personally reviewed the images/study and I agree with the findings as stated by Osmar Perez MD. This study was interpreted at [facility], [place], [place]   MACRO: Yris Wright discussed the significance and urgency of this critical finding by EPIC chat with  LAINA PAEZ on 3/16/2025 at 10:41 am. (**-RCF-**) Findings:  See findings.   ne   Signed by: Jon Wright 3/16/2025 10:41 AM Dictation workstation:   [id number]    XR chest 1 view    Result  Date: 3/16/2025  Interpreted By:  Jaiden Burns, STUDY: XR CHEST 1 VIEW;  3/16/2025 8:33 am   INDICATION: Signs/Symptoms:daily intubation, c/f aspiration.     COMPARISON: 03/15/2025   ACCESSION NUMBER(S): [id number]   ORDERING CLINICIAN: LAINA PAEZ   FINDINGS: The endotracheal tube tip over the trachea approximately 4.5 cm above the roland. Right IJ central venous line in place.   Hyperinflated lungs with increased lung markings bilaterally similar to the prior. No effusion seen. The cardiac silhouette is within normal limits for size       1.  ETT tip and the right IJ central venous line in satisfactory position 2. Redemonstration of extensive increased interstitial markings bilaterally, similar to the prior     MACRO: None   Signed by: Jaiden Burns 3/16/2025 8:56 AM Dictation workstation:   [id number]    CT head wo IV contrast    Result Date: 3/15/2025  Interpreted By:  Laina Humphreys and Hofer Lindsay STUDY: CT HEAD WO IV CONTRAST;  3/15/2025 3:44 pm   INDICATION: Signs/Symptoms:encephalopathy.   COMPARISON: None.   ACCESSION NUMBER(S): [id number]   ORDERING CLINICIAN: LAINA PAEZ   TECHNIQUE: Noncontrast axial CT images of head were obtained with coronal and sagittal reconstructed images.   FINDINGS: BRAIN PARENCHYMA: Within the subcortical white matter of the right parasagittal frontal lobe there is a 0.5 cm x 0.5 cm hyperdense lesion with a thin rim of surrounding hypodensity that may relate to edema. No other similar lesions. No parenchymal evidence of an acute large territory ischemic infarct. Gray-white matter distinction is preserved.   VENTRICLES and EXTRA-AXIAL SPACES: No acute extra-axial or intraventricular hemorrhage. No effacement of cerebral sulci. The ventricles and sulci are age-concordant.   PARANASAL SINUSES/MASTOIDS: There is a 1 cm osteoma in the right frontal sinus. No hemorrhage or air-fluid levels within the visualized paranasal sinuses. The mastoids are well  aerated.   CALVARIUM/ORBITS: No skull fracture. The orbits and globes are intact to the extent visualized.   EXTRACRANIAL SOFT TISSUES: No discernible abnormality.   Partially visualized nasogastric tube and endotracheal tube.       Within the subcortical white matter of the right parasagittal frontal lobe there is a 0.5 cm x 0.5 cm hyperdense lesion with a thin rim of surrounding hypodensity that may relate to edema. Differential diagnosis includes bland microhemorrhage, metastatic lesion, or cavernous malformation with or without internal hemorrhage. Recommend contrast-enhanced MRI for further evaluation.   I personally reviewed the images/study and resident's interpretation and I agree with the findings as stated by Gabriela Yee MD (resident radiologist). This study was analyzed and interpreted at [facility], [place], [place].   MACRO: Laina Humphreys discussed the significance and urgency of this critical finding via "[employer]" with confirmation of receipt with LAINA PAEZ on 3/15/2025 at 5:04 pm.  (**-RCF-**) Findings:  See findings.   Signed by: Laina Humphreys 3/15/2025 5:04 PM Dictation workstation:   [id number]    XR chest 1 view    Result Date: 3/15/2025  Interpreted By:  Hossein Harrison, STUDY: XR CHEST 1 VIEW;  3/15/2025 7:41 am   INDICATION: Signs/Symptoms:daily cxr while intubated.     COMPARISON: Exam dated 03/14/2025; CT dated 03/03/2025   ACCESSION NUMBER(S): [id number]   ORDERING CLINICIAN: LESIA MCLEAN   FINDINGS: AP radiograph of the chest was provided.   Endotracheal tube is in place with the tip projecting 3.4 cm above the roland. Esophageal temperature probe tip projects over the mid to lower esophagus. Enteric tube projects over the esophagus, tip not visualized. Right IJ central venous catheter tip projects over the upper SVC.   CARDIOMEDIASTINAL SILHOUETTE: Cardiomediastinal silhouette is stable in size and configuration.   LUNGS: Interval  increase in airspace opacities within the medial right lower lung. Otherwise, there are persistent diffusely increased interstitial and hazy airspace opacities throughout the lungs. Similar blunting of the right costophrenic angle. The lungs otherwise appear hyperinflated with relative lucency towards the apices that is most consistent with atelectasis.   ABDOMEN: No remarkable upper abdominal findings.   BONES: No acute osseous changes.       1.  Interval increase in airspace opacities in the medial right lower lung. Findings may represent worsening multifocal infection or interval aspiration. 2. Otherwise, stable diffuse interstitial and airspace opacities and small right pleural effusion. 3. Medical devices as above.       MACRO: None   Signed by: Hossein Harrison 3/15/2025 1:43 PM Dictation workstation:   [id number]    Vascular US upper extremity venous duplex left    Result Date: 3/14/2025            [place] [address]   Tel [phone] and Fax [phone]  Vascular Lab Report VASC US UPPER EXTREMITY VENOUS DUPLEX LEFT  Patient Name:      LOUISA Sanchez Physician: [id number] Jessica Garay MD Study Date:        3/14/2025          Ordering           [id number] LESIA MORRIS                                       Physician:         VINAY MRN/PID:           [id number]           Technologist:      Baylee Alex COCO Accession#:        [id number]       Technologist 2: Date of Birth/Age: 1985 / 39      Encounter#:        [id number]                    years Gender:            F Admission Status:  Inpatient          Location           [facility]                                       Performed:  Diagnosis/ICD: Left arm swelling-M79.89 Indication:    Limb swelling CPT Codes:     90888 Peripheral venous duplex scan for DVT Limited  CONCLUSIONS: Right Upper Venous: The subclavian vein demonstrates a normal spontaneous and phasic flow. Left Upper Venous: No  evidence of acute deep vein thrombus visualized in the left upper extremity. The cephalic vein is small and poorly visualized. Cannot rule out thrombus of non-visualized internal jugular vein due to patient position.  Imaging & Doppler Findings:  Right             Flow Subclavian Spontaneous/Phasic  Left                Compress Thrombus        Flow Subclavian Proximal   Yes      None   Spontaneous/Phasic Subclavian Mid        Yes      None   Spontaneous/Phasic Subclavian Distal     Yes      None   Spontaneous/Phasic Axillary              Yes      None   Spontaneous/Phasic Brachial              Yes      None Cephalic              Yes      None Basilic               Yes      None  [id number] Jessica Garay MD Electronically signed by [id number] Jessica Garay MD on 3/14/2025 at 11:30:26 AM  ** Final **     XR chest 1 view    Result Date: 3/14/2025  Interpreted By:  Gerardo Rice, STUDY: XR CHEST 1 VIEW;  3/14/2025 10:03 am   INDICATION: Signs/Symptoms:intubated, daily cxr.   COMPARISON: Chest radiograph 03/13/2025 abdominopelvic CT scan 03/03/2025   ACCESSION NUMBER(S): [id number]   ORDERING CLINICIAN: LESIA MCLEAN   FINDINGS: AP radiograph of the chest. Endotracheal tube tip now terminates 7 cm superior to roland. Enteric tube is again seen coursing below diaphragm and tip not included in field of view.; However, the side hole overlies GE junction region and slight advancement is recommended. Esophageal temperature probe tip again overlies expected location of lower esophagus. Right IJ approach central venous catheter with tip projecting over upper SVC, also stable.   CARDIOMEDIASTINAL SILHOUETTE: The cardiomediastinal silhouette is stable in size and configuration.   LUNGS: There is again demonstration of diffuse interstitial and alveolar infiltrates throughout bilateral lungs. Overall lung aeration is grossly unchanged from prior examination. There is similar chronic blunting of bilateral costophrenic angles. There is no  pneumothorax.   ABDOMEN: No remarkable upper abdominal findings.   BONES: No acute osseous abnormality.       1. Similar diffuse interstitial and alveolar infiltrates throughout bilateral lungs 2. Medical devices as above. No sizable pneumothorax. Slight advancement of enteric tube is recommended.   Signed by: Gerardo Rice 3/14/2025 10:33 AM Dictation workstation:   [id number]    XR chest 1 view    Result Date: 3/13/2025  Interpreted By:  Miguel Dill and Bartolomei Aguilar Christopher STUDY: XR CHEST 1 VIEW;  3/13/2025 7:56 am   INDICATION: Signs/Symptoms:daily cxr while intubated.   COMPARISON: Chest radiograph 03/12/2025   ACCESSION NUMBER(S): [id number]   ORDERING CLINICIAN: LESIA MCLEAN   FINDINGS: Semi-erect AP radiograph of the chest was provided.   Endotracheal tube positioned 7.0 cm above the roland. Esophageal temperature probe is noted. Enteric tube courses below the level of the diaphragm and beyond the field of view. Right internal jugular central venous catheter with distal tip overlying the upper SVC.   CARDIOMEDIASTINAL SILHOUETTE: Cardiomediastinal silhouette is stable in size and configuration.   LUNGS: Similar appearance of diffuse reticular airspace opacities more prominent within the mid to lower lung fields. Similar perihilar vascular congestion. Blunting of the right costophrenic angle may suggest small pleural effusion versus atelectasis, correlate with patient's volume status.   ABDOMEN: No remarkable upper abdominal findings.   BONES: No acute osseous changes.       1.  Persistent reticular airspace opacities within the mid to lower lung fields without significant interval change when compared to prior. 2. Similar perihilar vascular congestion, correlate with patient's volume status. 3. Medical lines and devices as above.   I personally reviewed the images/study and I agree with the findings as stated. This study was interpreted at [facility],  [place], [place].   MACRO: None   Signed by: Miguel Dill 3/13/2025 10:43 AM Dictation workstation:   [id number]    ECG 12 Lead    Result Date: 3/12/2025  Undetermined rhythm Right bundle branch block Abnormal ECG When compared with ECG of 03-MAR-2025 23:46, Previous ECG has undetermined rhythm, needs review Right bundle branch block is now Present Confirmed by Barrett Rabago ([id number]) on 3/12/2025 4:04:14 PM    ECG 12 Lead    Result Date: 3/12/2025  Sinus tachycardia Nonspecific T wave abnormality Abnormal ECG When compared with ECG of 05-MAR-2025 15:33, Sinus rhythm has replaced Ectopic atrial rhythm Right bundle branch block is no longer Present Confirmed by Jordan Iqbal ([id number]) on 3/12/2025 2:40:58 PM    XR chest 1 view    Result Date: 3/12/2025  Interpreted By:  Miguel Dill and Stevens Alex STUDY: XR CHEST 1 VIEW;  3/12/2025 8:18 am   INDICATION: Signs/Symptoms:Incresaing Fio2.     COMPARISON: XR chest 03/10/2025   ACCESSION NUMBER(S): [id number]   ORDERING CLINICIAN: LESIA MCLEAN   FINDINGS: AP radiograph of the chest was provided.   MEDICAL DEVICES Endotracheal tube terminates 7.6 cm from the roland. Right internal jugular central venous catheter tip projects over the expected location of the superior vena cava. Enteric tube courses past the diaphragm and terminates outside the field of view. Esophageal temperature probe within the mid aspect of the esophagus.   CARDIOMEDIASTINAL SILHOUETTE: Cardiomediastinal silhouette is normal in size and configuration.   LUNGS: Slight interval increase in bilateral reticular airspace opacities throughout the mid to lower lung fields. Slight increase in perihilar vascular congestion trace right pleural effusion. No pneumothorax.   ABDOMEN: No remarkable upper abdominal findings.   BONES: No acute osseous changes.       1.  Reticular airspace opacities within the mid to lower lung fields are slightly worsened in comparison to prior. Slight increase in perihilar  vascular congestion, which may be reflective of increased volume status.   I personally reviewed the images/study and I agree with the findings as stated by Wale Salas DO PGY-2. This study was interpreted at [facility], [place], [place].   MACRO: None   Signed by: Miguel Dill 3/12/2025 10:05 AM Dictation workstation:   [id number]    US liver with doppler    Result Date: 3/11/2025  Interpreted By:  Chang Moore,  and Robbi Guerrero STUDY: US LIVER WITH DOPPLER;  3/11/2025 3:21 pm   INDICATION: Signs/Symptoms:r/o TIPS malfunction, thrombus, assess degree of cirrhosis.     COMPARISON: CT 03/03/2025   ACCESSION NUMBER(S): [id number]   ORDERING CLINICIAN: LESIA MCLEAN   TECHNIQUE: Multiple images of the right upper quadrant were obtained. Images were obtained with grayscale, color, and spectral Doppler.   FINDINGS: LIVER: The liver measures 17.8 cm which is enlarged with increased echogenicity.   GALLBLADDER: The gallbladder is nondistended, and demonstrates gallstones and sludge without pericholecystic fluid or gallbladder wall thickening. The gallbladder wall thickness is 0.3 cm. Sonographic Loaiza's sign is negative.   BILIARY SYSTEM: No evidence of intra or extrahepatic biliary dilatation is identified; the common bile duct measures 0.3 cm.   DOPPLER EVALUATION:   TIPS Stent: A TIPS stent is visualized with the echogenic material in the lumen and no flow demonstrable on Doppler imaging likely representing thrombosis.   HEPATIC ARTERIES: Hepatic artery and its right and left branches RI's are estimated at 0.7, .61 and 0.8, respectively.   PORTAL VEIN: Portal vein is patent and measures 1.03. There is normal respiratory variation. Portal vein velocities are calculated as follows: main portal vein 8.7 cm/s, antegrade flow; left portal vein branch 15.9 cm/s, antegrade flow; right portal vein anterior branch 13.2, antegrade flow; right portal vein posterior branch is  not well visualized. The splenic vein is also patent.   HEPATIC VEIN: The right, middle and left hepatic veins are patent and demonstrate monophasic/biphasic antegrade flow. IVC appears also patent.   PANCREAS: The visualized pancreas is unremarkable in appearance.   RIGHT KIDNEY: The right kidney measures 12.2 cm in length. No hydronephrosis or renal calculi are seen.   SPLEEN: The spleen measures 18.1 cm and is grossly unremarkable.   PERITONEUM AND RETROPERITONEUM: There is no free or loculated fluid seen in the abdomen.       1. Cirrhotic morphology of the liver with TIPS which appears thrombosed. 2. Cholelithiasis without evidence of acute cholecystitis.   I personally reviewed the image(s) / study and I agree with the findings as stated by Cesar Culp MD. This study was interpreted at [facility], [place], [place].   MACRO: Critical Finding:  See findings. Notification was initiated on 3/11/2025 at 4:27 pm by  Cesar Culp.  (**-YCF-**)   Signed by: Chang Moore 3/11/2025 6:33 PM Dictation workstation:   [id number]    XR chest 1 view    Result Date: 3/10/2025  Interpreted By:  Rhett Tavares and Stevens Alex STUDY: XR CHEST 1 VIEW;  3/10/2025 11:43 am   INDICATION: Signs/Symptoms:ETT tube placement.     COMPARISON: XR chest 03/06/2025   ACCESSION NUMBER(S): [id number]   ORDERING CLINICIAN: LESIA MCLEAN   FINDINGS: AP radiograph of the chest was provided.   MEDICAL DEVICES Endotracheal tube terminates 5.8 cm from the roland. Right internal jugular central venous catheter tip projects over the expected location of the superior vena cava. Left internal jugular central venous catheter tip projects over the expected location of the superior cavoatrial junction. Enteric tube visualized coursing below the level of diaphragm with side hole just inferior to the level of the diaphragmatic hiatus with the tip projecting outside the field of view. Esophageal temperature probe seen within  the mid esophagus.   CARDIOMEDIASTINAL SILHOUETTE: Cardiomediastinal silhouette is normal in size and configuration.   LUNGS: Trace bilateral reticular airspace opacities throughout the mid to lower lung fields, improved compared to prior examination. Trace right pleural effusion, similar to prior.   ABDOMEN: No remarkable upper abdominal findings.   BONES: No acute osseous changes.       1. Endotracheal tube visualized with tip terminating 5.8 cm proximal to the level of the roland. 2. Reticular airspace opacities of the mid to lower lung fields are improved compared to prior examination. Stable trace right pleural effusion 3. Enteric tube visualized with side hole terminating just below the level of the diaphragmatic hiatus. Recommend advancement by 3-5 cm.   I personally reviewed the images/study and I agree with the findings as stated by Wale Salas DO PGY-2. This study was interpreted at [place], [place].   MACRO: None   Signed by: Rhett Tavares 3/10/2025 1:14 PM Dictation workstation:   [id number]    XR chest 1 view    Result Date: 3/6/2025  Interpreted By:  Nitish James and Beyersdorf Conner STUDY: XR CHEST 1 VIEW;  3/6/2025 5:09 pm   INDICATION: Signs/Symptoms:line placement (right trialysis.   COMPARISON: Single-view chest 03/05/2025   ACCESSION NUMBER(S): [id number]   ORDERING CLINICIAN: GABY DELGADO   FINDINGS: AP radiograph of the chest was provided.   Endotracheal tube terminates 6.4 cm superior to the roland. Right internal jugular central venous catheter tip projects over the expected location of the mid to upper SVC. Left internal jugular central venous catheter tip projects over the expected location of the lower SVC. Enteric tube courses past the diaphragm and with its tip projecting over the expected location of the gastric body. Esophageal temperature probe terminates over the mid esophagus.   CARDIOMEDIASTINAL SILHOUETTE: Cardiomediastinal  silhouette is stable in size and configuration.   LUNGS: Bilateral reticular airspace opacities throughout the mid to lower lung fields, slightly improved from the prior exam. Trace right pleural effusion. No pneumothorax.   ABDOMEN: No remarkable upper abdominal findings.   BONES: No acute osseous changes.       1.  Right internal jugular central venous catheter tip projects over the expected location of the mid to upper SVC. 2. Reticular airspace opacities in the mid to lower lungs are slightly improved from the prior exam, and again suggestive of multifocal infection. 3. Additional medical devices as above.   I personally reviewed the image(s)/study and resident interpretation. I agree with the findings as stated by resident Jude Conley. Data analyzed and images interpreted at [facility], [place], [place].   MACRO: None   Signed by: Nitish James 3/6/2025 7:29 PM Dictation workstation:   [id number]    XR abdomen 1 view    Result Date: 3/6/2025  Interpreted By:  Nitish James and Ohs Zachary STUDY: XR ABDOMEN 1 VIEW;  3/6/2025 1:53 am   INDICATION: Signs/Symptoms:worsening abdominal distention.     COMPARISON: CT ABDOMEN PELVIS W IV CONTRAST 3/3/2025, DX TH ABDOMEN AP VIEW 3/10/2012   ACCESSION NUMBER(S): [id number]   ORDERING CLINICIAN: GABY DELGADO   FINDINGS: Enteric tube distal tip and side-port overlying the expected location of the gastric body. Esophageal temperature probe distal tip overlying the lower cardiac silhouette. Tips in place.   There is a nonobstructive bowel gas pattern. No extraluminal or portal venous gas.   Visualized soft tissues and osseous structures are unremarkable.   Diffuse coarse reticular opacities of the lung bases. Small bilateral pleural effusions..       1. Nonobstructive bowel gas pattern. 2. Enteric tube distal tip and side-port overlying the expected location of the gastric body. 3. Diffuse coarse reticular opacities of the lung bases. 4.  Small bilateral pleural effusions.     I personally reviewed the images/study and I agree with the findings as stated by Dr. Jeffrey Ceja. This study was interpreted at [facility], [place], [place].   MACRO: none   Signed by: Nitish James 3/6/2025 7:47 AM Dictation workstation:   [id number]    XR chest 1 view    Result Date: 3/5/2025  Interpreted By:  Ellie Stephens, STUDY: XR CHEST 1 VIEW; 3/5/2025 11:57 am   INDICATION: Signs/Symptoms:Desat.   COMPARISON: 3/4/2025   ACCESSION NUMBER(S): [id number]   ORDERING CLINICIAN: GABY DELGADO   FINDINGS: AP radiograph of the chest was provided.   MEDICAL DEVICES: ETT distal tip 4.9 cm from the roland. Left IJ CVC distal tip overlying the expected location of the superior cavoatrial junction. Esophageal temperature probe distal tip overlying the cardiac silhouette. Enteric tube in place with tip outside field of view.     CARDIOMEDIASTINAL SILHOUETTE: Cardiomediastinal silhouette is largely obscured by overlying airspace opacities.     LUNGS: Patchy and dense consolidative opacities within the bilateral mid to lower lungs, most confluent in the right lower lung. Right pleural effusion. No pneumothorax..   ABDOMEN: No remarkable upper abdominal findings.   BONES: No acute osseous changes.       1. Persistent multifocal consolidation in bilateral lungs with slight worsening of aeration of the right lung area 2. Small to moderate right pleural effusion.       Signed by: Ellie Hammond 3/5/2025 12:17 PM Dictation workstation:   [id number]    ECG 12 Lead    Result Date: 3/4/2025  Narrow QRS tachycardia ST & T wave abnormality, consider inferior ischemia ST & T wave abnormality, consider anterolateral ischemia Abnormal ECG When compared with ECG of 03-MAR-2025 21:51, No significant change was found Confirmed by Barrett Rabago ([id number]) on 3/4/2025 4:20:45 PM    XR chest 1 view    Result Date: 3/4/2025  Interpreted By:  Hossein Harrison,   and Brenna Murphy STUDY: XR CHEST 1 VIEW;  3/4/2025 4:52 am   INDICATION: Signs/Symptoms:Hypoxia.     COMPARISON: XR CHEST 1 VIEW 3/3/2025, CT ABDOMEN PELVIS W IV CONTRAST 3/3/2025 CT PE 03/03/2025   ACCESSION NUMBER(S): [id number]   ORDERING CLINICIAN: MEGHANN DEGROOT   FINDINGS: AP radiograph of the chest was provided.   MEDICAL DEVICES: ETT distal tip 5.1 cm from the roland. Left IJ CVC distal tip overlying the expected location of the superior cavoatrial junction. Esophageal temperature probe distal tip overlying the cardiac silhouette. Enteric tube side port overlying the GE junction with distal tip not imaged. Partially visualized tips.   CARDIOMEDIASTINAL SILHOUETTE: Cardiomediastinal silhouette is largely obscured by overlying airspace opacities. Within these limits, is likely stable from prior exam.   LUNGS: Similar patchy opacities within the bilateral mid to lower lungs, most confluent in the right lower lung. Small right pleural effusion. No pneumothorax..   ABDOMEN: No remarkable upper abdominal findings.   BONES: No acute osseous changes.       1.  Similar bilateral mid to lower lung airspace and interstitial opacities. 2. Enteric tube side port overlying the GE junction. Advancement recommended. Other medical devices as above.   I personally reviewed the images/study and I agree with the findings as stated by Dr. Jeffrey Ceja. This study was interpreted at [place], [place].   MACRO: None   Signed by: Hossein Harrison 3/4/2025 11:44 AM Dictation workstation:   [id number]    Transthoracic Echo (TTE) Complete    Result Date: 3/4/2025   [facility], [address] [place] [address]                Tel [phone] and Fax [phone] TRANSTHORACIC ECHOCARDIOGRAM REPORT  Patient Name:       LOUISA HAMM        Reading Physician:    [id number] Cony Cartwright MD Study Date:          3/4/2025            Ordering Provider:    [id number] MEGHANN DEGROOT MRN/PID:            [id number]            Fellow:               [id number] Dakotah Duran MD Accession#:         [id number]        Nurse: Date of Birth/Age:  1985 / 39 years Sonographer:          Willie Palafox RDCS Gender assigned at  F                   Additional Staff: Birth: Height:             167.64 cm           Admit Date: Weight:             63.50 kg            Admission Status:     Inpatient -                                                               Routine BSA / BMI:          1.72 m2 / 22.60     Encounter#:           [id number]                     kg/m2 Blood Pressure:     77/51 mmHg          Department Location:  [address] Study Type:    TRANSTHORACIC ECHO (TTE) COMPLETE Diagnosis/ICD: Shock, unspecified-R57.9 Indication:    tachycardia, shock CPT Code:      Echo Complete w Full Doppler-51085 Patient History: Pertinent History: Chest Pain and Dyspnea. 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 Study Detail: The following Echo studies were performed: 2D, M-Mode, Doppler and               color flow. Technically challenging study due to body habitus,               patient lying in supine position, poor acoustic windows and               prominent lung artifact. The patient is intubated. Definity used               as a contrast agent for endocardial border definition and agitated               saline used as a contrast agent for intraseptal flow evaluation.               Total contrast used for this procedure was 2 mL via IV push.               Unable to obtain suprasternal notch view.   PHYSICIAN INTERPRETATION: Left Ventricle: Left ventricular ejection fraction is moderately decreased, by visual estimate at 30-35%. There is global hypokinesis of the left ventricle with minor regional variations. The left ventricular cavity size is normal. There is normal septal and normal posterior left ventricular wall thickness. Left ventricular diastolic filling cannot be determined due to E/A wave fusion. There is no definite left ventricular thrombus visualized. Technically difficult exam despite the use of echocontrast due to off axis somewhat foreshortened apical views. Left Atrium: The left atrial size was not well visualized. There is no evidence of a patent foramen ovale. A bubble study using agitated saline was performed. Bubble study is negative. Agitated saline contrast study was negative for intracardiac shunt. Right Ventricle: The right ventricle was not well visualized. There is reduced right ventricular systolic function. Right Atrium: The right atrium is normal in size. Aortic Valve: The aortic valve was not well visualized. There is no evidence of aortic valve regurgitation. The peak instantaneous gradient of the aortic valve is 7 mmHg. Mitral Valve: The mitral valve is mildly thickened. There is mild mitral annular calcification. There is trace mitral valve regurgitation. Tricuspid Valve: The tricuspid valve is structurally normal. There is trace tricuspid regurgitation. The right ventricular systolic pressure is unable to be estimated. Pulmonic Valve: The pulmonic valve is not well visualized. There is no indication of pulmonic valve regurgitation. Pericardium: Trivial pericardial effusion. Aorta: The aortic root is normal. Systemic Veins: The inferior vena cava was not well visualized. TIPS shunt seen within the portl vein. In comparison to the previous echocardiogram(s): There are no prior studies on this patient for comparison purposes. No prior echocardiogram available for comparison.   CONCLUSIONS:  1. Poorly visualized anatomical structures due to suboptimal image quality.  2. Left ventricular ejection fraction is moderately decreased, by visual estimate at 30-35%.  3. There is global hypokinesis of the left ventricle with minor regional variations.  4. No left ventricular thrombus visualized.  5. Technically difficult exam despite the use of echocontrast due to off axis somewhat foreshortened apical views.  6. There is reduced right ventricular systolic function.  7. Agitated saline contrast study was negative for intracardiac shunt.  8. TIPS shunt seen within the portl vein.  9. There is no evidence of a patent foramen ovale. 10. Pt is tachycardic with  bpm throughout the exam. 11. No prior echocardiogram available for comparison. QUANTITATIVE DATA SUMMARY:  2D MEASUREMENTS:         Normal Ranges: Ao Root d:       2.90 cm (2.0-3.7cm) LAs:             2.90 cm (2.7-4.0cm) IVSd:            0.70 cm (0.6-1.1cm) LVPWd:           0.70 cm (0.6-1.1cm) LVIDd:           4.70 cm (3.9-5.9cm) LVIDs:           3.80 cm LV Mass Index:   60 g/m2 LV % FS          19.1 %  AORTA MEASUREMENTS:         Normal Ranges: Ao Sinus, d:        2.90 cm (2.1-3.5cm)  LV SYSTOLIC FUNCTION:                      Normal Ranges: EF-Visual:      33 % LV EF Reported: 33 %  LV DIASTOLIC FUNCTION:          Normal Ranges: MV Peak E:             0.73 m/s (0.7-1.2 m/s)  AORTIC VALVE:           Normal Ranges: AoV Vmax:      1.32 m/s (<=1.7m/s) AoV Peak P.0 mmHg (<20mmHg) LVOT Max Low:  0.91 m/s (<=1.1m/s) LVOT VTI:      10.50 cm LVOT Diameter: 2.10 cm  (1.8-2.4cm) AoV Area,Vmax: 2.40 cm2 (2.5-4.5cm2)  RIGHT VENTRICLE: TAPSE: 15.5 mm RV s'  0.12 m/s  PULMONIC VALVE:          Normal Ranges: PV Max Low:     0.9 m/s  (0.6-0.9m/s) PV Max PG:      3.2 mmHg  [id number] Cony Cartwright MD Electronically signed on 3/4/2025 at 9:42:22 AM  ** Final **     XR chest 1 view    Result Date: 3/3/2025  Interpreted By:  Vivien Garcia, STUDY: XR CHEST 1  VIEW;  3/3/2025 8:44 pm   INDICATION: Signs/Symptoms:L IJ placement.     COMPARISON: 03/03/2025   ACCESSION NUMBER(S): [id number]   ORDERING CLINICIAN: JIMMY AMATO   FINDINGS: AP radiograph of the chest was provided.   Endotracheal tube has been retracted and terminates approximately 7 cm above the roland.   Enteric to extends over the gastric fundus with side port at the level of the gastroesophageal junction.   Left IJ central venous catheter with tip projecting over the distal SVC.   CARDIOMEDIASTINAL SILHOUETTE: Cardiomediastinal silhouette is normal in size and configuration.   LUNGS: Extensive bilateral mid to lower lung field airspace opacities similar to prior imaging. Blunting of the right costophrenic angle is again noted. No sizable pneumothorax.   ABDOMEN: No remarkable upper abdominal findings.   BONES: No acute osseous changes.       1.  Medical devices as detailed. High position of the endotracheal tube and enteric tube as detailed. 2. Airspace opacities and possible trace right pleural effusion, similar to prior.       MACRO: None   Signed by: Vivien Garcia 3/3/2025 9:26 PM Dictation workstation:   [id number]    XR chest 1 view    Result Date: 3/3/2025  Interpreted By:  Luis Carlos Barone and Beyersdorf Conner STUDY: XR CHEST 1 VIEW;  3/3/2025 5:47 pm   INDICATION: Signs/Symptoms:Intubation.   COMPARISON: Single-view chest 03/03/2025   ACCESSION NUMBER(S): [id number]   ORDERING CLINICIAN: BETTY ORTEGA   FINDINGS: AP radiograph of the chest was provided.   Endotracheal tube terminates at the roland, directed towards the right mainstem bronchus. Enteric tube courses below the diaphragm and with its tip terminating below the limits of the exam.     CARDIOMEDIASTINAL SILHOUETTE: Cardiomediastinal silhouette is normal in size and configuration.   LUNGS: Diffuse reticular opacities in the bilateral lungs. Opacity overlying the right costophrenic angle favored to represent atelectasis or pleural  effusion.   ABDOMEN: No remarkable upper abdominal findings.   BONES: No acute osseous changes.       1.  Endotracheal tube terminates at the roland, directed towards the right mainstem bronchus. Repositioning is recommended. 2. Diffuse reticular opacities bilaterally consistent with multifocal pneumonia. 3. Question small right basilar pleural effusion and atelectasis.   I personally reviewed the image(s)/study and resident interpretation. I agree with the findings as stated by resident Jude Conley. Data analyzed and images interpreted at [facility], [place], [place].   MACRO: Jude Conley discussed the significance and urgency of this critical finding by telephone with  BETTY ORTEGA on 3/3/2025 at 5:58 pm.  (**-RCF-**) Findings:  See findings.   Signed by: Luis Carlos Barone 3/3/2025 6:16 PM Dictation workstation:   [id number]    CT abdomen pelvis w IV contrast    Result Date: 3/3/2025  Interpreted By:  Dakotah Ness and Bera Kaustav STUDY: CT ABDOMEN PELVIS W IV CONTRAST;  3/3/2025 3:41 pm   INDICATION: Signs/Symptoms:Hypotensive, cirrhosis.     COMPARISON: CTA chest abdomen pelvis on 05/15/2022   ACCESSION NUMBER(S): [id number]   ORDERING CLINICIAN: JIMMY AMATO   TECHNIQUE: CT of the abdomen and pelvis was performed.  Standard contiguous axial images were obtained at 3 mm slice thickness through the abdomen and pelvis. Coronal and sagittal reconstructions at 3 mm slice thickness were performed.  90 ML of Omnipaque 350 was administered intravenously without immediate complication.   FINDINGS: LOWER CHEST: Please refer to separately dictated CT of the chest for evaluation of the chest.   ABDOMEN:   LIVER: The liver demonstrates irregular nodular contour and parenchyma consistent with cirrhosis. Several ill-defined lesions are seen throughout the liver, within segment III and VIII, which is similar when compared to 2017, however incompletely characterized on this  examination, however previously characterized as regenerative nodules on MRI from 2011. Similar poor opacification of the tips stent with internal calcification. Similar appearance of multiple collaterals along the anterior abdominal wall as well as a recannulated paraumbilical vein.   BILE DUCTS: The intrahepatic and extrahepatic ducts are not dilated.   GALLBLADDER: The gallbladder is nondistended and without evidence of radiopaque stones.   PANCREAS: The pancreas appears unremarkable without evidence of ductal dilatation or masses.   SPLEEN: Similar splenomegaly, consistent with changes of portal hypertension.   ADRENAL GLANDS: Bilateral adrenal glands appear normal.   KIDNEYS AND URETERS: The kidneys are normal in size and enhance symmetrically.  No hydroureteronephrosis or nephroureterolithiasis is identified.   PELVIS:   BLADDER: The urinary bladder appears normal without abnormal wall thickening.   REPRODUCTIVE ORGANS: The uterus and bilateral adnexa are unremarkable.   BOWEL: The stomach is unremarkable.   The small and large bowel are normal in caliber and demonstrate no wall thickening.   Normal appendix.   VESSELS: There is no aneurysmal dilatation of the abdominal aorta. There is similar chronic occlusion of the IVC and common iliac veins, with extensive collaterals visualized.. There are extensive collaterals present within the anterior abdominal wall, as well as perigastric, paraesophageal and perisplenic varices.   PERITONEUM/RETROPERITONEUM/LYMPH NODES: Trace ascites. No loculated fluid collections. No abdominopelvic lymphadenopathy is present.   BONES AND ABDOMINAL WALL: No suspicious osseous lesions are identified. Extensive venous collaterals as described above. Otherwise, the abdominal wall soft tissues appear normal.       1.  No acute abnormality within the abdomen and pelvis. 2. Chronic findings of cirrhosis and portal hypertension as described above. Incomplete evaluation of a tips stent,  further evaluation with color Doppler ultrasound recommended 3. Please refer to separately dictated CT of the chest for evaluation of the chest findings.   I personally reviewed the images/study and I agree with the findings as stated. This study was interpreted at [place], [place].   MACRO: None   Signed by: Dakotah Ness 3/3/2025 5:06 PM Dictation workstation:   [id number]    CT angio chest for pulmonary embolism    Result Date: 3/3/2025  Interpreted By:  Hossein Harrison and Sheng Max STUDY: CT ANGIO CHEST FOR PULMONARY EMBOLISM;  3/3/2025 3:41 pm   INDICATION: Signs/Symptoms:chest pain, r/o PE.       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   COMPARISON: CTA chest abdomen pelvis 05/15/2022 and CT dated 06/30/2019   ACCESSION NUMBER(S): [id number]   ORDERING CLINICIAN: ARETHA RYAN   TECHNIQUE: Helical data acquisition of the chest was obtained after intravenous administration of 90 mL of Omnipaque 350 intravenously, as per PE protocol. Images were reformatted in coronal and sagittal planes. Axial and coronal maximum intensity projection (MIP) images were created and reviewed. No immediate complications.   FINDINGS: POTENTIAL LIMITATIONS OF THE STUDY: The assessment is limited by respiratory motion and suboptimal contrast opacification of pulmonary arteries.   HEART AND VESSELS: No discrete filling defects within the main pulmonary artery or its branches to  proximal segmental level. Please note that, assessment of distal segmental and subsegmental branches is limited and small peripheral emboli are not entirely excluded. Main pulmonary artery is at the upper limits of normal measuring 3.0 cm.   The thoracic aorta normal in course and caliber. Normal three-vessel aortic arch. There is minimal atherosclerosis present, including calcified  and noncalcified plaques.Although, the study is not tailored for evaluation of aorta, there is no definite evidence of acute aortic pathology. No coronary artery calcifications are seen. Please note, the study is not optimized for evaluation of coronary arteries.   The cardiac chambers are not enlarged.There are no findings to suggest right heart strain. There is no pericardial effusion seen.   Engorgement of the azygous and hemiazygous veins is likely sequela of azygous continuation of the IVC in addition to chronic congestion from occluded TIPS (series 401, image 114 and 239).   MEDIASTINUM AND ASHLYN, LOWER NECK AND AXILLA: The visualized thyroid gland is within normal limits. There are multiple prominent to mildly enlarged mediastinal lymph nodes seen, measuring up to 1.4 cm, which are nonspecific, but are likely felt to be reactive in nature. Prominent soft tissue within the prevascular mediastinum which measures 2.2 centimeters, stable across multiple prior exams and may represent residual or hyperplastic thymic tissue. Esophagus appears within normal limits as seen.     LUNGS AND AIRWAYS: The trachea and central airways are patent. No endobronchial lesion is seen.   New diffuse peribronchial consolidations throughout all 5 lobes concerning for multifocal bronchopneumonia. Findings are greatest within the right middle lobe with near-complete lobar consolidation. Background of mild centrilobular emphysema. Smooth interlobular septal thickening predominantly in the left-greater-than-right upper lobes is likely sequela of pulmonary interstitial edema. No pleural effusion or pneumothorax.     UPPER ABDOMEN: Postsurgical changes of TIPS which appears chronically occluded (series 401, image 266-333) dating back to 2019. Partially imaged hepatosplenomegaly. Numerous prominent splenorenal varices.     CHEST WALL AND OSSEOUS STRUCTURES: Numerous collateral vasculature throughout the soft tissues tracking along the  left-greater-than-right anterior chest walls.No acute osseous pathology.There are no suspicious osseous lesions.Minimal multilevel degenerative changes within visualized spine.       1. No discrete filling defects within the main pulmonary artery or its branches to  proximal segmental level. Please note that, assessment of distal segmental and subsegmental branches is limited and small peripheral emboli are not entirely excluded. 2. Findings likely representing multifocal pneumonia to include near-complete right middle lobe consolidation. 3. Additional findings of interlobular septal thickening which likely indicates an additional component of pulmonary edema. 4. Hepatosplenomegaly with numerous prominent splenorenal varices and subcutaneous collateral vessels, consistent with portal hypertension. 5. Additional incidental non-acute findings as detailed above. See same day abdomen/pelvis CT report for full evaluation of findings below the diaphragm.     I personally reviewed the images/study and I agree with the findings as stated by Dr. Dennis Carrero. This study was interpreted at [place], [place].   MACRO: Dennis Carrero discussed the significance and urgency of this critical finding by epic secure chat with  ARETHA RYAN on 3/3/2025 at 4:27 pm.  (**-RCF-**) Findings:  See findings.   Signed by: Hossein Harrison 3/3/2025 4:57 PM Dictation workstation:   [id number]    XR chest 1 view    Result Date: 3/3/2025  Interpreted By:  Vivien Bradshaw and Awan Komal STUDY: XR CHEST 1 VIEW; 3/3/2025 3:18 pm   INDICATION: Signs/Symptoms:hypoxic sob.   COMPARISON: Chest radiograph 05/15/2022 and CTA chest abdomen pelvis 05/15/2022   ACCESSION NUMBER(S): [id number]   ORDERING CLINICIAN: FABI CUNNINGHAM   FINDINGS: AP view of the chest.   CARDIOMEDIASTINAL SILHOUETTE: Cardiomediastinal silhouette is stable in size and configuration.   LUNGS: There is a large opacity involving the right  lower lobe, obscuring the right heart border concerning for pneumonia. There are also patchy opacities involving the left lower lobe. No evidence of pneumothorax.   ABDOMEN: No remarkable upper abdominal findings.   BONES: No acute osseous changes.       Findings of multifocal pneumonia involving the bilateral mid/lower lung zones, right worse than left. Correlate with concurrent CT chest findings.   I personally reviewed the images/study and I agree with the findings as stated by Resident Elizabeth Burger. This study was interpreted at [facility], [place], [place].   Signed by: Vivien Bradshaw 3/3/2025 3:48 PM Dictation workstation:   [id number]  .      Assessment/Plan   Assessment & Plan      Ms. 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 She is being treated for ARDS 2/2 CAP (MSSA and s. pneumo) and severe septic shock complicated by acute anuric renal failure, on CVVH. LFTs also elevated and have worsened in past days. On exam, patient unable to verbally communicate due to intubation, but I believe follows eye opening and closing commands. Does not follow commands in extremities (such as squeeze hand and wiggle toes when prompted). CvEEG already ordered and placed with prelim EEG read consistent with mild diffuse encephalopathy.  MRI head obtained with GRE sequencing indicative of chronic multifocal punctate hemorrhages primarily in bilateral basal ganglia regions secondary likely to hypertension.  Patient has several reasons to be encephalopathic in the setting of both infectious and toxic/metabolic abnormalities (acute renal failure and acute on chronic liver disease). Hepatic encephalopathy also  supported by elevated ammonia of 130 on 3/3 as well as elevated LFTs (alk phos of 246, ALT 58, , T. bili 22.7). Agree with continuous video EEG at this time. With primary team continuing to treat pneumonia, renal and hepatic injury, it is believed that her mental status will improve as well in time.    For question of safety of bivalirudin in the setting of evidence of chronic punctate hemorrhages on MRI, we recommend continuing for now as patient has significant clotting risk, no acute bleeds seen at this time.      Recommendations:    -Agree with continuous video EEG, no need to initiate AEDs at this time  -Continue to treat infectious and metabolic abnormalities per primary  -Consider obtaining additional basic encephalopathy labs: Vit B12, Folate, Vit D, TSH w/ Reflex to Free T4  -Can continue Bivalrudin    Recommendations are not final until formally staffed with Neurology Attending in AM.    Julia Mcdowell MD  Department of Neurology, PGY-3  General [id number]                      Julia Mcdowell MD

## 2025-03-16 NOTE — CARE PLAN
Problem: Pain - Adult  Goal: Verbalizes/displays adequate comfort level or baseline comfort level  Outcome: Progressing  Flowsheets (Taken 3/16/2025 0851)  Verbalizes/displays adequate comfort level or baseline comfort level: Assess pain using appropriate pain scale     Problem: Safety - Adult  Goal: Free from fall injury  Outcome: Progressing     Problem: Skin  Goal: Participates in plan/prevention/treatment measures  Outcome: Progressing  Flowsheets (Taken 3/16/2025 0851)  Participates in plan/prevention/treatment measures: Elevate heels  Goal: Prevent/manage excess moisture  Outcome: Progressing  Flowsheets (Taken 3/16/2025 0851)  Prevent/manage excess moisture: Cleanse incontinence/protect with barrier cream  Goal: Prevent/minimize sheer/friction injuries  Outcome: Progressing  Flowsheets (Taken 3/16/2025 0851)  Prevent/minimize sheer/friction injuries:   Use pull sheet   Turn/reposition every 2 hours/use positioning/transfer devices

## 2025-03-16 NOTE — SIGNIFICANT EVENT
"Preliminary EEG Report     This vEEG is consistent with a mild diffuse encephalopathy. No epileptiform discharges or lateralizing signs are seen.    This EEG was read from 11:48 AM to 12:12 PM on 03/16/25 .     The final impression will be available tomorrow under Chart Review in the Media tab. If the plan is to discontinue the video EEG, please place a \"Discontinue Continuous VEEG\" order in the EMR; otherwise the EEG tech will not receive the notification.      Marj Penny MD  Adult Epilepsy Fellow  [facility] [place]     "

## 2025-03-16 NOTE — PROGRESS NOTES
CRRT procedure note   Seen and assessed on CVVH. Labs and events reviewed   Remains hemodynamically stable, not on on pressors   Vascular access: right IJ trialysis catheter  BFR :200 ml/min  Filter:M150  Total Replacement Rate: 2000 ml/hour  Pre blood pump : 1000 ml/hour    Replacement solution Prismasol potassium:  2 mEq/L  Patient fluid removal: per ICU team ; Current target to be negative 50 ml/hour.  Please check Phosphorus, Calcium and Magnesium daily and replace peripherally as needed  Continue with current CVVH prescription; may be able to discontinue in the  next 24 hr if remains HDS    Heart Rate:  []   Temp:  [34.9 °C (94.8 °F)-38 °C (100.4 °F)]   Resp:  [19-32]   BP: ()/()   SpO2:  [91 %-100 %]        Intake/Output Summary (Last 24 hours) at 3/16/2025 1300  Last data filed at 3/16/2025 1200  Gross per 24 hour   Intake 866.15 ml   Output 1241 ml   Net -374.85 ml       Scheduled medications  ergocalciferol, 1,250 mcg, oral, Weekly  pantoprazole, 40 mg, oral, Daily before breakfast   Or  esomeprazole, 40 mg, nasoduodenal tube, Daily before breakfast   Or  pantoprazole, 40 mg, intravenous, Daily before breakfast  hydrocortisone sodium succinate, 50 mg, intravenous, BID  insulin lispro, 0-10 Units, subcutaneous, q6h  lactulose, 10 g, oral, q6h  artificial tears, 2 drop, Both Eyes, q24h  meropenem, 2 g, intravenous, q12h  oxygen, , inhalation, Continuous - Inhalation  polyethylene glycol, 17 g, oral, Daily  rifAXIMin, 550 mg, oral, q12h COCO  sennosides-docusate sodium, 2 tablet, oral, Daily  thiamine, 100 mg, oral, Daily  vancomycin, 750 mg, intravenous, q24h  vitamin B complex-vitamin C-folic acid, 5 mL, oral, Daily      Continuous medications  [Held by provider] bivalirudin, 0.05-0.49 mg/kg/hr, Last Rate: Stopped (03/16/25 0545)  fentaNYL, 0-300 mcg/hr, Last Rate: Stopped (03/16/25 1100)  norepinephrine, 0.01-1 mcg/kg/min (Dosing Weight), Last Rate: Stopped (03/15/25 1800)  PrismaSol BGK  2/3.5, 25 mL/kg/hr (Dosing Weight), Last Rate: 25 mL/kg/hr (03/15/25 2156)  propofol, 0-50 mcg/kg/min (Dosing Weight), Last Rate: Stopped (03/15/25 1145)      PRN medications  PRN medications: acetaminophen **OR** acetaminophen, alteplase, dextrose, dextrose, fentaNYL, glucagon, glucagon, oxygen, vancomycin    Recent Results (from the past 24 hours)   Renal function panel    Collection Time: 03/15/25  2:05 PM   Result Value Ref Range    Glucose 215 (H) 74 - 99 mg/dL    Sodium 131 (L) 136 - 145 mmol/L    Potassium 4.3 3.5 - 5.3 mmol/L    Chloride 97 (L) 98 - 107 mmol/L    Bicarbonate 22 21 - 32 mmol/L    Anion Gap 16 10 - 20 mmol/L    Urea Nitrogen 51 (H) 6 - 23 mg/dL    Creatinine 1.71 (H) 0.50 - 1.05 mg/dL    eGFR 39 (L) >60 mL/min/1.73m*2    Calcium 9.5 8.6 - 10.6 mg/dL    Phosphorus 3.5 2.5 - 4.9 mg/dL    Albumin 2.7 (L) 3.4 - 5.0 g/dL   Calcium, ionized    Collection Time: 03/15/25  2:05 PM   Result Value Ref Range    POCT Calcium, Ionized 1.26 1.1 - 1.33 mmol/L   CBC    Collection Time: 03/15/25  2:05 PM   Result Value Ref Range    WBC 15.1 (H) 4.4 - 11.3 x10*3/uL    nRBC 0.7 (H) 0.0 - 0.0 /100 WBCs    RBC 3.11 (L) 4.00 - 5.20 x10*6/uL    Hemoglobin 10.0 (L) 12.0 - 16.0 g/dL    Hematocrit 27.1 (L) 36.0 - 46.0 %    MCV 87 80 - 100 fL    MCH 32.2 26.0 - 34.0 pg    MCHC 36.9 (H) 32.0 - 36.0 g/dL    RDW 17.4 (H) 11.5 - 14.5 %    Platelets 159 150 - 450 x10*3/uL   aPTT    Collection Time: 03/15/25  2:05 PM   Result Value Ref Range    aPTT 40 (H) 26 - 36 seconds   Respiratory Culture/Smear    Collection Time: 03/15/25  2:05 PM    Specimen: Tracheal Aspirate; Fluid   Result Value Ref Range    Respiratory Culture/Smear No growth to date     Gram Stain (4+) Abundant Polymorphonuclear leukocytes     Gram Stain No organisms seen    aPTT    Collection Time: 03/15/25  4:45 PM   Result Value Ref Range    aPTT 46 (H) 26 - 36 seconds   Cryptococcal Antigen, CSF/Serum    Collection Time: 03/15/25  6:32 PM    Specimen: Blood,  Venous   Result Value Ref Range    Cryptococcal AG, Qual Negative Negative, Invalid   POCT GLUCOSE    Collection Time: 03/15/25  6:35 PM   Result Value Ref Range    POCT Glucose 163 (H) 74 - 99 mg/dL   aPTT    Collection Time: 03/15/25  8:37 PM   Result Value Ref Range    aPTT 49 (H) 26 - 36 seconds   POCT GLUCOSE    Collection Time: 03/15/25 11:20 PM   Result Value Ref Range    POCT Glucose 153 (H) 74 - 99 mg/dL   Magnesium    Collection Time: 03/16/25  5:07 AM   Result Value Ref Range    Magnesium 2.38 1.60 - 2.40 mg/dL   CBC and Auto Differential    Collection Time: 03/16/25  5:07 AM   Result Value Ref Range    WBC 13.4 (H) 4.4 - 11.3 x10*3/uL    nRBC 0.3 (H) 0.0 - 0.0 /100 WBCs    RBC 3.13 (L) 4.00 - 5.20 x10*6/uL    Hemoglobin 9.9 (L) 12.0 - 16.0 g/dL    Hematocrit 26.9 (L) 36.0 - 46.0 %    MCV 86 80 - 100 fL    MCH 31.6 26.0 - 34.0 pg    MCHC 36.8 (H) 32.0 - 36.0 g/dL    RDW 17.4 (H) 11.5 - 14.5 %    Platelets 144 (L) 150 - 450 x10*3/uL    Neutrophils % 85.0 40.0 - 80.0 %    Immature Granulocytes %, Automated 1.4 (H) 0.0 - 0.9 %    Lymphocytes % 8.3 13.0 - 44.0 %    Monocytes % 4.9 2.0 - 10.0 %    Eosinophils % 0.0 0.0 - 6.0 %    Basophils % 0.4 0.0 - 2.0 %    Neutrophils Absolute 11.41 (H) 1.20 - 7.70 x10*3/uL    Immature Granulocytes Absolute, Automated 0.19 0.00 - 0.70 x10*3/uL    Lymphocytes Absolute 1.11 (L) 1.20 - 4.80 x10*3/uL    Monocytes Absolute 0.66 0.10 - 1.00 x10*3/uL    Eosinophils Absolute 0.00 0.00 - 0.70 x10*3/uL    Basophils Absolute 0.06 0.00 - 0.10 x10*3/uL   Type And Screen    Collection Time: 03/16/25  5:07 AM   Result Value Ref Range    ABO TYPE O     Rh TYPE POS     ANTIBODY SCREEN NEG    Coagulation Screen    Collection Time: 03/16/25  5:07 AM   Result Value Ref Range    Protime 21.2 (H) 9.8 - 12.4 seconds    INR 1.9 (H) 0.9 - 1.1    aPTT 66 (H) 26 - 36 seconds   Comprehensive metabolic panel    Collection Time: 03/16/25  5:07 AM   Result Value Ref Range    Glucose 146 (H) 74 - 99  mg/dL    Sodium 134 (L) 136 - 145 mmol/L    Potassium 3.7 3.5 - 5.3 mmol/L    Chloride 98 98 - 107 mmol/L    Bicarbonate 23 21 - 32 mmol/L    Anion Gap 17 10 - 20 mmol/L    Urea Nitrogen 53 (H) 6 - 23 mg/dL    Creatinine 1.71 (H) 0.50 - 1.05 mg/dL    eGFR 39 (L) >60 mL/min/1.73m*2    Calcium 8.9 8.6 - 10.6 mg/dL    Albumin 2.6 (L) 3.4 - 5.0 g/dL    Alkaline Phosphatase 246 (H) 33 - 110 U/L    Total Protein 6.2 (L) 6.4 - 8.2 g/dL     (H) 9 - 39 U/L    Bilirubin, Total 22.7 (H) 0.0 - 1.2 mg/dL    ALT 58 (H) 7 - 45 U/L   Calcium, ionized    Collection Time: 03/16/25  5:07 AM   Result Value Ref Range    POCT Calcium, Ionized 1.21 1.1 - 1.33 mmol/L   Vancomycin    Collection Time: 03/16/25  5:07 AM   Result Value Ref Range    Vancomycin 19.2 5.0 - 20.0 ug/mL   aPTT    Collection Time: 03/16/25  5:07 AM   Result Value Ref Range    aPTT 66 (H) 26 - 36 seconds   POCT GLUCOSE    Collection Time: 03/16/25  5:23 AM   Result Value Ref Range    POCT Glucose 141 (H) 74 - 99 mg/dL   POCT GLUCOSE    Collection Time: 03/16/25  7:53 AM   Result Value Ref Range    POCT Glucose 121 (H) 74 - 99 mg/dL   Blood Gas Venous Full Panel    Collection Time: 03/16/25  9:06 AM   Result Value Ref Range    POCT pH, Venous 7.39 7.33 - 7.43 pH    POCT pCO2, Venous 38 (L) 41 - 51 mm Hg    POCT pO2, Venous 43 35 - 45 mm Hg    POCT SO2, Venous 75 45 - 75 %    POCT Oxy Hemoglobin, Venous 72.6 45.0 - 75.0 %    POCT Hematocrit Calculated, Venous 31.0 (L) 36.0 - 46.0 %    POCT Sodium, Venous 131 (L) 136 - 145 mmol/L    POCT Potassium, Venous 3.5 3.5 - 5.3 mmol/L    POCT Chloride, Venous 99 98 - 107 mmol/L    POCT Ionized Calicum, Venous 1.26 1.10 - 1.33 mmol/L    POCT Glucose, Venous 121 (H) 74 - 99 mg/dL    POCT Lactate, Venous 2.2 (H) 0.4 - 2.0 mmol/L    POCT Base Excess, Venous -1.7 -2.0 - 3.0 mmol/L    POCT HCO3 Calculated, Venous 23.0 22.0 - 26.0 mmol/L    POCT Hemoglobin, Venous 10.3 (L) 12.0 - 16.0 g/dL    POCT Anion Gap, Venous 13.0 10.0 -  25.0 mmol/L    Patient Temperature 37.0 degrees Celsius    FiO2 50 %   POCT GLUCOSE    Collection Time: 03/16/25 11:04 AM   Result Value Ref Range    POCT Glucose 133 (H) 74 - 99 mg/dL

## 2025-03-16 NOTE — PROGRESS NOTES
"Medical Intensive Care - Daily Progress Note   Subjective    Gissel Harvey is a 39 y.o. year old female patient admitted on 3/3/2025 with following ICU needs:  Hypoxemia and AHRF requiring intubation due to ARDS from CAP, acute renal failure requiring CVVH     Interval History:  - MRI brain completed, results showing \"small microhemorrhages within the posterior right frontal lobe with mild associated vasogenic edema, likely acute. There are multiple tiny additional punctate areas of probable microhemorrhages throughout the bilateral cerebral hemispheres.\"   - Bival held 0540    - Off pressors this AM, on fent 50. Minimal but is grimacing w/ pain, pupils reactive.       Meds    Scheduled medications  ergocalciferol, 1,250 mcg, oral, Weekly  pantoprazole, 40 mg, oral, Daily before breakfast   Or  esomeprazole, 40 mg, nasoduodenal tube, Daily before breakfast   Or  pantoprazole, 40 mg, intravenous, Daily before breakfast  hydrocortisone sodium succinate, 50 mg, intravenous, BID  insulin lispro, 0-10 Units, subcutaneous, q6h  lactulose, 10 g, oral, q6h  artificial tears, 2 drop, Both Eyes, q24h  meropenem, 2 g, intravenous, q12h  oxygen, , inhalation, Continuous - Inhalation  polyethylene glycol, 17 g, oral, Daily  rifAXIMin, 550 mg, oral, q12h COCO  sennosides-docusate sodium, 2 tablet, oral, Daily  thiamine, 100 mg, oral, Daily  vancomycin, 750 mg, intravenous, q24h  vitamin B complex-vitamin C-folic acid, 5 mL, oral, Daily      Continuous medications  [Held by provider] bivalirudin, 0.05-0.49 mg/kg/hr, Last Rate: Stopped (03/16/25 0545)  fentaNYL, 0-300 mcg/hr, Last Rate: 50 mcg/hr (03/15/25 2051)  norepinephrine, 0.01-1 mcg/kg/min (Dosing Weight), Last Rate: Stopped (03/15/25 1800)  PrismaSol BGK 2/3.5, 25 mL/kg/hr (Dosing Weight), Last Rate: 25 mL/kg/hr (03/15/25 2156)  propofol, 0-50 mcg/kg/min (Dosing Weight), Last Rate: Stopped (03/15/25 1145)      PRN medications  PRN medications: acetaminophen **OR** " "acetaminophen, alteplase, dextrose, dextrose, fentaNYL, glucagon, glucagon, oxygen     Objective    Blood pressure 102/60, pulse 80, temperature 35.5 °C (95.9 °F), resp. rate 26, height 1.676 m (5' 6\"), weight 62 kg (136 lb 11 oz), SpO2 93%.     Physical Exam   Constitutional:       Comments: Intubated and Sedated  Not opening eyes to voice   Pupils reactive   HENT:      Head: Normocephalic and atraumatic.   Cardiovascular:      Rate and Rhythm: Normal rate and regular rhythm.      Pulses: Normal pulses.   Pulmonary:      Breath sounds: No wheezing.      Comments: Mechanically Ventilated, Coarse breath sounds  Abdominal:      General: Abdomen soft with significantly enlarged liver     Palpations: Abdomen is soft.      Comments: Rectal tube draining brown stool, no blood   Musculoskeletal:      Right lower leg: Edema present.      Left lower leg: Edema present.   Edema in the upper extremities bilaterally  Skin:     General: Skin is warm and dry.      Comments: Warm to touch   Neurological:      Comments: Sedated, not awakening to voice. Mild grimace to pain    Intake/Output Summary (Last 24 hours) at 3/16/2025 0751  Last data filed at 3/16/2025 0600  Gross per 24 hour   Intake 1225.91 ml   Output 1344 ml   Net -118.09 ml     Labs:   Results from last 72 hours   Lab Units 03/16/25  0507 03/15/25  1405 03/15/25  0453 03/14/25  1816 03/14/25  0950   SODIUM mmol/L 134* 131* 132* 132* 130*   POTASSIUM mmol/L 3.7 4.3 4.0 4.3 4.2   CHLORIDE mmol/L 98 97* 97* 97* 98   CO2 mmol/L 23 22 23 22 23   BUN mg/dL 53* 51* 54* 54* 53*   CREATININE mg/dL 1.71* 1.71* 1.49* 1.52* 1.54*   GLUCOSE mg/dL 146* 215* 206* 175* 256*   CALCIUM mg/dL 8.9 9.5 9.7 9.6 13.9*   ANION GAP mmol/L 17 16 16 17 13   EGFR mL/min/1.73m*2 39* 39* 46* 45* 44*   PHOSPHORUS mg/dL  --  3.5  --  3.3 3.3      Results from last 72 hours   Lab Units 03/16/25  0507 03/15/25  1405 03/15/25  0453 03/14/25  1816 03/14/25  0506   WBC AUTO x10*3/uL 13.4* 15.1* 17.4*   < " > 21.0*   HEMOGLOBIN g/dL 9.9* 10.0* 10.2*   < > 9.8*   HEMATOCRIT % 26.9* 27.1* 27.7*   < > 27.1*   PLATELETS AUTO x10*3/uL 144* 159 161   < > 237   NEUTROS PCT AUTO % 85.0  --  86.8  --  87.1   LYMPHS PCT AUTO % 8.3  --  6.0  --  6.6   MONOS PCT AUTO % 4.9  --  4.3  --  3.7   EOS PCT AUTO % 0.0  --  0.0  --  0.0    < > = values in this interval not displayed.                 Micro/ID:     Lab Results   Component Value Date    BLOODCULT No growth at 3 days 03/12/2025       Summary of key imaging results from the last 24 hours  MRI BRAIN 3/15/25  IMPRESSION:  1. Multiple small foci of susceptibility artifact within the supra and infratentorial compartment may reflect old microhemorrhages, possibly embolic in etiology versus cavernomas.  2. Small focus of hyperattenuation seen on the prior CT head corresponds to a microhemorrhage versus cavernoma located within the right frontal lobe. There is mild signal abnormality within the adjacent brain parenchyma which may reflect gliosis versus vasogenic edema, favored gliosis. There is no striking surrounding mass effect.  3. Symmetric signal abnormality located within the bilateral thalamus  may be sequela of acute hepatic encephalopathy or hypoxic encephalopathy, given patient's clinical history.    Assessment and Plan     Assessment: Gissel Harvey is a 39 y.o. 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 She is being treated for ARDS 2/2 CAP (MSSA and s. pneumo) and severe septic shock complicated by acute anuric renal failure, now on CVVH. She is still requiring significant respiratory support w/ increased FiO2 requirements and PEEP, on day 13 of  mechanical ventilation. She also has worsening Tbili concerning for intrahepatic insult/injury. She was made DNR by her daughter 3/12. 3/15 MRI brain w/ acute microhemorrhages.     Mechanical Ventilation: > 10 days  Sedation/Analgesia:  Fentanyl  Restraints: no    Summary for 03/16/25  :  Off vasopressors since 3/15  Neurology consulted due to MRI findings of microhemorrhages- bival held, will need to clarify her bleeding risk with these findings   Obtaining video EEG  Holding sedation for EEG and neuro eval  Plan to continue vanc/rebeca to 3/19  Resume TF  Will discuss trach/next steps pending neuro eval and further discussions with family    NEUROLOGY/PSYCH:  #Microhemorrages in BL cerebral hemispheres  :: CT head 3/14 w/ indeterminate lesions, MRI brain w/ BL microhemorrages  :: Neuro status unclear due to sedation, attempts at sedation holidays shortened due to tachypnea and agitation  :: mild grimace to pain, has cough, pupils equal and reactive   - 3/15 neuro consulted- will need assistance with anticoagulation resumption (on bival for APS and known thrombosis) and neuroprognostication  - Video EEG ordered  - Hold sedation for EEG and neuro eval    #Sedation   - When sedated- RASS goal -4 to -5 (to allow synchronicity with vent)  - c/w fentanyl - holding for EEG and neuro eval     CARDIOVASCULAR:     #HFrEF  :: TTE 3/4 with EF 30-35% with reduced RV function, no previous TTE for comparison  :: BNP 2127 at admission  :: Troponin plateaued (62>63) on 3/4/25  - Will discuss repeat TTE pending her clinical status, may have had HFrEF iso acute shock     #Shock, likely septic I/s/o severe pneumonia  :: lactate > 9 on presentation,  stable at 3  :: on initial presentation 3/3-3/7, she required 4 pressors (levo, vaso, epi, angiotensin)  :: on 3/8-3/11, able to come off pressors, maintained strong MAP  - 3/12 acute worsening of shock, (2/2 worsening of sepsis, worsened when sedation was weaned and she became  dyssynchronous with vent demonstrating air trapping and significant tachypnea), had significant hypotension once again requiring vasopressor support        - Off levo        - stress dose steroids: hydrocortisone 50 q6 -> weaning to BID 3/15        - c/w vanc + rebeca for broad spectrum coverage iso septic shock- plan for 7 day course from 3/12 due to acute decompensation with improvement following broadening     #Aflutter  #Sustained VT   :: went into NSVT while wire threaded for central line placement  :: s/p amiodarone 150 mg bolus x2  :: pt cardioverted  - Pt is back to sinus rhythm, okay for amio gtt if arrhythmias return     PULMONARY:  Vent Mode: Volume control/assist control  FiO2 (%):  [50 %] 50 %  S RR:  [26] 26  S VT:  [450 mL] 450 mL  PEEP/CPAP (cm H2O):  [10 cm H20] 10 cm H20  MAP (cm H2O):  [14-16] 14  #Community acquired pneumonia   #ARDS   :: intubated 3/3   :: P:F ~ 70 on admission   :: Procalcitonin 57.3  :: flu A/B, COVID, parainfluenza, RSV, metapneumovirus, legionella urine Ag: negative  :: see ID section for full abx history this admission  :: Tracheal aspirate cx 3/3: pan sensitive staph aureus and strep pneumo  :: Strep pneumo urine ag: positive  :: Bcx 3/3: NGTD x3 days  :: re-cultured 3/12: sputum negative, blood culture NGTD  Plan  - Vent requirements improving  - Approaching day 14 of mechanical ventilation, we have discussed trach with POA (daughter) and this may be within her goals, pending neuro prognostication. Will continue to discuss and potentially consult ENT early this week  - c/w vanc/rebeca for abx      RENAL/GENITOURINARY:  #Acute renal failure, anuric SCOUT requiring CVVH  :: etiology: septic shock and multiorgan failure  :: Cr 1.9 on admission, unclear baseline as no labs in 2 years   :: FeNa 3/3: 0.1% prerenal  :: Persistently anuric   - Continue CVVH per Nephrology  - While on CVVH: BID RFP, ionized calcium and CBC  - Warm CVVH fluid to assist with hypothermia      GASTROENTEROLOGY:  #liver cirrhosis   #portal hypertension s/p TIPS   #hx of Budd-Chiari   :: 3/7 POCUS with distended bowel, no appreciable ascites  - Continue to monitor abdomen for signs of ascites formation  - Hep C Abs positive, PCR pending  - 3/11 labs w/ acute elevation in INR (5), AST (147), ALT (57), Alk Phos (218)  - Liver US w/ thrombosed TIPS, IR consulted, likely chronic as this has been seen on prior CT and patient non-adherent with anticoagulation  -3/13 INR, PT, Bili, ALT, Alk phos all increasing. 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 C/f DILI or intrahepatic cholestasis / hepatic congestion, or shock liver   PLAN        - DC ketamine and use propofol for sedation given c/f DILI from ketamine   - currently only using fentanyl for sedation        - CTM- she is not stable enough for further hepatology workup or procedure  - c/w lactulose and adding rifaximin - monitor rectal tube output  - s/p vitamin K 10 mg 3/14 w/ improvement in INR     ENDOCRINOLOGY:              #High dose steroids              #Supplemental nutrition                          - SSI                           - TF via NG     HEMATOLOGY:  #Lymphopenia -> leukocytosis , improving  :: Abs leukocyte count 260  :: HIV neg  - CTM WBC count     #Hx of antiphospholipid syndrome   #Hx of positive PF4  #Chronic IVC occlusion   #Hx of DVT   :: Supposed to be on fondaparinux outpatient, unclear if taking as no recent med fills   - Was on bivalirudin throughout admission until 3/16- held due to microhemorrhages on MRI brain  - will discuss AC with neurology, potentially consult vascular medicine if needed     #Thrombocytopenia, stable  #Elevated PT and INR  - Trend plts, INR, coags  - Fibrinogen wnl  - If platelets <50, transfuse but do not stop bivalirubin  - INR elevated 5 -> 3.8 on 3/11, 5.9 on 3/12, iso known cirrhosis, plt 80  - s/p vitamin K 10 mg 3/14 w/ improvement in INR  - PT and INR increase can be  secondary to cirrhosis, improved with vitamin K     SKIN:  #Skin Failure  - BL buttock wounds, ear wound  - wound care following, appreciate recs  - c/w vaseline for lips/mouth while intubated     MUSCULOSKELETAL:  GIFTY     INFECTIOUS DISEASE:  #MSSA PNA  #Strep PNA  #ARDS  #Septic shock  Antibiotics  Azithromycin 500 mg: 3/3  Zosyn 4.5 g: 3/3  Tobramycin 440 mg: 3/4  Vancomycin: 3/3 - 3/6, 3/8 - 3/10  Ceftaroline 3/5 - 3/6  Meropenem 2 g BID: 3/3 - 3/8  Micafungin: 3/4 - 3/6  Doxycycline 100 mg BID: 3/4 - 3/6  Ceftriaxone 3/8 - 3/10  Cefazolin 3/10-3/12  *Vanc/Day 3/12- plan for end date 3/19.   - cultures from 3/12 NGTD    ICU Check List       ICU Liberation: Intervention:   Assess, Prevent, Manage Pain      Both SAT and SBT [] SAT  [] SBT 30-60 min [] Extubate to NC  [] Extubate to NIV  [] Discuss Trach   Choice of analgesia and sedation Gao Agitation Sedation Scale (RASS): Deep sedation  Target Arousal Level (RASS): RASS -4 to -5      Delirium: Assess, prevent and manage  CAM ICU Positive    Early Mobility and Exercise RASS less than -2 [] PT /OT consult   Family Engagement and Empowerment []Family updated []SW consult     FEN  Fluids: keep net even  Electrolytes: PRN- CVVH   Nutrition: TF  Prophylaxis:  DVT ppx: Held- SCDs  GI ppx: PPI  Bowel care: Lactulose  Hardware:         ETT  7.5 mm (Active)   Placement Date: 03/03/25   ETT Type: ETT - single  Single Lumen Tube Size: 7.5 mm  Cuffed: Yes  Location: Oral   Number of days: 13       NG/OG/Feeding Tube NG - [place] sump Right nostril (Active)   Placement Date/Time: 03/03/25 1900   Type of Tube: Feeding Tube  Tube Length: 53 cm  Tube Type: NG - [place] sump  Tube Location: Right nostril   Number of days: 12       Fecal Management Rectal tube with balloon (Active)   Placement Date/Time: 03/09/25 0018   Placed by: ashlie  Hand Hygiene Completed: Yes  Fecal Management Tube Type: Rectal tube with balloon   Number of days: 7       Hemodialysis Cath 03/06/25 Right  (Active)   Placement Date/Time: 03/06/25 1700   Orientation: Right   Number of days: 9       Social:  Code: DNR    HPOA: Payal Durant (daughter) [phone]  Disposition: MARLENE Buck MD   03/16/25 at 7:51 AM     Disclaimer: Documentation completed with the information available at the time of input. The times in the chart may not be reflective of actual patient care times, interventions, or procedures. Documentation occurs after the physical care of the patient.

## 2025-03-16 NOTE — CARE PLAN
Problem: Skin  Goal: Decreased wound size/increased tissue granulation at next dressing change  Outcome: Progressing  Goal: Participates in plan/prevention/treatment measures  Outcome: Progressing  Goal: Prevent/manage excess moisture  Outcome: Progressing  Goal: Prevent/minimize sheer/friction injuries  Outcome: Progressing  Flowsheets (Taken 3/16/2025 0212)  Prevent/minimize sheer/friction injuries:   HOB 30 degrees or less   Turn/reposition every 2 hours/use positioning/transfer devices  Goal: Promote/optimize nutrition  Outcome: Progressing  Goal: Promote skin healing  Outcome: Progressing  Flowsheets (Taken 3/16/2025 0212)  Promote skin healing:   Assess skin/pad under line(s)/device(s)   Turn/reposition every 2 hours/use positioning/transfer devices   Protective dressings over bony prominences   The patient's goals for the shift include      The clinical goals for the shift include pt will remain hds through shift

## 2025-03-17 VITALS
HEART RATE: 94 BPM | BODY MASS INDEX: 21.97 KG/M2 | RESPIRATION RATE: 27 BRPM | TEMPERATURE: 98.2 F | DIASTOLIC BLOOD PRESSURE: 58 MMHG | SYSTOLIC BLOOD PRESSURE: 99 MMHG | OXYGEN SATURATION: 99 % | WEIGHT: 136.69 LBS | HEIGHT: 66 IN

## 2025-03-17 LAB
ALBUMIN SERPL BCP-MCNC: 2.4 G/DL (ref 3.4–5)
ALBUMIN SERPL BCP-MCNC: 2.7 G/DL (ref 3.4–5)
ALP SERPL-CCNC: 253 U/L (ref 33–110)
ALT SERPL W P-5'-P-CCNC: 65 U/L (ref 7–45)
ANION GAP SERPL CALC-SCNC: 15 MMOL/L (ref 10–20)
ANION GAP SERPL CALC-SCNC: 17 MMOL/L (ref 10–20)
APTT PPP: 61 SECONDS (ref 26–36)
APTT PPP: 61 SECONDS (ref 26–36)
AST SERPL W P-5'-P-CCNC: 121 U/L (ref 9–39)
BACTERIA SPEC RESP CULT: NORMAL
BASOPHILS # BLD AUTO: 0.03 X10*3/UL (ref 0–0.1)
BASOPHILS NFR BLD AUTO: 0.2 %
BILIRUB SERPL-MCNC: 25.6 MG/DL (ref 0–1.2)
BUN SERPL-MCNC: 39 MG/DL (ref 6–23)
BUN SERPL-MCNC: 39 MG/DL (ref 6–23)
CA-I BLD-SCNC: 1.27 MMOL/L (ref 1.1–1.33)
CA-I BLD-SCNC: 1.28 MMOL/L (ref 1.1–1.33)
CALCIUM SERPL-MCNC: 9.2 MG/DL (ref 8.6–10.6)
CALCIUM SERPL-MCNC: 9.3 MG/DL (ref 8.6–10.6)
CHLORIDE SERPL-SCNC: 98 MMOL/L (ref 98–107)
CHLORIDE SERPL-SCNC: 98 MMOL/L (ref 98–107)
CO2 SERPL-SCNC: 20 MMOL/L (ref 21–32)
CO2 SERPL-SCNC: 24 MMOL/L (ref 21–32)
CREAT SERPL-MCNC: 1.59 MG/DL (ref 0.5–1.05)
CREAT SERPL-MCNC: 1.62 MG/DL (ref 0.5–1.05)
EGFRCR SERPLBLD CKD-EPI 2021: 41 ML/MIN/1.73M*2
EGFRCR SERPLBLD CKD-EPI 2021: 42 ML/MIN/1.73M*2
EOSINOPHIL # BLD AUTO: 0 X10*3/UL (ref 0–0.7)
EOSINOPHIL NFR BLD AUTO: 0 %
ERYTHROCYTE [DISTWIDTH] IN BLOOD BY AUTOMATED COUNT: 19.1 % (ref 11.5–14.5)
ERYTHROCYTE [DISTWIDTH] IN BLOOD BY AUTOMATED COUNT: 19.7 % (ref 11.5–14.5)
GLUCOSE BLD MANUAL STRIP-MCNC: 145 MG/DL (ref 74–99)
GLUCOSE BLD MANUAL STRIP-MCNC: 157 MG/DL (ref 74–99)
GLUCOSE BLD MANUAL STRIP-MCNC: 167 MG/DL (ref 74–99)
GLUCOSE BLD MANUAL STRIP-MCNC: 180 MG/DL (ref 74–99)
GLUCOSE SERPL-MCNC: 161 MG/DL (ref 74–99)
GLUCOSE SERPL-MCNC: 166 MG/DL (ref 74–99)
GRAM STN SPEC: NORMAL
GRAM STN SPEC: NORMAL
HCT VFR BLD AUTO: 25.7 % (ref 36–46)
HCT VFR BLD AUTO: 27.6 % (ref 36–46)
HGB BLD-MCNC: 10.3 G/DL (ref 12–16)
HGB BLD-MCNC: 9.3 G/DL (ref 12–16)
IMM GRANULOCYTES # BLD AUTO: 0.16 X10*3/UL (ref 0–0.7)
IMM GRANULOCYTES NFR BLD AUTO: 1.3 % (ref 0–0.9)
INR PPP: 1.8 (ref 0.9–1.1)
LYMPHOCYTES # BLD AUTO: 0.96 X10*3/UL (ref 1.2–4.8)
LYMPHOCYTES NFR BLD AUTO: 7.8 %
MAGNESIUM SERPL-MCNC: 2.28 MG/DL (ref 1.6–2.4)
MAGNESIUM SERPL-MCNC: 2.56 MG/DL (ref 1.6–2.4)
MCH RBC QN AUTO: 32.6 PG (ref 26–34)
MCH RBC QN AUTO: 32.8 PG (ref 26–34)
MCHC RBC AUTO-ENTMCNC: 36.2 G/DL (ref 32–36)
MCHC RBC AUTO-ENTMCNC: 37.3 G/DL (ref 32–36)
MCV RBC AUTO: 88 FL (ref 80–100)
MCV RBC AUTO: 90 FL (ref 80–100)
MONOCYTES # BLD AUTO: 0.76 X10*3/UL (ref 0.1–1)
MONOCYTES NFR BLD AUTO: 6.2 %
NEUTROPHILS # BLD AUTO: 10.43 X10*3/UL (ref 1.2–7.7)
NEUTROPHILS NFR BLD AUTO: 84.5 %
NRBC BLD-RTO: 0.7 /100 WBCS (ref 0–0)
NRBC BLD-RTO: 0.9 /100 WBCS (ref 0–0)
PHOSPHATE SERPL-MCNC: 3.1 MG/DL (ref 2.5–4.9)
PLATELET # BLD AUTO: 159 X10*3/UL (ref 150–450)
PLATELET # BLD AUTO: 160 X10*3/UL (ref 150–450)
POTASSIUM SERPL-SCNC: 3.5 MMOL/L (ref 3.5–5.3)
POTASSIUM SERPL-SCNC: 3.8 MMOL/L (ref 3.5–5.3)
PROT SERPL-MCNC: 6.2 G/DL (ref 6.4–8.2)
PROTHROMBIN TIME: 19.4 SECONDS (ref 9.8–12.4)
RBC # BLD AUTO: 2.85 X10*6/UL (ref 4–5.2)
RBC # BLD AUTO: 3.14 X10*6/UL (ref 4–5.2)
SODIUM SERPL-SCNC: 131 MMOL/L (ref 136–145)
SODIUM SERPL-SCNC: 133 MMOL/L (ref 136–145)
VANCOMYCIN SERPL-MCNC: 22.3 UG/ML (ref 5–20)
WBC # BLD AUTO: 10.3 X10*3/UL (ref 4.4–11.3)
WBC # BLD AUTO: 12.3 X10*3/UL (ref 4.4–11.3)

## 2025-03-17 PROCEDURE — 2500000001 HC RX 250 WO HCPCS SELF ADMINISTERED DRUGS (ALT 637 FOR MEDICARE OP): Mod: SE

## 2025-03-17 PROCEDURE — 36415 COLL VENOUS BLD VENIPUNCTURE: CPT

## 2025-03-17 PROCEDURE — 83735 ASSAY OF MAGNESIUM: CPT

## 2025-03-17 PROCEDURE — 86225 DNA ANTIBODY NATIVE: CPT

## 2025-03-17 PROCEDURE — 86160 COMPLEMENT ANTIGEN: CPT

## 2025-03-17 PROCEDURE — 90937 HEMODIALYSIS REPEATED EVAL: CPT

## 2025-03-17 PROCEDURE — 86235 NUCLEAR ANTIGEN ANTIBODY: CPT

## 2025-03-17 PROCEDURE — 2500000004 HC RX 250 GENERAL PHARMACY W/ HCPCS (ALT 636 FOR OP/ED): Mod: SE

## 2025-03-17 PROCEDURE — 80202 ASSAY OF VANCOMYCIN: CPT | Performed by: STUDENT IN AN ORGANIZED HEALTH CARE EDUCATION/TRAINING PROGRAM

## 2025-03-17 PROCEDURE — 82330 ASSAY OF CALCIUM: CPT

## 2025-03-17 PROCEDURE — 2500000005 HC RX 250 GENERAL PHARMACY W/O HCPCS: Mod: SE

## 2025-03-17 PROCEDURE — 82947 ASSAY GLUCOSE BLOOD QUANT: CPT

## 2025-03-17 PROCEDURE — 2020000001 HC ICU ROOM DAILY

## 2025-03-17 PROCEDURE — 95715 VEEG EA 12-26HR INTMT MNTR: CPT

## 2025-03-17 PROCEDURE — 95720 EEG PHY/QHP EA INCR W/VEEG: CPT | Performed by: STUDENT IN AN ORGANIZED HEALTH CARE EDUCATION/TRAINING PROGRAM

## 2025-03-17 PROCEDURE — 80069 RENAL FUNCTION PANEL: CPT | Mod: CCI

## 2025-03-17 PROCEDURE — 2500000002 HC RX 250 W HCPCS SELF ADMINISTERED DRUGS (ALT 637 FOR MEDICARE OP, ALT 636 FOR OP/ED): Mod: SE

## 2025-03-17 PROCEDURE — 90945 DIALYSIS ONE EVALUATION: CPT | Performed by: INTERNAL MEDICINE

## 2025-03-17 PROCEDURE — 85730 THROMBOPLASTIN TIME PARTIAL: CPT

## 2025-03-17 PROCEDURE — 80053 COMPREHEN METABOLIC PANEL: CPT

## 2025-03-17 PROCEDURE — 85027 COMPLETE CBC AUTOMATED: CPT

## 2025-03-17 PROCEDURE — 99291 CRITICAL CARE FIRST HOUR: CPT

## 2025-03-17 PROCEDURE — 85025 COMPLETE CBC W/AUTO DIFF WBC: CPT

## 2025-03-17 PROCEDURE — 94003 VENT MGMT INPAT SUBQ DAY: CPT

## 2025-03-17 RX ORDER — LACTULOSE 10 G/15ML
10 SOLUTION ORAL EVERY 4 HOURS
Status: DISCONTINUED | OUTPATIENT
Start: 2025-03-17 | End: 2025-03-19

## 2025-03-17 RX ADMIN — CALCIUM CHLORIDE, MAGNESIUM CHLORIDE, DEXTROSE MONOHYDRATE, LACTIC ACID, SODIUM CHLORIDE, SODIUM BICARBONATE AND POTASSIUM CHLORIDE 25 ML/KG/HR: 5.15; 2.03; 22; 5.4; 6.46; 3.09; .157 INJECTION INTRAVENOUS at 11:46

## 2025-03-17 RX ADMIN — CALCIUM CHLORIDE, MAGNESIUM CHLORIDE, DEXTROSE MONOHYDRATE, LACTIC ACID, SODIUM CHLORIDE, SODIUM BICARBONATE AND POTASSIUM CHLORIDE 25 ML/KG/HR: 5.15; 2.03; 22; 5.4; 6.46; 3.09; .157 INJECTION INTRAVENOUS at 22:32

## 2025-03-17 RX ADMIN — VANCOMYCIN HYDROCHLORIDE 750 MG: 750 INJECTION, SOLUTION INTRAVENOUS at 08:26

## 2025-03-17 RX ADMIN — LACTULOSE 10 G: 20 SOLUTION ORAL at 08:25

## 2025-03-17 RX ADMIN — THIAMINE HCL TAB 100 MG 100 MG: 100 TAB at 08:25

## 2025-03-17 RX ADMIN — Medication 5 ML: at 08:41

## 2025-03-17 RX ADMIN — BIVALIRUDIN 0.1 MG/KG/HR: 250 INJECTION INTRACAVERNOUS at 03:11

## 2025-03-17 RX ADMIN — CALCIUM CHLORIDE, MAGNESIUM CHLORIDE, DEXTROSE MONOHYDRATE, LACTIC ACID, SODIUM CHLORIDE, SODIUM BICARBONATE AND POTASSIUM CHLORIDE 25 ML/KG/HR: 5.15; 2.03; 22; 5.4; 6.46; 3.09; .157 INJECTION INTRAVENOUS at 01:06

## 2025-03-17 RX ADMIN — INSULIN LISPRO 2 UNITS: 100 INJECTION, SOLUTION INTRAVENOUS; SUBCUTANEOUS at 17:34

## 2025-03-17 RX ADMIN — MEROPENEM 2 G: 1 INJECTION INTRAVENOUS at 09:42

## 2025-03-17 RX ADMIN — HYDROCORTISONE SODIUM SUCCINATE 50 MG: 100 INJECTION, POWDER, FOR SOLUTION INTRAMUSCULAR; INTRAVENOUS at 08:26

## 2025-03-17 RX ADMIN — RIFAXIMIN 550 MG: 550 TABLET ORAL at 22:59

## 2025-03-17 RX ADMIN — INSULIN LISPRO 2 UNITS: 100 INJECTION, SOLUTION INTRAVENOUS; SUBCUTANEOUS at 06:37

## 2025-03-17 RX ADMIN — LACTULOSE 10 G: 20 SOLUTION ORAL at 03:11

## 2025-03-17 RX ADMIN — Medication 50 PERCENT: at 07:09

## 2025-03-17 RX ADMIN — LACTULOSE 10 G: 20 SOLUTION ORAL at 16:29

## 2025-03-17 RX ADMIN — LACTULOSE 10 G: 20 SOLUTION ORAL at 11:38

## 2025-03-17 RX ADMIN — CALCIUM CHLORIDE, MAGNESIUM CHLORIDE, DEXTROSE MONOHYDRATE, LACTIC ACID, SODIUM CHLORIDE, SODIUM BICARBONATE AND POTASSIUM CHLORIDE 25 ML/KG/HR: 5.15; 2.03; 22; 5.4; 6.46; 3.09; .157 INJECTION INTRAVENOUS at 22:33

## 2025-03-17 RX ADMIN — POLYETHYLENE GLYCOL 3350 17 G: 17 POWDER, FOR SOLUTION ORAL at 08:25

## 2025-03-17 RX ADMIN — CALCIUM CHLORIDE, MAGNESIUM CHLORIDE, DEXTROSE MONOHYDRATE, LACTIC ACID, SODIUM CHLORIDE, SODIUM BICARBONATE AND POTASSIUM CHLORIDE 25 ML/KG/HR: 5.15; 2.03; 22; 5.4; 6.46; 3.09; .157 INJECTION INTRAVENOUS at 01:05

## 2025-03-17 RX ADMIN — INSULIN LISPRO 2 UNITS: 100 INJECTION, SOLUTION INTRAVENOUS; SUBCUTANEOUS at 11:38

## 2025-03-17 RX ADMIN — CALCIUM CHLORIDE, MAGNESIUM CHLORIDE, DEXTROSE MONOHYDRATE, LACTIC ACID, SODIUM CHLORIDE, SODIUM BICARBONATE AND POTASSIUM CHLORIDE 25 ML/KG/HR: 5.15; 2.03; 22; 5.4; 6.46; 3.09; .157 INJECTION INTRAVENOUS at 01:12

## 2025-03-17 RX ADMIN — MEROPENEM 2 G: 1 INJECTION INTRAVENOUS at 22:59

## 2025-03-17 RX ADMIN — MEROPENEM 2 G: 1 INJECTION INTRAVENOUS at 00:50

## 2025-03-17 RX ADMIN — SENNOSIDES AND DOCUSATE SODIUM 2 TABLET: 50; 8.6 TABLET ORAL at 08:26

## 2025-03-17 RX ADMIN — CARBOXYMETHYLCELLULOSE SODIUM 2 DROP: 5 SOLUTION/ DROPS OPHTHALMIC at 08:48

## 2025-03-17 RX ADMIN — Medication 50 PERCENT: at 19:59

## 2025-03-17 RX ADMIN — RIFAXIMIN 550 MG: 550 TABLET ORAL at 08:25

## 2025-03-17 RX ADMIN — CALCIUM CHLORIDE, MAGNESIUM CHLORIDE, DEXTROSE MONOHYDRATE, LACTIC ACID, SODIUM CHLORIDE, SODIUM BICARBONATE AND POTASSIUM CHLORIDE 25 ML/KG/HR: 5.15; 2.03; 22; 5.4; 6.46; 3.09; .157 INJECTION INTRAVENOUS at 22:35

## 2025-03-17 RX ADMIN — PANTOPRAZOLE SODIUM 40 MG: 40 INJECTION, POWDER, LYOPHILIZED, FOR SOLUTION INTRAVENOUS at 07:48

## 2025-03-17 ASSESSMENT — PAIN - FUNCTIONAL ASSESSMENT
PAIN_FUNCTIONAL_ASSESSMENT: CPOT (CRITICAL CARE PAIN OBSERVATION TOOL)

## 2025-03-17 NOTE — CARE PLAN
Problem: Pain - Adult  Goal: Verbalizes/displays adequate comfort level or baseline comfort level  Outcome: Progressing  Flowsheets (Taken 3/17/2025 0543)  Verbalizes/displays adequate comfort level or baseline comfort level:   Assess pain using appropriate pain scale   Administer analgesics based on type and severity of pain and evaluate response   Implement non-pharmacological measures as appropriate and evaluate response   Consider cultural and social influences on pain and pain management     Problem: Safety - Adult  Goal: Free from fall injury  Outcome: Met  Flowsheets (Taken 3/17/2025 0543)  Free from fall injury: Instruct family/caregiver on patient safety     Problem: Discharge Planning  Goal: Discharge to home or other facility with appropriate resources  Outcome: Progressing  Flowsheets (Taken 3/17/2025 0543)  Discharge to home or other facility with appropriate resources:   Identify barriers to discharge with patient and caregiver   Arrange for needed discharge resources and transportation as appropriate   Identify discharge learning needs (meds, wound care, etc)   Refer to discharge planning if patient needs post-hospital services based on physician order or complex needs related to functional status, cognitive ability or social support system     Problem: Chronic Conditions and Co-morbidities  Goal: Patient's chronic conditions and co-morbidity symptoms are monitored and maintained or improved  Outcome: Progressing  Flowsheets (Taken 3/17/2025 0543)  Care Plan - Patient's Chronic Conditions and Co-Morbidity Symptoms are Monitored and Maintained or Improved:   Monitor and assess patient's chronic conditions and comorbid symptoms for stability, deterioration, or improvement   Collaborate with multidisciplinary team to address chronic and comorbid conditions and prevent exacerbation or deterioration   Update acute care plan with appropriate goals if chronic or comorbid symptoms are exacerbated and  prevent overall improvement and discharge     Problem: Nutrition  Goal: Nutrient intake appropriate for maintaining nutritional needs  Outcome: Progressing     Problem: Knowledge Deficit  Goal: Patient/family/caregiver demonstrates understanding of disease process, treatment plan, medications, and discharge instructions  Outcome: Progressing  Flowsheets (Taken 3/17/2025 0543)  Patient/family/caregiver demonstrates understanding of disease process, treatment plan, medications, and discharge instructions:   Complete learning assessment and assess knowledge base   Provide teaching at level of understanding   Provide teaching via preferred learning methods     Problem: Mechanical Ventilation  Goal: Patient Will Maintain Patent Airway  Outcome: Met  Flowsheets (Taken 3/17/2025 0543)  Patient Will Maintain Patent Airway:   Hyper oxygenate with 100% FiO2 prior to suctioning   Suctioning secretions as indicated to maintain patent airway   Elevate head of bed 30 degrees if patient has tube feeding     Problem: Mechanical Ventilation  Goal: Oral health is maintained or improved  Outcome: Progressing  Flowsheets (Taken 3/17/2025 0543)  Oral Health is Maintained or Improved:   Assess and monitor condition of lips and mouth and perform oral care per hospital policy   Perform oral care with an oral swab   Apply water-based moisturizer to lips   Suction oral pharynx     Problem: Mechanical Ventilation  Goal: ET tube will be managed safely  Outcome: Met  Flowsheets (Taken 3/17/2025 0543)  Endotracheal Tube Will Be Managed Safely:   Assess and monitor insertion depth at lip/nare line   Utilize ETT securing device and change as necessary to ensure ETT is properly secured to patient   Support ventilator tubing to avoid pressure from drag of tubing   Keep ambu bag, mask, oxygen connection tubing, and extra ETT at bedside and accompanying patient at all times   Utilize endotracheal tube securing device   Reposition endotracheal tube to  opposite side of mouth     Problem: Mechanical Ventilation  Goal: Mobility/activity is maintained at optimum level for patient  Outcome: Progressing     Problem: Skin  Goal: Decreased wound size/increased tissue granulation at next dressing change  Outcome: Progressing  Flowsheets (Taken 3/17/2025 0543)  Decreased wound size/increased tissue granulation at next dressing change:   Promote sleep for wound healing   Utilize specialty bed per algorithm   Protective dressings over bony prominences     Problem: Skin  Goal: Prevent/manage excess moisture  Outcome: Progressing  Flowsheets (Taken 3/17/2025 0543)  Prevent/manage excess moisture:   Monitor for/manage infection if present   Follow provider orders for dressing changes   Cleanse incontinence/protect with barrier cream   Moisturize dry skin     Problem: Skin  Goal: Prevent/minimize sheer/friction injuries  Outcome: Progressing  Flowsheets (Taken 3/16/2025 0851 by Sabine Li, KAILA)  Prevent/minimize sheer/friction injuries:   Use pull sheet   Turn/reposition every 2 hours/use positioning/transfer devices     Problem: Skin  Goal: Promote/optimize nutrition  Outcome: Progressing  Flowsheets (Taken 3/13/2025 1634 by Naren Parsons, KAILA)  Promote/optimize nutrition:   Assist with feeding   Monitor/record intake including meals     Problem: Skin  Goal: Promote skin healing  Outcome: Progressing  Flowsheets (Taken 3/16/2025 0212 by Jennifer Deluca RN)  Promote skin healing:   Assess skin/pad under line(s)/device(s)   Turn/reposition every 2 hours/use positioning/transfer devices   Protective dressings over bony prominences       Over the shift, the patient did not make progress toward the following goals. Barriers to progression include mental status. Recommendations to address these barriers include monitoring neurological status and reassessing.    Problem: Mechanical Ventilation  Goal: Ability to express needs and understand communication  Outcome: Not  Progressing  Flowsheets (Taken 3/17/2025 0543)  Ability to express needs and understand communication:   Assess and monitor patient's ability to understand information and communicate   Implement interventions to promote patient's ability to communicate   Encourage patient to communicate   Provide patient with clipboard and pen/pencil   Keep alphabet and symbol forms within reach     Problem: Skin  Goal: Participates in plan/prevention/treatment measures  Outcome: Not Progressing  Flowsheets (Taken 3/16/2025 0874 by Sabine Li RN)  Participates in plan/prevention/treatment measures: Elevate heels

## 2025-03-17 NOTE — PROCEDURES
CRRT procedure note   Seen and assessed on CVVH. Labs and events reviewed   Remains hemodynamically stable  Vascular access- R internal jugular trialysis catheter  BFR : 200 ml/min  Filter: M150  Total Replacement Rate: 2000 ml/hr  Pre blood pump :  1000 ml/hr  Replacement solution Prismasol potassium:  2 mEq/L  Patient fluid removal: per ICU team   Please check Phosphorus, Calcium and Magnesium daily and replace peripherally as needed  Continue with current CVVH prescription till we achieve net even, current cumulative balance is ~+4 L with daily intake of ~2 L, then transition to SLED to maintain volume control  Ct to monitor for renal recovery with daily bladder scans        3/17/2025    10:00 AM 3/17/2025    11:00 AM 3/17/2025    12:00 PM 3/17/2025     1:00 PM 3/17/2025     2:00 PM 3/17/2025     2:15 PM 3/17/2025     3:00 PM   Vitals   Systolic 99 102 97 89 94 76 85   Diastolic 55 61 53 49 54 39 54   Heart Rate 93 91 90 94 92 88 89   Temp 36.9 °C (98.4 °F) 36.9 °C (98.4 °F) 37.1 °C (98.8 °F) 37.2 °C (99 °F) 37.3 °C (99.1 °F) 37.3 °C (99.1 °F) 37.5 °C (99.5 °F)   Resp 26 27 27 28 28 26 26       Intake/Output Summary (Last 24 hours) at 3/17/2025 1530  Last data filed at 3/17/2025 1500  Gross per 24 hour   Intake 2454.96 ml   Output 782 ml   Net 1672.96 ml      Most recent labs:   Results from last 7 days   Lab Units 03/17/25  0449 03/16/25  1503 03/16/25  0507   WBC AUTO x10*3/uL 12.3* 13.4* 13.4*   HEMOGLOBIN g/dL 10.3* 10.1* 9.9*   HEMATOCRIT % 27.6* 29.7* 26.9*   PLATELETS AUTO x10*3/uL 160 158 144*     Results from last 7 days   Lab Units 03/17/25  0449 03/16/25  1503 03/16/25  0507 03/15/25  1405 03/15/25  0453 03/14/25  1816   SODIUM mmol/L 131* 133* 134* 131* 132* 132*   CO2 mmol/L 20* 21 23 22 23 22   ANION GAP mmol/L 17 18 17 16 16 17   BUN mg/dL 39* 43* 53* 51* 54* 54*   CREATININE mg/dL 1.59* 1.68* 1.71* 1.71* 1.49* 1.52*   GLUCOSE mg/dL 166* 145* 146* 215* 206* 175*   CALCIUM mg/dL 9.3 9.2 8.9 9.5 9.7  9.6   MAGNESIUM mg/dL 2.56*  --  2.38  --  2.56*  --    PHOSPHORUS mg/dL  --  3.9  --  3.5  --  3.3      Results from last 7 days   Lab Units 03/17/25  0449 03/16/25  0507 03/15/25  0453   ALT U/L 65* 58* 53*   AST U/L 121* 118* 135*   ALK PHOS U/L 253* 246* 257*      Results from last 7 days   Lab Units 03/17/25  0454 03/16/25  0507 03/15/25  0453   INR  1.8* 1.9* 1.4*     Results from last 7 days   Lab Units 03/16/25  0906 03/12/25  2304 03/12/25  1748 03/12/25  0210 03/11/25  1036 03/11/25  0804 03/10/25  2227   POCT PH, ARTERIAL pH  --   --   --   --  7.26* 7.31* 7.31*   POCT PO2, ARTERIAL mm Hg  --   --   --   --  83* 91 125*   POCT PCO2, ARTERIAL mm Hg  --   --   --   --  50* 45* 45*   FIO2 % 50 60 80   < > 55 55 70    < > = values in this interval not displayed.     Results from last 7 days   Lab Units 03/16/25  0906 03/12/25 2304 03/12/25 1748   POCT PH, VENOUS pH 7.39 7.32* 7.32*   POCT PCO2, VENOUS mm Hg 38* 43 44     ergocalciferol, 1,250 mcg, oral, Weekly  pantoprazole, 40 mg, oral, Daily before breakfast   Or  esomeprazole, 40 mg, nasoduodenal tube, Daily before breakfast   Or  pantoprazole, 40 mg, intravenous, Daily before breakfast  [START ON 3/18/2025] hydrocortisone sodium succinate, 50 mg, intravenous, q24h  insulin lispro, 0-10 Units, subcutaneous, q6h  lactulose, 10 g, oral, q4h  artificial tears, 2 drop, Both Eyes, q24h  meropenem, 2 g, intravenous, q12h  oxygen, , inhalation, Continuous - Inhalation  polyethylene glycol, 17 g, oral, Daily  rifAXIMin, 550 mg, oral, q12h COCO  sennosides-docusate sodium, 2 tablet, oral, Daily  thiamine, 100 mg, oral, Daily  vancomycin, 750 mg, intravenous, q24h  vitamin B complex-vitamin C-folic acid, 5 mL, oral, Daily

## 2025-03-17 NOTE — PROGRESS NOTES
"Medical Intensive Care - Daily Progress Note   Subjective    Gissel Harvey is a 39 y.o. year old female patient admitted on 3/3/2025 with following ICU needs: Hypoxemia and AHRF requiring intubation due to ARDS from CAP, acute renal failure requiring CVVH     Interval History:  - Off sedation x1 day. Not following commands. EEG w/ diffuse encephalopathy  - Levophed resumed 3/16 evening    Meds    Scheduled medications  ergocalciferol, 1,250 mcg, oral, Weekly  pantoprazole, 40 mg, oral, Daily before breakfast   Or  esomeprazole, 40 mg, nasoduodenal tube, Daily before breakfast   Or  pantoprazole, 40 mg, intravenous, Daily before breakfast  hydrocortisone sodium succinate, 50 mg, intravenous, BID  insulin lispro, 0-10 Units, subcutaneous, q6h  lactulose, 10 g, oral, q6h  artificial tears, 2 drop, Both Eyes, q24h  meropenem, 2 g, intravenous, q12h  oxygen, , inhalation, Continuous - Inhalation  polyethylene glycol, 17 g, oral, Daily  rifAXIMin, 550 mg, oral, q12h COCO  sennosides-docusate sodium, 2 tablet, oral, Daily  thiamine, 100 mg, oral, Daily  vancomycin, 750 mg, intravenous, q24h  vitamin B complex-vitamin C-folic acid, 5 mL, oral, Daily      Continuous medications  bivalirudin, 0.05-0.49 mg/kg/hr, Last Rate: 0.1 mg/kg/hr (03/17/25 0311)  fentaNYL, 0-300 mcg/hr, Last Rate: Stopped (03/16/25 1100)  norepinephrine, 0.01-1 mcg/kg/min (Dosing Weight), Last Rate: 0.02 mcg/kg/min (03/17/25 0402)  PrismaSol BGK 2/3.5, 25 mL/kg/hr (Dosing Weight), Last Rate: 25 mL/kg/hr (03/17/25 0112)  propofol, 0-50 mcg/kg/min (Dosing Weight), Last Rate: Stopped (03/15/25 1145)      PRN medications  PRN medications: acetaminophen **OR** acetaminophen, alteplase, dextrose, dextrose, fentaNYL, glucagon, glucagon, oxygen, vancomycin     Objective    Blood pressure 94/57, pulse 93, temperature 36.7 °C (98.1 °F), resp. rate (!) 30, height 1.676 m (5' 6\"), weight 62 kg (136 lb 11 oz), SpO2 91%.     Physical Exam   Constitutional:       " Comments: Intubated and Sedated  Not opening eyes to voice   Pupils reactive   HENT:      Head: Normocephalic and atraumatic.   Cardiovascular:      Rate and Rhythm: Normal rate and regular rhythm.      Pulses: Normal pulses.   Pulmonary:      Breath sounds: No wheezing.      Comments: Mechanically Ventilated, Coarse breath sounds  Abdominal:      General: Abdomen soft with significantly enlarged liver     Palpations: Abdomen is soft.      Comments: Rectal tube draining brown stool, no blood   Musculoskeletal:      Right lower leg: Edema present.      Left lower leg: Edema present.   Edema in the upper extremities bilaterally  Skin:     General: Skin is warm and dry.      Comments: Warm to touch   Neurological:      Comments: Sedated, not awakening to voice. No withdrawal to pain.    Intake/Output Summary (Last 24 hours) at 3/17/2025 0728  Last data filed at 3/17/2025 0600  Gross per 24 hour   Intake 2065.23 ml   Output 959 ml   Net 1106.23 ml     Labs:   Results from last 72 hours   Lab Units 03/17/25  0449 03/16/25  1503 03/16/25  0507 03/15/25  1405 03/15/25  0453 03/14/25  1816   SODIUM mmol/L 131* 133* 134* 131*   < > 132*   POTASSIUM mmol/L 3.8 3.9 3.7 4.3   < > 4.3   CHLORIDE mmol/L 98 98 98 97*   < > 97*   CO2 mmol/L 20* 21 23 22   < > 22   BUN mg/dL 39* 43* 53* 51*   < > 54*   CREATININE mg/dL 1.59* 1.68* 1.71* 1.71*   < > 1.52*   GLUCOSE mg/dL 166* 145* 146* 215*   < > 175*   CALCIUM mg/dL 9.3 9.2 8.9 9.5   < > 9.6   ANION GAP mmol/L 17 18 17 16   < > 17   EGFR mL/min/1.73m*2 42* 40* 39* 39*   < > 45*   PHOSPHORUS mg/dL  --  3.9  --  3.5  --  3.3    < > = values in this interval not displayed.      Results from last 72 hours   Lab Units 03/17/25  0449 03/16/25  1503 03/16/25  0507 03/15/25  1405 03/15/25  0453   WBC AUTO x10*3/uL 12.3* 13.4* 13.4*   < > 17.4*   HEMOGLOBIN g/dL 10.3* 10.1* 9.9*   < > 10.2*   HEMATOCRIT % 27.6* 29.7* 26.9*   < > 27.7*   PLATELETS AUTO x10*3/uL 160 158 144*   < > 161    NEUTROS PCT AUTO % 84.5  --  85.0  --  86.8   LYMPHS PCT AUTO % 7.8  --  8.3  --  6.0   MONOS PCT AUTO % 6.2  --  4.9  --  4.3   EOS PCT AUTO % 0.0  --  0.0  --  0.0    < > = values in this interval not displayed.          Results from last 72 hours   Lab Units 03/16/25  0906   POCT PH, VENOUS pH 7.39   POCT PCO2, VENOUS mm Hg 38*   POCT PO2, VENOUS mm Hg 43        Micro/ID:     Lab Results   Component Value Date    BLOODCULT No growth at 4 days -  FINAL REPORT 03/12/2025           Assessment and Plan     Assessment: Gissel Harvey is a 39 y.o. 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 She is being treated for ARDS 2/2 CAP (MSSA and s. pneumo) and severe septic shock complicated by acute anuric renal failure, now on CVVH. She is still requiring significant respiratory support w/ increased FiO2 requirements and PEEP, on day 14 of mechanical ventilation. She also has worsening Tbili concerning for intrahepatic insult/injury. She was made DNR by her daughter 3/12.     Mechanical Ventilation: > 10 days  Sedation/Analgesia:  none  Restraints: no    Summary for 03/17/25  :  Plan to attempt to remove some fluid with CVVH and assess how her BP tolerates  Continue to wean steroids- hydrocortisone 50 daily today -> off tomorrow  Will discuss overall goals and next steps with family as we approach need for trach     Plan:  NEUROLOGY/PSYCH:  #Sedation  - Off sedation since 3/16 AM  - Given liver impairment, may take longer for sedation effects to wear off     #Encephelopathy  :: EEG w/ encephalopathy, likely iso metabolic derangements, critical illness, hyperbilirubinemia     #Microhemorrages in BL  cerebral hemispheres  :: CT head 3/14 w/ indeterminate lesions, MRI brain w/ BL microhemorrages  :: Neuro status unclear due to sedation, attempts at sedation holidays shortened due to tachypnea and agitation  :: mild grimace to pain, has cough, pupils equal and reactive   - 3/15 neuro consulted- will need assistance with anticoagulation resumption (on bival for APS and known thrombosis) and neuroprognostication  - Video EEG ordered  - Hold sedation for EEG and neuro eval     CARDIOVASCULAR:     #HFrEF  :: TTE 3/4 with EF 30-35% with reduced RV function, no previous TTE for comparison  :: BNP 2127 at admission  :: Troponin plateaued (62>63) on 3/4/25  - Will discuss repeat TTE pending her clinical status, may have had HFrEF iso acute shock      #Shock, likely septic I/s/o severe pneumonia  :: on initial presentation 3/3-3/7, she required 4 pressors (levo, vaso, epi, angiotensin)  :: on 3/8-3/11, able to come off pressors, maintained strong MAP  - 3/12 acute worsening of shock, (2/2 worsening of sepsis, worsened when sedation was weaned and she became dyssynchronous with vent demonstrating air trapping and significant tachypnea), had significant hypotension once again requiring vasopressor support        - continue levo, weaning as able        - stress dose steroids: weaning (50 daily -> off tomorrow)        - c/w vanc + rebeca for broad spectrum coverage iso septic shock- plan for course to end 3/18     #Aflutter  #Sustained VT   :: went into NSVT while wire threaded for central line placement  :: s/p amiodarone 150 mg bolus x2  :: pt cardioverted  - Pt is back to sinus rhythm, okay for amio gtt if arrhythmias return     PULMONARY:  Vent Mode: Volume control/assist control  FiO2 (%):  [50 %] 50 %  S RR:  [26] 26  S VT:  [450 mL] 450 mL  PEEP/CPAP (cm H2O):  [10 cm H20] 10 cm H20  MAP (cm H2O):  [8-16] 8  #Community acquired pneumonia   #ARDS   :: intubated 3/3   :: P:F ~ 70 on admission   :: Procalcitonin 57.3  :: flu  A/B, COVID, parainfluenza, RSV, metapneumovirus, legionella urine Ag: negative  :: see ID section for full abx history this admission  :: Tracheal aspirate cx 3/3: pan sensitive staph aureus and strep pneumo  :: Strep pneumo urine ag: positive  :: Bcx 3/3: NGTD x3 days  :: re-cultured 3/12: sputum negative, blood culture NGTD  Plan  - Vanc/rebeca to end 3/18  - Day 14 of mechanical ventilation, will discuss trach with NOK/POA. If this is within family wishes for patient will reach out to ENT for trach this week     RENAL/GENITOURINARY:  #Acute renal failure, anuric SCOUT requiring CVVH  :: etiology: septic shock and multiorgan failure  :: Cr 1.9 on admission, unclear baseline as no labs in 2 years   :: FeNa 3/3: 0.1% prerenal  :: Persistently anuric   - Continue CVVH per Nephrology  - While on CVVH: BID RFP, ionized calcium and CBC  - Warm CVVH fluid to assist with hypothermia     GASTROENTEROLOGY:  #liver cirrhosis   #portal hypertension s/p TIPS   #hx of Budd-Chiari   :: 3/7 POCUS with distended bowel, no appreciable ascites  - Continue to monitor abdomen for signs of ascites formation  - Hep C Abs positive, PCR pending  - 3/11 labs w/ acute elevation in INR (5), AST (147), ALT (57), Alk Phos (218)  - Liver US w/ thrombosed TIPS, IR consulted, likely chronic as this has been seen on prior CT and patient non-adherent with anticoagulation  -3/13 INR, PT, Bili, ALT, Alk phos all increasing. 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 C/f DILI or intrahepatic cholestasis / hepatic congestion, or shock liver   PLAN        - DC ketamine and use propofol for sedation given c/f DILI from ketamine. Meropenem also potential for DILI        - CTM- she is not stable enough for further hepatology workup or procedure  - c/w lactulose and adding rifaximin - monitor rectal tube output  - s/p vitamin K 10 mg 3/14 w/ improvement in INR  - bili continues to rise, likely contributing to her encephalopathy,  continuing lactulose and rifaximin       ENDOCRINOLOGY:              #High dose steroids              #Supplemental nutrition                          - SSI                           - Tube feeds via NG     HEMATOLOGY:  #Lymphopenia -> leukocytosis   :: Abs leukocyte count 260  :: HIV neg  - Now with leukocytosis likely 2/2 known infection and steroid use  - CTM WBC count     #Hx of antiphospholipid syndrome   #Hx of positive PF4  #Chronic IVC occlusion   #Hx of DVT   :: Supposed to be on fondaparinux outpatient, unclear if taking as no recent med fills   - Continue bivalirudin while inpatient I/s/o previous positive PF4      #Thrombocytopenia, stable  #Elevated PT and INR  - Trend plts, INR, coags  - Fibrinogen wnl  - If platelets <50, transfuse but do not stop bivalirubin  - INR elevated 5 -> 3.8 on 3/11, 5.9 on 3/12, iso known cirrhosis, plt 80  - s/p vitamin K 10 mg 3/14 w/ improvement in INR  - PT and INR increase can be secondary to cirrhosis, improved with vitamin K     SKIN:  #Skin Failure  - BL buttock wounds, ear wound  - wound care following, appreciate recs  - c/w vaseline for lips/mouth while intubated     MUSCULOSKELETAL:  GIFTY     INFECTIOUS DISEASE:  #MSSA PNA  #Strep PNA  #ARDS  #Septic shock  Antibiotics  Azithromycin 500 mg: 3/3  Zosyn 4.5 g: 3/3  Tobramycin 440 mg: 3/4  Vancomycin: 3/3 - 3/6, 3/8 - 3/10  Ceftaroline 3/5 - 3/6  Meropenem 2 g BID: 3/3 - 3/8  Micafungin: 3/4 - 3/6  Doxycycline 100 mg BID: 3/4 - 3/6  Ceftriaxone 3/8 - 3/10  Cefazolin 3/10-3/12  *Vanc/Day 3/12-3/18  - cultures from 3/12 NGTD   ICU Check List       ICU Liberation: Intervention:   Assess, Prevent, Manage Pain      Both SAT and SBT [] SAT  [] SBT 30-60 min [] Extubate to NC  [] Extubate to NIV  [] Discuss Trach   Choice of analgesia and sedation Gao Agitation Sedation Scale (RASS): Unarousable  Target Arousal Level (RASS): RASS -4 to -5   None   Delirium: Assess, prevent and manage  CAM ICU Positive    Early  Mobility and Exercise New or increase in vasopressor dose in last 2 hours [] PT /OT consult   Family Engagement and Empowerment [x]Family updated []SW consult     FEN  Fluids: PRN  Electrolytes: PRN- CVVH   Nutrition: TF  Prophylaxis:  DVT ppx: Bival  GI ppx: PPI  Bowel care: Lactulose  Hardware:         ETT  7.5 mm (Active)   Placement Date: 03/03/25   ETT Type: ETT - single  Single Lumen Tube Size: 7.5 mm  Cuffed: Yes  Location: Oral   Number of days: 14       NG/OG/Feeding Tube NG - [place]utherford sump Right nostril (Active)   Placement Date/Time: 03/03/25 1900   Type of Tube: Feeding Tube  Tube Length: 53 cm  Tube Type: NG - [place]utherford sump  Tube Location: Right nostril   Number of days: 13       Fecal Management Rectal tube with balloon (Active)   Placement Date/Time: 03/09/25 0018   Placed by: ashlie  Hand Hygiene Completed: Yes  Fecal Management Tube Type: Rectal tube with balloon   Number of days: 8       Hemodialysis Cath 03/06/25 Right (Active)   Placement Date/Time: 03/06/25 1700   Orientation: Right   Number of days: 10       Social:  Code: DNR    HPOA: Payal Durant (daughter) [phone]  Disposition: MARLENE Buck MD   03/17/25 at 7:28 AM     Disclaimer: Documentation completed with the information available at the time of input. The times in the chart may not be reflective of actual patient care times, interventions, or procedures. Documentation occurs after the physical care of the patient.

## 2025-03-17 NOTE — CARE PLAN
The patient's goals for the shift include      Problem: Mechanical Ventilation  Goal: Oral health is maintained or improved  Outcome: Progressing     Problem: Skin  Goal: Decreased wound size/increased tissue granulation at next dressing change  Outcome: Progressing  Flowsheets (Taken 3/17/2025 1757)  Decreased wound size/increased tissue granulation at next dressing change:   Promote sleep for wound healing   Protective dressings over bony prominences  Goal: Participates in plan/prevention/treatment measures  Outcome: Progressing  Flowsheets (Taken 3/17/2025 1757)  Participates in plan/prevention/treatment measures: Elevate heels  Goal: Prevent/manage excess moisture  Outcome: Progressing  Flowsheets (Taken 3/17/2025 1757)  Prevent/manage excess moisture:   Cleanse incontinence/protect with barrier cream   Monitor for/manage infection if present   Follow provider orders for dressing changes   Moisturize dry skin  Goal: Prevent/minimize sheer/friction injuries  Outcome: Progressing  Flowsheets (Taken 3/17/2025 1757)  Prevent/minimize sheer/friction injuries:   Complete micro-shifts as needed if patient unable. Adjust patient position to relieve pressure points, not a full turn   Increase activity/out of bed for meals   Use pull sheet   HOB 30 degrees or less   Turn/reposition every 2 hours/use positioning/transfer devices  Goal: Promote/optimize nutrition  Outcome: Progressing  Flowsheets (Taken 3/17/2025 1757)  Promote/optimize nutrition:   Assist with feeding   Monitor/record intake including meals   Consume > 50% meals/supplements  Goal: Promote skin healing  Outcome: Progressing  Flowsheets (Taken 3/17/2025 1757)  Promote skin healing:   Assess skin/pad under line(s)/device(s)   Protective dressings over bony prominences   Turn/reposition every 2 hours/use positioning/transfer devices   Ensure correct size (line/device) and apply per  instructions   Rotate device position/do not position patient on  device     The clinical goals for the shift include Pt will be hemodynamically stable throughout shift.    Pt failed to have fluid removed off with CVVH today. Had to go up a little on the pressors.

## 2025-03-18 LAB
ALBUMIN SERPL BCP-MCNC: 2.4 G/DL (ref 3.4–5)
ALBUMIN SERPL BCP-MCNC: 2.5 G/DL (ref 3.4–5)
ALP SERPL-CCNC: 232 U/L (ref 33–110)
ALT SERPL W P-5'-P-CCNC: 62 U/L (ref 7–45)
AMMONIA PLAS-SCNC: 65 UMOL/L (ref 16–53)
ANION GAP SERPL CALC-SCNC: 14 MMOL/L (ref 10–20)
ANION GAP SERPL CALC-SCNC: 15 MMOL/L (ref 10–20)
APTT PPP: 65 SECONDS (ref 26–36)
AST SERPL W P-5'-P-CCNC: 98 U/L (ref 9–39)
BASOPHILS # BLD MANUAL: 0 X10*3/UL (ref 0–0.1)
BASOPHILS NFR BLD MANUAL: 0 %
BILIRUB SERPL-MCNC: 26.1 MG/DL (ref 0–1.2)
BUN SERPL-MCNC: 37 MG/DL (ref 6–23)
BUN SERPL-MCNC: 39 MG/DL (ref 6–23)
BURR CELLS BLD QL SMEAR: ABNORMAL
C3 SERPL-MCNC: 93 MG/DL (ref 87–200)
C4 SERPL-MCNC: 14 MG/DL (ref 10–50)
CA-I BLD-SCNC: 1.19 MMOL/L (ref 1.1–1.33)
CA-I BLD-SCNC: 1.2 MMOL/L (ref 1.1–1.33)
CALCIUM SERPL-MCNC: 9.2 MG/DL (ref 8.6–10.6)
CALCIUM SERPL-MCNC: 9.3 MG/DL (ref 8.6–10.6)
CHLORIDE SERPL-SCNC: 96 MMOL/L (ref 98–107)
CHLORIDE SERPL-SCNC: 98 MMOL/L (ref 98–107)
CO2 SERPL-SCNC: 23 MMOL/L (ref 21–32)
CO2 SERPL-SCNC: 25 MMOL/L (ref 21–32)
CREAT SERPL-MCNC: 1.33 MG/DL (ref 0.5–1.05)
CREAT SERPL-MCNC: 1.72 MG/DL (ref 0.5–1.05)
CRP SERPL-MCNC: 7.03 MG/DL
DSDNA AB SER-ACNC: <1 IU/ML
EGFRCR SERPLBLD CKD-EPI 2021: 38 ML/MIN/1.73M*2
EGFRCR SERPLBLD CKD-EPI 2021: 52 ML/MIN/1.73M*2
ENA RNP AB SER IA-ACNC: 0.3 AI
ENA SM AB SER IA-ACNC: <0.2 AI
EOSINOPHIL # BLD MANUAL: 0.09 X10*3/UL (ref 0–0.7)
EOSINOPHIL NFR BLD MANUAL: 0.9 %
ERYTHROCYTE [DISTWIDTH] IN BLOOD BY AUTOMATED COUNT: 21.4 % (ref 11.5–14.5)
ERYTHROCYTE [DISTWIDTH] IN BLOOD BY AUTOMATED COUNT: 22.2 % (ref 11.5–14.5)
ERYTHROCYTE [SEDIMENTATION RATE] IN BLOOD BY WESTERGREN METHOD: 125 MM/H (ref 0–20)
FUNGAL CULTURE, BLOOD (ARUP): NORMAL
GLUCOSE BLD MANUAL STRIP-MCNC: 123 MG/DL (ref 74–99)
GLUCOSE BLD MANUAL STRIP-MCNC: 129 MG/DL (ref 74–99)
GLUCOSE BLD MANUAL STRIP-MCNC: 135 MG/DL (ref 74–99)
GLUCOSE BLD MANUAL STRIP-MCNC: 153 MG/DL (ref 74–99)
GLUCOSE SERPL-MCNC: 113 MG/DL (ref 74–99)
GLUCOSE SERPL-MCNC: 154 MG/DL (ref 74–99)
HCT VFR BLD AUTO: 25.8 % (ref 36–46)
HCT VFR BLD AUTO: 27.9 % (ref 36–46)
HGB BLD-MCNC: 10 G/DL (ref 12–16)
HGB BLD-MCNC: 9.4 G/DL (ref 12–16)
IMM GRANULOCYTES # BLD AUTO: 0.06 X10*3/UL (ref 0–0.7)
IMM GRANULOCYTES NFR BLD AUTO: 0.6 % (ref 0–0.9)
INR PPP: 1.7 (ref 0.9–1.1)
LYMPHOCYTES # BLD MANUAL: 1.01 X10*3/UL (ref 1.2–4.8)
LYMPHOCYTES NFR BLD MANUAL: 10.2 %
MAGNESIUM SERPL-MCNC: 2.2 MG/DL (ref 1.6–2.4)
MAGNESIUM SERPL-MCNC: 2.48 MG/DL (ref 1.6–2.4)
MCH RBC QN AUTO: 33.7 PG (ref 26–34)
MCH RBC QN AUTO: 34.1 PG (ref 26–34)
MCHC RBC AUTO-ENTMCNC: 35.8 G/DL (ref 32–36)
MCHC RBC AUTO-ENTMCNC: 36.4 G/DL (ref 32–36)
MCV RBC AUTO: 94 FL (ref 80–100)
MCV RBC AUTO: 94 FL (ref 80–100)
METAMYELOCYTES # BLD MANUAL: 0.08 X10*3/UL
METAMYELOCYTES NFR BLD MANUAL: 0.8 %
MONOCYTES # BLD MANUAL: 0.25 X10*3/UL (ref 0.1–1)
MONOCYTES NFR BLD MANUAL: 2.5 %
NEUTROPHILS # BLD MANUAL: 8.48 X10*3/UL (ref 1.2–7.7)
NEUTS BAND # BLD MANUAL: 0.42 X10*3/UL (ref 0–0.7)
NEUTS BAND NFR BLD MANUAL: 4.2 %
NEUTS SEG # BLD MANUAL: 8.06 X10*3/UL (ref 1.2–7)
NEUTS SEG NFR BLD MANUAL: 81.4 %
NRBC BLD-RTO: 0.6 /100 WBCS (ref 0–0)
NRBC BLD-RTO: 0.8 /100 WBCS (ref 0–0)
PHOSPHATE SERPL-MCNC: 3.3 MG/DL (ref 2.5–4.9)
PLATELET # BLD AUTO: 133 X10*3/UL (ref 150–450)
PLATELET # BLD AUTO: 163 X10*3/UL (ref 150–450)
POLYCHROMASIA BLD QL SMEAR: ABNORMAL
POTASSIUM SERPL-SCNC: 3 MMOL/L (ref 3.5–5.3)
POTASSIUM SERPL-SCNC: 4.6 MMOL/L (ref 3.5–5.3)
PROT SERPL-MCNC: 5.7 G/DL (ref 6.4–8.2)
PROTHROMBIN TIME: 19.3 SECONDS (ref 9.8–12.4)
RBC # BLD AUTO: 2.76 X10*6/UL (ref 4–5.2)
RBC # BLD AUTO: 2.97 X10*6/UL (ref 4–5.2)
RBC MORPH BLD: ABNORMAL
SODIUM SERPL-SCNC: 131 MMOL/L (ref 136–145)
SODIUM SERPL-SCNC: 132 MMOL/L (ref 136–145)
TARGETS BLD QL SMEAR: ABNORMAL
TOTAL CELLS COUNTED BLD: 118
WBC # BLD AUTO: 9.9 X10*3/UL (ref 4.4–11.3)
WBC # BLD AUTO: 9.9 X10*3/UL (ref 4.4–11.3)

## 2025-03-18 PROCEDURE — 85652 RBC SED RATE AUTOMATED: CPT

## 2025-03-18 PROCEDURE — 2500000004 HC RX 250 GENERAL PHARMACY W/ HCPCS (ALT 636 FOR OP/ED): Mod: SE

## 2025-03-18 PROCEDURE — 71260 CT THORAX DX C+: CPT

## 2025-03-18 PROCEDURE — 2500000002 HC RX 250 W HCPCS SELF ADMINISTERED DRUGS (ALT 637 FOR MEDICARE OP, ALT 636 FOR OP/ED): Mod: SE

## 2025-03-18 PROCEDURE — 82330 ASSAY OF CALCIUM: CPT

## 2025-03-18 PROCEDURE — 2500000005 HC RX 250 GENERAL PHARMACY W/O HCPCS: Mod: SE

## 2025-03-18 PROCEDURE — 80053 COMPREHEN METABOLIC PANEL: CPT

## 2025-03-18 PROCEDURE — 36415 COLL VENOUS BLD VENIPUNCTURE: CPT

## 2025-03-18 PROCEDURE — 82140 ASSAY OF AMMONIA: CPT

## 2025-03-18 PROCEDURE — 2500000001 HC RX 250 WO HCPCS SELF ADMINISTERED DRUGS (ALT 637 FOR MEDICARE OP): Mod: SE

## 2025-03-18 PROCEDURE — 74177 CT ABD & PELVIS W/CONTRAST: CPT

## 2025-03-18 PROCEDURE — 2550000001 HC RX 255 CONTRASTS: Mod: SE | Performed by: INTERNAL MEDICINE

## 2025-03-18 PROCEDURE — 99291 CRITICAL CARE FIRST HOUR: CPT

## 2025-03-18 PROCEDURE — 86140 C-REACTIVE PROTEIN: CPT

## 2025-03-18 PROCEDURE — 94003 VENT MGMT INPAT SUBQ DAY: CPT

## 2025-03-18 PROCEDURE — 85730 THROMBOPLASTIN TIME PARTIAL: CPT

## 2025-03-18 PROCEDURE — 82947 ASSAY GLUCOSE BLOOD QUANT: CPT

## 2025-03-18 PROCEDURE — 83735 ASSAY OF MAGNESIUM: CPT

## 2025-03-18 PROCEDURE — 90945 DIALYSIS ONE EVALUATION: CPT | Performed by: INTERNAL MEDICINE

## 2025-03-18 PROCEDURE — 99222 1ST HOSP IP/OBS MODERATE 55: CPT | Performed by: INTERNAL MEDICINE

## 2025-03-18 PROCEDURE — 85027 COMPLETE CBC AUTOMATED: CPT

## 2025-03-18 PROCEDURE — 85007 BL SMEAR W/DIFF WBC COUNT: CPT

## 2025-03-18 PROCEDURE — 95720 EEG PHY/QHP EA INCR W/VEEG: CPT | Performed by: STUDENT IN AN ORGANIZED HEALTH CARE EDUCATION/TRAINING PROGRAM

## 2025-03-18 PROCEDURE — 2020000001 HC ICU ROOM DAILY

## 2025-03-18 PROCEDURE — 80069 RENAL FUNCTION PANEL: CPT | Mod: CCI

## 2025-03-18 PROCEDURE — 90937 HEMODIALYSIS REPEATED EVAL: CPT

## 2025-03-18 PROCEDURE — 71045 X-RAY EXAM CHEST 1 VIEW: CPT | Performed by: RADIOLOGY

## 2025-03-18 PROCEDURE — 95715 VEEG EA 12-26HR INTMT MNTR: CPT

## 2025-03-18 RX ADMIN — CALCIUM CHLORIDE, MAGNESIUM CHLORIDE, DEXTROSE MONOHYDRATE, LACTIC ACID, SODIUM CHLORIDE, SODIUM BICARBONATE AND POTASSIUM CHLORIDE 25 ML/KG/HR: 5.15; 2.03; 22; 5.4; 6.46; 3.09; .157 INJECTION INTRAVENOUS at 10:04

## 2025-03-18 RX ADMIN — Medication 1250 MCG: at 18:20

## 2025-03-18 RX ADMIN — CALCIUM CHLORIDE, MAGNESIUM CHLORIDE, DEXTROSE MONOHYDRATE, LACTIC ACID, SODIUM CHLORIDE, SODIUM BICARBONATE AND POTASSIUM CHLORIDE 25 ML/KG/HR: 5.15; 2.03; 22; 5.4; 6.46; 3.09; .157 INJECTION INTRAVENOUS at 10:02

## 2025-03-18 RX ADMIN — THIAMINE HCL TAB 100 MG 100 MG: 100 TAB at 09:13

## 2025-03-18 RX ADMIN — VANCOMYCIN HYDROCHLORIDE 750 MG: 750 INJECTION, SOLUTION INTRAVENOUS at 09:28

## 2025-03-18 RX ADMIN — CALCIUM CHLORIDE, MAGNESIUM CHLORIDE, DEXTROSE MONOHYDRATE, LACTIC ACID, SODIUM CHLORIDE, SODIUM BICARBONATE AND POTASSIUM CHLORIDE 25 ML/KG/HR: 3.68; 3.05; 22; 5.4; 6.46; 3.09; .314 INJECTION INTRAVENOUS at 13:53

## 2025-03-18 RX ADMIN — PANTOPRAZOLE SODIUM 40 MG: 40 INJECTION, POWDER, LYOPHILIZED, FOR SOLUTION INTRAVENOUS at 09:15

## 2025-03-18 RX ADMIN — LACTULOSE 10 G: 20 SOLUTION ORAL at 23:39

## 2025-03-18 RX ADMIN — CALCIUM CHLORIDE, MAGNESIUM CHLORIDE, DEXTROSE MONOHYDRATE, LACTIC ACID, SODIUM CHLORIDE, SODIUM BICARBONATE AND POTASSIUM CHLORIDE 25 ML/KG/HR: 5.15; 2.03; 22; 5.4; 6.46; 3.09; .157 INJECTION INTRAVENOUS at 10:03

## 2025-03-18 RX ADMIN — LACTULOSE 10 G: 20 SOLUTION ORAL at 20:46

## 2025-03-18 RX ADMIN — Medication 5 ML: at 09:13

## 2025-03-18 RX ADMIN — LACTULOSE 10 G: 20 SOLUTION ORAL at 15:55

## 2025-03-18 RX ADMIN — RIFAXIMIN 550 MG: 550 TABLET ORAL at 09:13

## 2025-03-18 RX ADMIN — CARBOXYMETHYLCELLULOSE SODIUM 2 DROP: 5 SOLUTION/ DROPS OPHTHALMIC at 09:14

## 2025-03-18 RX ADMIN — INSULIN LISPRO 2 UNITS: 100 INJECTION, SOLUTION INTRAVENOUS; SUBCUTANEOUS at 23:40

## 2025-03-18 RX ADMIN — NOREPINEPHRINE BITARTRATE 0.02 MCG/KG/MIN: 8 INJECTION, SOLUTION INTRAVENOUS at 09:12

## 2025-03-18 RX ADMIN — SENNOSIDES AND DOCUSATE SODIUM 2 TABLET: 50; 8.6 TABLET ORAL at 09:13

## 2025-03-18 RX ADMIN — LACTULOSE 10 G: 20 SOLUTION ORAL at 09:31

## 2025-03-18 RX ADMIN — CALCIUM CHLORIDE, MAGNESIUM CHLORIDE, DEXTROSE MONOHYDRATE, LACTIC ACID, SODIUM CHLORIDE, SODIUM BICARBONATE AND POTASSIUM CHLORIDE 25 ML/KG/HR: 3.68; 3.05; 22; 5.4; 6.46; 3.09; .314 INJECTION INTRAVENOUS at 13:54

## 2025-03-18 RX ADMIN — CALCIUM CHLORIDE, MAGNESIUM CHLORIDE, DEXTROSE MONOHYDRATE, LACTIC ACID, SODIUM CHLORIDE, SODIUM BICARBONATE AND POTASSIUM CHLORIDE 25 ML/KG/HR: 3.68; 3.05; 22; 5.4; 6.46; 3.09; .314 INJECTION INTRAVENOUS at 13:55

## 2025-03-18 RX ADMIN — CALCIUM CHLORIDE, MAGNESIUM CHLORIDE, DEXTROSE MONOHYDRATE, LACTIC ACID, SODIUM CHLORIDE, SODIUM BICARBONATE AND POTASSIUM CHLORIDE 25 ML/KG/HR: 5.15; 2.03; 22; 5.4; 6.46; 3.09; .157 INJECTION INTRAVENOUS at 04:10

## 2025-03-18 RX ADMIN — IOHEXOL 75 ML: 350 INJECTION, SOLUTION INTRAVENOUS at 13:39

## 2025-03-18 RX ADMIN — Medication 50 PERCENT: at 20:52

## 2025-03-18 RX ADMIN — MEROPENEM 2 G: 1 INJECTION INTRAVENOUS at 11:05

## 2025-03-18 RX ADMIN — RIFAXIMIN 550 MG: 550 TABLET ORAL at 20:46

## 2025-03-18 ASSESSMENT — PAIN - FUNCTIONAL ASSESSMENT
PAIN_FUNCTIONAL_ASSESSMENT: CPOT (CRITICAL CARE PAIN OBSERVATION TOOL)

## 2025-03-18 NOTE — CARE PLAN
The patient's goals for the shift include  irma    The clinical goals for the shift include Pt to have decreased levo requirements by 3/18/25 0600    Over the shift, the patient did make progress toward the following goals. Barriers to progression include remaining on CVVh. Recommendations to address these barriers include GOC discussions with family.

## 2025-03-18 NOTE — SIGNIFICANT EVENT
Assessment:    Ms. 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 She is being treated for ARDS 2/2 CAP (MSSA and s. pneumo) and severe septic shock complicated by acute anuric renal failure, on CVVH. LFTs also elevated and have worsened in past days.     Examination is non-focal but consistent with coma off sedation. EEG over 48 hours has showed no seizures with generalized slowing pattern. MRI brain obtained with GRE sequencing indicative of chronic multifocal punctate hemorrhages primarily in bilateral basal ganglia regions secondary likely to hypertension.  Reversible encephalopathy workup has been unremarkable.    Patient has several reasons to be encephalopathic in the setting of both infectious and toxic/metabolic abnormalities pertaining to her renal and hepatic impairments.      For question of safety of bivalirudin in the setting of evidence of chronic punctate hemorrhages on MRI, we recommend continuing for now as patient has significant clotting risk, no acute bleeds seen at this time.      Recommendations:    - No further neurological testing at this time    - Neurology will sign off; please re-engage if new concerns/questions arise    Ember Skaggs MD  PGY-4 Neurology  General Neurology [id number]

## 2025-03-18 NOTE — CONSULTS
Gastroenterology Consult Service INITIAL CONSULT Note  Department of Gastroenterology & Hepatology  [facility]    [facility]  March 18, 2025   Patient: Gissel Harvey    Medical Record: [id number]    Reason for Consult: Rising LFTs    Subjective     Gissel Harvey is a 39-year-old female with lupus, antiphospholipid syndrome, cirrhosis with portal hypertension s/p TIPS, Budd-Chiari, and chronic IVC occlusion was admitted on 3/3/2025 for ARDS due to multifocal pneumonia (MSSA and Streptococcus pneumoniae), septic shock, and acute renal failure requiring CVVH. Initially requiring multiple vasopressors (levo, vaso, epi, angiotensin II on 3/4), she has since been weaned to norepinephrine only. She now has progressively worsening LFTs (AST, ALT, Alk Phos, bilirubin), raising concern for a variety of etiologies including shock liver from hypotension, DILI, hepatic congestion from chronic TIPS thrombosis, or lupus-related hepatitis. Meropenem has been ongoing, and ketamine--potentially hepatotoxic--was stopped on 3/13. An RUQ ultrasound (3/14) showed no new biliary obstruction but confirmed chronic TIPS thrombosis.    Sedation has been tapered off as of 3/16 (she was previously on fentanyl, ketamine, midazolam [stopped 3/10], and propofol), yet she remains unresponsive to voice without withdrawal to pain. Her respiratory status has improved from 100% FiO2 on 3/8 to 50% FiO2 on 3/18, though she continues to manifest generalized edema and remains hypotensive (95/52), anuric on CVVH, and hyperbilirubinemic. Antibiotic therapy with vancomycin and meropenem concluded on 3/18.    She is on lactulose (10 g q6h) and rifaximin to manage suspected hepatic encephalopathy, with a net-even fluid goal on CVVH. She remains critically ill with ARDS, septic shock, and multi-organ failure, necessitating ongoing vigilant supportive care and careful adjustments to her treatment regimen.    12 point  ROS otherwise negative, unless indicated above.     Past Medical History:  No past medical history on file.    Home Medications  Medications Prior to Admission   Medication Sig Dispense Refill Last Dose/Taking    fondaparinux (Arixtra) 7.5 mg/0.6 mL syringe Inject 7.5 mg under the skin once daily.          Surgical History:  No past surgical history on file.    Allergies:    Allergies   Allergen Reactions    Heparin Hives and Unknown     HIT       Social History:    Social History     Socioeconomic History    Marital status:      Spouse name: Not on file    Number of children: Not on file    Years of education: Not on file    Highest education level: Not on file   Occupational History    Not on file   Tobacco Use    Smoking status: Every Day     Types: Cigarettes    Smokeless tobacco: Never   Substance and Sexual Activity    Alcohol use: Not on file    Drug use: Not on file    Sexual activity: Not on file   Other Topics Concern    Not on file   Social History Narrative    Not on file     Social Drivers of Health     Financial Resource Strain: Patient Unable To Answer (3/3/2025)    Overall Financial Resource Strain (CARDIA)     Difficulty of Paying Living Expenses: Patient unable to answer   Food Insecurity: Patient Unable To Answer (3/7/2025)    Hunger Vital Sign     Worried About Running Out of Food in the Last Year: Patient unable to answer     Ran Out of Food in the Last Year: Patient unable to answer   Transportation Needs: Patient Unable To Answer (3/3/2025)    PRAPARE - Transportation     Lack of Transportation (Medical): Patient unable to answer     Lack of Transportation (Non-Medical): Patient unable to answer   Physical Activity: Not on file   Stress: Not on file   Social Connections: Not on file   Intimate Partner Violence: Patient Unable To Answer (3/7/2025)    Humiliation, Afraid, Rape, and Kick questionnaire     Fear of Current or Ex-Partner: Patient unable to answer     Emotionally Abused:  Patient unable to answer     Physically Abused: Patient unable to answer     Sexually Abused: Patient unable to answer   Housing Stability: Patient Unable To Answer (3/3/2025)    Housing Stability Vital Sign     Unable to Pay for Housing in the Last Year: Patient unable to answer     Number of Times Moved in the Last Year: 0     Homeless in the Last Year: Patient unable to answer       Family History:  No family history on file.  No family history of GI or liver disease.     Objective     Vitals:    Vitals:    03/18/25 0915 03/18/25 1000 03/18/25 1100 03/18/25 1200   BP: 110/61 102/59 (!) 87/49 95/52   BP Location:       Patient Position:       Pulse: 89 90 86 88   Resp: (!) 28 (!) 28 26 (!) 28   Temp: 36.1 °C (97 °F) 36.2 °C (97.2 °F) 36.2 °C (97.2 °F) 36.1 °C (97 °F)   TempSrc:       SpO2: 98% 93% 98% 99%   Weight:       Height:         Failed to redirect to the Timeline version of the [employer] SmartLink.    Intake/Output Summary (Last 24 hours) at 3/18/2025 1258  Last data filed at 3/18/2025 1200  Gross per 24 hour   Intake 1107.62 ml   Output 1254 ml   Net -146.38 ml       Physical Exam  Gen: intubated, opens eyes   HEENT: NCAT, blood crusing around lips  Resp: mechanical breath sounds   CV: RRR, normal S1/S2, no murmurs/ rubs/gallops, no JVD  GI: soft, nontender  MSK: LE edema  Neuro: sedated    Labs:   Lab Results   Component Value Date    WBC 9.9 03/18/2025    HGB 9.4 (L) 03/18/2025    HCT 25.8 (L) 03/18/2025    MCV 94 03/18/2025     (L) 03/18/2025     Lab Results   Component Value Date    GLUCOSE 154 (H) 03/18/2025    CALCIUM 9.3 03/18/2025     (L) 03/18/2025    K 3.0 (L) 03/18/2025    CO2 25 03/18/2025    CL 96 (L) 03/18/2025    BUN 39 (H) 03/18/2025    CREATININE 1.33 (H) 03/18/2025     Lab Results   Component Value Date    ALT 62 (H) 03/18/2025    AST 98 (H) 03/18/2025    ALKPHOS 232 (H) 03/18/2025    BILITOT 26.1 (H) 03/18/2025     Lab Results   Component Value Date    INR 1.7 (H) 03/18/2025     INR 1.8 (H) 03/17/2025    INR 1.9 (H) 03/16/2025    PROTIME 19.3 (H) 03/18/2025    PROTIME 19.4 (H) 03/17/2025    PROTIME 21.2 (H) 03/16/2025       Imaging:   === 03/03/25 ===    US LIVER WITH DOPPLER    - Impression -  1. Cirrhotic morphology of the liver with TIPS which appears  thrombosed.  2. Cholelithiasis without evidence of acute cholecystitis.    I personally reviewed the image(s) / study and I agree with the  findings as stated by Cesar Culp MD. This study was interpreted at  [facility], [place], [place].    MACRO:  Critical Finding:  See findings. Notification was initiated on  3/11/2025 at 4:27 pm by  Cesar Culp.  (**-YCF-**)    Signed by: Chang Moore 3/11/2025 6:33 PM  Dictation workstation:   [id number]  === 03/03/25 ===    CT HEAD WO IV CONTRAST    - Impression -  Within the subcortical white matter of the right parasagittal frontal  lobe there is a 0.5 cm x 0.5 cm hyperdense lesion with a thin rim of  surrounding hypodensity that may relate to edema. Differential  diagnosis includes bland microhemorrhage, metastatic lesion, or  cavernous malformation with or without internal hemorrhage. Recommend  contrast-enhanced MRI for further evaluation.    I personally reviewed the images/study and resident's interpretation  and I agree with the findings as stated by Gabriela Yee MD (resident  radiologist). This study was analyzed and interpreted at [place], [place].    MACRO:  Laina Humphreys discussed the significance and urgency of this  critical finding via [employer] with confirmation of receipt with  LAINA PAEZ on 3/15/2025 at 5:04 pm.  (**-RCF-**) Findings:  See  findings.    Signed by: Laina Humphreys 3/15/2025 5:04 PM  Dictation workstation:   [id number]  === 03/03/25 ===    MR BRAIN W AND WO CONTRAST    - Impression -  1. Multiple small foci of susceptibility artifact within the supra  and infratentorial compartment  may reflect old microhemorrhages,  possibly embolic in etiology versus cavernomas.  2. Small focus of hyperattenuation seen on the prior CT head  corresponds to a microhemorrhage versus cavernoma located within the  right frontal lobe. There is mild signal abnormality within the  adjacent brain parenchyma which may reflect gliosis versus vasogenic  edema, favored gliosis. There is no striking surrounding mass effect.  3. Symmetric signal abnormality located within the bilateral thalamus  may be sequela of acute hepatic encephalopathy or hypoxic  encephalopathy, given patient's clinical history.    I personally reviewed the images/study and I agree with the findings  as stated by Osmar Perez MD. This study was interpreted at  [facility], [place], [place]    MACRO:  Yris Wright discussed the significance and urgency of this critical  finding by EPIC chat with  LAINA PAEZ on 3/16/2025 at 10:41 am.  (**-RCF-**) Findings:  See findings.    ne    Signed by: Jon Wright 3/16/2025 10:41 AM  Dictation workstation:   [id number]    Assessment & Plan   This is a 39-year-old woman with significant comorbidities--lupus, antiphospholipid syndrome, cirrhosis with portal hypertension (s/p TIPS), Budd-Chiari, and chronic IVC occlusion--who was admitted on 3/3/25 for ARDS secondary to multifocal bacterial pneumonia, septic shock, and acute renal failure requiring CVVH. She has since been weaned down to a single vasopressor (norepinephrine), yet her liver function tests have progressively worsened (AST, ALT, Alkaline Phosphatase, bilirubin).The etiology of her rising LFTs is likely multifactorial in the setting of:         Medication-related hepatotoxicity (she was on several sedatives including ketamine, which was discontinued on 3/13)       Critical illness and prolonged ICU stay       Ongoing sepsis and hypotensive episodes (risk for ischemic “shock” liver)       Pre-existing liver  disease (chronic TIPS thrombosis, potential congestive hepatopathy)       Autoimmune contributions (lupus-related hepatitis)    An RUQ ultrasound (3/14) showed no new biliary obstruction but confirmed her chronic TIPS thrombosis. Despite tapering sedation (most sedatives stopped by 3/16), she remains unresponsive to voice with no withdrawal to pain. Although her respiratory status has improved from 100% FiO? to 50% FiO?, she continues to exhibit hypotension, anuria on CVVH, marked edema, and significant hyperbilirubinemia. Antibiotic therapy (vancomycin and meropenem) concluded on 3/18, yet her condition remains critical, and her prognosis regarding hepatic recovery is poor given her underlying cirrhosis, multi-organ dysfunction, and repeated hemodynamic insults. She remains on lactulose and rifaximin for suspected hepatic encephalopathy, with a net-even fluid goal on CVVH, requiring vigilant supportive measures going forward.     Plan:  - trend CBC, BMP, hepatic function panel, and INR daily   - consider GOC discussion and palliative care consultation     Patient seen and discussed with attending, Dr. Javan Chaves.     Horacio Barnard MD, PhD  Gastroenterology Fellow, PGY-6  [facility]   Division of Gastroenterology and Liver Disease      Thank you for the consultation. Gastroenterology will continue to the follow the patient.   Please do not hesitate to contact me on [employer]iku or page [phone] if there are any further questions between the weekday hours of 7 AM - 5 PM.   If there is an urgent concern during the weekend, after-hours, or holidays; then please page the [place] fellow at [phone]. Thank you.    SIGNATURE: Horacio Barnard MD PATIENT NAME: Gissel Harvey   DATE: March 18, 2025 MRN: [id number]

## 2025-03-18 NOTE — PROGRESS NOTES
"Medical Intensive Care - Daily Progress Note   Subjective    Gissel Harvey is a 39 y.o. year old female patient admitted on 3/3/2025 with following ICU needs: Hypoxemia and AHRF requiring intubation due to ARDS from CAP, acute renal failure requiring CVVH, septic shock requiring vasopressor support       Interval History:  - off sedation x2 days. Not following commands, not withdrawing to pain  - remains on levophed  - worsening edema on exam      Meds    Scheduled medications  ergocalciferol, 1,250 mcg, oral, Weekly  pantoprazole, 40 mg, oral, Daily before breakfast   Or  esomeprazole, 40 mg, nasoduodenal tube, Daily before breakfast   Or  pantoprazole, 40 mg, intravenous, Daily before breakfast  insulin lispro, 0-10 Units, subcutaneous, q6h  lactulose, 10 g, oral, q4h  artificial tears, 2 drop, Both Eyes, q24h  meropenem, 2 g, intravenous, q12h  oxygen, , inhalation, Continuous - Inhalation  polyethylene glycol, 17 g, oral, Daily  rifAXIMin, 550 mg, oral, q12h COCO  sennosides-docusate sodium, 2 tablet, oral, Daily  thiamine, 100 mg, oral, Daily  vancomycin, 750 mg, intravenous, q24h  vitamin B complex-vitamin C-folic acid, 5 mL, oral, Daily      Continuous medications  bivalirudin, 0.05-0.49 mg/kg/hr, Last Rate: 0.1 mg/kg/hr (03/18/25 0200)  norepinephrine, 0.01-1 mcg/kg/min (Dosing Weight), Last Rate: Stopped (03/18/25 0100)  PrismaSol BGK 2/3.5, 25 mL/kg/hr (Dosing Weight), Last Rate: 25 mL/kg/hr (03/18/25 0410)      PRN medications  PRN medications: acetaminophen **OR** acetaminophen, alteplase, dextrose, dextrose, glucagon, glucagon, oxygen, vancomycin     Objective    Blood pressure 95/51, pulse 85, temperature 35.8 °C (96.4 °F), resp. rate (!) 29, height 1.676 m (5' 6\"), weight 62 kg (136 lb 11 oz), SpO2 92%.     Physical Exam   Constitutional:       Comments: Intubated  Not opening eyes to voice   Pupils reactive   HENT:      Head: Normocephalic and atraumatic.   Cardiovascular:      Rate and Rhythm: Normal " rate and regular rhythm.      Pulses: Normal pulses.   Pulmonary:      Breath sounds: No wheezing.      Comments: Mechanically Ventilated, Coarse breath sounds  Abdominal:      General: Abdomen soft with significantly enlarged liver     Palpations: Abdomen is soft.      Comments: Rectal tube draining brown stool, no blood   Musculoskeletal:      Right lower leg: Edema present.      Left lower leg: Edema present. significant  Edema in the upper extremities bilaterally  Skin:     General: Skin is warm and dry.      Comments: Warm to touch   Neurological:      Comments: Sedated, not awakening to voice. No withdrawal to pain.    Intake/Output Summary (Last 24 hours) at 3/18/2025 0742  Last data filed at 3/18/2025 0300  Gross per 24 hour   Intake 1586.88 ml   Output 1269 ml   Net 317.88 ml     Labs:   Results from last 72 hours   Lab Units 03/18/25 0418 03/17/25 1731 03/17/25 0449 03/16/25  1503 03/16/25  0507 03/15/25  1405   SODIUM mmol/L 132* 133* 131* 133*   < > 131*   POTASSIUM mmol/L 3.0* 3.5 3.8 3.9   < > 4.3   CHLORIDE mmol/L 96* 98 98 98   < > 97*   CO2 mmol/L 25 24 20* 21   < > 22   BUN mg/dL 39* 39* 39* 43*   < > 51*   CREATININE mg/dL 1.33* 1.62* 1.59* 1.68*   < > 1.71*   GLUCOSE mg/dL 154* 161* 166* 145*   < > 215*   CALCIUM mg/dL 9.3 9.2 9.3 9.2   < > 9.5   ANION GAP mmol/L 14 15 17 18   < > 16   EGFR mL/min/1.73m*2 52* 41* 42* 40*   < > 39*   PHOSPHORUS mg/dL  --  3.1  --  3.9  --  3.5    < > = values in this interval not displayed.      Results from last 72 hours   Lab Units 03/18/25  0418 03/17/25 1731 03/17/25  0449 03/16/25  1503 03/16/25  0507   WBC AUTO x10*3/uL 9.9 10.3 12.3*   < > 13.4*   HEMOGLOBIN g/dL 9.4* 9.3* 10.3*   < > 9.9*   HEMATOCRIT % 25.8* 25.7* 27.6*   < > 26.9*   PLATELETS AUTO x10*3/uL 133* 159 160   < > 144*   NEUTROS PCT AUTO %  --   --  84.5  --  85.0   LYMPHO PCT MAN % 10.2  --   --   --   --    LYMPHS PCT AUTO %  --   --  7.8  --  8.3   MONO PCT MAN % 2.5  --   --   --   --     MONOS PCT AUTO %  --   --  6.2  --  4.9   EOSINO PCT MAN % 0.9  --   --   --   --    EOS PCT AUTO %  --   --  0.0  --  0.0    < > = values in this interval not displayed.          Results from last 72 hours   Lab Units 03/16/25  0906   POCT PH, VENOUS pH 7.39   POCT PCO2, VENOUS mm Hg 38*   POCT PO2, VENOUS mm Hg 43        Micro/ID:     Lab Results   Component Value Date    BLOODCULT No growth at 4 days -  FINAL REPORT 03/12/2025           Assessment and Plan     Assessment: Gissel Harvey is a 39 y.o. 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 She is being treated for ARDS 2/2 CAP (MSSA and s. pneumo) and severe septic shock complicated by acute anuric renal failure, now on CVVH. She is still requiring significant respiratory support w/ increased FiO2 requirements and PEEP, on day 15 of mechanical ventilation. She also has worsening Tbili concerning for intrahepatic insult/injury. She was made DNR by her daughter 3/12.     Mechanical Ventilation: > 10 days  Sedation/Analgesia:  none  Restraints: no    Summary for 03/18/25  :  CT chest abdomen pelvis to assess disease status and assist in her overall prognostication  Hepatology consult for worsening hyperbilirubinemia iso known cirrhosis, assist with overall prognostic picture from a liver function perspective  Keep net even, did not tolerate fluid removal w/ CVVH yesterday  Stop Day and vanc today, completed ~2 weeks of antibiotics, mostly broad spectrum  Discontinued steroids, will check AM cortisol tomorrow morning  Will continue to discuss GOC and next steps with daughter (CORIN)     Plan:  NEUROLOGY/PSYCH:  #Sedation  - Off sedation  since 3/16 AM  - Given liver impairment, may take longer for sedation effects to wear off      #Encephelopathy  :: EEG w/ encephalopathy, likely iso metabolic derangements, critical illness, hyperbilirubinemia      #Microhemorrages in BL cerebral hemispheres  :: CT head 3/14 w/ indeterminate lesions, MRI brain w/ BL microhemorrages  :: Neuro status unclear due to sedation, attempts at sedation holidays shortened due to tachypnea and agitation  :: mild grimace to pain, has cough, pupils equal and reactive   - 3/15 neuro consulted-Determined microhemorrages likely chronic, risk of clotting is high with her APS so bivalrudin was resumed       CARDIOVASCULAR:     #HFrEF  :: TTE 3/4 with EF 30-35% with reduced RV function, no previous TTE for comparison  :: BNP 2127 at admission  :: Troponin plateaued (62>63) on 3/4/25  - Will discuss repeat TTE pending her clinical status, may have had HFrEF iso acute shock      #Shock, mixed, distributive and septic   #Hypotension 2/2 decreased systemic circulatory volume   :: on initial presentation 3/3-3/7, she required 4 pressors (levo, vaso, epi, angiotensin)  :: on 3/8-3/11, able to come off pressors, maintained strong MAP  - 3/12 acute worsening of shock, (2/2 worsening of sepsis, worsened when sedation was weaned and she became dyssynchronous with vent demonstrating air trapping and significant tachypnea), had significant hypotension once again requiring vasopressor support        - continue levo, weaning as tolerated        - Completed abx, see full courses below      #Aflutter  #Sustained VT   :: went into NSVT while wire threaded for central line placement  :: s/p amiodarone 150 mg bolus x2  :: pt cardioverted  - Pt is back to sinus rhythm, okay for amio gtt if arrhythmias return     PULMONARY:  Vent Mode: Volume control/assist control  FiO2 (%):  [50 %] 50 %  S RR:  [26] 26  S VT:  [450 mL] 450 mL  PEEP/CPAP (cm H2O):  [10 cm H20] 10 cm H20  MAP (cm H2O):  [8-17]  16  #Community acquired pneumonia   #ARDS   :: intubated 3/3   :: P:F ~ 70 on admission   :: Procalcitonin 57.3  :: flu A/B, COVID, parainfluenza, RSV, metapneumovirus, legionella urine Ag: negative  :: see ID section for full abx history this admission  :: Tracheal aspirate cx 3/3: pan sensitive staph aureus and strep pneumo  :: Strep pneumo urine ag: positive  :: Bcx 3/3: NGTD x3 days  :: re-cultured 3/12: sputum negative, blood culture NGTD  Plan  - Vanc/rebeca to end 3/18  - Day 14 of mechanical ventilation, will discuss trach with NOK/POA. If this is within family wishes for patient will reach out to ENT for trach this week. Pending further data for overall prognostication to help guide discussions with family     RENAL/GENITOURINARY:  #Acute renal failure, anuric SCOUT requiring CVVH  :: etiology: septic shock and multiorgan failure  :: Cr 1.9 on admission, unclear baseline as no labs in 2 years   :: FeNa 3/3: 0.1% prerenal  :: Persistently anuric   - Continue CVVH per Nephrology  - While on CVVH: BID RFP, ionized calcium and CBC  - Warm CVVH fluid to assist with hypothermia     GASTROENTEROLOGY:  #liver cirrhosis   #portal hypertension s/p TIPS   #hx of Budd-Chiari   :: 3/7 POCUS with distended bowel, no appreciable ascites  - Continue to monitor abdomen for signs of ascites formation  - Hep C Abs positive, PCR pending  - 3/11 labs w/ acute elevation in INR (5), AST (147), ALT (57), Alk Phos (218)  - Liver US w/ thrombosed TIPS, IR consulted, likely chronic as this has been seen on prior CT and patient non-adherent with anticoagulation  -3/13 INR, PT, Bili, ALT, Alk phos all increasing. 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 C/f DILI or intrahepatic cholestasis / hepatic congestion, or shock liver   PLAN        - DC ketamine and use propofol for sedation given c/f DILI from ketamine. Meropenem also potential for DILI        - CTM- she is not stable enough for further hepatology  workup or procedure  - c/w lactulose and adding rifaximin - monitor rectal tube output  - s/p vitamin K 10 mg 3/14 w/ improvement in INR  - bili continues to rise, likely contributing to her encephalopathy, continuing lactulose and rifaximin       ENDOCRINOLOGY:              #High dose steroids              #Supplemental nutrition                          - SSI                           - Tube feeds via NG     HEMATOLOGY:  #Hx of antiphospholipid syndrome   #Hx of positive PF4  #Chronic IVC occlusion   #Hx of DVT   :: Supposed to be on fondaparinux outpatient, unclear if taking as no recent med fills   - Continue bivalirudin while inpatient I/s/o previous positive PF4      #Thrombocytopenia, stable  #Elevated PT and INR  - Trend plts, INR, coags  - Fibrinogen wnl  - If platelets <50, transfuse but do not stop bivalirubin  - INR elevated 5 -> 3.8 on 3/11, 5.9 on 3/12, iso known cirrhosis, plt 80  - s/p vitamin K 10 mg 3/14 w/ improvement in INR  - PT and INR increase can be secondary to cirrhosis, improved with vitamin K     SKIN:  #Skin Failure  - BL buttock wounds, ear wound  - wound care following, appreciate recs  - c/w vaseline for lips/mouth while intubated     MUSCULOSKELETAL:  GIFTY     INFECTIOUS DISEASE:  #MSSA PNA  #Strep PNA  #ARDS  #Septic shock  Antibiotics  Azithromycin 500 mg: 3/3  Zosyn 4.5 g: 3/3  Tobramycin 440 mg: 3/4  Vancomycin: 3/3 - 3/6, 3/8 - 3/10  Ceftaroline 3/5 - 3/6  Meropenem 2 g BID: 3/3 - 3/8  Micafungin: 3/4 - 3/6  Doxycycline 100 mg BID: 3/4 - 3/6  Ceftriaxone 3/8 - 3/10  Cefazolin 3/10-3/12  *Vanc/Day 3/12-3/18  - cultures from 3/12 NGTD     ICU Check List       ICU Liberation: Intervention:   Assess, Prevent, Manage Pain      Both SAT and SBT [] SAT  [] SBT 30-60 min [] Extubate to NC  [] Extubate to NIV  [] Discuss Trach   Choice of analgesia and sedation Gao Agitation Sedation Scale (RASS): Unarousable  Target Arousal Level (RASS): RASS -4 to -5 none     Delirium: Assess,  prevent and manage  CAM ICU Positive    Early Mobility and Exercise RASS less than -2 [x] PT /OT consult   Family Engagement and Empowerment [x]Family updated []SW consult     FEN  Fluids: PRN  Electrolytes: PRN- CVVH   Nutrition: TF  Prophylaxis:  DVT ppx: Bival  GI ppx: PPI  Bowel care: Lactulose  Hardware:         ETT  7.5 mm (Active)   Placement Date: 03/03/25   ETT Type: ETT - single  Single Lumen Tube Size: 7.5 mm  Cuffed: Yes  Location: Oral   Number of days: 15       NG/OG/Feeding Tube NG - [place] sump Right nostril (Active)   Placement Date/Time: 03/03/25 1900   Type of Tube: Feeding Tube  Tube Length: 53 cm  Tube Type: NG - [place] sump  Tube Location: Right nostril   Number of days: 14       Fecal Management Rectal tube with balloon (Active)   Placement Date/Time: 03/09/25 0018   Placed by: ashlie  Hand Hygiene Completed: Yes  Fecal Management Tube Type: Rectal tube with balloon   Number of days: 9       Hemodialysis Cath 03/06/25 Right (Active)   Placement Date/Time: 03/06/25 1700   Orientation: Right   Number of days: 11       Social:  Code: DNR    HPOA: Payal Durant (daughter) [phone]  Disposition: MARLENE Buck MD   03/18/25 at 7:42 AM     Disclaimer: Documentation completed with the information available at the time of input. The times in the chart may not be reflective of actual patient care times, interventions, or procedures. Documentation occurs after the physical care of the patient.

## 2025-03-18 NOTE — PROGRESS NOTES
Vancomycin Dosing by Pharmacy- Cessation of Therapy    Consult to pharmacy for vancomycin dosing has been discontinued by the prescriber, pharmacy will sign off at this time.    Please call pharmacy if there are further questions or re-enter a consult if vancomycin is resumed.     Stu Motta, LindsayD

## 2025-03-18 NOTE — PROGRESS NOTES
SOCIAL WORK NOTE   -ICU Treatment Plan: SW met with team for interdisciplinary rounds.  Multiple specialities involved to further prognosticate, plan for GOC,   -Support System: daughter   -Planned Disposition: TBD, pending GOC   -Additional information:  Social work to follow.   CHARITO Montejo, LISW-S ([id number])

## 2025-03-18 NOTE — PROCEDURES
CRRT procedure note   Seen and assessed on CVVH. Labs and events reviewed   Remains hemodynamically unstable, on levo   BFR : 200  Filter: M150  Total Replacement Rate: 2000  Pre blood pump :  1000  Replacement solution Prismasol potassium: 4  mEq/L  Patient fluid removal: per ICU team, daily target to achieve negative 1 L at least to achieve even cumulative balance  Please check Phosphorus, Calcium and Magnesium daily and replace peripherally as needed  Continue with current CVVH prescription. Ct daily bladder scans      3/18/2025     3:30 PM 3/18/2025     3:45 PM 3/18/2025     4:00 PM 3/18/2025     4:15 PM 3/18/2025     4:30 PM 3/18/2025     4:45 PM 3/18/2025     5:00 PM   Vitals   Systolic 90 97 96 94 92 100 104   Diastolic 52 60 57 54 52 55 58   BP Location   Right arm       Heart Rate 92 94 93 91 92 92 91   Temp 36.7 °C (98.1 °F) 36.6 °C (97.9 °F) 36.6 °C (97.9 °F) 36.6 °C (97.9 °F) 36.5 °C (97.7 °F) 36.4 °C (97.5 °F) 36.3 °C (97.3 °F)   Resp 23 23 24 18 30 22 28       Intake/Output Summary (Last 24 hours) at 3/18/2025 1710  Last data filed at 3/18/2025 1701  Gross per 24 hour   Intake 1346.18 ml   Output 1118 ml   Net 228.18 ml      Most recent labs:   Results from last 7 days   Lab Units 03/18/25 0418 03/17/25 1731 03/17/25  0449   WBC AUTO x10*3/uL 9.9 10.3 12.3*   HEMOGLOBIN g/dL 9.4* 9.3* 10.3*   HEMATOCRIT % 25.8* 25.7* 27.6*   PLATELETS AUTO x10*3/uL 133* 159 160     Results from last 7 days   Lab Units 03/18/25  0418 03/17/25  1731 03/17/25  0449 03/16/25  1503 03/16/25  0507 03/15/25  1405   SODIUM mmol/L 132* 133* 131* 133*   < > 131*   CO2 mmol/L 25 24 20* 21   < > 22   ANION GAP mmol/L 14 15 17 18   < > 16   BUN mg/dL 39* 39* 39* 43*   < > 51*   CREATININE mg/dL 1.33* 1.62* 1.59* 1.68*   < > 1.71*   GLUCOSE mg/dL 154* 161* 166* 145*   < > 215*   CALCIUM mg/dL 9.3 9.2 9.3 9.2   < > 9.5   MAGNESIUM mg/dL 2.20 2.28 2.56*  --    < >  --    PHOSPHORUS mg/dL  --  3.1  --  3.9  --  3.5    < > = values in  this interval not displayed.      Results from last 7 days   Lab Units 03/18/25  0418 03/17/25  0449 03/16/25  0507   ALT U/L 62* 65* 58*   AST U/L 98* 121* 118*   ALK PHOS U/L 232* 253* 246*      Results from last 7 days   Lab Units 03/18/25  0418 03/17/25  0454 03/16/25  0507   INR  1.7* 1.8* 1.9*     Results from last 7 days   Lab Units 03/16/25  0906 03/12/25  2304 03/12/25  1748   FIO2 % 50 60 80     Results from last 7 days   Lab Units 03/16/25  0906 03/12/25  2304 03/12/25  1748   POCT PH, VENOUS pH 7.39 7.32* 7.32*   POCT PCO2, VENOUS mm Hg 38* 43 44     ergocalciferol, 1,250 mcg, oral, Weekly  pantoprazole, 40 mg, oral, Daily before breakfast   Or  esomeprazole, 40 mg, nasoduodenal tube, Daily before breakfast   Or  pantoprazole, 40 mg, intravenous, Daily before breakfast  insulin lispro, 0-10 Units, subcutaneous, q6h  lactulose, 10 g, oral, q4h  artificial tears, 2 drop, Both Eyes, q24h  oxygen, , inhalation, Continuous - Inhalation  polyethylene glycol, 17 g, oral, Daily  rifAXIMin, 550 mg, oral, q12h COCO  sennosides-docusate sodium, 2 tablet, oral, Daily  thiamine, 100 mg, oral, Daily  vitamin B complex-vitamin C-folic acid, 5 mL, oral, Daily

## 2025-03-18 NOTE — CARE PLAN
The patient's goals for the shift include      The clinical goals for the shift include pt will remain HDS and no longer require levo by end of shift on 3/18/25 at 1900.    Over the shift, the patient did not make progress toward the following goals. Barriers to progression include pt requiring Levo. Recommendations to address these barriers include keep trying to wean off pressors.      Problem: Pain - Adult  Goal: Verbalizes/displays adequate comfort level or baseline comfort level  Outcome: Progressing     Problem: Discharge Planning  Goal: Discharge to home or other facility with appropriate resources  Outcome: Progressing     Problem: Chronic Conditions and Co-morbidities  Goal: Patient's chronic conditions and co-morbidity symptoms are monitored and maintained or improved  Outcome: Progressing     Problem: Nutrition  Goal: Nutrient intake appropriate for maintaining nutritional needs  Outcome: Progressing     Problem: Knowledge Deficit  Goal: Patient/family/caregiver demonstrates understanding of disease process, treatment plan, medications, and discharge instructions  Outcome: Progressing     Problem: Mechanical Ventilation  Goal: Oral health is maintained or improved  Outcome: Progressing  Goal: Ability to express needs and understand communication  Outcome: Progressing  Goal: Mobility/activity is maintained at optimum level for patient  Outcome: Progressing     Problem: Skin  Goal: Decreased wound size/increased tissue granulation at next dressing change  Outcome: Progressing  Goal: Participates in plan/prevention/treatment measures  Outcome: Progressing  Goal: Prevent/manage excess moisture  Outcome: Progressing  Goal: Prevent/minimize sheer/friction injuries  Outcome: Progressing  Goal: Promote/optimize nutrition  Outcome: Progressing  Goal: Promote skin healing  Outcome: Progressing

## 2025-03-19 LAB
ABO GROUP (TYPE) IN BLOOD: NORMAL
ALBUMIN SERPL BCP-MCNC: 2.4 G/DL (ref 3.4–5)
ALBUMIN SERPL BCP-MCNC: 2.4 G/DL (ref 3.4–5)
ALP SERPL-CCNC: 228 U/L (ref 33–110)
ALT SERPL W P-5'-P-CCNC: 62 U/L (ref 7–45)
ANION GAP SERPL CALC-SCNC: 15 MMOL/L (ref 10–20)
ANION GAP SERPL CALC-SCNC: 17 MMOL/L (ref 10–20)
ANTIBODY SCREEN: NORMAL
APTT PPP: 67 SECONDS (ref 26–36)
AST SERPL W P-5'-P-CCNC: 89 U/L (ref 9–39)
BASO STIPL BLD QL SMEAR: PRESENT
BASOPHILS # BLD MANUAL: 0 X10*3/UL (ref 0–0.1)
BASOPHILS NFR BLD MANUAL: 0 %
BILIRUB DIRECT SERPL-MCNC: 18.6 MG/DL (ref 0–0.3)
BILIRUB SERPL-MCNC: 28.1 MG/DL (ref 0–1.2)
BUN SERPL-MCNC: 28 MG/DL (ref 6–23)
BUN SERPL-MCNC: 37 MG/DL (ref 6–23)
CA-I BLD-SCNC: 1.03 MMOL/L (ref 1.1–1.33)
CA-I BLD-SCNC: 1.06 MMOL/L (ref 1.1–1.33)
CALCIUM SERPL-MCNC: 8.4 MG/DL (ref 8.6–10.6)
CALCIUM SERPL-MCNC: 8.7 MG/DL (ref 8.6–10.6)
CHLORIDE SERPL-SCNC: 106 MMOL/L (ref 98–107)
CHLORIDE SERPL-SCNC: 99 MMOL/L (ref 98–107)
CO2 SERPL-SCNC: 23 MMOL/L (ref 21–32)
CO2 SERPL-SCNC: 24 MMOL/L (ref 21–32)
CORTIS 1H P CHAL SERPL-MCNC: 31.4 UG/DL
CORTIS 30M P CHAL SERPL-MCNC: 34.6 UG/DL
CORTIS AM PEAK SERPL-MSCNC: 30.3 UG/DL (ref 5–20)
CORTIS BS SERPL-MCNC: 30.2 UG/DL
CREAT SERPL-MCNC: 1.35 MG/DL (ref 0.5–1.05)
CREAT SERPL-MCNC: 1.4 MG/DL (ref 0.5–1.05)
EGFRCR SERPLBLD CKD-EPI 2021: 49 ML/MIN/1.73M*2
EGFRCR SERPLBLD CKD-EPI 2021: 51 ML/MIN/1.73M*2
EJECTION FRACTION APICAL 4 CHAMBER: 77.6
EJECTION FRACTION: 65 %
EOSINOPHIL # BLD MANUAL: 0.16 X10*3/UL (ref 0–0.7)
EOSINOPHIL NFR BLD MANUAL: 1.7 %
ERYTHROCYTE [DISTWIDTH] IN BLOOD BY AUTOMATED COUNT: 21.8 % (ref 11.5–14.5)
ERYTHROCYTE [DISTWIDTH] IN BLOOD BY AUTOMATED COUNT: 23.9 % (ref 11.5–14.5)
FOLATE SERPL-MCNC: 8 NG/ML
GLUCOSE BLD MANUAL STRIP-MCNC: 113 MG/DL (ref 74–99)
GLUCOSE BLD MANUAL STRIP-MCNC: 127 MG/DL (ref 74–99)
GLUCOSE BLD MANUAL STRIP-MCNC: 139 MG/DL (ref 74–99)
GLUCOSE BLD MANUAL STRIP-MCNC: 158 MG/DL (ref 74–99)
GLUCOSE SERPL-MCNC: 120 MG/DL (ref 74–99)
GLUCOSE SERPL-MCNC: 175 MG/DL (ref 74–99)
HCT VFR BLD AUTO: 25.4 % (ref 36–46)
HCT VFR BLD AUTO: 28.2 % (ref 36–46)
HGB BLD-MCNC: 9.5 G/DL (ref 12–16)
HGB BLD-MCNC: 9.8 G/DL (ref 12–16)
HIV 1+2 AB+HIV1 P24 AG SERPL QL IA: NONREACTIVE
IMM GRANULOCYTES # BLD AUTO: 0.09 X10*3/UL (ref 0–0.7)
IMM GRANULOCYTES NFR BLD AUTO: 1 % (ref 0–0.9)
INR PPP: 1.9 (ref 0.9–1.1)
LEFT VENTRICLE INTERNAL DIMENSION DIASTOLE: 4.1 CM (ref 3.5–6)
LYMPHOCYTES # BLD MANUAL: 0.87 X10*3/UL (ref 1.2–4.8)
LYMPHOCYTES NFR BLD MANUAL: 9.5 %
MAGNESIUM SERPL-MCNC: 2.65 MG/DL (ref 1.6–2.4)
MAGNESIUM SERPL-MCNC: 2.83 MG/DL (ref 1.6–2.4)
MCH RBC QN AUTO: 33.1 PG (ref 26–34)
MCH RBC QN AUTO: 33.6 PG (ref 26–34)
MCHC RBC AUTO-ENTMCNC: 34.8 G/DL (ref 32–36)
MCHC RBC AUTO-ENTMCNC: 37.4 G/DL (ref 32–36)
MCV RBC AUTO: 89 FL (ref 80–100)
MCV RBC AUTO: 97 FL (ref 80–100)
MITRAL VALVE E/A RATIO: 1.35
MONOCYTES # BLD MANUAL: 0.87 X10*3/UL (ref 0.1–1)
MONOCYTES NFR BLD MANUAL: 9.5 %
NEUTROPHILS # BLD MANUAL: 7.29 X10*3/UL (ref 1.2–7.7)
NEUTS BAND # BLD MANUAL: 0.55 X10*3/UL (ref 0–0.7)
NEUTS BAND NFR BLD MANUAL: 6 %
NEUTS SEG # BLD MANUAL: 6.74 X10*3/UL (ref 1.2–7)
NEUTS SEG NFR BLD MANUAL: 73.3 %
NRBC BLD-RTO: 0.4 /100 WBCS (ref 0–0)
NRBC BLD-RTO: 0.9 /100 WBCS (ref 0–0)
PHOSPHATE SERPL-MCNC: 2.1 MG/DL (ref 2.5–4.9)
PHOSPHATE SERPL-MCNC: 2.6 MG/DL (ref 2.5–4.9)
PLATELET # BLD AUTO: 143 X10*3/UL (ref 150–450)
PLATELET # BLD AUTO: 156 X10*3/UL (ref 150–450)
POLYCHROMASIA BLD QL SMEAR: ABNORMAL
POTASSIUM SERPL-SCNC: 3.6 MMOL/L (ref 3.5–5.3)
POTASSIUM SERPL-SCNC: 3.9 MMOL/L (ref 3.5–5.3)
PROT SERPL-MCNC: 5.6 G/DL (ref 6.4–8.2)
PROTHROMBIN TIME: 20.5 SECONDS (ref 9.8–12.4)
RBC # BLD AUTO: 2.87 X10*6/UL (ref 4–5.2)
RBC # BLD AUTO: 2.92 X10*6/UL (ref 4–5.2)
RBC MORPH BLD: ABNORMAL
RH FACTOR (ANTIGEN D): NORMAL
RIGHT VENTRICLE FREE WALL PEAK S': 15.1 CM/S
RIGHT VENTRICLE PEAK SYSTOLIC PRESSURE: 22.4 MMHG
SODIUM SERPL-SCNC: 134 MMOL/L (ref 136–145)
SODIUM SERPL-SCNC: 142 MMOL/L (ref 136–145)
T4 FREE SERPL-MCNC: 0.64 NG/DL (ref 0.78–1.48)
TARGETS BLD QL SMEAR: ABNORMAL
TOTAL CELLS COUNTED BLD: 116
TSH SERPL-ACNC: 4.13 MIU/L (ref 0.44–3.98)
WBC # BLD AUTO: 7.8 X10*3/UL (ref 4.4–11.3)
WBC # BLD AUTO: 9.2 X10*3/UL (ref 4.4–11.3)

## 2025-03-19 PROCEDURE — 82746 ASSAY OF FOLIC ACID SERUM: CPT

## 2025-03-19 PROCEDURE — 2500000001 HC RX 250 WO HCPCS SELF ADMINISTERED DRUGS (ALT 637 FOR MEDICARE OP): Mod: SE

## 2025-03-19 PROCEDURE — 84100 ASSAY OF PHOSPHORUS: CPT

## 2025-03-19 PROCEDURE — 99291 CRITICAL CARE FIRST HOUR: CPT

## 2025-03-19 PROCEDURE — 84439 ASSAY OF FREE THYROXINE: CPT

## 2025-03-19 PROCEDURE — 2500000005 HC RX 250 GENERAL PHARMACY W/O HCPCS: Mod: SE

## 2025-03-19 PROCEDURE — 2500000002 HC RX 250 W HCPCS SELF ADMINISTERED DRUGS (ALT 637 FOR MEDICARE OP, ALT 636 FOR OP/ED): Mod: SE

## 2025-03-19 PROCEDURE — 82947 ASSAY GLUCOSE BLOOD QUANT: CPT

## 2025-03-19 PROCEDURE — 99233 SBSQ HOSP IP/OBS HIGH 50: CPT | Performed by: INTERNAL MEDICINE

## 2025-03-19 PROCEDURE — 90945 DIALYSIS ONE EVALUATION: CPT | Performed by: INTERNAL MEDICINE

## 2025-03-19 PROCEDURE — 85007 BL SMEAR W/DIFF WBC COUNT: CPT

## 2025-03-19 PROCEDURE — 80400 ACTH STIMULATION PANEL: CPT

## 2025-03-19 PROCEDURE — 85610 PROTHROMBIN TIME: CPT

## 2025-03-19 PROCEDURE — 82533 TOTAL CORTISOL: CPT

## 2025-03-19 PROCEDURE — 86900 BLOOD TYPING SEROLOGIC ABO: CPT

## 2025-03-19 PROCEDURE — 2500000004 HC RX 250 GENERAL PHARMACY W/ HCPCS (ALT 636 FOR OP/ED): Mod: SE

## 2025-03-19 PROCEDURE — 80053 COMPREHEN METABOLIC PANEL: CPT

## 2025-03-19 PROCEDURE — B246ZZZ ULTRASONOGRAPHY OF RIGHT AND LEFT HEART: ICD-10-PCS | Performed by: INTERNAL MEDICINE

## 2025-03-19 PROCEDURE — 84443 ASSAY THYROID STIM HORMONE: CPT

## 2025-03-19 PROCEDURE — 94003 VENT MGMT INPAT SUBQ DAY: CPT

## 2025-03-19 PROCEDURE — 80069 RENAL FUNCTION PANEL: CPT | Mod: CCI

## 2025-03-19 PROCEDURE — 83735 ASSAY OF MAGNESIUM: CPT

## 2025-03-19 PROCEDURE — 2500000005 HC RX 250 GENERAL PHARMACY W/O HCPCS: Mod: SE | Performed by: STUDENT IN AN ORGANIZED HEALTH CARE EDUCATION/TRAINING PROGRAM

## 2025-03-19 PROCEDURE — 87389 HIV-1 AG W/HIV-1&-2 AB AG IA: CPT

## 2025-03-19 PROCEDURE — 85027 COMPLETE CBC AUTOMATED: CPT

## 2025-03-19 PROCEDURE — 82330 ASSAY OF CALCIUM: CPT

## 2025-03-19 PROCEDURE — 2020000001 HC ICU ROOM DAILY

## 2025-03-19 PROCEDURE — 82248 BILIRUBIN DIRECT: CPT

## 2025-03-19 PROCEDURE — 36415 COLL VENOUS BLD VENIPUNCTURE: CPT

## 2025-03-19 RX ORDER — COSYNTROPIN 0.25 MG/ML
250 INJECTION, POWDER, FOR SOLUTION INTRAMUSCULAR; INTRAVENOUS ONCE
Status: COMPLETED | OUTPATIENT
Start: 2025-03-19 | End: 2025-03-19

## 2025-03-19 RX ORDER — CALCIUM GLUCONATE 20 MG/ML
2 INJECTION, SOLUTION INTRAVENOUS ONCE
Status: COMPLETED | OUTPATIENT
Start: 2025-03-19 | End: 2025-03-19

## 2025-03-19 RX ORDER — LACTULOSE 10 G/15ML
20 SOLUTION ORAL EVERY 4 HOURS
Status: DISCONTINUED | OUTPATIENT
Start: 2025-03-19 | End: 2025-03-21

## 2025-03-19 RX ORDER — LEVOTHYROXINE SODIUM 50 UG/1
50 TABLET ORAL DAILY
Status: DISCONTINUED | OUTPATIENT
Start: 2025-03-19 | End: 2025-03-21

## 2025-03-19 RX ADMIN — PERFLUTREN 2 ML OF DILUTION: 6.52 INJECTION, SUSPENSION INTRAVENOUS at 11:36

## 2025-03-19 RX ADMIN — BIVALIRUDIN 0.1 MG/KG/HR: 250 INJECTION INTRACAVERNOUS at 01:05

## 2025-03-19 RX ADMIN — COSYNTROPIN 250 MCG: 0.25 INJECTION, POWDER, LYOPHILIZED, FOR SOLUTION INTRAVENOUS at 11:36

## 2025-03-19 RX ADMIN — NOREPINEPHRINE BITARTRATE 0.05 MCG/KG/MIN: 8 INJECTION, SOLUTION INTRAVENOUS at 15:50

## 2025-03-19 RX ADMIN — RIFAXIMIN 550 MG: 550 TABLET ORAL at 20:35

## 2025-03-19 RX ADMIN — LACTULOSE 20 G: 20 SOLUTION ORAL at 19:58

## 2025-03-19 RX ADMIN — LEVOTHYROXINE SODIUM 50 MCG: 0.05 TABLET ORAL at 09:49

## 2025-03-19 RX ADMIN — LACTULOSE 20 G: 20 SOLUTION ORAL at 23:52

## 2025-03-19 RX ADMIN — LACTULOSE 10 G: 20 SOLUTION ORAL at 08:13

## 2025-03-19 RX ADMIN — POLYETHYLENE GLYCOL 3350 17 G: 17 POWDER, FOR SOLUTION ORAL at 08:13

## 2025-03-19 RX ADMIN — PANTOPRAZOLE SODIUM 40 MG: 40 TABLET, DELAYED RELEASE ORAL at 08:13

## 2025-03-19 RX ADMIN — Medication 5 ML: at 08:15

## 2025-03-19 RX ADMIN — RIFAXIMIN 550 MG: 550 TABLET ORAL at 08:13

## 2025-03-19 RX ADMIN — Medication 50 PERCENT: at 07:31

## 2025-03-19 RX ADMIN — Medication 40 PERCENT: at 20:17

## 2025-03-19 RX ADMIN — THIAMINE HCL TAB 100 MG 100 MG: 100 TAB at 08:13

## 2025-03-19 RX ADMIN — SENNOSIDES AND DOCUSATE SODIUM 2 TABLET: 50; 8.6 TABLET ORAL at 08:13

## 2025-03-19 RX ADMIN — CALCIUM GLUCONATE 2 G: 20 INJECTION, SOLUTION INTRAVENOUS at 08:08

## 2025-03-19 RX ADMIN — CALCIUM CHLORIDE, MAGNESIUM CHLORIDE, DEXTROSE MONOHYDRATE, LACTIC ACID, SODIUM CHLORIDE, SODIUM BICARBONATE AND POTASSIUM CHLORIDE 27 ML/KG/HR: 3.68; 3.05; 22; 5.4; 6.46; 3.09; .314 INJECTION INTRAVENOUS at 00:31

## 2025-03-19 RX ADMIN — ACETAMINOPHEN 650 MG: 160 SOLUTION ORAL at 15:50

## 2025-03-19 RX ADMIN — LACTULOSE 20 G: 20 SOLUTION ORAL at 12:04

## 2025-03-19 RX ADMIN — Medication 40 PERCENT: at 10:12

## 2025-03-19 RX ADMIN — CARBOXYMETHYLCELLULOSE SODIUM 2 DROP: 5 SOLUTION/ DROPS OPHTHALMIC at 09:49

## 2025-03-19 RX ADMIN — LACTULOSE 10 G: 20 SOLUTION ORAL at 04:28

## 2025-03-19 RX ADMIN — INSULIN LISPRO 2 UNITS: 100 INJECTION, SOLUTION INTRAVENOUS; SUBCUTANEOUS at 23:52

## 2025-03-19 ASSESSMENT — COGNITIVE AND FUNCTIONAL STATUS - GENERAL
DRESSING REGULAR UPPER BODY CLOTHING: TOTAL
MOVING TO AND FROM BED TO CHAIR: TOTAL
STANDING UP FROM CHAIR USING ARMS: TOTAL
HELP NEEDED FOR BATHING: TOTAL
PERSONAL GROOMING: TOTAL
TURNING FROM BACK TO SIDE WHILE IN FLAT BAD: TOTAL
MOBILITY SCORE: 6
TOILETING: TOTAL
WALKING IN HOSPITAL ROOM: TOTAL
DAILY ACTIVITIY SCORE: 6
EATING MEALS: TOTAL
CLIMB 3 TO 5 STEPS WITH RAILING: TOTAL
DRESSING REGULAR LOWER BODY CLOTHING: TOTAL
MOVING FROM LYING ON BACK TO SITTING ON SIDE OF FLAT BED WITH BEDRAILS: TOTAL

## 2025-03-19 ASSESSMENT — PAIN - FUNCTIONAL ASSESSMENT
PAIN_FUNCTIONAL_ASSESSMENT: CPOT (CRITICAL CARE PAIN OBSERVATION TOOL)

## 2025-03-19 NOTE — PROCEDURES
CRRT procedure note   Seen and assessed on CVVH. Labs and events reviewed   Remains hemodynamically unstable, on levo   BFR : 200 ml/min  Filter:   Total Replacement Rate: 1800 ml/hr  Pre blood pump :  600 ml/hr  Replacement solution Prismasol potassium: 4  mEq/L  Patient fluid removal: per ICU team   Please check Phosphorus, Calcium and Magnesium daily and replace peripherally as needed  Continue with current CVVH prescription.     Vitals:    03/19/25 1300 03/19/25 1400 03/19/25 1500 03/19/25 1600   BP: 111/58 115/60 101/57 115/61   Pulse: 84 83 78 82   Resp: (!) 27 25 18 25   Temp: 36.3 °C (97.3 °F) 36.2 °C (97.2 °F) 35.7 °C (96.3 °F) 35.6 °C (96.1 °F)   TempSrc:       SpO2: 93% 93% 97% (!) 84%   Weight:       Height:        Most recent labs:   Results from last 7 days   Lab Units 03/19/25 0318 03/18/25 1746 03/18/25  0418   WBC AUTO x10*3/uL 9.2 9.9 9.9   HEMOGLOBIN g/dL 9.5* 10.0* 9.4*   HEMATOCRIT % 25.4* 27.9* 25.8*   PLATELETS AUTO x10*3/uL 156 163 133*     Results from last 7 days   Lab Units 03/19/25 0318 03/18/25  1746 03/18/25  0418 03/17/25  1731   SODIUM mmol/L 134* 131* 132* 133*   CO2 mmol/L 24 23 25 24   ANION GAP mmol/L 15 15 14 15   BUN mg/dL 37* 37* 39* 39*   CREATININE mg/dL 1.40* 1.72* 1.33* 1.62*   GLUCOSE mg/dL 120* 113* 154* 161*   CALCIUM mg/dL 8.4* 9.2 9.3 9.2   MAGNESIUM mg/dL 2.65* 2.48* 2.20 2.28   PHOSPHORUS mg/dL 2.6 3.3  --  3.1      Results from last 7 days   Lab Units 03/19/25 0318 03/18/25 0418 03/17/25  0449   ALT U/L 62* 62* 65*   AST U/L 89* 98* 121*   ALK PHOS U/L 228* 232* 253*      Results from last 7 days   Lab Units 03/19/25  0318 03/18/25  0418 03/17/25  0454   INR  1.9* 1.7* 1.8*     Results from last 7 days   Lab Units 03/16/25  0906 03/12/25  2304 03/12/25  1748   FIO2 % 50 60 80     Results from last 7 days   Lab Units 03/16/25  0906 03/12/25  2304 03/12/25  1748   POCT PH, VENOUS pH 7.39 7.32* 7.32*   POCT PCO2, VENOUS mm Hg 38* 43 44     Results from last 7  days   Lab Units 03/19/25  0318   FREE T4 ng/dL 0.64*       ergocalciferol, 1,250 mcg, oral, Weekly  pantoprazole, 40 mg, oral, Daily before breakfast   Or  esomeprazole, 40 mg, nasoduodenal tube, Daily before breakfast   Or  pantoprazole, 40 mg, intravenous, Daily before breakfast  insulin lispro, 0-10 Units, subcutaneous, q6h  lactulose, 20 g, oral, q4h  levothyroxine, 50 mcg, oral, Daily  artificial tears, 2 drop, Both Eyes, q24h  oxygen, , inhalation, Continuous - Inhalation  perflutren protein A microsphere, 0.5 mL, intravenous, Once in imaging  polyethylene glycol, 17 g, oral, Daily  rifAXIMin, 550 mg, oral, q12h COCO  sennosides-docusate sodium, 2 tablet, oral, Daily  sulfur hexafluoride microsphr, 2 mL, intravenous, Once in imaging  thiamine, 100 mg, oral, Daily  vitamin B complex-vitamin C-folic acid, 5 mL, oral, Daily

## 2025-03-19 NOTE — PROGRESS NOTES
Gastroenterology Consult Service Progress Note  Department of Gastroenterology & Hepatology  [facility]    [facility]  March 19, 2025   Patient: Gissel Harvey    Medical Record: [id number]  Reason for Initial Consult: Rising LFTs    Interval History:   - NAEO   - patient more awake today       Physical Exam:    Vitals:    03/19/25 1200 03/19/25 1300 03/19/25 1400 03/19/25 1500   BP: 109/57 111/58 115/60 101/57   Pulse: 84 84 83 78   Resp: (!) 28 (!) 27 25 18   Temp: 36.5 °C (97.7 °F) 36.3 °C (97.3 °F) 36.2 °C (97.2 °F) 35.7 °C (96.3 °F)   TempSrc:       SpO2: 94% 93% 93% 97%   Weight:       Height:             Intake/Output Summary (Last 24 hours) at 3/19/2025 1603  Last data filed at 3/19/2025 1600  Gross per 24 hour   Intake 1279.99 ml   Output 160 ml   Net 1119.99 ml       Gen: intubated, opens eyes   HEENT: NCAT, blood crusing around lips  Resp: mechanical breath sounds   CV: RRR, normal S1/S2, no murmurs/ rubs/gallops, no JVD  GI: soft, nontender  MSK: LE edema  Neuro: sedated    Labs:  CBC  Results from last 7 days   Lab Units 03/19/25 0318 03/18/25 1746 03/18/25 0418 03/17/25  1731   HEMOGLOBIN g/dL 9.5* 10.0* 9.4* 9.3*   HEMATOCRIT % 25.4* 27.9* 25.8* 25.7*   WBC AUTO x10*3/uL 9.2 9.9 9.9 10.3   PLATELETS AUTO x10*3/uL 156 163 133* 159      BMP  Results from last 7 days   Lab Units 03/19/25 0318 03/18/25  1746 03/18/25 0418 03/17/25  1731   SODIUM mmol/L 134* 131* 132* 133*   POTASSIUM mmol/L 3.6 4.6 3.0* 3.5   CHLORIDE mmol/L 99 98 96* 98   CO2 mmol/L 24 23 25 24   BUN mg/dL 37* 37* 39* 39*   CREATININE mg/dL 1.40* 1.72* 1.33* 1.62*   MAGNESIUM mg/dL 2.65* 2.48* 2.20 2.28   PHOSPHORUS mg/dL 2.6 3.3  --  3.1   ANION GAP mmol/L 15 15 14 15   GLUCOSE mg/dL 120* 113* 154* 161*   CALCIUM mg/dL 8.4* 9.2 9.3 9.2     LFTS  Results from last 7 days   Lab Units 03/19/25  0318 03/18/25  0418 03/17/25  0449   ALT U/L 62* 62* 65*   AST U/L 89* 98* 121*   ALK PHOS U/L  228* 232* 253*   BILIRUBIN TOTAL mg/dL 28.1* 26.1* 25.6*   BILIRUBIN DIRECT mg/dL 18.6*  --   --      COAGS  Results from last 7 days   Lab Units 03/19/25  0318 03/18/25  0418 03/17/25  0454   INR  1.9* 1.7* 1.8*        Imaging:  === 03/03/25 ===    US LIVER WITH DOPPLER    - Impression -  1. Cirrhotic morphology of the liver with TIPS which appears  thrombosed.  2. Cholelithiasis without evidence of acute cholecystitis.    I personally reviewed the image(s) / study and I agree with the  findings as stated by Cesar Culp MD. This study was interpreted at  [facility], [place], [place].    MACRO:  Critical Finding:  See findings. Notification was initiated on  3/11/2025 at 4:27 pm by  Cesar Culp.  (**-YCF-**)    Signed by: Chang Moore 3/11/2025 6:33 PM  Dictation workstation:   [id number]  === 03/03/25 ===    CT CHEST ABDOMEN PELVIS W IV CONTRAST    - Impression -  1.  Significant interval improvement in previously seen airspace  disease with residual disease noted in the anterior portions of the  lungs. Persistent smooth interlobular septal thickening likely  representing pulmonary interstitial edema.  2. Redemonstration of extensive, engorged vascular collaterals,  likely secondary to known chronic occlusion of the IVC and occluded  TIPS.  3. Splenomegaly secondary to the above.  4. Extensive 3rd spacing as evidenced by interval worsening of  anasarca  5. Bowel wall edema and fluid-filled large bowel can be suggestive of  enteritis and diarrhea in the proper clinical setting.      I personally reviewed the images/study and I agree with the findings  as stated by Angel Bang MD (resident).    MACRO:  None    Signed by: Nitish James 3/19/2025 12:07 PM  Dictation workstation:   [id number]  === 03/03/25 ===    MR BRAIN W AND WO CONTRAST    - Impression -  1. Multiple small foci of susceptibility artifact within the supra  and infratentorial compartment may reflect old  microhemorrhages,  possibly embolic in etiology versus cavernomas.  2. Small focus of hyperattenuation seen on the prior CT head  corresponds to a microhemorrhage versus cavernoma located within the  right frontal lobe. There is mild signal abnormality within the  adjacent brain parenchyma which may reflect gliosis versus vasogenic  edema, favored gliosis. There is no striking surrounding mass effect.  3. Symmetric signal abnormality located within the bilateral thalamus  may be sequela of acute hepatic encephalopathy or hypoxic  encephalopathy, given patient's clinical history.    I personally reviewed the images/study and I agree with the findings  as stated by Osmar Perez MD. This study was interpreted at  [facility], [place], [place]    MACRO:  Yris Wright discussed the significance and urgency of this critical  finding by EPIC chat with  LAINA PAEZ on 3/16/2025 at 10:41 am.  (**-RCF-**) Findings:  See findings.    ne    Signed by: Jon Wright 3/16/2025 10:41 AM  Dictation workstation:   [id number]    GI procedures    Assessment and Plan:        This is a 39-year-old woman with significant comorbidities--lupus, antiphospholipid syndrome, cirrhosis with portal hypertension (s/p TIPS), Budd-Chiari, and chronic IVC occlusion--who was admitted on 3/3/25 for ARDS secondary to multifocal bacterial pneumonia, septic shock, and acute renal failure requiring CVVH. She has since been weaned down to a single vasopressor (norepinephrine), yet her liver function tests have progressively worsened (AST, ALT, Alkaline Phosphatase, bilirubin).The etiology of her rising LFTs is likely multifactorial in the setting of:         Medication-related hepatotoxicity (she was on several sedatives including ketamine, which was discontinued on 3/13)       Critical illness and prolonged ICU stay       Ongoing sepsis and hypotensive episodes (risk for ischemic “shock” liver)        Pre-existing liver disease (chronic TIPS thrombosis, potential congestive hepatopathy)       Autoimmune contributions (lupus-related hepatitis)    An RUQ ultrasound (3/14) showed no new biliary obstruction but confirmed her chronic TIPS thrombosis. Despite tapering sedation (most sedatives stopped by 3/16), she remains unresponsive to voice with no withdrawal to pain. Although her respiratory status has improved from 100% FiO? to 50% FiO?, she continues to exhibit hypotension, anuria on CVVH, marked edema, and significant hyperbilirubinemia. Antibiotic therapy (vancomycin and meropenem) concluded on 3/18, yet her condition remains critical, and her prognosis regarding hepatic recovery is poor given her underlying cirrhosis, multi-organ dysfunction, and repeated hemodynamic insults. She remains on lactulose and rifaximin for suspected hepatic encephalopathy, with a net-even fluid goal on CVVH, requiring vigilant supportive measures going forward.     Plan:  - trend CBC, BMP, hepatic function panel, and INR daily   - consider GOC discussion and palliative care consultation   - Hepatology to sign off  - patient can follow up with Dr. Jori Motley at the outpatient liver clinic when discharged     Thank you for the consultation.  The consulting team will sign off now.  Please do not hesitate to contact us again on by paging the consultation team again between the weekday hours of 7 AM - 5 PM.  If there is an urgent concern during the weekend, after-hours, or holidays; then please page the [place] GI fellow at [phone]. Thank you.      Patient seen and discussed with attending, Dr. Chaves.       Horacio Barnard MD, PhD  Gastroenterology Fellow, PGY-6  [facility]   Division of Gastroenterology and Liver Disease      SIGNATURE: Horacio Barnard MD PATIENT NAME: Gissel Harvey   DATE: March 19, 2025 MRN: [id number]

## 2025-03-19 NOTE — PROGRESS NOTES
"Medical Intensive Care - Daily Progress Note   Subjective    Gissel Harvey is a 39 y.o. year old female patient admitted on 3/3/2025 with following ICU needs: Hypoxemia and AHRF requiring intubation due to ARDS from CAP, acute renal failure requiring CVVH, septic shock requiring vasopressor support       Interval History:  - off sedation x3 days. More awake this morning, eyes open to voice. Tracking with eyes. Not squeezing hands or moving feet.   - increasing levo requirements, worsening anasarca       Meds    Scheduled medications  ergocalciferol, 1,250 mcg, oral, Weekly  pantoprazole, 40 mg, oral, Daily before breakfast   Or  esomeprazole, 40 mg, nasoduodenal tube, Daily before breakfast   Or  pantoprazole, 40 mg, intravenous, Daily before breakfast  insulin lispro, 0-10 Units, subcutaneous, q6h  lactulose, 20 g, oral, q4h  levothyroxine, 50 mcg, oral, Daily  artificial tears, 2 drop, Both Eyes, q24h  oxygen, , inhalation, Continuous - Inhalation  perflutren protein A microsphere, 0.5 mL, intravenous, Once in imaging  polyethylene glycol, 17 g, oral, Daily  rifAXIMin, 550 mg, oral, q12h COCO  sennosides-docusate sodium, 2 tablet, oral, Daily  sulfur hexafluoride microsphr, 2 mL, intravenous, Once in imaging  thiamine, 100 mg, oral, Daily  vitamin B complex-vitamin C-folic acid, 5 mL, oral, Daily      Continuous medications  bivalirudin, 0.05-0.49 mg/kg/hr, Last Rate: 0.1 mg/kg/hr (03/19/25 1200)  norepinephrine, 0.01-1 mcg/kg/min (Dosing Weight), Last Rate: 0.05 mcg/kg/min (03/19/25 1400)  PrismaSol 4/2.5, 27 mL/kg/hr (Dosing Weight), Last Rate: 27 mL/kg/hr (03/19/25 0031)      PRN medications  PRN medications: acetaminophen **OR** acetaminophen, alteplase, dextrose, dextrose, glucagon, glucagon, oxygen     Objective    Blood pressure 115/60, pulse 83, temperature 36.2 °C (97.2 °F), resp. rate 25, height 1.676 m (5' 6\"), weight 64.6 kg (142 lb 6.7 oz), SpO2 93%.     Physical Exam   Constitutional:       Comments: " Intubated  Eyes open, tracking appropriately   Pupils reactive   HENT:      Head: Normocephalic and atraumatic.   Cardiovascular:      Rate and Rhythm: Normal rate and regular rhythm.      Pulses: Normal pulses.   Pulmonary:      Breath sounds: No wheezing.      Comments: Mechanically Ventilated, Coarse breath sounds  Abdominal:      General: Abdomen soft with significantly enlarged liver     Palpations: Abdomen is soft.      Comments: Rectal tube draining brown stool, no blood   Musculoskeletal:      Right lower leg: Edema present.      Left lower leg: Edema present. significant  Edema in the upper extremities bilaterally  Skin:     General: Skin is warm and dry.      Comments: Warm to touch   Neurological:      Comments: Grimacing to pain without withdrawing    Intake/Output Summary (Last 24 hours) at 3/19/2025 1448  Last data filed at 3/19/2025 1400  Gross per 24 hour   Intake 1308.8 ml   Output 248 ml   Net 1060.8 ml     Labs:   Results from last 72 hours   Lab Units 03/19/25 0318 03/18/25  1746 03/18/25 0418 03/17/25  1731   SODIUM mmol/L 134* 131* 132* 133*   POTASSIUM mmol/L 3.6 4.6 3.0* 3.5   CHLORIDE mmol/L 99 98 96* 98   CO2 mmol/L 24 23 25 24   BUN mg/dL 37* 37* 39* 39*   CREATININE mg/dL 1.40* 1.72* 1.33* 1.62*   GLUCOSE mg/dL 120* 113* 154* 161*   CALCIUM mg/dL 8.4* 9.2 9.3 9.2   ANION GAP mmol/L 15 15 14 15   EGFR mL/min/1.73m*2 49* 38* 52* 41*   PHOSPHORUS mg/dL 2.6 3.3  --  3.1      Results from last 72 hours   Lab Units 03/19/25  0318 03/18/25  1746 03/18/25  0418 03/17/25  1731 03/17/25  0449   WBC AUTO x10*3/uL 9.2 9.9 9.9   < > 12.3*   HEMOGLOBIN g/dL 9.5* 10.0* 9.4*   < > 10.3*   HEMATOCRIT % 25.4* 27.9* 25.8*   < > 27.6*   PLATELETS AUTO x10*3/uL 156 163 133*   < > 160   NEUTROS PCT AUTO %  --   --   --   --  84.5   LYMPHO PCT MAN % 9.5  --  10.2  --   --    LYMPHS PCT AUTO %  --   --   --   --  7.8   MONO PCT MAN % 9.5  --  2.5  --   --    MONOS PCT AUTO %  --   --   --   --  6.2   EOSINO  PCT MAN % 1.7  --  0.9  --   --    EOS PCT AUTO %  --   --   --   --  0.0    < > = values in this interval not displayed.                   Micro/ID:     Lab Results   Component Value Date    BLOODCULT No growth at 4 days -  FINAL REPORT 03/12/2025           Assessment and Plan     Assessment: Gissel Harvey is a 39 y.o. 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 She is being treated for ARDS 2/2 CAP (MSSA and s. pneumo) and severe septic shock complicated by acute anuric renal failure, now on CVVH. She is still requiring significant respiratory support w/ increased FiO2 requirements and PEEP, on day 15 of mechanical ventilation. She also has worsening Tbili concerning for intrahepatic insult/injury. She was made DNR by her daughter 3/12.     Mechanical Ventilation: > 10 days  Sedation/Analgesia:  none  Restraints: no    Summary for 03/19/25  :  CT chest abdomen pelvis to assess disease status and assist in her overall prognostication- pending formal read, lungs do appear improved compared to 3/3  Hepatology consulted 3/18- appreciate their summary of her clinical status from a liver standpoint, unfortunately only palliative efforts are available   Pending GOC with family today    Plan:  NEUROLOGY/PSYCH:  #Sedation  - Off sedation since 3/16 AM  - Given liver impairment, may take longer for sedation effects to wear off      #Encephelopathy  :: EEG w/ encephalopathy, likely iso metabolic derangements, critical illness, hyperbilirubinemia      #Microhemorrages in BL cerebral hemispheres  :: CT head 3/14 w/ indeterminate lesions, MRI brain w/ BL microhemorrages  :: Neuro status unclear due to  sedation, attempts at sedation holidays shortened due to tachypnea and agitation  :: mild grimace to pain, has cough, pupils equal and reactive   - 3/15 neuro consulted-Determined microhemorrages likely chronic, risk of clotting is high with her APS so bivalrudin was resumed       CARDIOVASCULAR:     #HFrEF  :: TTE 3/4 with EF 30-35% with reduced RV function, no previous TTE for comparison  :: BNP 2127 at admission  :: Troponin plateaued (62>63) on 3/4/25  - Will discuss repeat TTE pending her clinical status, may have had HFrEF iso acute shock      #Shock, mixed, distributive and septic   #Hypotension 2/2 decreased systemic circulatory volume   :: on initial presentation 3/3-3/7, she required 4 pressors (levo, vaso, epi, angiotensin)  :: on 3/8-3/11, able to come off pressors, maintained strong MAP  - 3/12 acute worsening of shock, (2/2 worsening of sepsis, worsened when sedation was weaned and she became dyssynchronous with vent demonstrating air trapping and significant tachypnea), had significant hypotension once again requiring vasopressor support        - continue levo, weaning as tolerated        - Completed abx, see full courses below      #Aflutter  #Sustained VT   :: went into NSVT while wire threaded for central line placement  :: s/p amiodarone 150 mg bolus x2  :: pt cardioverted  - Pt is back to sinus rhythm, okay for amio gtt if arrhythmias return     PULMONARY:  Vent Mode: Volume control/assist control  FiO2 (%):  [40 %-50 %] 40 %  S RR:  [18-26] 18  S VT:  [450 mL] 450 mL  PEEP/CPAP (cm H2O):  [10 cm H20] 10 cm H20  MAP (cm H2O):  [15-16] 15.5  #Community acquired pneumonia   #ARDS   :: intubated 3/3   :: P:F ~ 70 on admission   :: Procalcitonin 57.3  :: flu A/B, COVID, parainfluenza, RSV, metapneumovirus, legionella urine Ag: negative  :: see ID section for full abx history this admission  :: Tracheal aspirate cx 3/3: pan sensitive staph aureus and strep pneumo  :: Strep pneumo urine ag:  positive  :: Bcx 3/3: NGTD x3 days  :: re-cultured 3/12: sputum negative, blood culture NGTD  Plan  - Vanc/rebeca to end 3/18  - Day 14 of mechanical ventilation, will discuss trach with NOK/POA. If this is within family wishes for patient will reach out to ENT for trach this week. Pending further data for overall prognostication to help guide discussions with family     RENAL/GENITOURINARY:  #Acute renal failure, anuric SCOUT requiring CVVH  :: etiology: septic shock and multiorgan failure  :: Cr 1.9 on admission, unclear baseline as no labs in 2 years   :: FeNa 3/3: 0.1% prerenal  :: Persistently anuric   - Continue CVVH per Nephrology  - While on CVVH: BID RFP, ionized calcium and CBC  - Warm CVVH fluid to assist with hypothermia     GASTROENTEROLOGY:  #liver cirrhosis   #portal hypertension s/p TIPS   #hx of Budd-Chiari   :: 3/7 POCUS with distended bowel, no appreciable ascites  - Continue to monitor abdomen for signs of ascites formation  - Hep C Abs positive, PCR pending  - 3/11 labs w/ acute elevation in INR (5), AST (147), ALT (57), Alk Phos (218)  - Liver US w/ thrombosed TIPS, IR consulted, likely chronic as this has been seen on prior CT and patient non-adherent with anticoagulation  -3/13 INR, PT, Bili, ALT, Alk phos all increasing. 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 C/f DILI or intrahepatic cholestasis / hepatic congestion, or shock liver   PLAN        - DC ketamine and use propofol for sedation given c/f DILI from ketamine. Meropenem also potential for DILI        - CTM- she is not stable enough for further hepatology workup or procedure  - c/w lactulose and adding rifaximin - monitor rectal tube output  - s/p vitamin K 10 mg 3/14 w/ improvement in INR  - bili continues to rise, likely contributing to her encephalopathy, continuing lactulose and rifaximin       ENDOCRINOLOGY:              #High dose steroids              #Supplemental nutrition                           - SSI                           - Tube feeds via NG     HEMATOLOGY:  #Hx of antiphospholipid syndrome   #Hx of positive PF4  #Chronic IVC occlusion   #Hx of DVT   :: Supposed to be on fondaparinux outpatient, unclear if taking as no recent med fills   - Continue bivalirudin while inpatient I/s/o previous positive PF4      #Thrombocytopenia, stable  #Elevated PT and INR  - Trend plts, INR, coags  - Fibrinogen wnl  - If platelets <50, transfuse but do not stop bivalirubin  - INR elevated 5 -> 3.8 on 3/11, 5.9 on 3/12, iso known cirrhosis, plt 80  - s/p vitamin K 10 mg 3/14 w/ improvement in INR  - PT and INR increase can be secondary to cirrhosis, improved with vitamin K     SKIN:  #Skin Failure  - BL buttock wounds, ear wound  - wound care following, appreciate recs  - c/w vaseline for lips/mouth while intubated     MUSCULOSKELETAL:  GIFTY     INFECTIOUS DISEASE:  #MSSA PNA  #Strep PNA  #ARDS  #Septic shock  Antibiotics  Azithromycin 500 mg: 3/3  Zosyn 4.5 g: 3/3  Tobramycin 440 mg: 3/4  Vancomycin: 3/3 - 3/6, 3/8 - 3/10  Ceftaroline 3/5 - 3/6  Meropenem 2 g BID: 3/3 - 3/8  Micafungin: 3/4 - 3/6  Doxycycline 100 mg BID: 3/4 - 3/6  Ceftriaxone 3/8 - 3/10  Cefazolin 3/10-3/12  *Vanc/Day 3/12-3/18  - cultures from 3/12 NGTD     ICU Check List       ICU Liberation: Intervention:   Assess, Prevent, Manage Pain      Both SAT and SBT [] SAT  [] SBT 30-60 min [] Extubate to NC  [] Extubate to NIV  [] Discuss Trach   Choice of analgesia and sedation Gao Agitation Sedation Scale (RASS): Deep sedation  Target Arousal Level (RASS): RASS -4 to -5 none     Delirium: Assess, prevent and manage  CAM ICU Positive    Early Mobility and Exercise RASS less than -2 [x] PT /OT consult   Family Engagement and Empowerment [x]Family updated []SW consult     FEN  Fluids: PRN  Electrolytes: PRN- CVVH   Nutrition: TF  Prophylaxis:  DVT ppx: Bival  GI ppx: PPI  Bowel care: Lactulose  Hardware:         ETT  7.5 mm (Active)   Placement Date:  03/03/25   ETT Type: ETT - single  Single Lumen Tube Size: 7.5 mm  Cuffed: Yes  Location: Oral   Number of days: 15       NG/OG/Feeding Tube NG - [place] sump Right nostril (Active)   Placement Date/Time: 03/03/25 1900   Type of Tube: Feeding Tube  Tube Length: 53 cm  Tube Type: NG - [place] sump  Tube Location: Right nostril   Number of days: 14       Fecal Management Rectal tube with balloon (Active)   Placement Date/Time: 03/09/25 0018   Placed by: ashlie  Hand Hygiene Completed: Yes  Fecal Management Tube Type: Rectal tube with balloon   Number of days: 9       Hemodialysis Cath 03/06/25 Right (Active)   Placement Date/Time: 03/06/25 1700   Orientation: Right   Number of days: 11       Social:  Code: DNR    HPOA: Payal Durant (daughter) [phone]  Disposition: MARLENE Buck MD   03/19/25 at 2:48 PM     Disclaimer: Documentation completed with the information available at the time of input. The times in the chart may not be reflective of actual patient care times, interventions, or procedures. Documentation occurs after the physical care of the patient.

## 2025-03-19 NOTE — CARE PLAN
Problem: Pain - Adult  Goal: Verbalizes/displays adequate comfort level or baseline comfort level  Outcome: Progressing  Flowsheets (Taken 3/17/2025 0543 by Rosie Galdamez RN)  Verbalizes/displays adequate comfort level or baseline comfort level:   Assess pain using appropriate pain scale   Administer analgesics based on type and severity of pain and evaluate response   Implement non-pharmacological measures as appropriate and evaluate response   Consider cultural and social influences on pain and pain management     Problem: Discharge Planning  Goal: Discharge to home or other facility with appropriate resources  Outcome: Progressing  Flowsheets (Taken 3/17/2025 0543 by Rosie Galdamez RN)  Discharge to home or other facility with appropriate resources:   Identify barriers to discharge with patient and caregiver   Arrange for needed discharge resources and transportation as appropriate   Identify discharge learning needs (meds, wound care, etc)   Refer to discharge planning if patient needs post-hospital services based on physician order or complex needs related to functional status, cognitive ability or social support system     Problem: Chronic Conditions and Co-morbidities  Goal: Patient's chronic conditions and co-morbidity symptoms are monitored and maintained or improved  Outcome: Progressing  Flowsheets (Taken 3/17/2025 0543 by Rosie Galdamez RN)  Care Plan - Patient's Chronic Conditions and Co-Morbidity Symptoms are Monitored and Maintained or Improved:   Monitor and assess patient's chronic conditions and comorbid symptoms for stability, deterioration, or improvement   Collaborate with multidisciplinary team to address chronic and comorbid conditions and prevent exacerbation or deterioration   Update acute care plan with appropriate goals if chronic or comorbid symptoms are exacerbated and prevent overall improvement and discharge     Problem: Nutrition  Goal: Nutrient intake appropriate for maintaining  nutritional needs  Outcome: Progressing     Problem: Knowledge Deficit  Goal: Patient/family/caregiver demonstrates understanding of disease process, treatment plan, medications, and discharge instructions  Outcome: Progressing     Problem: Mechanical Ventilation  Goal: Oral health is maintained or improved  Outcome: Progressing  Flowsheets (Taken 3/17/2025 0543 by Rosie Galdamez, RN)  Oral Health is Maintained or Improved:   Assess and monitor condition of lips and mouth and perform oral care per hospital policy   Perform oral care with an oral swab   Apply water-based moisturizer to lips   Suction oral pharynx  Goal: Ability to express needs and understand communication  Outcome: Progressing  Flowsheets (Taken 3/17/2025 0543 by Rosie Galdamez, RN)  Ability to express needs and understand communication:   Assess and monitor patient's ability to understand information and communicate   Implement interventions to promote patient's ability to communicate   Encourage patient to communicate   Provide patient with clipboard and pen/pencil   Keep alphabet and symbol forms within reach  Goal: Mobility/activity is maintained at optimum level for patient  Outcome: Progressing     Problem: Skin  Goal: Decreased wound size/increased tissue granulation at next dressing change  Outcome: Progressing  Flowsheets (Taken 3/17/2025 1757 by Naren Parsons, RN)  Decreased wound size/increased tissue granulation at next dressing change:   Promote sleep for wound healing   Protective dressings over bony prominences  Goal: Participates in plan/prevention/treatment measures  Outcome: Progressing  Flowsheets (Taken 3/19/2025 1157)  Participates in plan/prevention/treatment measures:   Discuss with provider PT/OT consult   Elevate heels  Goal: Prevent/manage excess moisture  Outcome: Progressing  Flowsheets (Taken 3/17/2025 1757 by Naren Parsons, RN)  Prevent/manage excess moisture:   Cleanse incontinence/protect with barrier cream    Monitor for/manage infection if present   Follow provider orders for dressing changes   Moisturize dry skin  Goal: Prevent/minimize sheer/friction injuries  Outcome: Progressing  Flowsheets (Taken 3/17/2025 1757 by Naren Parsons, RN)  Prevent/minimize sheer/friction injuries:   Complete micro-shifts as needed if patient unable. Adjust patient position to relieve pressure points, not a full turn   Increase activity/out of bed for meals   Use pull sheet   HOB 30 degrees or less   Turn/reposition every 2 hours/use positioning/transfer devices  Goal: Promote/optimize nutrition  Outcome: Progressing  Flowsheets (Taken 3/17/2025 1757 by Naren Parsons, RN)  Promote/optimize nutrition:   Assist with feeding   Monitor/record intake including meals   Consume > 50% meals/supplements  Goal: Promote skin healing  Outcome: Progressing  Flowsheets (Taken 3/17/2025 1757 by Naren Parsons, RN)  Promote skin healing:   Assess skin/pad under line(s)/device(s)   Protective dressings over bony prominences   Turn/reposition every 2 hours/use positioning/transfer devices   Ensure correct size (line/device) and apply per  instructions   Rotate device position/do not position patient on device

## 2025-03-19 NOTE — PROGRESS NOTES
"Nutrition Follow Up Assessment:   Nutrition Assessment         Patient is a 39 y.o. female presenting with hypoxemic respiratory failure with ARDS and CAP requiring intubation.  Currently on day #17 of intubation.  On levo for pressure support as well as CVVH for SCOUT and warming blanket.  Has been off sedation since 3/16 but is not waking up.  Has worsening T. Bili and edema.  Team working on GO with family.    Pt with an OGT in place for enteral access  She is on Two Carlos HN at 25mls/hr at visit; also with order for Prostat twice daily--> goal rate TF regimen while on propofol.  Now that she is off of propofol, can increase TF rate slightly-- see below for details.  She has also started oral synthroid so will adjust TF regimen to reflect the need to hold for 2 hours around this medication.      Anthropometrics:  Height: 167.6 cm (5' 6\")   Weight: 64.6 kg (142 lb 6.7 oz)   BMI (Calculated): 23  IBW/kg (Dietitian Calculated): 59 kg  Percent of IBW: 134 %       Weight History:     3/19/25          64.6kg  3/11/25          65kg  03/07/25 79 kg (174 lb 2.6 oz)   11/02/24 66.7 kg (147 lb)   10/21/24 66.7 kg (147 lb)   10/21/24 66.7 kg (147 lb)   07/02/19 61.2 kg (134 lb 14.7 oz)     Weight Change %:       Nutrition Focused Physical Exam Findings:  Defer d/t edema    Subcutaneous Fat Loss:      Muscle Wasting:     Edema:  Edema Location: +4 to B/L LE and LUE; +2 RUE  Physical Findings:  Skin: Positive (PI B/L butt, PI mouth, skin tear R leg; wound to R ear)    Nutrition Significant Labs:  A1C:No results found for: \"HGBA1C\", BG POCT trend:   Results from last 7 days   Lab Units 03/19/25  1146 03/19/25  0559 03/18/25  2311 03/18/25  1742 03/18/25  1126   POCT GLUCOSE mg/dL 127* 139* 153* 123* 135*    , Liver Function Trend:   Results from last 7 days   Lab Units 03/19/25  0318 03/18/25  0418 03/17/25  0449 03/16/25  0507   ALK PHOS U/L 228* 232* 253* 246*   AST U/L 89* 98* 121* 118*   ALT U/L 62* 62* 65* 58*   BILIRUBIN " TOTAL mg/dL 28.1* 26.1* 25.6* 22.7*    , Renal Lab Trend:   Results from last 7 days   Lab Units 03/19/25  0318 03/18/25  1746 03/18/25  0418 03/17/25  1731   POTASSIUM mmol/L 3.6 4.6 3.0* 3.5   PHOSPHORUS mg/dL 2.6 3.3  --  3.1   SODIUM mmol/L 134* 131* 132* 133*   MAGNESIUM mg/dL 2.65* 2.48* 2.20 2.28   EGFR mL/min/1.73m*2 49* 38* 52* 41*   BUN mg/dL 37* 37* 39* 39*   CREATININE mg/dL 1.40* 1.72* 1.33* 1.62*    , Vit D:   Lab Results   Component Value Date    VITD25 23 (L) 03/11/2025        Nutrition Specific Medications:  Scheduled medications  ergocalciferol, 1,250 mcg, oral, Weekly  pantoprazole, 40 mg, oral, Daily before breakfast   Or  esomeprazole, 40 mg, nasoduodenal tube, Daily before breakfast   Or  pantoprazole, 40 mg, intravenous, Daily before breakfast  insulin lispro, 0-10 Units, subcutaneous, q6h  lactulose, 20 g, oral, q4h  levothyroxine, 50 mcg, oral, Daily  artificial tears, 2 drop, Both Eyes, q24h  oxygen, , inhalation, Continuous - Inhalation  perflutren protein A microsphere, 0.5 mL, intravenous, Once in imaging  polyethylene glycol, 17 g, oral, Daily  rifAXIMin, 550 mg, oral, q12h COCO  sennosides-docusate sodium, 2 tablet, oral, Daily  sulfur hexafluoride microsphr, 2 mL, intravenous, Once in imaging  thiamine, 100 mg, oral, Daily  vitamin B complex-vitamin C-folic acid, 5 mL, oral, Daily      Continuous medications  bivalirudin, 0.05-0.49 mg/kg/hr, Last Rate: 0.1 mg/kg/hr (03/19/25 1200)  norepinephrine, 0.01-1 mcg/kg/min (Dosing Weight), Last Rate: 0.07 mcg/kg/min (03/19/25 1200)  PrismaSol 4/2.5, 27 mL/kg/hr (Dosing Weight), Last Rate: 27 mL/kg/hr (03/19/25 0031)      PRN medications  PRN medications: acetaminophen **OR** acetaminophen, alteplase, dextrose, dextrose, glucagon, glucagon, oxygen     I/O:   Last BM Date: 03/18/25; Stool Appearance: Liquid (03/18/25 0800)        Dietary Orders (From admission, onward)       Start     Ordered    03/13/25 0922  Enteral feeding with NPO TwoCal HN;  NG (nasogastric tube); 10; No free water  Diet effective now        Comments: Two Carlos HN at start rate of 15mls/hr.  Increase once after 8hrs to goal rate of 25mls/hr reached.   Question Answer Comment   Tube feeding formula: TwoCal HN    Tube feeding additive: Pro-Stat AWC    Tube feeding additive frequency: Twice a day    Feeding route: NG (nasogastric tube)    Tube feeding continuous rate (mL/hr): 10    Free water restriction: No free water        03/13/25 0922    03/03/25 2151  May Participate in Room Service  ( ROOM SERVICE MAY PARTICIPATE)  Once        Question:  .  Answer:  Yes    03/03/25 2150                     Estimated Needs:   Total Energy Estimated Needs in 24 hours (kCal):  (4103-6544)  Method for Estimating Needs: MV= 12.6, RMR= 1564  Total Protein Estimated Needs in 24 Hours (g):  (90)  Method for Estimating 24 Hour Protein Needs: 59kg x 1.5            Nutrition Diagnosis        Nutrition Diagnosis  Patient has Nutrition Diagnosis: Yes  Diagnosis Status (1): Active  Nutrition Diagnosis 1: Increased nutrient needs  Related to (1): septic shock, ARDS  As Evidenced by (1): altered metabolism in the setting of critical illness       Nutrition Interventions/Recommendations         Nutrition Prescription:   RDN will increase TF to new goal rate of Two Carlos HN at 35mls/hr over 22hrs daily and keep Prostat ordered BID.          Nutrition Interventions:    TF at new goal with prostat x 2= 1740kcals, 98grams protein       Nutrition Education:   Not appropriate       Nutrition Monitoring and Evaluation   Food/Nutrient Related History Monitoring  Enteral and Parenteral Nutrition Intake Determination: Enteral nutrition intake - Tolerate TF at goal rate         Time Spent (min): 60 minutes

## 2025-03-19 NOTE — CARE PLAN
The patient's goals for the shift include  irma    The clinical goals for the shift include wean levophed to maintain map >65    Over the shift, the patient did not make progress toward the following goals. Barriers to progression include . Recommendations to address these barriers include .

## 2025-03-20 LAB
ALBUMIN SERPL BCP-MCNC: 2.3 G/DL (ref 3.4–5)
ALBUMIN SERPL BCP-MCNC: 2.4 G/DL (ref 3.4–5)
ALP SERPL-CCNC: 236 U/L (ref 33–110)
ALT SERPL W P-5'-P-CCNC: 62 U/L (ref 7–45)
ANION GAP SERPL CALC-SCNC: 12 MMOL/L (ref 10–20)
ANION GAP SERPL CALC-SCNC: 14 MMOL/L (ref 10–20)
APTT PPP: 66 SECONDS (ref 26–36)
AST SERPL W P-5'-P-CCNC: 85 U/L (ref 9–39)
BASO STIPL BLD QL SMEAR: PRESENT
BASOPHILS # BLD MANUAL: 0 X10*3/UL (ref 0–0.1)
BASOPHILS NFR BLD MANUAL: 0 %
BILIRUB SERPL-MCNC: 28.8 MG/DL (ref 0–1.2)
BUN SERPL-MCNC: 26 MG/DL (ref 6–23)
BUN SERPL-MCNC: 27 MG/DL (ref 6–23)
CA-I BLD-SCNC: 1.07 MMOL/L (ref 1.1–1.33)
CA-I BLD-SCNC: 1.11 MMOL/L (ref 1.1–1.33)
CALCIUM SERPL-MCNC: 8.2 MG/DL (ref 8.6–10.6)
CALCIUM SERPL-MCNC: 8.3 MG/DL (ref 8.6–10.6)
CHLORIDE SERPL-SCNC: 101 MMOL/L (ref 98–107)
CHLORIDE SERPL-SCNC: 102 MMOL/L (ref 98–107)
CO2 SERPL-SCNC: 24 MMOL/L (ref 21–32)
CO2 SERPL-SCNC: 25 MMOL/L (ref 21–32)
CREAT SERPL-MCNC: 1.27 MG/DL (ref 0.5–1.05)
CREAT SERPL-MCNC: 1.35 MG/DL (ref 0.5–1.05)
EGFRCR SERPLBLD CKD-EPI 2021: 51 ML/MIN/1.73M*2
EGFRCR SERPLBLD CKD-EPI 2021: 55 ML/MIN/1.73M*2
EOSINOPHIL # BLD MANUAL: 0 X10*3/UL (ref 0–0.7)
EOSINOPHIL NFR BLD MANUAL: 0 %
ERYTHROCYTE [DISTWIDTH] IN BLOOD BY AUTOMATED COUNT: 22.8 % (ref 11.5–14.5)
ERYTHROCYTE [DISTWIDTH] IN BLOOD BY AUTOMATED COUNT: 22.9 % (ref 11.5–14.5)
GLUCOSE BLD MANUAL STRIP-MCNC: 112 MG/DL (ref 74–99)
GLUCOSE BLD MANUAL STRIP-MCNC: 137 MG/DL (ref 74–99)
GLUCOSE BLD MANUAL STRIP-MCNC: 140 MG/DL (ref 74–99)
GLUCOSE BLD MANUAL STRIP-MCNC: 140 MG/DL (ref 74–99)
GLUCOSE BLD MANUAL STRIP-MCNC: 147 MG/DL (ref 74–99)
GLUCOSE SERPL-MCNC: 148 MG/DL (ref 74–99)
GLUCOSE SERPL-MCNC: 149 MG/DL (ref 74–99)
HCT VFR BLD AUTO: 25.1 % (ref 36–46)
HCT VFR BLD AUTO: 25.2 % (ref 36–46)
HGB BLD-MCNC: 9.3 G/DL (ref 12–16)
HGB BLD-MCNC: 9.4 G/DL (ref 12–16)
IMM GRANULOCYTES # BLD AUTO: 0.07 X10*3/UL (ref 0–0.7)
IMM GRANULOCYTES NFR BLD AUTO: 0.9 % (ref 0–0.9)
INR PPP: 1.7 (ref 0.9–1.1)
LYMPHOCYTES # BLD MANUAL: 1.22 X10*3/UL (ref 1.2–4.8)
LYMPHOCYTES NFR BLD MANUAL: 15.8 %
MAGNESIUM SERPL-MCNC: 2.91 MG/DL (ref 1.6–2.4)
MAGNESIUM SERPL-MCNC: 2.94 MG/DL (ref 1.6–2.4)
MCH RBC QN AUTO: 33.5 PG (ref 26–34)
MCH RBC QN AUTO: 34.1 PG (ref 26–34)
MCHC RBC AUTO-ENTMCNC: 36.9 G/DL (ref 32–36)
MCHC RBC AUTO-ENTMCNC: 37.5 G/DL (ref 32–36)
MCV RBC AUTO: 91 FL (ref 80–100)
MCV RBC AUTO: 91 FL (ref 80–100)
MONOCYTES # BLD MANUAL: 0.54 X10*3/UL (ref 0.1–1)
MONOCYTES NFR BLD MANUAL: 7 %
MYELOCYTES # BLD MANUAL: 0.07 X10*3/UL
MYELOCYTES NFR BLD MANUAL: 0.9 %
NEUTROPHILS # BLD MANUAL: 5.81 X10*3/UL (ref 1.2–7.7)
NEUTS BAND # BLD MANUAL: 0.27 X10*3/UL (ref 0–0.7)
NEUTS BAND NFR BLD MANUAL: 3.5 %
NEUTS SEG # BLD MANUAL: 5.54 X10*3/UL (ref 1.2–7)
NEUTS SEG NFR BLD MANUAL: 71.9 %
NRBC BLD-RTO: 0.4 /100 WBCS (ref 0–0)
NRBC BLD-RTO: 0.5 /100 WBCS (ref 0–0)
PHOSPHATE SERPL-MCNC: 1.2 MG/DL (ref 2.5–4.9)
PLATELET # BLD AUTO: 137 X10*3/UL (ref 150–450)
PLATELET # BLD AUTO: 158 X10*3/UL (ref 150–450)
PLATELET CLUMP BLD QL SMEAR: PRESENT
POLYCHROMASIA BLD QL SMEAR: NORMAL
POTASSIUM SERPL-SCNC: 4 MMOL/L (ref 3.5–5.3)
POTASSIUM SERPL-SCNC: 4.1 MMOL/L (ref 3.5–5.3)
PROT SERPL-MCNC: 5.7 G/DL (ref 6.4–8.2)
PROTHROMBIN TIME: 19 SECONDS (ref 9.8–12.4)
RBC # BLD AUTO: 2.76 X10*6/UL (ref 4–5.2)
RBC # BLD AUTO: 2.78 X10*6/UL (ref 4–5.2)
RBC MORPH BLD: NORMAL
SODIUM SERPL-SCNC: 134 MMOL/L (ref 136–145)
SODIUM SERPL-SCNC: 136 MMOL/L (ref 136–145)
TARGETS BLD QL SMEAR: NORMAL
TOTAL CELLS COUNTED BLD: 114
VARIANT LYMPHS # BLD MANUAL: 0.07 X10*3/UL (ref 0–0.5)
VARIANT LYMPHS NFR BLD: 0.9 %
WBC # BLD AUTO: 7 X10*3/UL (ref 4.4–11.3)
WBC # BLD AUTO: 7.7 X10*3/UL (ref 4.4–11.3)

## 2025-03-20 PROCEDURE — 2020000001 HC ICU ROOM DAILY

## 2025-03-20 PROCEDURE — 2500000001 HC RX 250 WO HCPCS SELF ADMINISTERED DRUGS (ALT 637 FOR MEDICARE OP): Mod: SE

## 2025-03-20 PROCEDURE — 85027 COMPLETE CBC AUTOMATED: CPT

## 2025-03-20 PROCEDURE — 2500000004 HC RX 250 GENERAL PHARMACY W/ HCPCS (ALT 636 FOR OP/ED): Mod: SE

## 2025-03-20 PROCEDURE — 99253 IP/OBS CNSLTJ NEW/EST LOW 45: CPT | Performed by: OTOLARYNGOLOGY

## 2025-03-20 PROCEDURE — 82947 ASSAY GLUCOSE BLOOD QUANT: CPT

## 2025-03-20 PROCEDURE — 99291 CRITICAL CARE FIRST HOUR: CPT

## 2025-03-20 PROCEDURE — 36415 COLL VENOUS BLD VENIPUNCTURE: CPT

## 2025-03-20 PROCEDURE — 2500000005 HC RX 250 GENERAL PHARMACY W/O HCPCS: Mod: SE

## 2025-03-20 PROCEDURE — 85007 BL SMEAR W/DIFF WBC COUNT: CPT

## 2025-03-20 PROCEDURE — 80053 COMPREHEN METABOLIC PANEL: CPT

## 2025-03-20 PROCEDURE — 82330 ASSAY OF CALCIUM: CPT

## 2025-03-20 PROCEDURE — 83735 ASSAY OF MAGNESIUM: CPT

## 2025-03-20 PROCEDURE — 94003 VENT MGMT INPAT SUBQ DAY: CPT

## 2025-03-20 PROCEDURE — 80069 RENAL FUNCTION PANEL: CPT | Mod: CCI

## 2025-03-20 PROCEDURE — 2500000005 HC RX 250 GENERAL PHARMACY W/O HCPCS: Mod: SE | Performed by: STUDENT IN AN ORGANIZED HEALTH CARE EDUCATION/TRAINING PROGRAM

## 2025-03-20 PROCEDURE — 85730 THROMBOPLASTIN TIME PARTIAL: CPT

## 2025-03-20 PROCEDURE — 2500000002 HC RX 250 W HCPCS SELF ADMINISTERED DRUGS (ALT 637 FOR MEDICARE OP, ALT 636 FOR OP/ED): Mod: SE

## 2025-03-20 PROCEDURE — 90945 DIALYSIS ONE EVALUATION: CPT | Performed by: INTERNAL MEDICINE

## 2025-03-20 RX ADMIN — LACTULOSE 20 G: 20 SOLUTION ORAL at 04:01

## 2025-03-20 RX ADMIN — RIFAXIMIN 550 MG: 550 TABLET ORAL at 20:44

## 2025-03-20 RX ADMIN — Medication 5 ML: at 08:07

## 2025-03-20 RX ADMIN — CALCIUM CHLORIDE, MAGNESIUM CHLORIDE, DEXTROSE MONOHYDRATE, LACTIC ACID, SODIUM CHLORIDE, SODIUM BICARBONATE AND POTASSIUM CHLORIDE 27 ML/KG/HR: 3.68; 3.05; 22; 5.4; 6.46; 3.09; .314 INJECTION INTRAVENOUS at 20:41

## 2025-03-20 RX ADMIN — LACTULOSE 20 G: 20 SOLUTION ORAL at 12:40

## 2025-03-20 RX ADMIN — RIFAXIMIN 550 MG: 550 TABLET ORAL at 08:07

## 2025-03-20 RX ADMIN — LACTULOSE 20 G: 20 SOLUTION ORAL at 08:07

## 2025-03-20 RX ADMIN — PANTOPRAZOLE SODIUM 40 MG: 40 TABLET, DELAYED RELEASE ORAL at 08:07

## 2025-03-20 RX ADMIN — BIVALIRUDIN 0.1 MG/KG/HR: 250 INJECTION INTRACAVERNOUS at 20:44

## 2025-03-20 RX ADMIN — POLYETHYLENE GLYCOL 3350 17 G: 17 POWDER, FOR SOLUTION ORAL at 08:07

## 2025-03-20 RX ADMIN — LEVOTHYROXINE SODIUM 50 MCG: 0.05 TABLET ORAL at 05:28

## 2025-03-20 RX ADMIN — SENNOSIDES AND DOCUSATE SODIUM 2 TABLET: 50; 8.6 TABLET ORAL at 08:07

## 2025-03-20 RX ADMIN — Medication 40 PERCENT: at 15:19

## 2025-03-20 RX ADMIN — CALCIUM CHLORIDE, MAGNESIUM CHLORIDE, DEXTROSE MONOHYDRATE, LACTIC ACID, SODIUM CHLORIDE, SODIUM BICARBONATE AND POTASSIUM CHLORIDE 27 ML/KG/HR: 3.68; 3.05; 22; 5.4; 6.46; 3.09; .314 INJECTION INTRAVENOUS at 20:40

## 2025-03-20 RX ADMIN — Medication 40 PERCENT: at 07:25

## 2025-03-20 RX ADMIN — NOREPINEPHRINE BITARTRATE 0.05 MCG/KG/MIN: 8 INJECTION, SOLUTION INTRAVENOUS at 22:28

## 2025-03-20 RX ADMIN — THIAMINE HCL TAB 100 MG 100 MG: 100 TAB at 08:07

## 2025-03-20 RX ADMIN — CARBOXYMETHYLCELLULOSE SODIUM 2 DROP: 5 SOLUTION/ DROPS OPHTHALMIC at 09:01

## 2025-03-20 RX ADMIN — Medication 40 PERCENT: at 10:18

## 2025-03-20 ASSESSMENT — PAIN - FUNCTIONAL ASSESSMENT
PAIN_FUNCTIONAL_ASSESSMENT: CPOT (CRITICAL CARE PAIN OBSERVATION TOOL)

## 2025-03-20 ASSESSMENT — COGNITIVE AND FUNCTIONAL STATUS - GENERAL
DRESSING REGULAR LOWER BODY CLOTHING: TOTAL
CLIMB 3 TO 5 STEPS WITH RAILING: TOTAL
MOBILITY SCORE: 6
MOVING FROM LYING ON BACK TO SITTING ON SIDE OF FLAT BED WITH BEDRAILS: TOTAL
TURNING FROM BACK TO SIDE WHILE IN FLAT BAD: TOTAL
STANDING UP FROM CHAIR USING ARMS: TOTAL
TOILETING: TOTAL
DRESSING REGULAR UPPER BODY CLOTHING: TOTAL
WALKING IN HOSPITAL ROOM: TOTAL
PERSONAL GROOMING: TOTAL
DAILY ACTIVITIY SCORE: 6
EATING MEALS: TOTAL
HELP NEEDED FOR BATHING: TOTAL
MOVING TO AND FROM BED TO CHAIR: TOTAL

## 2025-03-20 NOTE — ACP (ADVANCE CARE PLANNING)
"Confirming Previous Code Status:   Advance Care Planning Note     Discussion Date: 03/20/25   Discussion Participants: daughter    The patient wishes to discuss Advance Care Planning today and the following is a brief summary of our discussion.     Patient has capacity to make their own medical decisions: No  Health Care Agent/Surrogate Decision Maker documented in chart: No    Documents on file and valid:  Advance Directive/Living Will: No   Health Care Power of : No  Other: NOK: adult daughterPayal    Communication of Medical Status/Prognosis:   We explained that Gissel's overall prognosis is extremely poor, as she is incredibly sick. Her liver failure is unlikely to recover and she cannot receive transplant, her kidney failure is unlikely to recover and she will be dependent on dialysis, and she is still needing vasopressor support. Even with trach, her chance of meaningful recovery is extremely minimal.  Payal was worried that Gissel appeared uncomfortable. We explained that she has been off pain medication and sedation for a few days now, with very low blood pressure requiring vasopressor support, and inability to communicate, even though her eyes are open. Her neurological function appears poor and likely complicated by metabolic encephalopathy, acute liver failure, and critical illness.    Payal stated her mother was an active woman who would not want to be bed-bound or reliant on machines for her life. She also expressed concern that her mother would be upset with all that we are doing. She stated she believed this was \"too much\" .    When asked what Gissel would say if she could see all that we were doing for her, Payal stated \"she would ask why we were doing all of this\" and stated \"she would cuss me out for doing this\". When directly asked if Gissel would want a tracheostomy, Payal responded \"Honestly, no\". Payal explicitly stated that she \"selfishly wants to trach\" her mother, but that this " would not be what her mother would chose.     Given all of this, the medical team communicated to Payal that we all want to act in accordance with Gissel's wishes and what she would want. We explained that we are here to treat Gissel, and honor what she would want, and that Payal is the person who knows her best and can help us understand what Gissel would want. Payal agreed that Gissel would not want to be living in a nursing facility for long, which is almost certainly where she would need to go, should she recover. Payal agreed that Gissel would not want to be bed bound or dependent on dialysis. Payal agreed that Gissel would not want a tracheostomy.     Payal stated she was not ready to withdraw care today, as she needed to allow other family members to come see Gissel and say goodbye. We agreed that to minimize any suffering for Gissel, we would not escalate care further. We determined that tomorrow 3/21, Payal and other family members would come to the hospital with some of Gissels personal belongings, and we would transition to comfort measures only.     Hospice and palliative care has been consulted to provide support to family during this time and for bereavement support.       Treatment Decisions  Goals of Care: comfort is paramount; other goals are not relevant or achievable     Follow Up Plan  Plan for transition to comfort measures 3/21/25 to allow time for family to come to hospital and see patient before withdrawing care.  Team Members  [facility] team including Dr Ohara and myself       Lima Buck MD  3/20/2025 5:13 PM

## 2025-03-20 NOTE — CONSULTS
Otolaryngology - Head and Neck Surgery Consultation Note      Reason For Consult  Tracheostomy Assessment    History Of Present Illness    Gissel Harvey is a 39 y.o. 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     Date of intubation/duration: 3/3/2025  Indication for trach: ***  Prior neck surgery: none  Anti-coagulation: bivalirudin gtt  INR: 1.7  Platelet count: 158  Pressors/CVVH: norepineprhine; on CVVH    *HPI, PMH, PSH, FH, SH, and comprehensive ROS were attempted to be obtained from the patient, but the patient currently remains intubated and cannot provide this information. Therefore, the history recorded below represents information gathered from the primary team, nursing staff, EMR and family*    Past Medical History  She has no past medical history on file.  Patient Active Problem List   Diagnosis    Budd-Chiari syndrome (Multi)    Hypoxemia       Surgical History  She has no past surgical history on file.     Social History  She reports that she has been smoking cigarettes. She has never used smokeless tobacco. No history on file for alcohol use and drug use.    Family History  No family history on file.     Allergies  Heparin    Review of Systems  ROS were attempted to be obtained from the patient, but the patient currently remains intubated and cannot provide this information     Physical Exam  PHYSICAL EXAMINATION:   Constitutional: intubated, sedated, appears physically deconditioned  Voice:  Unable to evaluate secondary to intubation  Respiration:  Intubated, stable rate on ventilator, chest rise symmetric  Cardiovascular:  No clubbing/cyanosis/edema in hands  Eyes:  Eyelids and periorbital area without laceration or  "lesion  Neuro:  Intubated and sedated, grimaces to pain  Head and Face:  Symmetric facial features, no masses or lesions  Salivary Glands:  Parotid and submandibular glands normal bilaterally  Ears:  Normal external ears  Nose: External nose midline, anterior rhinoscopy is normal with limited visualization to the anterior aspect of the interior turbinates, no bleeding or drainage, no lesions  Oral Cavity/Oropharynx/Lips:  ETT in place, visualized portions of mucous membranes/floor of mouth/tongue/OP normal, no masses or lesions are noted  Pharynx:  Unable to visualize due to ETT  Neck/Lymph:  No LAD, no thyroid masses, trachea midline, palpable sternal notch, thyroid cartilage, and cricoid cartilage  Skin:  Neck skin is without scar or injury  Psych: Sedated, unable to evaluate mood and affect       Last Recorded Vitals  Blood pressure 104/57, pulse 80, temperature 35.8 °C (96.4 °F), resp. rate 23, height 1.676 m (5' 6\"), weight 65.9 kg (145 lb 4.5 oz), SpO2 94%.    Medications:  Scheduled medications  ergocalciferol, 1,250 mcg, oral, Weekly  pantoprazole, 40 mg, oral, Daily before breakfast   Or  esomeprazole, 40 mg, nasoduodenal tube, Daily before breakfast   Or  pantoprazole, 40 mg, intravenous, Daily before breakfast  insulin lispro, 0-10 Units, subcutaneous, q6h  lactulose, 20 g, oral, q4h  levothyroxine, 50 mcg, oral, Daily  artificial tears, 2 drop, Both Eyes, q24h  oxygen, , inhalation, Continuous - Inhalation  perflutren protein A microsphere, 0.5 mL, intravenous, Once in imaging  polyethylene glycol, 17 g, oral, Daily  rifAXIMin, 550 mg, oral, q12h COCO  sennosides-docusate sodium, 2 tablet, oral, Daily  sulfur hexafluoride microsphr, 2 mL, intravenous, Once in imaging  thiamine, 100 mg, oral, Daily  vitamin B complex-vitamin C-folic acid, 5 mL, oral, Daily      Continuous medications  bivalirudin, 0.05-0.49 mg/kg/hr, Last Rate: 0.1 mg/kg/hr (03/20/25 1200)  norepinephrine, 0.01-1 mcg/kg/min (Dosing " Weight), Last Rate: 0.07 mcg/kg/min (03/20/25 1200)  PrismaSol 4/2.5, 27 mL/kg/hr (Dosing Weight), Last Rate: 27 mL/kg/hr (03/19/25 0031)      PRN medications  PRN medications: acetaminophen **OR** acetaminophen, alteplase, dextrose, dextrose, glucagon, glucagon, oxygen    Recent Labs:  Results from last 7 days   Lab Units 03/20/25 0320 03/19/25  1736 03/19/25  0318   WBC AUTO x10*3/uL 7.7 7.8 9.2   HEMOGLOBIN g/dL 9.4* 9.8* 9.5*   PLATELETS AUTO x10*3/uL 158 143* 156      Results from last 7 days   Lab Units 03/20/25 0320 03/19/25 1736 03/19/25  0318   SODIUM mmol/L 136 142 134*   POTASSIUM mmol/L 4.0 3.9 3.6   CHLORIDE mmol/L 102 106 99   CO2 mmol/L 24 23 24   BUN mg/dL 26* 28* 37*   CREATININE mg/dL 1.27* 1.35* 1.40*   GLUCOSE mg/dL 149* 175* 120*   MAGNESIUM mg/dL 2.94* 2.83* 2.65*   PHOSPHORUS mg/dL  --  2.1* 2.6      Results from last 7 days   Lab Units 03/20/25 0320 03/19/25 0318 03/18/25  0418   INR  1.7* 1.9* 1.7*   PROTIME seconds 19.0* 20.5* 19.3*   APTT seconds 66* 67* 65*          Imaging:    CT chest/abdomen/pelvis from 3/18:    Significant interval improvement in previously seen airspace  disease with residual disease noted in the anterior portions of the  lungs. Persistent smooth interlobular septal thickening likely  representing pulmonary interstitial edema       Assessment/Plan   - Discussion was held with *** regarding the risks, benefits and indications for tracheostomy including but not limited to bleeding, infection, pneumothorax, trachea-innominate fistula and trachea-esophageal injury, medical complications, and death. Informed consent to proceed with tracheostomy was obtained. All questions were answered.  - Will plan to proceed with bronchoscopy and tracheotomy  - Discussed with team and family  - Consent obtained from *** and placed in chart          Rosaura Murcia, MD - PGY1  Otolaryngology - Head & Neck Surgery    ENT Consult pager: [phone]  ENT Peds pager: [phone]  ENT Head & Neck Surgery  Phone: [phone]  ENT Subspecialty team (otology, rhinology, laryngology, plastics, sleep):   M-F 6am-5pm- EpicChat or page the resident who wrote the daily note   Nights & weekends- [phone]  ENT Outpatient scheduling number: [phone]  Please Page [phone] or call [phone] if urgent

## 2025-03-20 NOTE — PROCEDURES
CRRT procedure note   Seen and assessed on CVVH. Labs and events reviewed   Remains hemodynamically unstable, anuric  BFR : 200 ml/min  Filter:   Total Replacement Rate: 1800 ml/hr  Pre blood pump :  600 ml/hr  Replacement solution Prismasol potassium: 4  mEq/L  Patient fluid removal: per ICU team   Please check Phosphorus, Calcium and Magnesium daily and replace peripherally as needed  Continue with current CVVH prescription.   Prognosis for renal recovery is poor      3/20/2025    10:18 AM 3/20/2025    11:00 AM 3/20/2025    12:00 PM 3/20/2025     1:00 PM 3/20/2025     2:00 PM 3/20/2025     3:00 PM 3/20/2025     4:00 PM   Vitals   Systolic  104 109 104 105 98 100   Diastolic  50 64 57 62 51 58   Heart Rate  81 80 80 80 79 80   Temp  35.9 °C (96.6 °F) 35.9 °C (96.6 °F) 35.8 °C (96.4 °F) 35.8 °C (96.4 °F) 35.8 °C (96.4 °F) 35.8 °C (96.4 °F)   Resp 24 25 24 23 25 24 23     Most recent labs:   Results from last 7 days   Lab Units 03/20/25  0320 03/19/25 1736 03/19/25 0318   WBC AUTO x10*3/uL 7.7 7.8 9.2   HEMOGLOBIN g/dL 9.4* 9.8* 9.5*   HEMATOCRIT % 25.1* 28.2* 25.4*   PLATELETS AUTO x10*3/uL 158 143* 156     Results from last 7 days   Lab Units 03/20/25  0320 03/19/25  1736 03/19/25 0318 03/18/25  1746   SODIUM mmol/L 136 142 134* 131*   CO2 mmol/L 24 23 24 23   ANION GAP mmol/L 14 17 15 15   BUN mg/dL 26* 28* 37* 37*   CREATININE mg/dL 1.27* 1.35* 1.40* 1.72*   GLUCOSE mg/dL 149* 175* 120* 113*   CALCIUM mg/dL 8.2* 8.7 8.4* 9.2   MAGNESIUM mg/dL 2.94* 2.83* 2.65* 2.48*   PHOSPHORUS mg/dL  --  2.1* 2.6 3.3      Results from last 7 days   Lab Units 03/20/25  0320 03/19/25  0318 03/18/25  0418   ALT U/L 62* 62* 62*   AST U/L 85* 89* 98*   ALK PHOS U/L 236* 228* 232*      Results from last 7 days   Lab Units 03/20/25  0320 03/19/25  0318 03/18/25  0418   INR  1.7* 1.9* 1.7*     Results from last 7 days   Lab Units 03/16/25  0906   FIO2 % 50     Results from last 7 days   Lab Units 03/16/25  0906   POCT PH,  VENOUS pH 7.39   POCT PCO2, VENOUS mm Hg 38*     Results from last 7 days   Lab Units 03/19/25  0318   FREE T4 ng/dL 0.64*       ergocalciferol, 1,250 mcg, oral, Weekly  pantoprazole, 40 mg, oral, Daily before breakfast   Or  esomeprazole, 40 mg, nasoduodenal tube, Daily before breakfast   Or  pantoprazole, 40 mg, intravenous, Daily before breakfast  insulin lispro, 0-10 Units, subcutaneous, q6h  lactulose, 20 g, oral, q4h  levothyroxine, 50 mcg, oral, Daily  artificial tears, 2 drop, Both Eyes, q24h  oxygen, , inhalation, Continuous - Inhalation  perflutren protein A microsphere, 0.5 mL, intravenous, Once in imaging  polyethylene glycol, 17 g, oral, Daily  rifAXIMin, 550 mg, oral, q12h COCO  sennosides-docusate sodium, 2 tablet, oral, Daily  sulfur hexafluoride microsphr, 2 mL, intravenous, Once in imaging  thiamine, 100 mg, oral, Daily  vitamin B complex-vitamin C-folic acid, 5 mL, oral, Daily

## 2025-03-20 NOTE — PROGRESS NOTES
SOCIAL WORK NOTE   -ICU Treatment Plan: Per notes and reports ongoing GOC discussion with family, considering trach   -Support System:  daughter   -Planned Disposition: TBD, pending GOC   -Additional information:  Social work to follow.   CHARITO Montejo, LISW-S ([id number])

## 2025-03-20 NOTE — CARE PLAN
The patient's goals for the shift include  PATTI    The clinical goals for the shift include wean levophed drip to maintain map >65    Over the shift, the patient did not make progress toward the following goals. Barriers to progression include . Recommendations to address these barriers include .

## 2025-03-20 NOTE — PROGRESS NOTES
"Medical Intensive Care - Daily Progress Note   Subjective    Gissel Harvey is a 39 y.o. year old female patient admitted on 3/3/2025 with following ICU needs: Hypoxemia and AHRF requiring intubation due to ARDS from CAP, acute renal failure requiring CVVH, septic shock requiring vasopressor support       Interval History:  - awake today but unable to communicate with team, occasional blinks in response to questions, small twitch of one ankle when prompted, however largely unable to follow commands.   - Family discussions ongoing    Meds    Scheduled medications  ergocalciferol, 1,250 mcg, oral, Weekly  pantoprazole, 40 mg, oral, Daily before breakfast   Or  esomeprazole, 40 mg, nasoduodenal tube, Daily before breakfast   Or  pantoprazole, 40 mg, intravenous, Daily before breakfast  insulin lispro, 0-10 Units, subcutaneous, q6h  lactulose, 20 g, oral, q4h  levothyroxine, 50 mcg, oral, Daily  artificial tears, 2 drop, Both Eyes, q24h  oxygen, , inhalation, Continuous - Inhalation  perflutren protein A microsphere, 0.5 mL, intravenous, Once in imaging  polyethylene glycol, 17 g, oral, Daily  rifAXIMin, 550 mg, oral, q12h COCO  sennosides-docusate sodium, 2 tablet, oral, Daily  sulfur hexafluoride microsphr, 2 mL, intravenous, Once in imaging  thiamine, 100 mg, oral, Daily  vitamin B complex-vitamin C-folic acid, 5 mL, oral, Daily      Continuous medications  bivalirudin, 0.05-0.49 mg/kg/hr, Last Rate: 0.1 mg/kg/hr (03/20/25 1200)  norepinephrine, 0.01-1 mcg/kg/min (Dosing Weight), Last Rate: 0.07 mcg/kg/min (03/20/25 1200)  PrismaSol 4/2.5, 27 mL/kg/hr (Dosing Weight), Last Rate: 27 mL/kg/hr (03/19/25 0031)      PRN medications  PRN medications: acetaminophen **OR** acetaminophen, alteplase, dextrose, dextrose, glucagon, glucagon, oxygen     Objective    Blood pressure 104/57, pulse 80, temperature 35.8 °C (96.4 °F), resp. rate 23, height 1.676 m (5' 6\"), weight 65.9 kg (145 lb 4.5 oz), SpO2 94%.     Physical Exam "   Constitutional:       Comments: Intubated  Eyes open, tracking appropriately   Pupils reactive   HENT:      Head: Normocephalic and atraumatic.   Cardiovascular:      Rate and Rhythm: Normal rate and regular rhythm.      Pulses: Normal pulses.   Pulmonary:      Breath sounds: No wheezing.      Comments: Mechanically Ventilated, Coarse breath sounds  Abdominal:      General: Abdomen soft with significantly enlarged liver     Palpations: Abdomen is soft.      Comments: Rectal tube draining brown stool, no blood   Musculoskeletal:      Right lower leg: Edema present.      Left lower leg: Edema present. significant  Edema in the upper extremities bilaterally  Skin:     General: Skin is warm and dry.      Comments: Warm to touch   Neurological:      Comments: Grimacing to pain without withdrawing    Intake/Output Summary (Last 24 hours) at 3/20/2025 1359  Last data filed at 3/20/2025 1200  Gross per 24 hour   Intake 1415.42 ml   Output 125 ml   Net 1290.42 ml     Labs:   Results from last 72 hours   Lab Units 03/20/25  0320 03/19/25  1736 03/19/25 0318 03/18/25  1746   SODIUM mmol/L 136 142 134* 131*   POTASSIUM mmol/L 4.0 3.9 3.6 4.6   CHLORIDE mmol/L 102 106 99 98   CO2 mmol/L 24 23 24 23   BUN mg/dL 26* 28* 37* 37*   CREATININE mg/dL 1.27* 1.35* 1.40* 1.72*   GLUCOSE mg/dL 149* 175* 120* 113*   CALCIUM mg/dL 8.2* 8.7 8.4* 9.2   ANION GAP mmol/L 14 17 15 15   EGFR mL/min/1.73m*2 55* 51* 49* 38*   PHOSPHORUS mg/dL  --  2.1* 2.6 3.3      Results from last 72 hours   Lab Units 03/20/25  0320 03/19/25  1736 03/19/25  0318 03/18/25  1746 03/18/25  0418   WBC AUTO x10*3/uL 7.7 7.8 9.2   < > 9.9   HEMOGLOBIN g/dL 9.4* 9.8* 9.5*   < > 9.4*   HEMATOCRIT % 25.1* 28.2* 25.4*   < > 25.8*   PLATELETS AUTO x10*3/uL 158 143* 156   < > 133*   LYMPHO PCT MAN % 15.8  --  9.5  --  10.2   MONO PCT MAN % 7.0  --  9.5  --  2.5   EOSINO PCT MAN % 0.0  --  1.7  --  0.9    < > = values in this interval not displayed.                "    Micro/ID:     Lab Results   Component Value Date    BLOODCULT No growth at 4 days -  FINAL REPORT 03/12/2025           Assessment and Plan     Assessment: Gissel Harvey is a 39 y.o. 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 She is being treated for ARDS 2/2 CAP (MSSA and s. pneumo) and severe septic shock complicated by acute anuric renal failure, now on CVVH. She is still requiring significant respiratory support w/ increased FiO2 requirements and PEEP, on day 15 of mechanical ventilation. She also has worsening Tbili concerning for intrahepatic insult/injury. She was made DNR by her daughter 3/12.     Mechanical Ventilation: > 10 days  Sedation/Analgesia:  none  Restraints: no    Summary for 03/20/25  :  Family discussions ongoing. ENRIQUE is daughter, Payal, who appreciates input from other family members (step siblings, aunts etc) as she remains sole decision maker. Yesterday, Payal stated her mother was an active woman who would not want to be bed-bound or reliant on machines for her life. She also expressed concern that her mother would be upset with all that we are doing. She stated she believed this was \"too much\" and that she would soon be ready for \"next steps\". Today, Payal has since spoken with other family members and is now wanting to go forward with tracheostomy to \"give Gissel a chance to recover\". We explained that her liver failure is unlikely to recover and she cannot receive transplant, her kidney failure is unlikely to recover and she will be dependent on dialysis, and she is still needing vasopressor support. Even with trach, her chance of meaningful recovery is " "extremely minimal. Payal has chosen to get ENT evaluation for trach and if trach is an option likely pursue this and \"give more time\". ENT consulted. Discussions ongoing as we want to honor patient's wishes and support family in this difficult time.    Plan:  NEUROLOGY/PSYCH:  #Sedation  - Off sedation since 3/16 AM  - Given liver impairment, may take longer for sedation effects to wear off      #Encephelopathy  :: EEG w/ encephalopathy, likely iso metabolic derangements, critical illness, hyperbilirubinemia      #Microhemorrages in BL cerebral hemispheres  :: CT head 3/14 w/ indeterminate lesions, MRI brain w/ BL microhemorrages  :: Neuro status unclear due to sedation, attempts at sedation holidays shortened due to tachypnea and agitation  :: mild grimace to pain, has cough, pupils equal and reactive   - 3/15 neuro consulted-Determined microhemorrages likely chronic, risk of clotting is high with her APS so bivalrudin was resumed       CARDIOVASCULAR:     #HFrEF, recovered  :: TTE 3/4 with EF 30-35% with reduced RV function, no previous TTE for comparison  :: BNP 2127 at admission  :: Troponin plateaued (62>63) on 3/4/25  - Repeat limited TTE with normal LVEF     #Shock, mixed, distributive and septic   #Hypotension 2/2 decreased systemic circulatory volume   :: on initial presentation 3/3-3/7, she required 4 pressors (levo, vaso, epi, angiotensin)  :: on 3/8-3/11, able to come off pressors, maintained strong MAP  - 3/12 acute worsening of shock, (2/2 worsening of sepsis, worsened when sedation was weaned and she became dyssynchronous with vent demonstrating air trapping and significant tachypnea), had significant hypotension once again requiring vasopressor support        - continue levo, weaning as tolerated        - Completed abx, see full courses below      #Aflutter  #Sustained VT   :: went into NSVT while wire threaded for central line placement  :: s/p amiodarone 150 mg bolus x2  :: pt cardioverted  - Pt " is back to sinus rhythm, okay for amio gtt if arrhythmias return     PULMONARY:  Vent Mode: Volume control/assist control  FiO2 (%):  [40 %] 40 %  S RR:  [18] 18  S VT:  [450 mL] 450 mL  PEEP/CPAP (cm H2O):  [10 cm H20] 10 cm H20  MAP (cm H2O):  [14-16] 14  #Community acquired pneumonia   #ARDS   :: intubated 3/3   :: P:F ~ 70 on admission   :: Procalcitonin 57.3  :: flu A/B, COVID, parainfluenza, RSV, metapneumovirus, legionella urine Ag: negative  :: see ID section for full abx history this admission  :: Tracheal aspirate cx 3/3: pan sensitive staph aureus and strep pneumo  :: Strep pneumo urine ag: positive  :: Bcx 3/3: NGTD x3 days  :: re-cultured 3/12: sputum negative, blood culture NGTD  Plan  - Vanc/rebeca to end 3/18  - See summary of GOC conversation above, ENT consulted     RENAL/GENITOURINARY:  #Acute renal failure, anuric SCOUT requiring CVVH  :: etiology: septic shock and multiorgan failure  :: Cr 1.9 on admission, unclear baseline as no labs in 2 years   :: FeNa 3/3: 0.1% prerenal  :: Persistently anuric   - Continue CVVH per Nephrology  - While on CVVH: BID RFP, ionized calcium and CBC  - Warm CVVH fluid to assist with hypothermia     GASTROENTEROLOGY:  #liver cirrhosis   #portal hypertension s/p TIPS   #hx of Budd-Chiari   :: 3/7 POCUS with distended bowel, no appreciable ascites  - Continue to monitor abdomen for signs of ascites formation  - Hep C Abs positive, PCR pending  - 3/11 labs w/ acute elevation in INR (5), AST (147), ALT (57), Alk Phos (218)  - Liver US w/ thrombosed TIPS, IR consulted, likely chronic as this has been seen on prior CT and patient non-adherent with anticoagulation  -3/13 INR, PT, Bili, ALT, Alk phos all increasing. 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 C/f DILI or intrahepatic cholestasis / hepatic congestion, or shock liver   PLAN        - DC ketamine and use propofol for sedation given c/f DILI from ketamine. Meropenem also potential for  DILI        - CTM- she is not stable enough for further hepatology workup or procedure  - c/w lactulose and adding rifaximin - monitor rectal tube output  - s/p vitamin K 10 mg 3/14 w/ improvement in INR  - bili continues to rise, likely contributing to her encephalopathy, continuing lactulose and rifaximin    - hepatology consulted, liver function is extremely poor without many therapeutic options. She is not a transplant candidate      ENDOCRINOLOGY:              #High dose steroids              #Supplemental nutrition                          - SSI                           - Tube feeds via NG     HEMATOLOGY:  #Hx of antiphospholipid syndrome   #Hx of positive PF4  #Chronic IVC occlusion   #Hx of DVT   :: Supposed to be on fondaparinux outpatient, unclear if taking as no recent med fills   - Continue bivalirudin while inpatient I/s/o previous positive PF4      #Thrombocytopenia, stable  #Elevated PT and INR  - Trend plts, INR, coags  - Fibrinogen wnl  - If platelets <50, transfuse but do not stop bivalirubin  - INR elevated 5 -> 3.8 on 3/11, 5.9 on 3/12, iso known cirrhosis, plt 80  - s/p vitamin K 10 mg 3/14 w/ improvement in INR  - PT and INR increase can be secondary to cirrhosis, improved with vitamin K     SKIN:  #Skin Failure  - BL buttock wounds, ear wound  - wound care following, appreciate recs  - c/w vaseline for lips/mouth while intubated     MUSCULOSKELETAL:  GIFTY     INFECTIOUS DISEASE:  #MSSA PNA  #Strep PNA  #ARDS  #Septic shock  Antibiotics  Azithromycin 500 mg: 3/3  Zosyn 4.5 g: 3/3  Tobramycin 440 mg: 3/4  Vancomycin: 3/3 - 3/6, 3/8 - 3/10  Ceftaroline 3/5 - 3/6  Meropenem 2 g BID: 3/3 - 3/8  Micafungin: 3/4 - 3/6  Doxycycline 100 mg BID: 3/4 - 3/6  Ceftriaxone 3/8 - 3/10  Cefazolin 3/10-3/12  *Vanc/Day 3/12-3/18  - cultures from 3/12 NGTD     ICU Check List       ICU Liberation: Intervention:   Assess, Prevent, Manage Pain      Both SAT and SBT [] SAT  [] SBT 30-60 min [] Extubate to NC  []  Extubate to NIV  [x] Discuss Trach   Choice of analgesia and sedation Gao Agitation Sedation Scale (RASS): Alert and calm  Target Arousal Level (RASS): RASS -4 to -5 none     Delirium: Assess, prevent and manage  CAM ICU Positive    Early Mobility and Exercise RASS less than -2 [x] PT /OT consult   Family Engagement and Empowerment [x]Family updated []SW consult     FEN  Fluids: PRN  Electrolytes: PRN- CVVH   Nutrition: TF  Prophylaxis:  DVT ppx: Bival  GI ppx: PPI  Bowel care: Lactulose  Hardware:         ETT  7.5 mm (Active)   Placement Date: 03/03/25   ETT Type: ETT - single  Single Lumen Tube Size: 7.5 mm  Cuffed: Yes  Location: Oral   Number of days: 15       NG/OG/Feeding Tube NG - Crosby sump Right nostril (Active)   Placement Date/Time: 03/03/25 1900   Type of Tube: Feeding Tube  Tube Length: 53 cm  Tube Type: NG - Crosby sump  Tube Location: Right nostril   Number of days: 14       Fecal Management Rectal tube with balloon (Active)   Placement Date/Time: 03/09/25 0018   Placed by: ashlie  Hand Hygiene Completed: Yes  Fecal Management Tube Type: Rectal tube with balloon   Number of days: 9       Hemodialysis Cath 03/06/25 Right (Active)   Placement Date/Time: 03/06/25 1700   Orientation: Right   Number of days: 11       Social:  Code: DNR    HPOA: Payal Durant (daughter) [phone]  Disposition: MARLENE Buck MD   03/20/25 at 1:59 PM     Disclaimer: Documentation completed with the information available at the time of input. The times in the chart may not be reflective of actual patient care times, interventions, or procedures. Documentation occurs after the physical care of the patient.

## 2025-03-20 NOTE — PROGRESS NOTES
Spiritual Care Visit  Spiritual Care Request    Reason for Visit:  Routine Visit: Introduction     Request Received From:  Referral From: Verbal, Other (Comment) (IDT huddle)    Focus of Care:  Visited With: Family       Refer to :  Referral To:        Spiritual Care Annotation    Annotation:   went to check on family alongside [facility].  explained his role and means of support, affirmed/acknowledged difficulty of patient's situation/family decision-making, and assured family of ongoing support if/when needed. Family verbalized appreciation for checking on them, but did not need  services at this time.     Signed: Sean Leija, Clinical Care

## 2025-03-20 NOTE — CONSULTS
Otolaryngology - Head and Neck Surgery Consultation Note      Reason For Consult  Tracheostomy Assessment    History Of Present Illness  Gissel Harvey is a 39 y.o. 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     Date of intubation/duration: 3/3/2025  Indication for trach: Prolonged intubation  Prior neck surgery: none  Anti-coagulation: bivalirudin gtt  INR: 1.7  Platelet count: 158  Pressors/CVVH: norepineprhine; on CVVH    *HPI, PMH, PSH, FH, SH, and comprehensive ROS were attempted to be obtained from the patient, but the patient currently remains intubated and cannot provide this information. Therefore, the history recorded below represents information gathered from the primary team, nursing staff, EMR and family*    Past Medical History  She has no past medical history on file.  Patient Active Problem List   Diagnosis    Budd-Chiari syndrome (Multi)    Hypoxemia       Surgical History  She has no past surgical history on file.     Social History  She reports that she has been smoking cigarettes. She has never used smokeless tobacco. No history on file for alcohol use and drug use.    Family History  No family history on file.     Allergies  Heparin    Review of Systems  ROS were attempted to be obtained from the patient, but the patient currently remains intubated and cannot provide this information     Physical Exam  PHYSICAL EXAMINATION:   Constitutional: intubated, sedated, appears physically deconditioned  Voice:  Unable to evaluate secondary to intubation  Respiration:  Intubated, stable rate on ventilator, chest rise symmetric  Cardiovascular:  No clubbing/cyanosis/edema in hands  Eyes:  Eyelids and periorbital area without laceration  "or lesion  Neuro:  Intubated and sedated, grimaces to pain  Head and Face:  Symmetric facial features, no masses or lesions  Ears:  Normal external ears  Nose: External nose midline, anterior rhinoscopy is normal with limited visualization to the anterior aspect of the interior turbinates, no bleeding or drainage, no lesions  Oral Cavity/Oropharynx/Lips:  ETT in place, visualized portions of mucous membranes/floor of mouth/tongue/OP normal, no masses or lesions are noted  Pharynx:  Unable to visualize due to ETT  Neck/Lymph:  No LAD, no thyroid masses, trachea midline, palpable sternal notch, thyroid cartilage, and cricoid cartilage  Skin:  Neck skin is without scar or injury  Psych: Sedated, unable to evaluate mood and affect       Last Recorded Vitals  Blood pressure 107/57, pulse 84, temperature 36 °C (96.8 °F), resp. rate (!) 28, height 1.676 m (5' 6\"), weight 65.9 kg (145 lb 4.5 oz), SpO2 93%.    Medications:  Scheduled medications  ergocalciferol, 1,250 mcg, oral, Weekly  pantoprazole, 40 mg, oral, Daily before breakfast   Or  esomeprazole, 40 mg, nasoduodenal tube, Daily before breakfast   Or  pantoprazole, 40 mg, intravenous, Daily before breakfast  insulin lispro, 0-10 Units, subcutaneous, q6h  lactulose, 20 g, oral, q4h  levothyroxine, 50 mcg, oral, Daily  artificial tears, 2 drop, Both Eyes, q24h  oxygen, , inhalation, Continuous - Inhalation  perflutren protein A microsphere, 0.5 mL, intravenous, Once in imaging  polyethylene glycol, 17 g, oral, Daily  rifAXIMin, 550 mg, oral, q12h COCO  sennosides-docusate sodium, 2 tablet, oral, Daily  sulfur hexafluoride microsphr, 2 mL, intravenous, Once in imaging  thiamine, 100 mg, oral, Daily  vitamin B complex-vitamin C-folic acid, 5 mL, oral, Daily      Continuous medications  bivalirudin, 0.05-0.49 mg/kg/hr, Last Rate: 0.1 mg/kg/hr (03/20/25 1600)  norepinephrine, 0.01-1 mcg/kg/min (Dosing Weight), Last Rate: 0.05 mcg/kg/min (03/20/25 1600)  PrismaSol 4/2.5, 27 " mL/kg/hr (Dosing Weight), Last Rate: 27 mL/kg/hr (03/19/25 0031)      PRN medications  PRN medications: acetaminophen **OR** acetaminophen, alteplase, dextrose, dextrose, glucagon, glucagon, oxygen    Recent Labs:  Results from last 7 days   Lab Units 03/20/25  0320 03/19/25  1736 03/19/25  0318   WBC AUTO x10*3/uL 7.7 7.8 9.2   HEMOGLOBIN g/dL 9.4* 9.8* 9.5*   PLATELETS AUTO x10*3/uL 158 143* 156      Results from last 7 days   Lab Units 03/20/25  0320 03/19/25  1736 03/19/25  0318   SODIUM mmol/L 136 142 134*   POTASSIUM mmol/L 4.0 3.9 3.6   CHLORIDE mmol/L 102 106 99   CO2 mmol/L 24 23 24   BUN mg/dL 26* 28* 37*   CREATININE mg/dL 1.27* 1.35* 1.40*   GLUCOSE mg/dL 149* 175* 120*   MAGNESIUM mg/dL 2.94* 2.83* 2.65*   PHOSPHORUS mg/dL  --  2.1* 2.6      Results from last 7 days   Lab Units 03/20/25  0320 03/19/25  0318 03/18/25  0418   INR  1.7* 1.9* 1.7*   PROTIME seconds 19.0* 20.5* 19.3*   APTT seconds 66* 67* 65*          Imaging:  CT chest/abdomen/pelvis from 3/18:    Significant interval improvement in previously seen airspace  disease with residual disease noted in the anterior portions of the  lungs. Persistent smooth interlobular septal thickening likely  representing pulmonary interstitial edema       Assessment/Plan   - Discussion was held with family regarding the risks, benefits and indications for tracheostomy including but not limited to bleeding, infection, pneumothorax, trachea-innominate fistula and trachea-esophageal injury, medical complications, and death. Informed consent to proceed with tracheostomy was obtained. All questions were answered.  -Family decision was made not to proceed with tracheostomy  -Please page with questions and concerns        Rosaura Murcia MD - PGY1  Otolaryngology - Head & Neck Surgery    ENT Consult pager: [phone]  ENT Peds pager: [phone]  ENT Head & Neck Surgery Phone: [phone]  ENT Subspecialty team (otology, rhinology, laryngology, plastics, sleep):   RINKU 6am-5pm- [facility]  or page the resident who wrote the daily note   Nights & weekends- [phone]  ENT Outpatient scheduling number: [phone]  Please Page [phone] or call [phone] if urgent

## 2025-03-20 NOTE — CONSULTS
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      Pertinent Labs:   Albumin   Date Value Ref Range Status   03/20/2025 2.3 (L) 3.4 - 5.0 g/dL Final       Wound Assessment:  Wound 03/07/25 Pressure Injury Buttock Left (Active)   Wound Image   03/16/25 0400   Site Assessment Eschar 03/20/25 0800   [place]-Wound Assessment Dry 03/20/25 0800   Non-staged Wound Description Partial thickness 03/20/25 0800   Pressure Injury Stage 2 03/20/25 0800   Wound Length (cm) 1 cm 03/16/25 0400   Wound Width (cm) 2 cm 03/16/25 0400   Wound Surface Area (cm^2) 2 cm^2 03/16/25 0400   Wound Depth (cm) 0 cm 03/16/25 0400   Wound Volume (cm^3) 0 cm^3 03/16/25 0400   Wound Healing % 100 03/16/25 0400   Drainage Description Serosanguineous 03/20/25 0800   Drainage Amount Scant 03/20/25 0800   Dressing Xeroform;Foam 03/20/25 0800   Dressing Changed Changed 03/20/25 0800   Dressing Status Clean;Dry;Occlusive 03/20/25 0800       Wound 03/08/25 Pressure Injury Mouth (Active)   Wound Image   03/16/25 0408   Site Assessment Eschar;Black 03/20/25 1000   [place]-Wound Assessment Intact 03/20/25 1000   Pressure Injury Stage U 03/20/25 1000   State of Healing Eschar 03/20/25 1000   Margins Poorly defined 03/20/25 1000   Drainage Description None 03/20/25 1000   Drainage Amount None 03/20/25 1000   Dressing Open to air 03/20/25 1000   Dressing Changed Changed 03/18/25 0241       Wound 03/09/25 Pressure Injury Buttock Right (Active)   Wound Image   03/16/25 0405   Site Assessment  Purple 03/20/25 0830   [place]-Wound Assessment Dry 03/20/25 0830   Pressure Injury Stage DTPI 03/17/25 2000   Wound Length (cm) 5 cm 03/16/25 0403   Wound Width (cm) 1 cm 03/16/25 0403   Wound Surface Area (cm^2) 5 cm^2 03/16/25 0403   Margins Well-defined edges 03/18/25 0400   Drainage Description None 03/20/25 0830   Drainage Amount None 03/20/25 0830   Dressing Foam 03/20/25 0830   Dressing Changed Changed 03/20/25 0830   Dressing Status Clean;Dry 03/20/25 0830       Wound 03/09/25 Skin Tear Leg Right;Upper (Active)   Wound Image   03/16/25 0356   Site Assessment Pink 03/20/25 0830   Non-staged Wound Description Partial thickness 03/20/25 0830   Shape circular 03/20/25 0830   Wound Length (cm) 4 cm 03/16/25 0356   Wound Width (cm) 3.5 cm 03/16/25 0356   Wound Surface Area (cm^2) 14 cm^2 03/16/25 0356   Wound Depth (cm) 0.1 cm 03/16/25 0356   Wound Volume (cm^3) 1.4 cm^3 03/16/25 0356   Wound Healing % 0 03/16/25 0356   Drainage Description None 03/20/25 0830   Drainage Amount None 03/20/25 0830   Dressing Foam 03/20/25 0830   Dressing Changed Changed 03/20/25 0830   Dressing Status Clean;Dry 03/20/25 0830       Wound 03/10/25 Pressure Injury Ear Right (Active)   Wound Image   03/16/25 0407   Site Assessment Black 03/20/25 0830   [place]-Wound Assessment Fragile 03/18/25 1630   Pressure Injury Stage DTPI 03/20/25 0830   Drainage Description None 03/20/25 0830   Drainage Amount None 03/20/25 0830   Dressing Open to air 03/20/25 0830       Wound 03/19/25 Perineum (Active)   Wound Image   03/19/25 1428   Shape irregular 03/20/25 0830   Wound Depth (cm) 0 cm 03/19/25 1428   State of Healing Non-healing 03/20/25 0830   Margins Not attached 03/20/25 0830   Drainage Description Serosanguineous 03/20/25 0830   Drainage Amount Scant 03/20/25 0830   Dressing Open to air;Protective barrier 03/20/25 0830       Wound Team Summary Assessment:      3/20/2025 Recommendation for mouth wounds:  Would continue apply petroleum - keep  wound moist for comfort      Wound Team Plan: Would continue with with current recommendations      Lyssa ROBLES   3/20/2025  6:45 PM

## 2025-03-20 NOTE — CARE PLAN
Problem: Pain - Adult  Goal: Verbalizes/displays adequate comfort level or baseline comfort level  Outcome: Progressing  Flowsheets (Taken 3/17/2025 0543 by Rosie Galdamez RN)  Verbalizes/displays adequate comfort level or baseline comfort level:   Assess pain using appropriate pain scale   Administer analgesics based on type and severity of pain and evaluate response   Implement non-pharmacological measures as appropriate and evaluate response   Consider cultural and social influences on pain and pain management     Problem: Discharge Planning  Goal: Discharge to home or other facility with appropriate resources  Outcome: Progressing  Flowsheets (Taken 3/17/2025 0543 by Rosie Galdamez RN)  Discharge to home or other facility with appropriate resources:   Identify barriers to discharge with patient and caregiver   Arrange for needed discharge resources and transportation as appropriate   Identify discharge learning needs (meds, wound care, etc)   Refer to discharge planning if patient needs post-hospital services based on physician order or complex needs related to functional status, cognitive ability or social support system     Problem: Chronic Conditions and Co-morbidities  Goal: Patient's chronic conditions and co-morbidity symptoms are monitored and maintained or improved  Outcome: Progressing  Flowsheets (Taken 3/17/2025 0543 by Rosie Galdamez RN)  Care Plan - Patient's Chronic Conditions and Co-Morbidity Symptoms are Monitored and Maintained or Improved:   Monitor and assess patient's chronic conditions and comorbid symptoms for stability, deterioration, or improvement   Collaborate with multidisciplinary team to address chronic and comorbid conditions and prevent exacerbation or deterioration   Update acute care plan with appropriate goals if chronic or comorbid symptoms are exacerbated and prevent overall improvement and discharge     Problem: Nutrition  Goal: Nutrient intake appropriate for maintaining  nutritional needs  Outcome: Progressing     Problem: Knowledge Deficit  Goal: Patient/family/caregiver demonstrates understanding of disease process, treatment plan, medications, and discharge instructions  Outcome: Progressing  Flowsheets (Taken 3/17/2025 0543 by Rosie Galdamez, RN)  Patient/family/caregiver demonstrates understanding of disease process, treatment plan, medications, and discharge instructions:   Complete learning assessment and assess knowledge base   Provide teaching at level of understanding   Provide teaching via preferred learning methods     Problem: Mechanical Ventilation  Goal: Oral health is maintained or improved  Outcome: Progressing  Flowsheets (Taken 3/17/2025 0543 by Rosie Galdamez, RN)  Oral Health is Maintained or Improved:   Assess and monitor condition of lips and mouth and perform oral care per hospital policy   Perform oral care with an oral swab   Apply water-based moisturizer to lips   Suction oral pharynx  Goal: Ability to express needs and understand communication  Outcome: Progressing  Flowsheets (Taken 3/17/2025 0543 by Rosie Galdamez, RN)  Ability to express needs and understand communication:   Assess and monitor patient's ability to understand information and communicate   Implement interventions to promote patient's ability to communicate   Encourage patient to communicate   Provide patient with clipboard and pen/pencil   Keep alphabet and symbol forms within reach  Goal: Mobility/activity is maintained at optimum level for patient  Outcome: Progressing     Problem: Skin  Goal: Decreased wound size/increased tissue granulation at next dressing change  Outcome: Progressing  Flowsheets (Taken 3/17/2025 1757 by Naren Parsons RN)  Decreased wound size/increased tissue granulation at next dressing change:   Promote sleep for wound healing   Protective dressings over bony prominences  Goal: Participates in plan/prevention/treatment measures  Outcome: Progressing  Flowsheets  (Taken 3/19/2025 1157)  Participates in plan/prevention/treatment measures:   Discuss with provider PT/OT consult   Elevate heels  Goal: Prevent/manage excess moisture  Outcome: Progressing  Flowsheets (Taken 3/17/2025 1757 by Naren Parsons, RN)  Prevent/manage excess moisture:   Cleanse incontinence/protect with barrier cream   Monitor for/manage infection if present   Follow provider orders for dressing changes   Moisturize dry skin  Goal: Prevent/minimize sheer/friction injuries  Outcome: Progressing  Flowsheets (Taken 3/17/2025 1757 by Naren Parsons, RN)  Prevent/minimize sheer/friction injuries:   Complete micro-shifts as needed if patient unable. Adjust patient position to relieve pressure points, not a full turn   Increase activity/out of bed for meals   Use pull sheet   HOB 30 degrees or less   Turn/reposition every 2 hours/use positioning/transfer devices  Goal: Promote/optimize nutrition  Outcome: Progressing  Flowsheets (Taken 3/20/2025 1017)  Promote/optimize nutrition: Monitor/record intake including meals  Goal: Promote skin healing  Outcome: Progressing  Flowsheets (Taken 3/17/2025 1757 by Naren Parsons, RN)  Promote skin healing:   Assess skin/pad under line(s)/device(s)   Protective dressings over bony prominences   Turn/reposition every 2 hours/use positioning/transfer devices   Ensure correct size (line/device) and apply per  instructions   Rotate device position/do not position patient on device

## 2025-03-21 LAB
ALBUMIN SERPL BCP-MCNC: 2.5 G/DL (ref 3.4–5)
ALP SERPL-CCNC: 248 U/L (ref 33–110)
ALT SERPL W P-5'-P-CCNC: 63 U/L (ref 7–45)
ANION GAP SERPL CALC-SCNC: 11 MMOL/L (ref 10–20)
APTT PPP: 67 SECONDS (ref 26–36)
AST SERPL W P-5'-P-CCNC: 87 U/L (ref 9–39)
BASOPHILS # BLD MANUAL: 0.06 X10*3/UL (ref 0–0.1)
BASOPHILS NFR BLD MANUAL: 0.9 %
BILIRUB SERPL-MCNC: 29.4 MG/DL (ref 0–1.2)
BUN SERPL-MCNC: 26 MG/DL (ref 6–23)
CA-I BLD-SCNC: 1.06 MMOL/L (ref 1.1–1.33)
CALCIUM SERPL-MCNC: 8 MG/DL (ref 8.6–10.6)
CHLORIDE SERPL-SCNC: 101 MMOL/L (ref 98–107)
CO2 SERPL-SCNC: 27 MMOL/L (ref 21–32)
CREAT SERPL-MCNC: 1.18 MG/DL (ref 0.5–1.05)
EGFRCR SERPLBLD CKD-EPI 2021: 60 ML/MIN/1.73M*2
EOSINOPHIL # BLD MANUAL: 0 X10*3/UL (ref 0–0.7)
EOSINOPHIL NFR BLD MANUAL: 0 %
ERYTHROCYTE [DISTWIDTH] IN BLOOD BY AUTOMATED COUNT: 23.4 % (ref 11.5–14.5)
GLUCOSE BLD MANUAL STRIP-MCNC: 122 MG/DL (ref 74–99)
GLUCOSE BLD MANUAL STRIP-MCNC: 124 MG/DL (ref 74–99)
GLUCOSE SERPL-MCNC: 136 MG/DL (ref 74–99)
HCT VFR BLD AUTO: 25.9 % (ref 36–46)
HGB BLD-MCNC: 9.1 G/DL (ref 12–16)
IMM GRANULOCYTES # BLD AUTO: 0.07 X10*3/UL (ref 0–0.7)
IMM GRANULOCYTES NFR BLD AUTO: 1 % (ref 0–0.9)
INR PPP: 1.7 (ref 0.9–1.1)
LYMPHOCYTES # BLD MANUAL: 1.41 X10*3/UL (ref 1.2–4.8)
LYMPHOCYTES NFR BLD MANUAL: 20.5 %
MAGNESIUM SERPL-MCNC: 3.03 MG/DL (ref 1.6–2.4)
MCH RBC QN AUTO: 32.3 PG (ref 26–34)
MCHC RBC AUTO-ENTMCNC: 35.1 G/DL (ref 32–36)
MCV RBC AUTO: 92 FL (ref 80–100)
METAMYELOCYTES # BLD MANUAL: 0.06 X10*3/UL
METAMYELOCYTES NFR BLD MANUAL: 0.9 %
MONOCYTES # BLD MANUAL: 0.59 X10*3/UL (ref 0.1–1)
MONOCYTES NFR BLD MANUAL: 8.5 %
MYELOCYTES # BLD MANUAL: 0.12 X10*3/UL
MYELOCYTES NFR BLD MANUAL: 1.7 %
NEUTS SEG # BLD MANUAL: 4.66 X10*3/UL (ref 1.2–7)
NEUTS SEG NFR BLD MANUAL: 67.5 %
NRBC BLD-RTO: 0.4 /100 WBCS (ref 0–0)
PLATELET # BLD AUTO: 135 X10*3/UL (ref 150–450)
POLYCHROMASIA BLD QL SMEAR: NORMAL
POTASSIUM SERPL-SCNC: 4.1 MMOL/L (ref 3.5–5.3)
PROT SERPL-MCNC: 6.1 G/DL (ref 6.4–8.2)
PROTHROMBIN TIME: 18.9 SECONDS (ref 9.8–12.4)
RBC # BLD AUTO: 2.82 X10*6/UL (ref 4–5.2)
RBC MORPH BLD: NORMAL
SODIUM SERPL-SCNC: 135 MMOL/L (ref 136–145)
TARGETS BLD QL SMEAR: NORMAL
TOTAL CELLS COUNTED BLD: 117
WBC # BLD AUTO: 6.9 X10*3/UL (ref 4.4–11.3)

## 2025-03-21 PROCEDURE — 90937 HEMODIALYSIS REPEATED EVAL: CPT

## 2025-03-21 PROCEDURE — 99497 ADVNCD CARE PLAN 30 MIN: CPT

## 2025-03-21 PROCEDURE — 85027 COMPLETE CBC AUTOMATED: CPT

## 2025-03-21 PROCEDURE — 85610 PROTHROMBIN TIME: CPT

## 2025-03-21 PROCEDURE — 2500000005 HC RX 250 GENERAL PHARMACY W/O HCPCS: Mod: SE

## 2025-03-21 PROCEDURE — 99291 CRITICAL CARE FIRST HOUR: CPT

## 2025-03-21 PROCEDURE — 2500000004 HC RX 250 GENERAL PHARMACY W/ HCPCS (ALT 636 FOR OP/ED): Mod: SE

## 2025-03-21 PROCEDURE — 2500000001 HC RX 250 WO HCPCS SELF ADMINISTERED DRUGS (ALT 637 FOR MEDICARE OP): Mod: SE

## 2025-03-21 PROCEDURE — 80053 COMPREHEN METABOLIC PANEL: CPT

## 2025-03-21 PROCEDURE — 82947 ASSAY GLUCOSE BLOOD QUANT: CPT

## 2025-03-21 PROCEDURE — 2020000001 HC ICU ROOM DAILY

## 2025-03-21 PROCEDURE — 85007 BL SMEAR W/DIFF WBC COUNT: CPT

## 2025-03-21 PROCEDURE — 83735 ASSAY OF MAGNESIUM: CPT

## 2025-03-21 PROCEDURE — 36415 COLL VENOUS BLD VENIPUNCTURE: CPT

## 2025-03-21 PROCEDURE — 99498 ADVNCD CARE PLAN ADDL 30 MIN: CPT

## 2025-03-21 PROCEDURE — 2500000002 HC RX 250 W HCPCS SELF ADMINISTERED DRUGS (ALT 637 FOR MEDICARE OP, ALT 636 FOR OP/ED): Mod: SE

## 2025-03-21 PROCEDURE — 82330 ASSAY OF CALCIUM: CPT

## 2025-03-21 PROCEDURE — 99223 1ST HOSP IP/OBS HIGH 75: CPT

## 2025-03-21 PROCEDURE — 94003 VENT MGMT INPAT SUBQ DAY: CPT

## 2025-03-21 RX ORDER — HYDROMORPHONE HCL/0.9% NACL/PF 15 MG/30ML
PATIENT CONTROLLED ANALGESIA SYRINGE INTRAVENOUS CONTINUOUS
Status: DISCONTINUED | OUTPATIENT
Start: 2025-03-21 | End: 2025-03-21

## 2025-03-21 RX ORDER — CALCIUM GLUCONATE 20 MG/ML
2 INJECTION, SOLUTION INTRAVENOUS ONCE
Status: COMPLETED | OUTPATIENT
Start: 2025-03-21 | End: 2025-03-21

## 2025-03-21 RX ORDER — MORPHINE SULFATE 4 MG/ML
2 INJECTION INTRAVENOUS
Status: DISCONTINUED | OUTPATIENT
Start: 2025-03-21 | End: 2025-03-23 | Stop reason: HOSPADM

## 2025-03-21 RX ORDER — GLYCOPYRROLATE 0.2 MG/ML
0.2 INJECTION INTRAMUSCULAR; INTRAVENOUS EVERY 2 HOUR PRN
Status: DISCONTINUED | OUTPATIENT
Start: 2025-03-21 | End: 2025-03-23 | Stop reason: HOSPADM

## 2025-03-21 RX ORDER — FENTANYL CITRATE-0.9 % NACL/PF 10 MCG/ML
PLASTIC BAG, INJECTION (ML) INTRAVENOUS
Status: COMPLETED
Start: 2025-03-21 | End: 2025-03-21

## 2025-03-21 RX ORDER — LORAZEPAM 2 MG/ML
0.5 INJECTION INTRAMUSCULAR EVERY 4 HOURS PRN
Status: DISCONTINUED | OUTPATIENT
Start: 2025-03-21 | End: 2025-03-23 | Stop reason: HOSPADM

## 2025-03-21 RX ORDER — FENTANYL CITRATE-0.9 % NACL/PF 10 MCG/ML
25-200 PLASTIC BAG, INJECTION (ML) INTRAVENOUS CONTINUOUS
Status: DISCONTINUED | OUTPATIENT
Start: 2025-03-21 | End: 2025-03-23

## 2025-03-21 RX ORDER — ACETAMINOPHEN 10 MG/ML
1000 INJECTION, SOLUTION INTRAVENOUS ONCE
Status: COMPLETED | OUTPATIENT
Start: 2025-03-21 | End: 2025-03-21

## 2025-03-21 RX ORDER — HYDROMORPHONE HYDROCHLORIDE 1 MG/ML
0.4 INJECTION, SOLUTION INTRAMUSCULAR; INTRAVENOUS; SUBCUTANEOUS
Status: DISCONTINUED | OUTPATIENT
Start: 2025-03-21 | End: 2025-03-21

## 2025-03-21 RX ADMIN — CARBOXYMETHYLCELLULOSE SODIUM 2 DROP: 5 SOLUTION/ DROPS OPHTHALMIC at 08:50

## 2025-03-21 RX ADMIN — LACTULOSE 20 G: 20 SOLUTION ORAL at 12:39

## 2025-03-21 RX ADMIN — Medication 5 ML: at 08:49

## 2025-03-21 RX ADMIN — LACTULOSE 20 G: 20 SOLUTION ORAL at 04:57

## 2025-03-21 RX ADMIN — GLYCOPYRROLATE 0.2 MG: 0.2 INJECTION, SOLUTION INTRAMUSCULAR; INTRAVENOUS at 17:55

## 2025-03-21 RX ADMIN — MORPHINE SULFATE 2 MG: 4 INJECTION INTRAVENOUS at 18:35

## 2025-03-21 RX ADMIN — SODIUM PHOSPHATE, MONOBASIC, MONOHYDRATE AND SODIUM PHOSPHATE, DIBASIC, ANHYDROUS 21 MMOL: 276; 142 INJECTION, SOLUTION INTRAVENOUS at 08:47

## 2025-03-21 RX ADMIN — ACETAMINOPHEN 1000 MG: 10 INJECTION INTRAVENOUS at 18:35

## 2025-03-21 RX ADMIN — MORPHINE SULFATE 2 MG: 4 INJECTION INTRAVENOUS at 17:55

## 2025-03-21 RX ADMIN — LEVOTHYROXINE SODIUM 50 MCG: 0.05 TABLET ORAL at 06:43

## 2025-03-21 RX ADMIN — PANTOPRAZOLE SODIUM 40 MG: 40 INJECTION, POWDER, LYOPHILIZED, FOR SOLUTION INTRAVENOUS at 08:49

## 2025-03-21 RX ADMIN — CALCIUM CHLORIDE, MAGNESIUM CHLORIDE, DEXTROSE MONOHYDRATE, LACTIC ACID, SODIUM CHLORIDE, SODIUM BICARBONATE AND POTASSIUM CHLORIDE 27 ML/KG/HR: 3.68; 3.05; 22; 5.4; 6.46; 3.09; .314 INJECTION INTRAVENOUS at 09:04

## 2025-03-21 RX ADMIN — MORPHINE SULFATE 2 MG: 4 INJECTION INTRAVENOUS at 23:08

## 2025-03-21 RX ADMIN — MORPHINE SULFATE 2 MG: 4 INJECTION INTRAVENOUS at 18:01

## 2025-03-21 RX ADMIN — Medication 50 PERCENT: at 07:12

## 2025-03-21 RX ADMIN — Medication 50 MCG/HR: at 15:46

## 2025-03-21 RX ADMIN — RIFAXIMIN 550 MG: 550 TABLET ORAL at 08:49

## 2025-03-21 RX ADMIN — THIAMINE HCL TAB 100 MG 100 MG: 100 TAB at 08:49

## 2025-03-21 RX ADMIN — CALCIUM CHLORIDE, MAGNESIUM CHLORIDE, DEXTROSE MONOHYDRATE, LACTIC ACID, SODIUM CHLORIDE, SODIUM BICARBONATE AND POTASSIUM CHLORIDE 27 ML/KG/HR: 3.68; 3.05; 22; 5.4; 6.46; 3.09; .314 INJECTION INTRAVENOUS at 00:18

## 2025-03-21 RX ADMIN — CALCIUM GLUCONATE 2 G: 20 INJECTION, SOLUTION INTRAVENOUS at 08:43

## 2025-03-21 ASSESSMENT — RESPIRATORY DISTRESS OBSERVATION SCALE (RDOS)
RDOS TOTAL SCORE: 5
RESPIRATORY RATE PER MINUTE: 2 - >30 BREATHS
HEART RATE PER MINUTE: 2 - >109 BEATS
RESPIRATORY RATE PER MINUTE: 2 - >30 BREATHS
PARADOXICAL BREATHING PATTERN: 0 - NONE
RESTLESS NONPURPOSEFUL MOVEMENTS: 0 - NONE
INVOLUNTARY NASAL FLARING: 0 - NONE
PARADOXICAL BREATHING PATTERN: 0 - NONE
RESPIRATORY RATE PER MINUTE: 2 - >30 BREATHS
RESTLESS NONPURPOSEFUL MOVEMENTS: 0 - NONE
LOOK OF FEAR: 0 - NONE
PARADOXICAL BREATHING PATTERN: 0 - NONE
LOOK OF FEAR: 0 - NONE
INVOLUNTARY NASAL FLARING: 0 - NONE
ACCESSORY MUSCLE RISE IN CLAVICLE DURING INSPIRATION: 1 - SLIGHT RISE
PARADOXICAL BREATHING PATTERN: 0 - NONE
HEART RATE PER MINUTE: 1 - 90-109 BEATS
LOOK OF FEAR: 0 - NONE
PARADOXICAL BREATHING PATTERN: 0 - NONE
LOOK OF FEAR: 0 - NONE
RDOS TOTAL SCORE: 4
LOOK OF FEAR: 0 - NONE
RESPIRATORY RATE PER MINUTE: 2 - >30 BREATHS
RESPIRATORY RATE PER MINUTE: 2 - >30 BREATHS
INVOLUNTARY NASAL FLARING: 0 - NONE
ACCESSORY MUSCLE RISE IN CLAVICLE DURING INSPIRATION: 1 - SLIGHT RISE
RDOS TOTAL SCORE: 3
RESPIRATORY RATE PER MINUTE: 2 - >30 BREATHS
INVOLUNTARY NASAL FLARING: 0 - NONE
ACCESSORY MUSCLE RISE IN CLAVICLE DURING INSPIRATION: 1 - SLIGHT RISE
LOOK OF FEAR: 0 - NONE
RDOS TOTAL SCORE: 6
RESTLESS NONPURPOSEFUL MOVEMENTS: 1 - OCCASIONAL, SLIGHT MOVEMENTS
HEART RATE PER MINUTE: 2 - >109 BEATS
RESTLESS NONPURPOSEFUL MOVEMENTS: 0 - NONE
HEART RATE PER MINUTE: 1 - 90-109 BEATS
INVOLUNTARY NASAL FLARING: 0 - NONE
RDOS TOTAL SCORE: 3
ACCESSORY MUSCLE RISE IN CLAVICLE DURING INSPIRATION: 0 - NONE
ACCESSORY MUSCLE RISE IN CLAVICLE DURING INSPIRATION: 0 - NONE
GRUNTING AT END OF EXPIRATION: 0 - NONE
RDOS TOTAL SCORE: 4
INVOLUNTARY NASAL FLARING: 0 - NONE
HEART RATE PER MINUTE: 1 - 90-109 BEATS
RESTLESS NONPURPOSEFUL MOVEMENTS: 0 - NONE
GRUNTING AT END OF EXPIRATION: 0 - NONE
RESPIRATORY RATE PER MINUTE: 2 - >30 BREATHS
RDOS TOTAL SCORE: 4
GRUNTING AT END OF EXPIRATION: 0 - NONE
LOOK OF FEAR: 0 - NONE
ACCESSORY MUSCLE RISE IN CLAVICLE DURING INSPIRATION: 0 - NONE
RESTLESS NONPURPOSEFUL MOVEMENTS: 0 - NONE
ACCESSORY MUSCLE RISE IN CLAVICLE DURING INSPIRATION: 1 - SLIGHT RISE
RESTLESS NONPURPOSEFUL MOVEMENTS: 1 - OCCASIONAL, SLIGHT MOVEMENTS
HEART RATE PER MINUTE: 1 - 90-109 BEATS
INVOLUNTARY NASAL FLARING: 0 - NONE
GRUNTING AT END OF EXPIRATION: 0 - NONE
HEART RATE PER MINUTE: 1 - 90-109 BEATS
PARADOXICAL BREATHING PATTERN: 0 - NONE
PARADOXICAL BREATHING PATTERN: 0 - NONE
GRUNTING AT END OF EXPIRATION: 0 - NONE

## 2025-03-21 ASSESSMENT — PAIN - FUNCTIONAL ASSESSMENT
PAIN_FUNCTIONAL_ASSESSMENT: CPOT (CRITICAL CARE PAIN OBSERVATION TOOL)
PAIN_FUNCTIONAL_ASSESSMENT: CPOT (CRITICAL CARE PAIN OBSERVATION TOOL)

## 2025-03-21 NOTE — PROGRESS NOTES
"Received consult to help primary team with goals of care conversation.  Attempted to call pt's daughter Payal, but was unable to leave a message, sent text message.  This SW and Maddie Win DNP called pt's cousin Enedina Mcconnell Gio [phone] who was recently visiting from [place] and helping Payal with this devastating situation.  Provided updated information about pt's multi-system organ failure and Vitaliy feels that pt would never want to live \"like a vegetable\"  She shared how difficult this is for Payal to decide, she also shared the multiple losses that Payal has had to experience in her short life.  Enedina Mcconnell states there is a big extended family, but that this loss will mean that Payal will not have any immediate family left.  Will plan to meet with Payal when she visits pt today.    ANIA Gaxiola      "

## 2025-03-21 NOTE — CARE PLAN
Problem: Skin  Goal: Decreased wound size/increased tissue granulation at next dressing change  Outcome: Progressing  Goal: Participates in plan/prevention/treatment measures  Outcome: Progressing  Goal: Prevent/manage excess moisture  Outcome: Progressing  Flowsheets (Taken 3/21/2025 1045)  Prevent/manage excess moisture:   Cleanse incontinence/protect with barrier cream   Monitor for/manage infection if present   Follow provider orders for dressing changes  Goal: Prevent/minimize sheer/friction injuries  Outcome: Progressing  Goal: Promote/optimize nutrition  Outcome: Progressing  Goal: Promote skin healing  Outcome: Progressing   The patient's goals for the shift include      The clinical goals for the shift include pt will have decreased levo requirements

## 2025-03-21 NOTE — PROGRESS NOTES
Spiritual Care Visit  Spiritual Care Request    Reason for Visit:  Routine Visit: Follow-up     Request Received From:  Referral From: Physician    Focus of Care:  Visited With: Family, Patient       Refer to :  Referral To:        Outcome:  Outcome of Spiritual Care Visit: Affirmation, [place], Comfort/healing presence, Support system identified     Spiritual Care Annotation    Annotation:   contacted to offer family support as patient transitions to comfort care.      prayed with family at bedside, actively listened, provided a calm, supportive presence, and offered comforting words. Family appeared comforted by visit and expressed gratitude for prayer/support.    Signed: Sean Leija, Clinical Care

## 2025-03-21 NOTE — CONSULTS
"Inpatient consult to Palliative Care  Consult performed by: Maddie Win, APRN-CNP, DNP  Consult ordered by: Anish Wilder MD PhD        Palliative Medicine Consult  Complex medical decision making, symptom management, patient/family support    History obtained from chart review including ED note, H&P, patient's daily progress notes, review of lab/test results, and discussion with primary team and bedside RN.    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 Patient now in MSOF - liver failure, renal failure, respiratory failure, shock, ongoing sepsis. Not a candidate for transplant. Poor prognosis at this time.     Introduction to Palliative Medicine  Met with patient's daughter at bedside.     Patient remains altered, does not have capacity to make their own medical decisions at this time. Unable to participate in ROS or goals of care discussion. Surrogate decision maker is daughter.    Palliative Medicine was introduced as a specialty service for patients with serious illness to help with symptom management, improve quality of life, assist with goals of care conversations, navigate complex decision making, and provide support to patients and families. Support and empathy was provided throughout the encounter. Provided reflective listening and presence.     Symptoms  Patient intubated at this time, ROS limited    Palliative Medicine Social History:  Patient is not . She has one daughter. Daughter states she is always \"put together\" loves doing hair, makeup, self care. Daughter states she would never want to be in her current state. Loves music, singing, painting/drawing, art. \"Lit up every room.\"    Objective    Last Recorded Vitals  BP 89/50   Pulse 86   Temp 36.7 °C (98.1 °F)   Resp 24   Ht 1.676 m (5' 6\")   Wt 65.9 kg (145 lb 4.5 oz)   SpO2 95%  "  BMI 23.45 kg/m²      Physical Exam  Constitutional:       Appearance: She is ill-appearing.   HENT:      Head: Normocephalic.      Mouth/Throat:      Mouth: Mucous membranes are dry.   Eyes:      General: Scleral icterus present.   Pulmonary:      Comments: intubated  Skin:     Coloration: Skin is jaundiced.   Neurological:      Mental Status: She is disoriented.        Relevant Results  Results for orders placed or performed during the hospital encounter of 03/03/25 (from the past 24 hours)   POCT GLUCOSE   Result Value Ref Range    POCT Glucose 140 (H) 74 - 99 mg/dL   Renal function panel   Result Value Ref Range    Glucose 148 (H) 74 - 99 mg/dL    Sodium 134 (L) 136 - 145 mmol/L    Potassium 4.1 3.5 - 5.3 mmol/L    Chloride 101 98 - 107 mmol/L    Bicarbonate 25 21 - 32 mmol/L    Anion Gap 12 10 - 20 mmol/L    Urea Nitrogen 27 (H) 6 - 23 mg/dL    Creatinine 1.35 (H) 0.50 - 1.05 mg/dL    eGFR 51 (L) >60 mL/min/1.73m*2    Calcium 8.3 (L) 8.6 - 10.6 mg/dL    Phosphorus 1.2 (L) 2.5 - 4.9 mg/dL    Albumin 2.3 (L) 3.4 - 5.0 g/dL   Calcium, ionized   Result Value Ref Range    POCT Calcium, Ionized 1.07 (L) 1.1 - 1.33 mmol/L   CBC   Result Value Ref Range    WBC 7.0 4.4 - 11.3 x10*3/uL    nRBC 0.4 (H) 0.0 - 0.0 /100 WBCs    RBC 2.78 (L) 4.00 - 5.20 x10*6/uL    Hemoglobin 9.3 (L) 12.0 - 16.0 g/dL    Hematocrit 25.2 (L) 36.0 - 46.0 %    MCV 91 80 - 100 fL    MCH 33.5 26.0 - 34.0 pg    MCHC 36.9 (H) 32.0 - 36.0 g/dL    RDW 22.9 (H) 11.5 - 14.5 %    Platelets 137 (L) 150 - 450 x10*3/uL   Magnesium   Result Value Ref Range    Magnesium 2.91 (H) 1.60 - 2.40 mg/dL   POCT GLUCOSE   Result Value Ref Range    POCT Glucose 140 (H) 74 - 99 mg/dL   POCT GLUCOSE   Result Value Ref Range    POCT Glucose 112 (H) 74 - 99 mg/dL   Magnesium   Result Value Ref Range    Magnesium 3.03 (H) 1.60 - 2.40 mg/dL   CBC and Auto Differential   Result Value Ref Range    WBC 6.9 4.4 - 11.3 x10*3/uL    nRBC 0.4 (H) 0.0 - 0.0 /100 WBCs    RBC 2.82 (L)  4.00 - 5.20 x10*6/uL    Hemoglobin 9.1 (L) 12.0 - 16.0 g/dL    Hematocrit 25.9 (L) 36.0 - 46.0 %    MCV 92 80 - 100 fL    MCH 32.3 26.0 - 34.0 pg    MCHC 35.1 32.0 - 36.0 g/dL    RDW 23.4 (H) 11.5 - 14.5 %    Platelets 135 (L) 150 - 450 x10*3/uL    Immature Granulocytes %, Automated 1.0 (H) 0.0 - 0.9 %    Immature Granulocytes Absolute, Automated 0.07 0.00 - 0.70 x10*3/uL   Calcium, ionized   Result Value Ref Range    POCT Calcium, Ionized 1.06 (L) 1.1 - 1.33 mmol/L   Coagulation Screen   Result Value Ref Range    Protime 18.9 (H) 9.8 - 12.4 seconds    INR 1.7 (H) 0.9 - 1.1    aPTT 67 (H) 26 - 36 seconds   Comprehensive metabolic panel   Result Value Ref Range    Glucose 136 (H) 74 - 99 mg/dL    Sodium 135 (L) 136 - 145 mmol/L    Potassium 4.1 3.5 - 5.3 mmol/L    Chloride 101 98 - 107 mmol/L    Bicarbonate 27 21 - 32 mmol/L    Anion Gap 11 10 - 20 mmol/L    Urea Nitrogen 26 (H) 6 - 23 mg/dL    Creatinine 1.18 (H) 0.50 - 1.05 mg/dL    eGFR 60 (L) >60 mL/min/1.73m*2    Calcium 8.0 (L) 8.6 - 10.6 mg/dL    Albumin 2.5 (L) 3.4 - 5.0 g/dL    Alkaline Phosphatase 248 (H) 33 - 110 U/L    Total Protein 6.1 (L) 6.4 - 8.2 g/dL    AST 87 (H) 9 - 39 U/L    Bilirubin, Total 29.4 (H) 0.0 - 1.2 mg/dL    ALT 63 (H) 7 - 45 U/L   Manual Differential   Result Value Ref Range    Neutrophils %, Manual 67.5 40.0 - 80.0 %    Lymphocytes %, Manual 20.5 13.0 - 44.0 %    Monocytes %, Manual 8.5 2.0 - 10.0 %    Eosinophils %, Manual 0.0 0.0 - 6.0 %    Basophils %, Manual 0.9 0.0 - 2.0 %    Metamyelocytes %, Manual 0.9 0.0 - 0.0 %    Myelocytes %, Manual 1.7 0.0 - 0.0 %    Seg Neutrophils Absolute, Manual 4.66 1.20 - 7.00 x10*3/uL    Lymphocytes Absolute, Manual 1.41 1.20 - 4.80 x10*3/uL    Monocytes Absolute, Manual 0.59 0.10 - 1.00 x10*3/uL    Eosinophils Absolute, Manual 0.00 0.00 - 0.70 x10*3/uL    Basophils Absolute, Manual 0.06 0.00 - 0.10 x10*3/uL    Metamyelocytes Absolute, Manual 0.06 0.00 - 0.00 x10*3/uL    Myelocytes Absolute, Manual  0.12 0.00 - 0.00 x10*3/uL    Total Cells Counted 117     RBC Morphology See Below     Polychromasia Mild     Target Cells Many    POCT GLUCOSE   Result Value Ref Range    POCT Glucose 124 (H) 74 - 99 mg/dL   POCT GLUCOSE   Result Value Ref Range    POCT Glucose 122 (H) 74 - 99 mg/dL      EEG  IMPRESSION    Impression    This vEEG is indicative of a mild to moderate diffuse encephalopathy. The degree of encephalopathy is improved compared to the prior recording. No epileptiform discharges or lateralizing signs are seen.    To note, the EEG was briefly disconnected from 1:07 PM to 1:40 PM.    A full report will be scanned into the patient's chart at a later time.    This report has been interpreted and electronically signed by  Transthoracic Echo (TTE) Limited     [facility], [address] [place] [address]                 Tel [phone] and Fax [phone]    TRANSTHORACIC ECHOCARDIOGRAM REPORT       Patient Name:       LOUISA HAMM        Laura Physician:    [id number] Fabián Iqbal MD  Study Date:         3/19/2025           Ordering Provider:    [id number] SERAFIN MELENDEZ  MRN/PID:            [id number]            Fellow:  Accession#:         [id number]        Nurse:  Date of Birth/Age:  1985 / 39 years Sonographer:          Willie Palafox RDCS  Gender assigned at  F                   Additional Staff:  Birth:  Height:             167.64 cm           Admit Date:  Weight:             64.41 kg            Admission Status:     Inpatient - STAT  BSA / BMI:          1.73 m2 / 22.92     Encounter#:           [id number]                      kg/m2  Blood Pressure:     103/57 mmHg         Department Location:  [address]    Study Type:    TRANSTHORACIC ECHO (TTE) LIMITED  Diagnosis/ICD: Hypotension,  unspecified-I95.9  Indication:    follow up HFrEF, continued shock  CPT Code:      Echo Limited-49759; Doppler Limited-89587; Color Doppler-89367    Patient History:  Pertinent History: Cirrhosis with portal HTN s/p TIPS, Budd-Chiari, chronic IVC                     occlusion, anti-phospholipid syndrome (supposed to be on                     fondaparinux but poorly adherent at home), 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    Study Detail: The following Echo studies were performed: 2D, M-Mode, Doppler and                color flow. Technically challenging study due to patient lying in                supine position, body habitus, prominent lung artifact and poor                acoustic windows. The patient is intubated. Definity used as a                contrast agent for endocardial border definition. Total contrast                used for this procedure was 2 mL via IV push.       PHYSICIAN INTERPRETATION:  Left Ventricle: Left ventricular ejection fraction is normal, by visual estimate at 65%. The left ventricular cavity size is normal. There is normal septal and normal posterior left ventricular wall thickness. Abnormal (paradoxical) septal motion, consistent with an intraventricular conduction delay. Spectral Doppler shows a normal pattern of left ventricular diastolic filling.  Left Atrium: The left atrial size is normal.  Right Ventricle: The right ventricle is normal in size. There is normal right ventricular global systolic function.  Right Atrium: The right atrium is normal in size.  Aortic Valve: The aortic valve was not well visualized. There is trace aortic valve regurgitation.  Mitral Valve: The mitral valve is normal in structure. There is trace mitral valve regurgitation.  Tricuspid Valve: The tricuspid valve is structurally normal. There is trace tricuspid regurgitation.  Pulmonic Valve: The pulmonic valve is not well visualized. There is trace pulmonic valve  regurgitation.  Pericardium: Trivial pericardial effusion.  Aorta: The aortic root is normal.  Pulmonary Artery: The tricuspid regurgitant velocity is 2.20 m/s, and with an estimated right atrial pressure of 3 mmHg, the estimated pulmonary artery pressure is normal with the RVSP at 22.4 mmHg.  Systemic Veins: The inferior vena cava was not well visualized. There is thrombus detected in the inferior vena cava.  In comparison to the previous echocardiogram(s): Compared with study dated 3/4/2025, rate is now slower with improved LV/RV function.       CONCLUSIONS:   1. Left ventricular ejection fraction is normal, by visual estimate at 65%.   2. There is normal right ventricular global systolic function.   3. Thrombus suspected in the inferior vena cava.    QUANTITATIVE DATA SUMMARY:     2D MEASUREMENTS:         Normal Ranges:  IVSd:            0.70 cm (0.6-1.1cm)  LVPWd:           0.70 cm (0.6-1.1cm)  LVIDd:           4.10 cm (3.9-5.9cm)  LVIDs:           2.70 cm  LV Mass Index:   47 g/m2  LV % FS          34.1 %       LV SYSTOLIC FUNCTION:                       Normal Ranges:  EF-A4C View:    78 % (>=55%)  EF-Visual:      65 %  LV EF Reported: 65 %       LV DIASTOLIC FUNCTION:             Normal Ranges:  MV Peak E:             0.63 m/s    (0.7-1.2 m/s)  MV Peak A:             0.47 m/s    (0.42-0.7 m/s)  E/A Ratio:             1.35        (1.0-2.2)  MV e'                  0.112 m/s   (>8.0)  MV lateral e'          0.11 m/s  MV medial e'           0.11 m/s  MV A Dur:              103.00 msec  E/e' Ratio:            5.60        (<8.0)       MITRAL VALVE:          Normal Ranges:  MV DT:        126 msec (150-240msec)       RIGHT VENTRICLE:  RV s' 0.15 m/s       TRICUSPID VALVE/RVSP:          Normal Ranges:  Peak TR Velocity:     2.20 m/s  RV Syst Pressure:     22 mmHg  (< 30mmHg)       [id number] Fabián Iqbal MD  Electronically signed on 3/19/2025 at 1:11:57 PM       ** Final **  CT chest abdomen pelvis w IV  contrast  Narrative: Interpreted By:  Nitish James and Elsamaloty Mazzin   STUDY:  CT CHEST ABDOMEN PELVIS W IV CONTRAST;  3/18/2025 1:38 pm      INDICATION:  Signs/Symptoms:hypoxia, liver failure.          Per EMR, 39-year-old woman admitted on 03/03/2025 with hypoxemia and  acute hypoxic respiratory failure requiring intubation secondary to  acute respiratory distress syndrome from a community-acquired  pneumonia. Past medical history notable for cirrhosis with portal  hypertension status post tips formation, Budd-Chiari syndrome,  chronic IVC occlusion, antiphospholipid antibody syndrome      COMPARISON:  CT ABDOMEN PELVIS W IV CONTRAST 3/3/2025      ACCESSION NUMBER(S):  [id number]      ORDERING CLINICIAN:  SERAFIN MELENDEZ      TECHNIQUE:  CT of the chest, abdomen, and pelvis was performed.  Contiguous axial  images were obtained at 3 mm slice thickness through the chest,  abdomen and pelvis. Coronal and sagittal reconstructions at 3 mm  slice thickness were performed. 75 ML of Omnipaque 350 was  administered intravenously without immediate complication.      FINDINGS:  CHEST:  Endotracheal tube tip terminates 3.2 cm above the roland.          LUNG/PLEURA/LARGE AIRWAYS:  Significantly improved aeration of the bilateral lungs. There is  persistent peribronchial and septal thickening seen in the anterior  portions of the bilateral lungs, compatible with improving  consolidations from previous multifocal pneumonia. There is  persistent smooth interlobular septal thickening which is consistent  with interstitial pulmonary edema. There are now more apparent  pulmonary nodules such as 1 seen in the superior portions of the left  lower lobe which measures 0.6 x 0.5 cm (Series 3, Image 177),  inferior portions of the left upper lobe which measures 0.4 x 0.4 cm  (Series 3, Image 173) and anterior/inferior portions of the left  upper lobe which measures 0.9 x 0.8 cm (Series 3, Image 216).      VESSELS:  Aorta and  pulmonary arteries are normal caliber.  No atherosclerotic  changes of the aorta are identified. Normal three-vessel aortic arch.  No coronary artery calcifications are present. The azygous vein is  engorged and measures up to 2.6 cm just proximal to the insertion  into the IVC 2.6 cm (Series 2, Image 40) . The left subclavian venous  engorgement measures 2.1 cm in diameter (Series 2, Image 28).      HEART:  Normal size.  No pericardial effusion      MEDIASTINUM AND ASHLYN:  No mediastinal, hilar or axillary lymphadenopathy is present. Of  note, previously seen mediastinal lymphadenopathy is no longer  appreciated. The esophagus is normal. Enteric tube is seen traversing  the esophagus with tip terminating in the gastric body. Similar  appearance of prominent soft tissue in the anterior mediastinum,  likely residual thymic tissue 1.8 cm (Series 2, Image 34).      CHEST WALL AND LOWER NECK:  Extensive collateralized vessels are present along the anterior and  lateral portions of the chest wall. The visualized thyroid gland  appears within normal limits.      ABDOMEN:      LIVER:  The liver demonstrates a nodular contour, consistent with cirrhosis.  Similar appearance of ill-defined lesions within segment 7 of the  liver which have been previously characterized as regenerative  nodules, measuring 1.1 x 0.9 cm (Series 2, Image 116) and 1.5 x 0.7  cm (Series 2, Image 100). Findings are ultimately not fully  characterized on the current examination. Tip shunt in place with  poor opacification, compatible with occlusion.      BILE DUCTS:  Normal caliber.      GALLBLADDER:  The gallbladder is present. Gallbladder wall edema noted.      PANCREAS:  The pancreas appears unremarkable without evidence of ductal  dilatation or masses.      SPLEEN:  Spleen is enlarged and measures 14.8 cm in the craniocaudal direction  (Series 902, Image 72), similar to prior, consistent with portal  hypertension. Subtle heterogeneous venous  hypodensities favored to  represent artifact as opposed to infarction.      ADRENAL GLANDS:  Bilateral adrenal glands appear normal.      KIDNEYS AND URETERS:  The kidneys are normal in size and enhance symmetrically.  No  hydroureteronephrosis or nephroureterolithiasis is identified.      PELVIS:      BLADDER:  The urinary bladder is decompressed, limited for evaluation.      REPRODUCTIVE ORGANS:  The uterus is present.      BOWEL:  Stomach appears unremarkable. There is diffuse edema of virtually all  visualized small bowel loops. Fecal management system in place with  liquid stool seen throughout the entire colon. The appendix is not  definitively visualized.          VESSELS:  The IVC is not visualized, compatible with known chronic occlusion.  The right common iliac vein appears patent up until the expected  position of the IVC. Left common iliac vein is not definitively  visualized. There is extensive collateralization of vessels and  recannulization of the umbilical vein. Findings appear similar, if  not intervally progressed from prior with redemonstration of multiple  engorged collateral vessels.      PERITONEUM/RETROPERITONEUM/LYMPH NODES:  No ascites can be definitively visualized. However, there is  extensive bowel wall edema, vascular engorgement, and anasarca which  is compatible with ongoing third-spacing. No abdominopelvic  lymphadenopathy is present.      BONE AND SOFT TISSUE:  No suspicious osseous lesions are identified.  Extensive venous  collaterals inguinal anasarca as described above. Abdominal wall soft  tissues are otherwise unremarkable.      Impression: 1.  Significant interval improvement in previously seen airspace  disease with residual disease noted in the anterior portions of the  lungs. Persistent smooth interlobular septal thickening likely  representing pulmonary interstitial edema.  2. Redemonstration of extensive, engorged vascular collaterals,  likely secondary to known chronic  occlusion of the IVC and occluded  TIPS.  3. Splenomegaly secondary to the above.  4. Extensive 3rd spacing as evidenced by interval worsening of  anasarca  5. Bowel wall edema and fluid-filled large bowel can be suggestive of  enteritis and diarrhea in the proper clinical setting.          I personally reviewed the images/study and I agree with the findings  as stated by Angel Bang MD (resident).      MACRO:  None      Signed by: Nitish James 3/19/2025 12:07 PM  Dictation workstation:   [id number]     Encounter Date: 03/03/25   ECG 12 Lead   Result Value    Ventricular Rate 101    Atrial Rate 101    AR Interval 166    QRS Duration 102    QT Interval 372    QTC Calculation(Bazett) 482    P Axis 86    R Axis 83    T Axis 27    QRS Count 16    Q Onset 222    P Onset 139    P Offset 203    T Offset 408    QTC Fredericia 442    Narrative    Sinus tachycardia  Nonspecific T wave abnormality  Abnormal ECG  When compared with ECG of 05-MAR-2025 15:33,  Sinus rhythm has replaced Ectopic atrial rhythm  Right bundle branch block is no longer Present  Confirmed by Jordan Iqbal ([id number]) on 3/12/2025 2:40:58 PM        Allergies  Heparin    Scheduled medications  ergocalciferol, 1,250 mcg, oral, Weekly  pantoprazole, 40 mg, oral, Daily before breakfast   Or  esomeprazole, 40 mg, nasoduodenal tube, Daily before breakfast   Or  pantoprazole, 40 mg, intravenous, Daily before breakfast  insulin lispro, 0-10 Units, subcutaneous, q6h  lactulose, 20 g, oral, q4h  levothyroxine, 50 mcg, oral, Daily  artificial tears, 2 drop, Both Eyes, q24h  oxygen, , inhalation, Continuous - Inhalation  perflutren protein A microsphere, 0.5 mL, intravenous, Once in imaging  polyethylene glycol, 17 g, oral, Daily  rifAXIMin, 550 mg, oral, q12h [facility]  sennosides-docusate sodium, 2 tablet, oral, Daily  sodium phosphate, 21 mmol, intravenous, Once  sulfur hexafluoride microsphr, 2 mL, intravenous, Once in imaging  thiamine, 100 mg, oral, Daily  vitamin  B complex-vitamin C-folic acid, 5 mL, oral, Daily      Continuous medications  bivalirudin, 0.05-0.49 mg/kg/hr, Last Rate: 0.1 mg/kg/hr (03/21/25 0730)  norepinephrine, 0.01-1 mcg/kg/min (Dosing Weight), Last Rate: 0.1 mcg/kg/min (03/21/25 1000)  PrismaSol 4/2.5, 27 mL/kg/hr (Dosing Weight), Last Rate: 27 mL/kg/hr (03/21/25 0904)      PRN medications  PRN medications: acetaminophen **OR** acetaminophen, alteplase, dextrose, dextrose, glucagon, glucagon, oxygen     Assessment/Plan    Gissel Harvey is a 39y female with a pmh polysubstance use disorder, cirrhosis, portal HTN S/P TIPS, 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 Patient now in MSOF - liver failure, renal failure, respiratory failure, shock, ongoing sepsis. Not a candidate for transplant. Poor prognosis at this time.     ------------------------------------------------------------------------------------------------------------------------------------------------------------------------------------  Goals of care discussion with daughter Payal.     Payal has a clear understanding of severity of illness.  Understands that patient is in multisystem organ failure and chance of survival is unlikely, chance of recovery that patient would find meaningful is unlikely. Payal feels that patient would want to transition to comfort measures at this point to ensure a peaceful and dignified death.     Supportive phone call also made to cousin MICHELLE who was able to provide additional social hx about patient's hx and affirms that patient would want to die peacefully at this point given poor prognosis.    ----------------------------------------------------------------------------------------------------------------------------------------------------------------------------------    #Goals of Care  - Code status: DNRCC  - Surrogate decision maker: daughter Payal is legal NOK, no other children and no spouse  - Goals are comfort and quality of life  based: consult placed to hospice services for family bereavement services    #Comfort Measures  -dilaudid 0.5mg IV q15 min PRN per RDOS for pain/dyspnea. Can start gtt as needed for symptoms uncontrolled with IVP.  -lorazepam 1mg IV q4 PRN for anxiety/restlessness  -glycopyrrolate 0.4mg IV q6 PRN for secretion management  -discontinue cardiac monitoring  -VS daily PRN  -if patient lives > 24 hrs, engage hospice for GIP care    #Psychosocial Support  - Music Therapy ordered  - Spiritual Care Support following  - Art Therapy ordered  - Child life specialist following  - social work following    Plan of Care discussed with: Updated MD and bedside RN on goals of care decision, medication adjustments, and code status     Medical Decision Making was high level due to high complexity of problems, extensive data review, and high risk of management/treatment.     - respiratory, renal, and liver failure posing threat to life and function   - Parenteral controlled substances    I spent 60 minutes in providing separately identifiable ACP services with the patient and/or surrogate decision maker in a voluntary conversation discussing the patient's wishes and goals as detailed in the above note.    Thank you for allowing us to participate in the care of this patient. Palliative will continue to follow as needed. Palliative medicine is available Monday-Friday, 8a-6p. Please contact team with any questions or concerns.  Team pager [phone] (weekdays)  Maddie Win DNP, APRN-CNP

## 2025-03-21 NOTE — PROGRESS NOTES
"Medical Intensive Care - Daily Progress Note   Subjective    Gissel Harvey is a 39 y.o. year old female patient admitted on 3/3/2025 with following ICU needs: Hypoxemia and AHRF requiring intubation due to ARDS from CAP, acute renal failure requiring CVVH, septic shock requiring vasopressor support       Interval History:  - transitioning to comfort care measures only    Meds    Scheduled medications  oxygen, , inhalation, Continuous - Inhalation  oxygen, , inhalation, Continuous - Inhalation      Continuous medications  fentaNYL,  mcg/hr, Last Rate: 50 mcg/hr (03/21/25 1546)  norepinephrine, 0.01-1 mcg/kg/min (Dosing Weight), Last Rate: 0.1 mcg/kg/min (03/21/25 1000)      PRN medications  PRN medications: acetaminophen **OR** acetaminophen, alteplase, fentaNYL, glucagon, glycopyrrolate, LORazepam, morphine, oxygen     Objective    Blood pressure 99/54, pulse 106, temperature (!) 38.3 °C (100.9 °F), resp. rate (!) 29, height 1.676 m (5' 6\"), weight 65.9 kg (145 lb 4.5 oz), SpO2 94%.     Physical Exam   Constitutional:       Comments: Intubated  Eyes open, tracking appropriately   Pupils reactive   HENT:      Head: Normocephalic and atraumatic.   Cardiovascular:      Rate and Rhythm: Normal rate and regular rhythm.      Pulses: Normal pulses.   Pulmonary:      Breath sounds: No wheezing.      Comments: Mechanically Ventilated, Coarse breath sounds  Abdominal:      General: Abdomen soft with significantly enlarged liver     Palpations: Abdomen is soft.      Comments: Rectal tube draining brown stool, no blood   Musculoskeletal:      Right lower leg: Edema present.      Left lower leg: Edema present. significant  Edema in the upper extremities bilaterally  Skin:     General: Skin is warm and dry.      Comments: Warm to touch   Neurological:      Comments: Grimacing to pain without withdrawing    Intake/Output Summary (Last 24 hours) at 3/21/2025 1648  Last data filed at 3/21/2025 1400  Gross per 24 hour   Intake " 1179.92 ml   Output 126 ml   Net 1053.92 ml     Labs:   Results from last 72 hours   Lab Units 03/21/25  0504 03/20/25  1656 03/20/25  0320 03/19/25 1736 03/19/25  0318   SODIUM mmol/L 135* 134* 136 142 134*   POTASSIUM mmol/L 4.1 4.1 4.0 3.9 3.6   CHLORIDE mmol/L 101 101 102 106 99   CO2 mmol/L 27 25 24 23 24   BUN mg/dL 26* 27* 26* 28* 37*   CREATININE mg/dL 1.18* 1.35* 1.27* 1.35* 1.40*   GLUCOSE mg/dL 136* 148* 149* 175* 120*   CALCIUM mg/dL 8.0* 8.3* 8.2* 8.7 8.4*   ANION GAP mmol/L 11 12 14 17 15   EGFR mL/min/1.73m*2 60* 51* 55* 51* 49*   PHOSPHORUS mg/dL  --  1.2*  --  2.1* 2.6      Results from last 72 hours   Lab Units 03/21/25  0504 03/20/25  1656 03/20/25  0320 03/19/25 1736 03/19/25  0318   WBC AUTO x10*3/uL 6.9 7.0 7.7   < > 9.2   HEMOGLOBIN g/dL 9.1* 9.3* 9.4*   < > 9.5*   HEMATOCRIT % 25.9* 25.2* 25.1*   < > 25.4*   PLATELETS AUTO x10*3/uL 135* 137* 158   < > 156   LYMPHO PCT MAN % 20.5  --  15.8  --  9.5   MONO PCT MAN % 8.5  --  7.0  --  9.5   EOSINO PCT MAN % 0.0  --  0.0  --  1.7    < > = values in this interval not displayed.                   Micro/ID:     Lab Results   Component Value Date    BLOODCULT No growth at 4 days -  FINAL REPORT 03/12/2025           Assessment and Plan     Assessment: Gissel Harvey is a 39 y.o. 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 She is being treated for ARDS 2/2 CAP (MSSA and s. pneumo) and severe septic shock complicated by acute anuric renal failure, now on CVVH. She is still requiring significant respiratory support w/ increased FiO2 requirements and PEEP, on day 15 of mechanical ventilation. She also has worsening Tbili concerning  for intrahepatic insult/injury. She was made DNR by her daughter 3/12.     Mechanical Ventilation: > 10 days  Sedation/Analgesia:  none  Restraints: no    Summary for 03/21/25  :  Decision was made to transition to comfort measures only this afternoon, with palliative extubation when family members are bedside   See ACP note from 3/20 for details      Below plan reflects the medical decision making and plan for therapies under DNR no escalation of care status, which has since been transitioned to comfort care only.    Plan:  NEUROLOGY/PSYCH:  #Sedation  - Off sedation since 3/16 AM  - Given liver impairment, may take longer for sedation effects to wear off      #Encephelopathy  :: EEG w/ encephalopathy, likely iso metabolic derangements, critical illness, hyperbilirubinemia      #Microhemorrages in BL cerebral hemispheres  :: CT head 3/14 w/ indeterminate lesions, MRI brain w/ BL microhemorrages  :: Neuro status unclear due to sedation, attempts at sedation holidays shortened due to tachypnea and agitation  :: mild grimace to pain, has cough, pupils equal and reactive   - 3/15 neuro consulted-Determined microhemorrages likely chronic, risk of clotting is high with her APS so bivalrudin was resumed       CARDIOVASCULAR:     #HFrEF, recovered  :: TTE 3/4 with EF 30-35% with reduced RV function, no previous TTE for comparison  :: BNP 2127 at admission  :: Troponin plateaued (62>63) on 3/4/25  - Repeat limited TTE with normal LVEF     #Shock, mixed, distributive and septic   #Hypotension 2/2 decreased systemic circulatory volume   :: on initial presentation 3/3-3/7, she required 4 pressors (levo, vaso, epi, angiotensin)  :: on 3/8-3/11, able to come off pressors, maintained strong MAP  - 3/12 acute worsening of shock, (2/2 worsening of sepsis, worsened when sedation was weaned and she became dyssynchronous with vent demonstrating air trapping and significant tachypnea), had significant hypotension once again requiring  vasopressor support        - continue levo, weaning as tolerated        - Completed abx, see full courses below      #Aflutter  #Sustained VT   :: went into NSVT while wire threaded for central line placement  :: s/p amiodarone 150 mg bolus x2  :: pt cardioverted  - Pt is back to sinus rhythm, okay for amio gtt if arrhythmias return     PULMONARY:  Vent Mode: Volume control/assist control  FiO2 (%):  [40 %-50 %] 40 %  S RR:  [18] 18  S VT:  [450 mL] 450 mL  PEEP/CPAP (cm H2O):  [10 cm H20] 10 cm H20  MAP (cm H2O):  [14-17] 15  #Community acquired pneumonia   #ARDS   :: intubated 3/3   :: P:F ~ 70 on admission   :: Procalcitonin 57.3  :: flu A/B, COVID, parainfluenza, RSV, metapneumovirus, legionella urine Ag: negative  :: see ID section for full abx history this admission  :: Tracheal aspirate cx 3/3: pan sensitive staph aureus and strep pneumo  :: Strep pneumo urine ag: positive  :: Bcx 3/3: NGTD x3 days  :: re-cultured 3/12: sputum negative, blood culture NGTD  Plan  - Vanc/rebeca to end 3/18  - See summary of GOC conversation above, ENT consulted     RENAL/GENITOURINARY:  #Acute renal failure, anuric SCOUT requiring CVVH  :: etiology: septic shock and multiorgan failure  :: Cr 1.9 on admission, unclear baseline as no labs in 2 years   :: FeNa 3/3: 0.1% prerenal  :: Persistently anuric   - Continue CVVH per Nephrology  - While on CVVH: BID RFP, ionized calcium and CBC  - Warm CVVH fluid to assist with hypothermia     GASTROENTEROLOGY:  #liver cirrhosis   #portal hypertension s/p TIPS   #hx of Budd-Chiari   :: 3/7 POCUS with distended bowel, no appreciable ascites  - Continue to monitor abdomen for signs of ascites formation  - Hep C Abs positive, PCR pending  - 3/11 labs w/ acute elevation in INR (5), AST (147), ALT (57), Alk Phos (218)  - Liver US w/ thrombosed TIPS, IR consulted, likely chronic as this has been seen on prior CT and patient non-adherent with anticoagulation  -3/13 INR, PT, Bili, ALT, Alk phos all  increasing. 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 C/f DILI or intrahepatic cholestasis / hepatic congestion, or shock liver   PLAN        - DC ketamine and use propofol for sedation given c/f DILI from ketamine. Meropenem also potential for DILI        - CTM- she is not stable enough for further hepatology workup or procedure  - c/w lactulose and adding rifaximin - monitor rectal tube output  - s/p vitamin K 10 mg 3/14 w/ improvement in INR  - bili continues to rise, likely contributing to her encephalopathy, continuing lactulose and rifaximin    - hepatology consulted, liver function is extremely poor without many therapeutic options. She is not a transplant candidate      ENDOCRINOLOGY:              #High dose steroids              #Supplemental nutrition                          - SSI                           - Tube feeds via NG     HEMATOLOGY:  #Hx of antiphospholipid syndrome   #Hx of positive PF4  #Chronic IVC occlusion   #Hx of DVT   :: Supposed to be on fondaparinux outpatient, unclear if taking as no recent med fills   - Continue bivalirudin while inpatient I/s/o previous positive PF4      #Thrombocytopenia, stable  #Elevated PT and INR  - Trend plts, INR, coags  - Fibrinogen wnl  - If platelets <50, transfuse but do not stop bivalirubin  - INR elevated 5 -> 3.8 on 3/11, 5.9 on 3/12, iso known cirrhosis, plt 80  - s/p vitamin K 10 mg 3/14 w/ improvement in INR  - PT and INR increase can be secondary to cirrhosis, improved with vitamin K     SKIN:  #Skin Failure  - BL buttock wounds, ear wound  - wound care following, appreciate recs  - c/w vaseline for lips/mouth while intubated     MUSCULOSKELETAL:  GIFTY     INFECTIOUS DISEASE:  #MSSA PNA  #Strep PNA  #ARDS  #Septic shock  Antibiotics  Azithromycin 500 mg: 3/3  Zosyn 4.5 g: 3/3  Tobramycin 440 mg: 3/4  Vancomycin: 3/3 - 3/6, 3/8 - 3/10  Ceftaroline 3/5 - 3/6  Meropenem 2 g BID: 3/3 - 3/8  Micafungin: 3/4 - 3/6  Doxycycline  100 mg BID: 3/4 - 3/6  Ceftriaxone 3/8 - 3/10  Cefazolin 3/10-3/12  *Vanc/Day 3/12-3/18  - cultures from 3/12 NGTD     ICU Check List       ICU Liberation: Intervention:   Assess, Prevent, Manage Pain      Both SAT and SBT [] SAT  [] SBT 30-60 min [] Extubate to NC  [] Extubate to NIV  [x] Discuss Trach   Choice of analgesia and sedation Gao Agitation Sedation Scale (RASS): Alert and calm  Target Arousal Level (RASS): RASS -1 to -2 none     Delirium: Assess, prevent and manage  CAM ICU Positive    Early Mobility and Exercise New or increase in vasopressor dose in last 2 hours [x] PT /OT consult   Family Engagement and Empowerment [x]Family updated []SW consult     FEN  Fluids: PRN  Electrolytes: PRN- CVVH   Nutrition: TF  Prophylaxis:  DVT ppx: Bival  GI ppx: PPI  Bowel care: Lactulose  Hardware:         ETT  7.5 mm (Active)   Placement Date: 03/03/25   ETT Type: ETT - single  Single Lumen Tube Size: 7.5 mm  Cuffed: Yes  Location: Oral   Number of days: 15       NG/OG/Feeding Tube NG - [place] sump Right nostril (Active)   Placement Date/Time: 03/03/25 1900   Type of Tube: Feeding Tube  Tube Length: 53 cm  Tube Type: NG - [place] sump  Tube Location: Right nostril   Number of days: 14       Fecal Management Rectal tube with balloon (Active)   Placement Date/Time: 03/09/25 0018   Placed by: ashlie  Hand Hygiene Completed: Yes  Fecal Management Tube Type: Rectal tube with balloon   Number of days: 9       Hemodialysis Cath 03/06/25 Right (Active)   Placement Date/Time: 03/06/25 1700   Orientation: Right   Number of days: 11       Social:  Code: DNR Comfort Measures Only    HPOA: Payal Durant (daughter) [phone]  Disposition: MARLENE Buck MD   03/21/25 at 4:48 PM     Disclaimer: Documentation completed with the information available at the time of input. The times in the chart may not be reflective of actual patient care times, interventions, or procedures. Documentation occurs after the physical  care of the patient.

## 2025-03-22 PROCEDURE — 2020000001 HC ICU ROOM DAILY

## 2025-03-22 PROCEDURE — 2500000004 HC RX 250 GENERAL PHARMACY W/ HCPCS (ALT 636 FOR OP/ED): Mod: SE

## 2025-03-22 RX ADMIN — Medication 125 MCG/HR: at 08:38

## 2025-03-22 RX ADMIN — Medication 125 MCG/HR: at 23:40

## 2025-03-22 RX ADMIN — Medication 125 MCG/HR: at 15:08

## 2025-03-22 RX ADMIN — MORPHINE SULFATE 2 MG: 4 INJECTION INTRAVENOUS at 12:47

## 2025-03-22 RX ADMIN — MORPHINE SULFATE 2 MG: 4 INJECTION INTRAVENOUS at 14:42

## 2025-03-22 RX ADMIN — MORPHINE SULFATE 2 MG: 4 INJECTION INTRAVENOUS at 16:22

## 2025-03-22 RX ADMIN — Medication 125 MCG/HR: at 02:05

## 2025-03-22 RX ADMIN — LORAZEPAM 0.5 MG: 2 INJECTION INTRAMUSCULAR; INTRAVENOUS at 14:42

## 2025-03-22 ASSESSMENT — RESPIRATORY DISTRESS OBSERVATION SCALE (RDOS)
RESTLESS NONPURPOSEFUL MOVEMENTS: 0 - NONE
INVOLUNTARY NASAL FLARING: 0 - NONE
RESTLESS NONPURPOSEFUL MOVEMENTS: 0 - NONE
INVOLUNTARY NASAL FLARING: 0 - NONE
GRUNTING AT END OF EXPIRATION: 0 - NONE
ACCESSORY MUSCLE RISE IN CLAVICLE DURING INSPIRATION: 1 - SLIGHT RISE
RDOS TOTAL SCORE: 2
LOOK OF FEAR: 2 - EYES WIDE OPEN, FACIAL MUSCLES TENSE, BROW FURROWED, MOUTH OPEN
RESPIRATORY RATE PER MINUTE: 1 - 19-30 BREATHS
RESTLESS NONPURPOSEFUL MOVEMENTS: 0 - NONE
ACCESSORY MUSCLE RISE IN CLAVICLE DURING INSPIRATION: 1 - SLIGHT RISE
RDOS TOTAL SCORE: 1
RESTLESS NONPURPOSEFUL MOVEMENTS: 0 - NONE
LOOK OF FEAR: 0 - NONE
GRUNTING AT END OF EXPIRATION: 0 - NONE
RESTLESS NONPURPOSEFUL MOVEMENTS: 1 - OCCASIONAL, SLIGHT MOVEMENTS
ACCESSORY MUSCLE RISE IN CLAVICLE DURING INSPIRATION: 2 - PRONOUNCED RISE
HEART RATE PER MINUTE: 0 - <90 BEATS
ACCESSORY MUSCLE RISE IN CLAVICLE DURING INSPIRATION: 0 - NONE
LOOK OF FEAR: 0 - NONE
GRUNTING AT END OF EXPIRATION: 0 - NONE
RDOS TOTAL SCORE: 8
RESTLESS NONPURPOSEFUL MOVEMENTS: 0 - NONE
GRUNTING AT END OF EXPIRATION: 0 - NONE
ACCESSORY MUSCLE RISE IN CLAVICLE DURING INSPIRATION: 1 - SLIGHT RISE
INVOLUNTARY NASAL FLARING: 0 - NONE
INVOLUNTARY NASAL FLARING: 0 - NONE
RDOS TOTAL SCORE: 4
LOOK OF FEAR: 0 - NONE
LOOK OF FEAR: 0 - NONE
INVOLUNTARY NASAL FLARING: 0 - NONE
RESTLESS NONPURPOSEFUL MOVEMENTS: 0 - NONE
HEART RATE PER MINUTE: 0 - <90 BEATS
HEART RATE PER MINUTE: 0 - <90 BEATS
INVOLUNTARY NASAL FLARING: 0 - NONE
PARADOXICAL BREATHING PATTERN: 0 - NONE
ACCESSORY MUSCLE RISE IN CLAVICLE DURING INSPIRATION: 0 - NONE
ACCESSORY MUSCLE RISE IN CLAVICLE DURING INSPIRATION: 0 - NONE
RESTLESS NONPURPOSEFUL MOVEMENTS: 1 - OCCASIONAL, SLIGHT MOVEMENTS
LOOK OF FEAR: 0 - NONE
HEART RATE PER MINUTE: 0 - <90 BEATS
LOOK OF FEAR: 0 - NONE
RDOS TOTAL SCORE: 2
RESTLESS NONPURPOSEFUL MOVEMENTS: 1 - OCCASIONAL, SLIGHT MOVEMENTS
PARADOXICAL BREATHING PATTERN: 0 - NONE
INVOLUNTARY NASAL FLARING: 0 - NONE
RESTLESS NONPURPOSEFUL MOVEMENTS: 0 - NONE
GRUNTING AT END OF EXPIRATION: 0 - NONE
HEART RATE PER MINUTE: 0 - <90 BEATS
PARADOXICAL BREATHING PATTERN: 2 - PRESENT
HEART RATE PER MINUTE: 0 - <90 BEATS
GRUNTING AT END OF EXPIRATION: 0 - NONE
RDOS TOTAL SCORE: 3
PARADOXICAL BREATHING PATTERN: 0 - NONE
RDOS TOTAL SCORE: 2
LOOK OF FEAR: 0 - NONE
ACCESSORY MUSCLE RISE IN CLAVICLE DURING INSPIRATION: 0 - NONE
RDOS TOTAL SCORE: 2
RDOS TOTAL SCORE: 2
INVOLUNTARY NASAL FLARING: 0 - NONE
RDOS TOTAL SCORE: 1
INVOLUNTARY NASAL FLARING: 0 - NONE
ACCESSORY MUSCLE RISE IN CLAVICLE DURING INSPIRATION: 0 - NONE
RESPIRATORY RATE PER MINUTE: 1 - 19-30 BREATHS
RESTLESS NONPURPOSEFUL MOVEMENTS: 0 - NONE
ACCESSORY MUSCLE RISE IN CLAVICLE DURING INSPIRATION: 0 - NONE
ACCESSORY MUSCLE RISE IN CLAVICLE DURING INSPIRATION: 0 - NONE
PARADOXICAL BREATHING PATTERN: 0 - NONE
PARADOXICAL BREATHING PATTERN: 0 - NONE
INVOLUNTARY NASAL FLARING: 0 - NONE
PARADOXICAL BREATHING PATTERN: 0 - NONE
LOOK OF FEAR: 0 - NONE
GRUNTING AT END OF EXPIRATION: 0 - NONE
RDOS TOTAL SCORE: 4
LOOK OF FEAR: 0 - NONE
GRUNTING AT END OF EXPIRATION: 0 - NONE
RESPIRATORY RATE PER MINUTE: 1 - 19-30 BREATHS
GRUNTING AT END OF EXPIRATION: 0 - NONE
RESTLESS NONPURPOSEFUL MOVEMENTS: 0 - NONE
RESTLESS NONPURPOSEFUL MOVEMENTS: 1 - OCCASIONAL, SLIGHT MOVEMENTS
HEART RATE PER MINUTE: 0 - <90 BEATS
INVOLUNTARY NASAL FLARING: 0 - NONE
HEART RATE PER MINUTE: 0 - <90 BEATS
RESTLESS NONPURPOSEFUL MOVEMENTS: 0 - NONE
PARADOXICAL BREATHING PATTERN: 2 - PRESENT
ACCESSORY MUSCLE RISE IN CLAVICLE DURING INSPIRATION: 1 - SLIGHT RISE
RESPIRATORY RATE PER MINUTE: 1 - 19-30 BREATHS
GRUNTING AT END OF EXPIRATION: 0 - NONE
RESPIRATORY RATE PER MINUTE: 1 - 19-30 BREATHS
RDOS TOTAL SCORE: 1
PARADOXICAL BREATHING PATTERN: 0 - NONE
RESPIRATORY RATE PER MINUTE: 2 - >30 BREATHS
INVOLUNTARY NASAL FLARING: 0 - NONE
GRUNTING AT END OF EXPIRATION: 0 - NONE
INVOLUNTARY NASAL FLARING: 0 - NONE
INVOLUNTARY NASAL FLARING: 0 - NONE
ACCESSORY MUSCLE RISE IN CLAVICLE DURING INSPIRATION: 0 - NONE
RDOS TOTAL SCORE: 1
RESPIRATORY RATE PER MINUTE: 1 - 19-30 BREATHS
RDOS TOTAL SCORE: 2
LOOK OF FEAR: 0 - NONE
INVOLUNTARY NASAL FLARING: 0 - NONE
RESPIRATORY RATE PER MINUTE: 1 - 19-30 BREATHS
PARADOXICAL BREATHING PATTERN: 0 - NONE
ACCESSORY MUSCLE RISE IN CLAVICLE DURING INSPIRATION: 1 - SLIGHT RISE
HEART RATE PER MINUTE: 0 - <90 BEATS
INVOLUNTARY NASAL FLARING: 0 - NONE
RDOS TOTAL SCORE: 8
PARADOXICAL BREATHING PATTERN: 0 - NONE
HEART RATE PER MINUTE: 0 - <90 BEATS
INVOLUNTARY NASAL FLARING: 0 - NONE
HEART RATE PER MINUTE: 0 - <90 BEATS
LOOK OF FEAR: 0 - NONE
PARADOXICAL BREATHING PATTERN: 0 - NONE
RDOS TOTAL SCORE: 1
INVOLUNTARY NASAL FLARING: 0 - NONE
PARADOXICAL BREATHING PATTERN: 0 - NONE
PARADOXICAL BREATHING PATTERN: 0 - NONE
RESPIRATORY RATE PER MINUTE: 1 - 19-30 BREATHS
ACCESSORY MUSCLE RISE IN CLAVICLE DURING INSPIRATION: 2 - PRONOUNCED RISE
ACCESSORY MUSCLE RISE IN CLAVICLE DURING INSPIRATION: 0 - NONE
RESTLESS NONPURPOSEFUL MOVEMENTS: 0 - NONE
ACCESSORY MUSCLE RISE IN CLAVICLE DURING INSPIRATION: 1 - SLIGHT RISE
HEART RATE PER MINUTE: 0 - <90 BEATS
PARADOXICAL BREATHING PATTERN: 0 - NONE
RESPIRATORY RATE PER MINUTE: 1 - 19-30 BREATHS
LOOK OF FEAR: 0 - NONE
GRUNTING AT END OF EXPIRATION: 0 - NONE
HEART RATE PER MINUTE: 1 - 90-109 BEATS
RESPIRATORY RATE PER MINUTE: 1 - 19-30 BREATHS
LOOK OF FEAR: 0 - NONE
GRUNTING AT END OF EXPIRATION: 0 - NONE
RESTLESS NONPURPOSEFUL MOVEMENTS: 1 - OCCASIONAL, SLIGHT MOVEMENTS
RESPIRATORY RATE PER MINUTE: 1 - 19-30 BREATHS
GRUNTING AT END OF EXPIRATION: 0 - NONE
RESPIRATORY RATE PER MINUTE: 2 - >30 BREATHS
HEART RATE PER MINUTE: 0 - <90 BEATS
PARADOXICAL BREATHING PATTERN: 0 - NONE
GRUNTING AT END OF EXPIRATION: 0 - NONE
GRUNTING AT END OF EXPIRATION: 0 - NONE
LOOK OF FEAR: 0 - NONE
PARADOXICAL BREATHING PATTERN: 0 - NONE
HEART RATE PER MINUTE: 1 - 90-109 BEATS
RDOS TOTAL SCORE: 2
HEART RATE PER MINUTE: 0 - <90 BEATS
RDOS TOTAL SCORE: 1
LOOK OF FEAR: 0 - NONE
RESTLESS NONPURPOSEFUL MOVEMENTS: 1 - OCCASIONAL, SLIGHT MOVEMENTS
RESPIRATORY RATE PER MINUTE: 1 - 19-30 BREATHS
HEART RATE PER MINUTE: 0 - <90 BEATS
GRUNTING AT END OF EXPIRATION: 0 - NONE
HEART RATE PER MINUTE: 0 - <90 BEATS
INVOLUNTARY NASAL FLARING: 0 - NONE
HEART RATE PER MINUTE: 0 - <90 BEATS
ACCESSORY MUSCLE RISE IN CLAVICLE DURING INSPIRATION: 0 - NONE
RESTLESS NONPURPOSEFUL MOVEMENTS: 0 - NONE
PARADOXICAL BREATHING PATTERN: 0 - NONE
ACCESSORY MUSCLE RISE IN CLAVICLE DURING INSPIRATION: 0 - NONE
RESPIRATORY RATE PER MINUTE: 1 - 19-30 BREATHS
RESPIRATORY RATE PER MINUTE: 1 - 19-30 BREATHS
PARADOXICAL BREATHING PATTERN: 0 - NONE
RESPIRATORY RATE PER MINUTE: 1 - 19-30 BREATHS
RESTLESS NONPURPOSEFUL MOVEMENTS: 1 - OCCASIONAL, SLIGHT MOVEMENTS
GRUNTING AT END OF EXPIRATION: 0 - NONE
LOOK OF FEAR: 2 - EYES WIDE OPEN, FACIAL MUSCLES TENSE, BROW FURROWED, MOUTH OPEN
RDOS TOTAL SCORE: 1
GRUNTING AT END OF EXPIRATION: 0 - NONE

## 2025-03-22 NOTE — PROGRESS NOTES
"Medical Intensive Care - Daily Progress Note   Subjective    Gissel Harvey is a 39 y.o. year old female patient admitted on 3/3/2025 with following ICU needs: Hypoxemia and AHRF requiring intubation due to ARDS from CAP, acute renal failure requiring CVVH, septic shock requiring vasopressor support       Interval History:  - Remains on comfort care, family at the bedside. She appears comfortable without labored breathing.    Meds    Scheduled medications  oxygen, , inhalation, Continuous - Inhalation  oxygen, , inhalation, Continuous - Inhalation      Continuous medications  fentaNYL,  mcg/hr, Last Rate: 125 mcg/hr (03/22/25 1508)      PRN medications  PRN medications: acetaminophen **OR** acetaminophen, alteplase, fentaNYL, glucagon, glycopyrrolate, LORazepam, morphine, oxygen     Objective    Blood pressure (!) 79/43, pulse 78, temperature (!) 39 °C (102.2 °F), resp. rate 23, height 1.676 m (5' 6\"), weight 65.9 kg (145 lb 4.5 oz), SpO2 (!) 71%.     Physical Exam   Constitutional:       Comments: Extubated, resting in bed, eyes closed   Pupils reactive   HENT:      Head: Normocephalic and atraumatic.Dry mucous membranes  Cardiovascular:      Rate and Rhythm: Normal rate and regular rhythm.      Pulses: Normal pulses.   Pulmonary:      Breath sounds: No wheezing.   Abdominal:      General: Abdomen soft with significantly enlarged liver     Palpations: Abdomen is soft.   Musculoskeletal:      Right lower leg: Edema present.      Left lower leg: Edema present. significant  Edema in the upper extremities bilaterally  Skin:     General: Skin is warm and dry.      Comments: Warm to touch   Neurological:      Comments: Appears comfortableDictation #1  MRN:[id number]  CSN:[id number]     Intake/Output Summary (Last 24 hours) at 3/22/2025 1736  Last data filed at 3/22/2025 0500  Gross per 24 hour   Intake 167.11 ml   Output --   Net 167.11 ml     Labs:   Results from last 72 hours   Lab Units 03/21/25  0504 03/20/25  1656 " 03/20/25  0320   SODIUM mmol/L 135* 134* 136   POTASSIUM mmol/L 4.1 4.1 4.0   CHLORIDE mmol/L 101 101 102   CO2 mmol/L 27 25 24   BUN mg/dL 26* 27* 26*   CREATININE mg/dL 1.18* 1.35* 1.27*   GLUCOSE mg/dL 136* 148* 149*   CALCIUM mg/dL 8.0* 8.3* 8.2*   ANION GAP mmol/L 11 12 14   EGFR mL/min/1.73m*2 60* 51* 55*   PHOSPHORUS mg/dL  --  1.2*  --       Results from last 72 hours   Lab Units 03/21/25  0504 03/20/25  1656 03/20/25  0320   WBC AUTO x10*3/uL 6.9 7.0 7.7   HEMOGLOBIN g/dL 9.1* 9.3* 9.4*   HEMATOCRIT % 25.9* 25.2* 25.1*   PLATELETS AUTO x10*3/uL 135* 137* 158   LYMPHO PCT MAN % 20.5  --  15.8   MONO PCT MAN % 8.5  --  7.0   EOSINO PCT MAN % 0.0  --  0.0                   Micro/ID:     Lab Results   Component Value Date    BLOODCULT No growth at 4 days -  FINAL REPORT 03/12/2025           Assessment and Plan     Assessment: Gissel Harvey is a 39 y.o. 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 She is being treated for ARDS 2/2 CAP (MSSA and s. pneumo) and severe septic shock complicated by acute anuric renal failure, now on CVVH. She is still requiring significant respiratory support w/ increased FiO2 requirements and PEEP, on day 15 of mechanical ventilation. She also has worsening Tbili concerning for intrahepatic insult/injury. She was made DNR by her daughter 3/12, eventually transitioned to comfort care due to poor prognosis of hepatic and renal recovery.    Mechanical Ventilation: > 10 days  Sedation/Analgesia:  none  Restraints: no    Summary for 03/22/25  :  Remains on comfort care, on fentanyl gtt     Chang Nolasco MD   03/22/25 at 5:36 PM     Disclaimer: Documentation completed  with the information available at the time of input. The times in the chart may not be reflective of actual patient care times, interventions, or procedures. Documentation occurs after the physical care of the patient.

## 2025-03-23 VITALS
BODY MASS INDEX: 23.35 KG/M2 | DIASTOLIC BLOOD PRESSURE: 25 MMHG | RESPIRATION RATE: 7 BRPM | HEIGHT: 66 IN | SYSTOLIC BLOOD PRESSURE: 53 MMHG | WEIGHT: 145.28 LBS | TEMPERATURE: 98.4 F | OXYGEN SATURATION: 72 % | HEART RATE: 70 BPM

## 2025-03-23 PROCEDURE — 99238 HOSP IP/OBS DSCHRG MGMT 30/<: CPT | Performed by: STUDENT IN AN ORGANIZED HEALTH CARE EDUCATION/TRAINING PROGRAM

## 2025-03-23 PROCEDURE — 2500000004 HC RX 250 GENERAL PHARMACY W/ HCPCS (ALT 636 FOR OP/ED): Mod: SE | Performed by: STUDENT IN AN ORGANIZED HEALTH CARE EDUCATION/TRAINING PROGRAM

## 2025-03-23 PROCEDURE — 2500000004 HC RX 250 GENERAL PHARMACY W/ HCPCS (ALT 636 FOR OP/ED): Mod: JZ,SE

## 2025-03-23 RX ORDER — HYDROMORPHONE HCL/0.9% NACL/PF 15 MG/30ML
PATIENT CONTROLLED ANALGESIA SYRINGE INTRAVENOUS CONTINUOUS
Status: DISCONTINUED | OUTPATIENT
Start: 2025-03-23 | End: 2025-03-23 | Stop reason: HOSPADM

## 2025-03-23 RX ORDER — NALOXONE HYDROCHLORIDE 0.4 MG/ML
0.2 INJECTION, SOLUTION INTRAMUSCULAR; INTRAVENOUS; SUBCUTANEOUS AS NEEDED
Status: DISCONTINUED | OUTPATIENT
Start: 2025-03-23 | End: 2025-03-23 | Stop reason: HOSPADM

## 2025-03-23 RX ADMIN — Medication: at 12:32

## 2025-03-23 RX ADMIN — Medication: at 05:43

## 2025-03-23 ASSESSMENT — RESPIRATORY DISTRESS OBSERVATION SCALE (RDOS)
INVOLUNTARY NASAL FLARING: 0 - NONE
RESTLESS NONPURPOSEFUL MOVEMENTS: 0 - NONE
PARADOXICAL BREATHING PATTERN: 0 - NONE
INVOLUNTARY NASAL FLARING: 0 - NONE
RDOS TOTAL SCORE: 1
RDOS TOTAL SCORE: 1
GRUNTING AT END OF EXPIRATION: 0 - NONE
RESTLESS NONPURPOSEFUL MOVEMENTS: 0 - NONE
ACCESSORY MUSCLE RISE IN CLAVICLE DURING INSPIRATION: 1 - SLIGHT RISE
GRUNTING AT END OF EXPIRATION: 0 - NONE
RESPIRATORY RATE PER MINUTE: 1 - 19-30 BREATHS
PARADOXICAL BREATHING PATTERN: 0 - NONE
ACCESSORY MUSCLE RISE IN CLAVICLE DURING INSPIRATION: 1 - SLIGHT RISE
ACCESSORY MUSCLE RISE IN CLAVICLE DURING INSPIRATION: 1 - SLIGHT RISE
INVOLUNTARY NASAL FLARING: 0 - NONE
LOOK OF FEAR: 0 - NONE
ACCESSORY MUSCLE RISE IN CLAVICLE DURING INSPIRATION: 1 - SLIGHT RISE
LOOK OF FEAR: 0 - NONE
RESPIRATORY RATE PER MINUTE: 0 - <19 BREATHS
LOOK OF FEAR: 0 - NONE
RDOS TOTAL SCORE: 1
PARADOXICAL BREATHING PATTERN: 0 - NONE
RDOS TOTAL SCORE: 1
GRUNTING AT END OF EXPIRATION: 0 - NONE
RESPIRATORY RATE PER MINUTE: 0 - <19 BREATHS
RDOS TOTAL SCORE: 2
ACCESSORY MUSCLE RISE IN CLAVICLE DURING INSPIRATION: 1 - SLIGHT RISE
INVOLUNTARY NASAL FLARING: 0 - NONE
INVOLUNTARY NASAL FLARING: 0 - NONE
HEART RATE PER MINUTE: 0 - <90 BEATS
INVOLUNTARY NASAL FLARING: 0 - NONE
RESPIRATORY RATE PER MINUTE: 0 - <19 BREATHS
PARADOXICAL BREATHING PATTERN: 0 - NONE
HEART RATE PER MINUTE: 0 - <90 BEATS
LOOK OF FEAR: 0 - NONE
LOOK OF FEAR: 0 - NONE
RESTLESS NONPURPOSEFUL MOVEMENTS: 0 - NONE
HEART RATE PER MINUTE: 0 - <90 BEATS
GRUNTING AT END OF EXPIRATION: 0 - NONE
HEART RATE PER MINUTE: 0 - <90 BEATS
ACCESSORY MUSCLE RISE IN CLAVICLE DURING INSPIRATION: 1 - SLIGHT RISE
PARADOXICAL BREATHING PATTERN: 0 - NONE
RDOS TOTAL SCORE: 1
LOOK OF FEAR: 0 - NONE
PARADOXICAL BREATHING PATTERN: 0 - NONE
RESTLESS NONPURPOSEFUL MOVEMENTS: 0 - NONE
PARADOXICAL BREATHING PATTERN: 0 - NONE
GRUNTING AT END OF EXPIRATION: 0 - NONE
RDOS TOTAL SCORE: 1
RESPIRATORY RATE PER MINUTE: 0 - <19 BREATHS
HEART RATE PER MINUTE: 0 - <90 BEATS
GRUNTING AT END OF EXPIRATION: 0 - NONE
RESTLESS NONPURPOSEFUL MOVEMENTS: 0 - NONE
RESTLESS NONPURPOSEFUL MOVEMENTS: 0 - NONE
RESPIRATORY RATE PER MINUTE: 0 - <19 BREATHS
RESPIRATORY RATE PER MINUTE: 0 - <19 BREATHS
ACCESSORY MUSCLE RISE IN CLAVICLE DURING INSPIRATION: 1 - SLIGHT RISE
INVOLUNTARY NASAL FLARING: 0 - NONE
HEART RATE PER MINUTE: 0 - <90 BEATS
GRUNTING AT END OF EXPIRATION: 0 - NONE
HEART RATE PER MINUTE: 0 - <90 BEATS
LOOK OF FEAR: 0 - NONE
RESTLESS NONPURPOSEFUL MOVEMENTS: 0 - NONE

## 2025-03-23 NOTE — PROGRESS NOTES
03/23/25 0921   Discharge Planning   Home or Post Acute Services Other (Comment)  (Hospice consult)   Expected Discharge Disposition [facility]   Does the patient need discharge transport arranged? Yes   RoundTrip coordination needed? Yes       MD placed a hospice consult. SW consulted and will meet with team and patient/family at the bedside. Will continue to follow for updates.    Shantell Cheung RN, BSN  Transitional Care Coordinator

## 2025-03-23 NOTE — PROGRESS NOTES
Gissel Harvey is a 39 y.o. female on day 20 of admission presenting with Hypoxemia.      Subjective   NE        Objective     Last Recorded Vitals  BP (!) 61/28   Pulse 73   Temp 36.9 °C (98.4 °F) (Temporal)   Resp 15   Wt 65.9 kg (145 lb 4.5 oz)   SpO2 (!) 66%   Intake/Output last 3 Shifts:    Intake/Output Summary (Last 24 hours) at 3/23/2025 0916  Last data filed at 3/23/2025 0543  Gross per 24 hour   Intake 308.96 ml   Output --   Net 308.96 ml       Admission Weight  Weight: 63.5 kg (140 lb) (03/03/25 1500)    Daily Weight  03/20/25 : 65.9 kg (145 lb 4.5 oz)    Image Results  EEG  IMPRESSION    Impression    This vEEG is indicative of a mild to moderate diffuse encephalopathy. The degree of encephalopathy is improved compared to the prior recording. No epileptiform discharges or lateralizing signs are seen.    To note, the EEG was briefly disconnected from 1:07 PM to 1:40 PM.    A full report will be scanned into the patient's chart at a later time.    This report has been interpreted and electronically signed by  Transthoracic Echo (TTE) Limited     [facility], [address] [place] [address]                 Tel [phone] and Fax [phone]    TRANSTHORACIC ECHOCARDIOGRAM REPORT       Patient Name:       GISSEL HARVEY        Reading Physician:    [id number] Fabián Iqbal MD  Study Date:         3/19/2025           Ordering Provider:    [id number] SERAFIN MELENDEZ  MRN/PID:            [id number]            Fellow:  Accession#:         [id number]        Nurse:  Date of Birth/Age:  1985 / 39 years Sonographer:          Willie Palafox                                                                [facility]  Gender assigned at  F                   Additional Staff:  Birth:  Height:             167.64 cm           Admit Date:  Weight:             64.41 kg             Admission Status:     Inpatient - STAT  BSA / BMI:          1.73 m2 / 22.92     Encounter#:           [id number]                      kg/m2  Blood Pressure:     103/57 mmHg         [address]    Study Type:    TRANSTHORACIC ECHO (TTE) LIMITED  Diagnosis/ICD: Hypotension, unspecified-I95.9  Indication:    follow up HFrEF, continued shock  CPT Code:      Echo Limited-01463; Doppler Limited-35529; Color Doppler-37978    Patient History:  Pertinent History: Cirrhosis with portal HTN s/p TIPS, Budd-Chiari, chronic IVC                     occlusion, anti-phospholipid syndrome (supposed to be on                     fondaparinux but poorly adherent at home), 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    Study Detail: The following Echo studies were performed: 2D, M-Mode, Doppler and                color flow. Technically challenging study due to patient lying in                supine position, body habitus, prominent lung artifact and poor                acoustic windows. The patient is intubated. Definity used as a                contrast agent for endocardial border definition. Total contrast                used for this procedure was 2 mL via IV push.       PHYSICIAN INTERPRETATION:  Left Ventricle: Left ventricular ejection fraction is normal, by visual estimate at 65%. The left ventricular cavity size is normal. There is normal septal and normal posterior left ventricular wall thickness. Abnormal (paradoxical) septal motion, consistent with an intraventricular conduction delay. Spectral Doppler shows a normal pattern of left ventricular diastolic filling.  Left Atrium: The left atrial size is normal.  Right Ventricle: The right ventricle is normal in size. There is normal right ventricular global systolic function.  Right Atrium: The right atrium is normal in size.  Aortic Valve: The aortic valve was not well visualized. There is trace aortic valve regurgitation.  Mitral  Valve: The mitral valve is normal in structure. There is trace mitral valve regurgitation.  Tricuspid Valve: The tricuspid valve is structurally normal. There is trace tricuspid regurgitation.  Pulmonic Valve: The pulmonic valve is not well visualized. There is trace pulmonic valve regurgitation.  Pericardium: Trivial pericardial effusion.  Aorta: The aortic root is normal.  Pulmonary Artery: The tricuspid regurgitant velocity is 2.20 m/s, and with an estimated right atrial pressure of 3 mmHg, the estimated pulmonary artery pressure is normal with the RVSP at 22.4 mmHg.  Systemic Veins: The inferior vena cava was not well visualized. There is thrombus detected in the inferior vena cava.  In comparison to the previous echocardiogram(s): Compared with study dated 3/4/2025, rate is now slower with improved LV/RV function.       CONCLUSIONS:   1. Left ventricular ejection fraction is normal, by visual estimate at 65%.   2. There is normal right ventricular global systolic function.   3. Thrombus suspected in the inferior vena cava.    QUANTITATIVE DATA SUMMARY:     2D MEASUREMENTS:         Normal Ranges:  IVSd:            0.70 cm (0.6-1.1cm)  LVPWd:           0.70 cm (0.6-1.1cm)  LVIDd:           4.10 cm (3.9-5.9cm)  LVIDs:           2.70 cm  LV Mass Index:   47 g/m2  LV % FS          34.1 %       LV SYSTOLIC FUNCTION:                       Normal Ranges:  EF-A4C View:    78 % (>=55%)  EF-Visual:      65 %  LV EF Reported: 65 %       LV DIASTOLIC FUNCTION:             Normal Ranges:  MV Peak E:             0.63 m/s    (0.7-1.2 m/s)  MV Peak A:             0.47 m/s    (0.42-0.7 m/s)  E/A Ratio:             1.35        (1.0-2.2)  MV e'                  0.112 m/s   (>8.0)  MV lateral e'          0.11 m/s  MV medial e'           0.11 m/s  MV A Dur:              103.00 msec  E/e' Ratio:            5.60        (<8.0)       MITRAL VALVE:          Normal Ranges:  MV DT:        126 msec (150-240msec)       RIGHT VENTRICLE:  RV  s' 0.15 m/s       TRICUSPID VALVE/RVSP:          Normal Ranges:  Peak TR Velocity:     2.20 m/s  RV Syst Pressure:     22 mmHg  (< 30mmHg)       [id number] Fabián Iqbal MD  Electronically signed on 3/19/2025 at 1:11:57 PM       ** Final **  CT chest abdomen pelvis w IV contrast  Narrative: Interpreted By:  Nitish James  and Merritt Ortiz   STUDY:  CT CHEST ABDOMEN PELVIS W IV CONTRAST;  3/18/2025 1:38 pm      INDICATION:  Signs/Symptoms:hypoxia, liver failure.          Per EMR, 39-year-old woman admitted on 03/03/2025 with hypoxemia and  acute hypoxic respiratory failure requiring intubation secondary to  acute respiratory distress syndrome from a community-acquired  pneumonia. Past medical history notable for cirrhosis with portal  hypertension status post tips formation, Budd-Chiari syndrome,  chronic IVC occlusion, antiphospholipid antibody syndrome      COMPARISON:  CT ABDOMEN PELVIS W IV CONTRAST 3/3/2025      ACCESSION NUMBER(S):  [id number]      ORDERING CLINICIAN:  SERAFIN MELENDEZ      TECHNIQUE:  CT of the chest, abdomen, and pelvis was performed.  Contiguous axial  images were obtained at 3 mm slice thickness through the chest,  abdomen and pelvis. Coronal and sagittal reconstructions at 3 mm  slice thickness were performed. 75 ML of Omnipaque 350 was  administered intravenously without immediate complication.      FINDINGS:  CHEST:  Endotracheal tube tip terminates 3.2 cm above the roland.          LUNG/PLEURA/LARGE AIRWAYS:  Significantly improved aeration of the bilateral lungs. There is  persistent peribronchial and septal thickening seen in the anterior  portions of the bilateral lungs, compatible with improving  consolidations from previous multifocal pneumonia. There is  persistent smooth interlobular septal thickening which is consistent  with interstitial pulmonary edema. There are now more apparent  pulmonary nodules such as 1 seen in the superior portions of the left  lower lobe which measures  0.6 x 0.5 cm (Series 3, Image 177),  inferior portions of the left upper lobe which measures 0.4 x 0.4 cm  (Series 3, Image 173) and anterior/inferior portions of the left  upper lobe which measures 0.9 x 0.8 cm (Series 3, Image 216).      VESSELS:  Aorta and pulmonary arteries are normal caliber.  No atherosclerotic  changes of the aorta are identified. Normal three-vessel aortic arch.  No coronary artery calcifications are present. The azygous vein is  engorged and measures up to 2.6 cm just proximal to the insertion  into the IVC 2.6 cm (Series 2, Image 40) . The left subclavian venous  engorgement measures 2.1 cm in diameter (Series 2, Image 28).      HEART:  Normal size.  No pericardial effusion      MEDIASTINUM AND ASHLYN:  No mediastinal, hilar or axillary lymphadenopathy is present. Of  note, previously seen mediastinal lymphadenopathy is no longer  appreciated. The esophagus is normal. Enteric tube is seen traversing  the esophagus with tip terminating in the gastric body. Similar  appearance of prominent soft tissue in the anterior mediastinum,  likely residual thymic tissue 1.8 cm (Series 2, Image 34).      CHEST WALL AND LOWER NECK:  Extensive collateralized vessels are present along the anterior and  lateral portions of the chest wall. The visualized thyroid gland  appears within normal limits.      ABDOMEN:      LIVER:  The liver demonstrates a nodular contour, consistent with cirrhosis.  Similar appearance of ill-defined lesions within segment 7 of the  liver which have been previously characterized as regenerative  nodules, measuring 1.1 x 0.9 cm (Series 2, Image 116) and 1.5 x 0.7  cm (Series 2, Image 100). Findings are ultimately not fully  characterized on the current examination. Tip shunt in place with  poor opacification, compatible with occlusion.      BILE DUCTS:  Normal caliber.      GALLBLADDER:  The gallbladder is present. Gallbladder wall edema noted.      PANCREAS:  The pancreas appears  unremarkable without evidence of ductal  dilatation or masses.      SPLEEN:  Spleen is enlarged and measures 14.8 cm in the craniocaudal direction  (Series 902, Image 72), similar to prior, consistent with portal  hypertension. Subtle heterogeneous venous hypodensities favored to  represent artifact as opposed to infarction.      ADRENAL GLANDS:  Bilateral adrenal glands appear normal.      KIDNEYS AND URETERS:  The kidneys are normal in size and enhance symmetrically.  No  hydroureteronephrosis or nephroureterolithiasis is identified.      PELVIS:      BLADDER:  The urinary bladder is decompressed, limited for evaluation.      REPRODUCTIVE ORGANS:  The uterus is present.      BOWEL:  Stomach appears unremarkable. There is diffuse edema of virtually all  visualized small bowel loops. Fecal management system in place with  liquid stool seen throughout the entire colon. The appendix is not  definitively visualized.          VESSELS:  The IVC is not visualized, compatible with known chronic occlusion.  The right common iliac vein appears patent up until the expected  position of the IVC. Left common iliac vein is not definitively  visualized. There is extensive collateralization of vessels and  recannulization of the umbilical vein. Findings appear similar, if  not intervally progressed from prior with redemonstration of multiple  engorged collateral vessels.      PERITONEUM/RETROPERITONEUM/LYMPH NODES:  No ascites can be definitively visualized. However, there is  extensive bowel wall edema, vascular engorgement, and anasarca which  is compatible with ongoing third-spacing. No abdominopelvic  lymphadenopathy is present.      BONE AND SOFT TISSUE:  No suspicious osseous lesions are identified.  Extensive venous  collaterals inguinal anasarca as described above. Abdominal wall soft  tissues are otherwise unremarkable.      Impression: 1.  Significant interval improvement in previously seen airspace  disease with  residual disease noted in the anterior portions of the  lungs. Persistent smooth interlobular septal thickening likely  representing pulmonary interstitial edema.  2. Redemonstration of extensive, engorged vascular collaterals,  likely secondary to known chronic occlusion of the IVC and occluded  TIPS.  3. Splenomegaly secondary to the above.  4. Extensive 3rd spacing as evidenced by interval worsening of  anasarca  5. Bowel wall edema and fluid-filled large bowel can be suggestive of  enteritis and diarrhea in the proper clinical setting.          I personally reviewed the images/study and I agree with the findings  as stated by Angel Bang MD (resident).      MACRO:  None      Signed by: Nitish James 3/19/2025 12:07 PM  Dictation workstation:   [id number]      Physical Exam    Constitutional:       Comments: Extubated, resting in bed, eyes closed   Pupils reactive   HENT:      Head: Normocephalic and atraumatic.Dry mucous membranes  Cardiovascular:      Rate and Rhythm: Normal rate and regular rhythm.      Pulses: Normal pulses.   Pulmonary:      Breath sounds: No wheezing.   Abdominal:      General: Abdomen soft with significantly enlarged liver     Palpations: Abdomen is soft.   Musculoskeletal:      Right lower leg: Edema present.      Left lower leg: Edema present. significant  Edema in the upper extremities bilaterally  Skin:     General: Skin is warm and dry.      Comments: Warm to touch       Relevant Results               Assessment/Plan        This patient has a central line   Reason for the central line remaining today? Parenteral medication            Assessment & Plan  Hypoxemia    MICU transfer Gissel Harvey 39-year-old F presented with acute hypoxic respiratory failure and large and septic shock from multifocal pneumonia unable to wean off of vent compassionately extubated and made comfort care, on Hydromorphone PCA. Hospice consulted. TCC and SW involved.      DNR Comfort Measures  Only  DPOA/Contact Number: Payal Durant (daughter) [phone]      25 min               Ayaan Modi MD

## 2025-03-23 NOTE — SIGNIFICANT EVENT
Floor Readiness Note       I, personally, evaluated Gissel Harvey prior to transfer to the floor, including reviewing all current laboratory and imaging studies. The patient remains appropriate for transfer to the floor. Bedside nurse and respiratory therapy are also in agreement of patient's readiness for the floor.     Brief summary:  Gissel Harvey is a 39 y.o. year old female patient admitted on 3/3/2025 with PMHx cirrhosis with portal HTN s/p TIPS, Budd-Chiari, chronic IVC occlusion, anti-phospholipid syndrome who presented to the ED with hypoxemia requiring intubation and was found to have severe multifocal PNA on chest CT. She was treated for ARDS 2/2 CAP (MSSA and s. pneumo) and severe septic shock complicated by acute anuric renal failure requiring CVVH. She was unable to be weaned off vent and per family patient would not have wanted tracheostomy. Patient was transitioned to comfort care on 3/20.    Updated focused Physical Exam:  Resting comfortably in bed   Breathing not labored  Edema improved  RRR    Current Vital Signs:  Heart Rate: 80 (03/23/25 0958 : Jaymie Murphy RN)  BP: (!) 61/28 (03/22/25 1900 : User, System Default)  Temp: 36.9 °C (98.4 °F) (03/23/25 0200 : Shad Villafana RN)  Resp: 12 (03/23/25 0958 : Jaymie Murphy RN)  SpO2: (!) 75 % (03/23/25 0958 : Jaymie Murphy RN)    Relevant updates since rounds:      Accepting team, Hospitalist A, received verbal sign out and the Provider Care team/Attending has been updated. Bedside nurse will now call accepting nurse for report and patient will be transferred to [place] [id number].    Lima Buck MD

## 2025-03-23 NOTE — PROGRESS NOTES
ICU to Cash Transfer Summary     I:  ICU Admission Reason & Brief ICU Course:    Gissel Harvey is a 39 y.o. year old female patient admitted on 3/3/2025 with PMHx cirrhosis with portal HTN s/p TIPS, Budd-Chiari, chronic IVC occlusion, anti-phospholipid syndrome who presented to the ED with hypoxemia requiring intubation and was found to have severe multifocal PNA on chest CT. She was treated for ARDS 2/2 CAP (MSSA and s. pneumo) and severe septic shock complicated by acute anuric renal failure requiring CVVH. She was unable to be weaned off vent and per family patient would not have wanted tracheostomy. Patient was transitioned to comfort care on 3/20.       C: Code Status/DPOA Info/Goals of Care/ACP Note    DNR Comfort Measures Only  DPOA/Contact Number: Payal Durant (daughter) [phone]     U: Unprescribing & Pertinent High-Risk Medications    Changes to home meds: comfort care      Anticoagulation: No Reason for no VTE prophylaxis:medical contraindication due to comfort care status    Antibiotics:   [x] N/A - no current planned antimicrobioals      P: Pending Tests at the Time of Transfer   none      A: Active consultants, including Rehab:   []  Subspecialty Consultants: nephrology, palliative care, ENT  []  PT  []  OT  []  SLP  []  Wound Care    U: Uncertainty Measure/Diagnostic Pause:    Working diagnosis at the time of transfer ARDS, septic shock, though ddx includes      Diagnosis Degree of Certainty: 1. High degree of certainty about the clinical diagnosis.     S: Summary of Major Problems and To-Dos:   Comfort care    To-do list prior to transfer:  []Transitioned from fentanyl drip to dilaudid PCA basal rate         E: Exam, including Lines/Drains/Airways & Data Review:   Resting in bed off monitors given comfort care status     Difficult airway? N/A  Lines/drains assessed for removal? No    Within 30 minutes of the patient physically leaving the floor, a Floor Readiness Note needs to be placed with  updated vitals.

## 2025-03-23 NOTE — PROGRESS NOTES
03/23/25 0935   Discharge Planning   Support Systems Children   Expected Discharge Disposition [facility]   Does the patient need discharge transport arranged? No   RoundTrip coordination needed? No     SW consulted to assist with Hospice referral.  SW met with pts daughter Payal to discuss preferred provider.  Payal inquiring about transferring pt to [facility].  Dr. Buck met with daughter and discussed pt appropriate for [facility]IP.  Payal understanding and in agreement with [facility].  SW messaged [facility] and requested they contact daughter to coordinate. SW to continue to follow and assist as needed.    Elizabeth Gunsalus LISW-S  Care Transitions Supervisor

## 2025-03-24 NOTE — DISCHARGE SUMMARY
Discharge Diagnosis  Hypoxemia    Issues Requiring Follow-Up  None     Discharge Meds     Medication List      STOP taking these medications     Arixtra 7.5 mg/0.6 mL syringe; Generic drug: fondaparinux       Test Results Pending At Discharge  Pending Labs       Order Current Status    Type and Screen In process    Fungal Culture, Blood Preliminary result            Hospital Course          MICU transfer Gissel Harvey 39-year-old F presented with acute hypoxic respiratory failure and large and septic shock from multifocal pneumonia unable to wean off of vent compassionately extubated and made comfort care, on Hydromorphone PCA. Hospice consulted. TCC and SW involved.  Pt passed away while inpatient overnight.      DNR Comfort Measures Only  DPOA/Contact Number: Payal Durant (daughter) [phone]            Pertinent Physical Exam At Time of Discharge  Physical Exam    Outpatient Follow-Up  No future appointments.      Ayaan Modi MD

## 2025-04-26 LAB — FUNGAL CULTURE, BLOOD (ARUP): NORMAL

## 2025-04-30 LAB — FUNGAL CULTURE, BLOOD (ARUP): NORMAL
